# Patient Record
Sex: MALE
[De-identification: names, ages, dates, MRNs, and addresses within clinical notes are randomized per-mention and may not be internally consistent; named-entity substitution may affect disease eponyms.]

---

## 2023-06-05 PROBLEM — Z00.00 ENCOUNTER FOR PREVENTIVE HEALTH EXAMINATION: Status: ACTIVE | Noted: 2023-06-05

## 2023-07-14 ENCOUNTER — APPOINTMENT (OUTPATIENT)
Dept: OTOLARYNGOLOGY | Facility: CLINIC | Age: 58
End: 2023-07-14

## 2023-08-20 ENCOUNTER — INPATIENT (INPATIENT)
Facility: HOSPITAL | Age: 58
LOS: 7 days | Discharge: HOME | DRG: 872 | End: 2023-08-28
Attending: INTERNAL MEDICINE | Admitting: INTERNAL MEDICINE
Payer: MEDICAID

## 2023-08-20 VITALS
DIASTOLIC BLOOD PRESSURE: 55 MMHG | HEART RATE: 102 BPM | HEIGHT: 74 IN | SYSTOLIC BLOOD PRESSURE: 88 MMHG | WEIGHT: 259.93 LBS | RESPIRATION RATE: 18 BRPM | TEMPERATURE: 98 F | OXYGEN SATURATION: 94 %

## 2023-08-20 LAB
ALBUMIN SERPL ELPH-MCNC: 3.2 G/DL — LOW (ref 3.5–5)
ALBUMIN SERPL ELPH-MCNC: 3.2 G/DL — LOW (ref 3.5–5)
ALP SERPL-CCNC: 120 U/L — SIGNIFICANT CHANGE UP (ref 40–120)
ALP SERPL-CCNC: 120 U/L — SIGNIFICANT CHANGE UP (ref 40–120)
ALT FLD-CCNC: 32 U/L DA — SIGNIFICANT CHANGE UP (ref 10–60)
ALT FLD-CCNC: 32 U/L DA — SIGNIFICANT CHANGE UP (ref 10–60)
ANION GAP SERPL CALC-SCNC: 11 MMOL/L — SIGNIFICANT CHANGE UP (ref 5–17)
ANION GAP SERPL CALC-SCNC: 11 MMOL/L — SIGNIFICANT CHANGE UP (ref 5–17)
APTT BLD: 33.6 SEC — SIGNIFICANT CHANGE UP (ref 24.5–35.6)
APTT BLD: 33.6 SEC — SIGNIFICANT CHANGE UP (ref 24.5–35.6)
AST SERPL-CCNC: 16 U/L — SIGNIFICANT CHANGE UP (ref 10–40)
AST SERPL-CCNC: 16 U/L — SIGNIFICANT CHANGE UP (ref 10–40)
BASOPHILS # BLD AUTO: 0.05 K/UL — SIGNIFICANT CHANGE UP (ref 0–0.2)
BASOPHILS # BLD AUTO: 0.05 K/UL — SIGNIFICANT CHANGE UP (ref 0–0.2)
BASOPHILS NFR BLD AUTO: 0.3 % — SIGNIFICANT CHANGE UP (ref 0–2)
BASOPHILS NFR BLD AUTO: 0.3 % — SIGNIFICANT CHANGE UP (ref 0–2)
BILIRUB SERPL-MCNC: 0.5 MG/DL — SIGNIFICANT CHANGE UP (ref 0.2–1.2)
BILIRUB SERPL-MCNC: 0.5 MG/DL — SIGNIFICANT CHANGE UP (ref 0.2–1.2)
BUN SERPL-MCNC: 17 MG/DL — SIGNIFICANT CHANGE UP (ref 7–18)
BUN SERPL-MCNC: 17 MG/DL — SIGNIFICANT CHANGE UP (ref 7–18)
CALCIUM SERPL-MCNC: 8.6 MG/DL — SIGNIFICANT CHANGE UP (ref 8.4–10.5)
CALCIUM SERPL-MCNC: 8.6 MG/DL — SIGNIFICANT CHANGE UP (ref 8.4–10.5)
CHLORIDE SERPL-SCNC: 101 MMOL/L — SIGNIFICANT CHANGE UP (ref 96–108)
CHLORIDE SERPL-SCNC: 101 MMOL/L — SIGNIFICANT CHANGE UP (ref 96–108)
CO2 SERPL-SCNC: 22 MMOL/L — SIGNIFICANT CHANGE UP (ref 22–31)
CO2 SERPL-SCNC: 22 MMOL/L — SIGNIFICANT CHANGE UP (ref 22–31)
CREAT SERPL-MCNC: 1.76 MG/DL — HIGH (ref 0.5–1.3)
CREAT SERPL-MCNC: 1.76 MG/DL — HIGH (ref 0.5–1.3)
EGFR: 44 ML/MIN/1.73M2 — LOW
EGFR: 44 ML/MIN/1.73M2 — LOW
EOSINOPHIL # BLD AUTO: 0.08 K/UL — SIGNIFICANT CHANGE UP (ref 0–0.5)
EOSINOPHIL # BLD AUTO: 0.08 K/UL — SIGNIFICANT CHANGE UP (ref 0–0.5)
EOSINOPHIL NFR BLD AUTO: 0.5 % — SIGNIFICANT CHANGE UP (ref 0–6)
EOSINOPHIL NFR BLD AUTO: 0.5 % — SIGNIFICANT CHANGE UP (ref 0–6)
GLUCOSE SERPL-MCNC: 147 MG/DL — HIGH (ref 70–99)
GLUCOSE SERPL-MCNC: 147 MG/DL — HIGH (ref 70–99)
HCT VFR BLD CALC: 26.9 % — LOW (ref 39–50)
HCT VFR BLD CALC: 26.9 % — LOW (ref 39–50)
HGB BLD-MCNC: 7.9 G/DL — LOW (ref 13–17)
HGB BLD-MCNC: 7.9 G/DL — LOW (ref 13–17)
IMM GRANULOCYTES NFR BLD AUTO: 0.6 % — SIGNIFICANT CHANGE UP (ref 0–0.9)
IMM GRANULOCYTES NFR BLD AUTO: 0.6 % — SIGNIFICANT CHANGE UP (ref 0–0.9)
INR BLD: 1.2 RATIO — HIGH (ref 0.85–1.18)
INR BLD: 1.2 RATIO — HIGH (ref 0.85–1.18)
LACTATE SERPL-SCNC: 2 MMOL/L — SIGNIFICANT CHANGE UP (ref 0.7–2)
LACTATE SERPL-SCNC: 2 MMOL/L — SIGNIFICANT CHANGE UP (ref 0.7–2)
LYMPHOCYTES # BLD AUTO: 0.9 K/UL — LOW (ref 1–3.3)
LYMPHOCYTES # BLD AUTO: 0.9 K/UL — LOW (ref 1–3.3)
LYMPHOCYTES # BLD AUTO: 5.3 % — LOW (ref 13–44)
LYMPHOCYTES # BLD AUTO: 5.3 % — LOW (ref 13–44)
MCHC RBC-ENTMCNC: 22.7 PG — LOW (ref 27–34)
MCHC RBC-ENTMCNC: 22.7 PG — LOW (ref 27–34)
MCHC RBC-ENTMCNC: 29.4 GM/DL — LOW (ref 32–36)
MCHC RBC-ENTMCNC: 29.4 GM/DL — LOW (ref 32–36)
MCV RBC AUTO: 77.3 FL — LOW (ref 80–100)
MCV RBC AUTO: 77.3 FL — LOW (ref 80–100)
MONOCYTES # BLD AUTO: 1.47 K/UL — HIGH (ref 0–0.9)
MONOCYTES # BLD AUTO: 1.47 K/UL — HIGH (ref 0–0.9)
MONOCYTES NFR BLD AUTO: 8.6 % — SIGNIFICANT CHANGE UP (ref 2–14)
MONOCYTES NFR BLD AUTO: 8.6 % — SIGNIFICANT CHANGE UP (ref 2–14)
NEUTROPHILS # BLD AUTO: 14.41 K/UL — HIGH (ref 1.8–7.4)
NEUTROPHILS # BLD AUTO: 14.41 K/UL — HIGH (ref 1.8–7.4)
NEUTROPHILS NFR BLD AUTO: 84.7 % — HIGH (ref 43–77)
NEUTROPHILS NFR BLD AUTO: 84.7 % — HIGH (ref 43–77)
NRBC # BLD: 0 /100 WBCS — SIGNIFICANT CHANGE UP (ref 0–0)
NRBC # BLD: 0 /100 WBCS — SIGNIFICANT CHANGE UP (ref 0–0)
PLATELET # BLD AUTO: 397 K/UL — SIGNIFICANT CHANGE UP (ref 150–400)
PLATELET # BLD AUTO: 397 K/UL — SIGNIFICANT CHANGE UP (ref 150–400)
POTASSIUM SERPL-MCNC: 3.4 MMOL/L — LOW (ref 3.5–5.3)
POTASSIUM SERPL-MCNC: 3.4 MMOL/L — LOW (ref 3.5–5.3)
POTASSIUM SERPL-SCNC: 3.4 MMOL/L — LOW (ref 3.5–5.3)
POTASSIUM SERPL-SCNC: 3.4 MMOL/L — LOW (ref 3.5–5.3)
PROT SERPL-MCNC: 8.2 G/DL — SIGNIFICANT CHANGE UP (ref 6–8.3)
PROT SERPL-MCNC: 8.2 G/DL — SIGNIFICANT CHANGE UP (ref 6–8.3)
PROTHROM AB SERPL-ACNC: 13.6 SEC — HIGH (ref 9.5–13)
PROTHROM AB SERPL-ACNC: 13.6 SEC — HIGH (ref 9.5–13)
RBC # BLD: 3.48 M/UL — LOW (ref 4.2–5.8)
RBC # BLD: 3.48 M/UL — LOW (ref 4.2–5.8)
RBC # FLD: 16.5 % — HIGH (ref 10.3–14.5)
RBC # FLD: 16.5 % — HIGH (ref 10.3–14.5)
SARS-COV-2 RNA SPEC QL NAA+PROBE: SIGNIFICANT CHANGE UP
SARS-COV-2 RNA SPEC QL NAA+PROBE: SIGNIFICANT CHANGE UP
SODIUM SERPL-SCNC: 134 MMOL/L — LOW (ref 135–145)
SODIUM SERPL-SCNC: 134 MMOL/L — LOW (ref 135–145)
TROPONIN I, HIGH SENSITIVITY RESULT: 7.3 NG/L — SIGNIFICANT CHANGE UP
TROPONIN I, HIGH SENSITIVITY RESULT: 7.3 NG/L — SIGNIFICANT CHANGE UP
WBC # BLD: 17.02 K/UL — HIGH (ref 3.8–10.5)
WBC # BLD: 17.02 K/UL — HIGH (ref 3.8–10.5)
WBC # FLD AUTO: 17.02 K/UL — HIGH (ref 3.8–10.5)
WBC # FLD AUTO: 17.02 K/UL — HIGH (ref 3.8–10.5)

## 2023-08-20 PROCEDURE — 99285 EMERGENCY DEPT VISIT HI MDM: CPT

## 2023-08-20 PROCEDURE — 71045 X-RAY EXAM CHEST 1 VIEW: CPT | Mod: 26

## 2023-08-20 RX ORDER — PIPERACILLIN AND TAZOBACTAM 4; .5 G/20ML; G/20ML
3.38 INJECTION, POWDER, LYOPHILIZED, FOR SOLUTION INTRAVENOUS ONCE
Refills: 0 | Status: COMPLETED | OUTPATIENT
Start: 2023-08-20 | End: 2023-08-20

## 2023-08-20 RX ORDER — VANCOMYCIN HCL 1 G
1000 VIAL (EA) INTRAVENOUS ONCE
Refills: 0 | Status: COMPLETED | OUTPATIENT
Start: 2023-08-20 | End: 2023-08-21

## 2023-08-20 RX ORDER — SODIUM CHLORIDE 9 MG/ML
1000 INJECTION INTRAMUSCULAR; INTRAVENOUS; SUBCUTANEOUS ONCE
Refills: 0 | Status: COMPLETED | OUTPATIENT
Start: 2023-08-20 | End: 2023-08-20

## 2023-08-20 RX ADMIN — SODIUM CHLORIDE 1000 MILLILITER(S): 9 INJECTION INTRAMUSCULAR; INTRAVENOUS; SUBCUTANEOUS at 23:53

## 2023-08-20 RX ADMIN — PIPERACILLIN AND TAZOBACTAM 200 GRAM(S): 4; .5 INJECTION, POWDER, LYOPHILIZED, FOR SOLUTION INTRAVENOUS at 23:54

## 2023-08-20 NOTE — ED PROVIDER NOTE - CLINICAL SUMMARY MEDICAL DECISION MAKING FREE TEXT BOX
1:14am  /68. Pt in no idstress.  MAR and hospitalist endorsed. Pt agrees with admission for IVF and IV abx.  I had a detailed discussion with the patient and/or guardian regarding the historical points, exam findings, and any diagnostic results supporting the admit diagnosis. 1:14am  /68. Pt in no idstress.  MAR and hospitalist endorsed. Pt agrees with admission for IVF and IV abx.  I had a detailed discussion with the patient and/or guardian regarding the historical points, exam findings, and any diagnostic results supporting the admit diagnosis.    H/H 7.9/26.9, pt states received 1 unit transfusion at [facility]. Pt denies blood per rectum. Pt states has dark stool from ferrous supplements. 1:14am  /68. Pt in no idstress.  MAR and hospitalist endorsed. Pt agrees with admission for IVF and IV abx.  I had a detailed discussion with the patient and/or guardian regarding the historical points, exam findings, and any diagnostic results supporting the admit diagnosis.    H/H 7.9/26.9, pt states received 1 unit transfusion at [facility]. Pt denies blood per rectum. Pt states has dark stool from ferrous supplements. 1:14am  /68. Pt in no idstress.  MAR and hospitalist endorsed. Pt agrees with admission for IVF and IV abx.  I had a detailed discussion with the patient and/or guardian regarding the historical points, exam findings, and any diagnostic results supporting the admit diagnosis.    H/H 7.9/26.9, pt states received 1 unit transfusion at [facility]. Pt denies blood per rectum. Pt states has dark stool from ferrous supplements.

## 2023-08-20 NOTE — ED PROVIDER NOTE - OBJECTIVE STATEMENT
58-year-old male past medical history for diabetes, dyslipidemia, hypertension.  Patient with progressive weakness for the past 3 days.  Patient states he was recently admitted to [facility] in [place] for anemia and diabetic lower extremity infected ulcers.  Patient states he left hospital 2 days ago due to personal issues.  Patient denies chest pain, no shortness of breath, no vomiting, no abdominal pain.  Patient does not endorse recent fever.  Meds: Janumet, Jardiance, insulin, losartan/hydrochlorothiazide, pravastatin. 58-year-old male past medical history for diabetes, dyslipidemia, hypertension.  Patient with progressive weakness for the past 3 days.  Patient states he was recently admitted to [facility] in [place] for anemia and diabetic lower extremity infected ulcers.  Patient states he left hospital 2 days ago due to personal issues.  Patient denies chest pain, no shortness of breath, no vomiting, no abdominal pain.  Patient does not endorse recent fever.  Meds: Janumet, Jardiance, insulin, losartan/hydrochlorothiazide, pravastatin. 58-year-old male past medical history for diabetes, dyslipidemia, hypertension.  Patient with progressive weakness for the past 3 days.  Patient states he was recently admitted to [facility] in [place] for anemia and diabetic lower extremity infected ulcers.  Patient states he left hospital 2 days ago due to personal issues.  Patient denies chest pain, no shortness of breath, no vomiting, no abdominal pain.  Patient does not endorse recent fever.  Meds: Janumet, Jardiance, insulin, losartan/hydrochlorothiazide, pravastatin.

## 2023-08-20 NOTE — ED PROVIDER NOTE - NS ED MD DISPO DIVISION
[facility]

## 2023-08-20 NOTE — ED ADULT TRIAGE NOTE - NS ED NURSE AMBULANCES
[facility]

## 2023-08-21 DIAGNOSIS — E11.9 TYPE 2 DIABETES MELLITUS WITHOUT COMPLICATIONS: ICD-10-CM

## 2023-08-21 DIAGNOSIS — A41.9 SEPSIS, UNSPECIFIED ORGANISM: ICD-10-CM

## 2023-08-21 DIAGNOSIS — L97.909 NON-PRESSURE CHRONIC ULCER OF UNSPECIFIED PART OF UNSPECIFIED LOWER LEG WITH UNSPECIFIED SEVERITY: ICD-10-CM

## 2023-08-21 DIAGNOSIS — N17.9 ACUTE KIDNEY FAILURE, UNSPECIFIED: ICD-10-CM

## 2023-08-21 DIAGNOSIS — I10 ESSENTIAL (PRIMARY) HYPERTENSION: ICD-10-CM

## 2023-08-21 DIAGNOSIS — E78.5 HYPERLIPIDEMIA, UNSPECIFIED: ICD-10-CM

## 2023-08-21 DIAGNOSIS — Z29.9 ENCOUNTER FOR PROPHYLACTIC MEASURES, UNSPECIFIED: ICD-10-CM

## 2023-08-21 DIAGNOSIS — I95.9 HYPOTENSION, UNSPECIFIED: ICD-10-CM

## 2023-08-21 DIAGNOSIS — D64.9 ANEMIA, UNSPECIFIED: ICD-10-CM

## 2023-08-21 DIAGNOSIS — L03.115 CELLULITIS OF RIGHT LOWER LIMB: ICD-10-CM

## 2023-08-21 DIAGNOSIS — E11.622 TYPE 2 DIABETES MELLITUS WITH OTHER SKIN ULCER: ICD-10-CM

## 2023-08-21 LAB
A1C WITH ESTIMATED AVERAGE GLUCOSE RESULT: 6.6 % — HIGH (ref 4–5.6)
A1C WITH ESTIMATED AVERAGE GLUCOSE RESULT: 6.6 % — HIGH (ref 4–5.6)
ABO RH CONFIRMATION: SIGNIFICANT CHANGE UP
ABO RH CONFIRMATION: SIGNIFICANT CHANGE UP
ANION GAP SERPL CALC-SCNC: 10 MMOL/L — SIGNIFICANT CHANGE UP (ref 5–17)
ANION GAP SERPL CALC-SCNC: 10 MMOL/L — SIGNIFICANT CHANGE UP (ref 5–17)
ANISOCYTOSIS BLD QL: SLIGHT — SIGNIFICANT CHANGE UP
ANISOCYTOSIS BLD QL: SLIGHT — SIGNIFICANT CHANGE UP
APPEARANCE UR: CLEAR — SIGNIFICANT CHANGE UP
APPEARANCE UR: CLEAR — SIGNIFICANT CHANGE UP
BILIRUB UR-MCNC: NEGATIVE — SIGNIFICANT CHANGE UP
BILIRUB UR-MCNC: NEGATIVE — SIGNIFICANT CHANGE UP
BUN SERPL-MCNC: 15 MG/DL — SIGNIFICANT CHANGE UP (ref 7–18)
BUN SERPL-MCNC: 15 MG/DL — SIGNIFICANT CHANGE UP (ref 7–18)
CALCIUM SERPL-MCNC: 7.1 MG/DL — LOW (ref 8.4–10.5)
CALCIUM SERPL-MCNC: 7.1 MG/DL — LOW (ref 8.4–10.5)
CHLORIDE SERPL-SCNC: 106 MMOL/L — SIGNIFICANT CHANGE UP (ref 96–108)
CHLORIDE SERPL-SCNC: 106 MMOL/L — SIGNIFICANT CHANGE UP (ref 96–108)
CO2 SERPL-SCNC: 20 MMOL/L — LOW (ref 22–31)
CO2 SERPL-SCNC: 20 MMOL/L — LOW (ref 22–31)
COLOR SPEC: YELLOW — SIGNIFICANT CHANGE UP
COLOR SPEC: YELLOW — SIGNIFICANT CHANGE UP
CREAT SERPL-MCNC: 1.1 MG/DL — SIGNIFICANT CHANGE UP (ref 0.5–1.3)
CREAT SERPL-MCNC: 1.1 MG/DL — SIGNIFICANT CHANGE UP (ref 0.5–1.3)
DIFF PNL FLD: NEGATIVE — SIGNIFICANT CHANGE UP
DIFF PNL FLD: NEGATIVE — SIGNIFICANT CHANGE UP
EGFR: 78 ML/MIN/1.73M2 — SIGNIFICANT CHANGE UP
EGFR: 78 ML/MIN/1.73M2 — SIGNIFICANT CHANGE UP
ESTIMATED AVERAGE GLUCOSE: 143 MG/DL — HIGH (ref 68–114)
ESTIMATED AVERAGE GLUCOSE: 143 MG/DL — HIGH (ref 68–114)
FERRITIN SERPL-MCNC: 73 NG/ML — SIGNIFICANT CHANGE UP (ref 30–400)
FERRITIN SERPL-MCNC: 73 NG/ML — SIGNIFICANT CHANGE UP (ref 30–400)
GLUCOSE BLDC GLUCOMTR-MCNC: 133 MG/DL — HIGH (ref 70–99)
GLUCOSE BLDC GLUCOMTR-MCNC: 133 MG/DL — HIGH (ref 70–99)
GLUCOSE BLDC GLUCOMTR-MCNC: 147 MG/DL — HIGH (ref 70–99)
GLUCOSE BLDC GLUCOMTR-MCNC: 147 MG/DL — HIGH (ref 70–99)
GLUCOSE BLDC GLUCOMTR-MCNC: 164 MG/DL — HIGH (ref 70–99)
GLUCOSE BLDC GLUCOMTR-MCNC: 164 MG/DL — HIGH (ref 70–99)
GLUCOSE BLDC GLUCOMTR-MCNC: 193 MG/DL — HIGH (ref 70–99)
GLUCOSE BLDC GLUCOMTR-MCNC: 193 MG/DL — HIGH (ref 70–99)
GLUCOSE SERPL-MCNC: 224 MG/DL — HIGH (ref 70–99)
GLUCOSE SERPL-MCNC: 224 MG/DL — HIGH (ref 70–99)
GLUCOSE UR QL: >=1000 MG/DL
GLUCOSE UR QL: >=1000 MG/DL
GRAM STN FLD: SIGNIFICANT CHANGE UP
HAPTOGLOB SERPL-MCNC: 372 MG/DL — HIGH (ref 34–200)
HAPTOGLOB SERPL-MCNC: 372 MG/DL — HIGH (ref 34–200)
HCT VFR BLD CALC: 22.9 % — LOW (ref 39–50)
HCT VFR BLD CALC: 22.9 % — LOW (ref 39–50)
HCT VFR BLD CALC: 24.8 % — LOW (ref 39–50)
HCT VFR BLD CALC: 24.8 % — LOW (ref 39–50)
HGB BLD-MCNC: 6.9 G/DL — CRITICAL LOW (ref 13–17)
HGB BLD-MCNC: 6.9 G/DL — CRITICAL LOW (ref 13–17)
HGB BLD-MCNC: 7.6 G/DL — LOW (ref 13–17)
HGB BLD-MCNC: 7.6 G/DL — LOW (ref 13–17)
HYPOCHROMIA BLD QL: SLIGHT — SIGNIFICANT CHANGE UP
HYPOCHROMIA BLD QL: SLIGHT — SIGNIFICANT CHANGE UP
IRON SATN MFR SERPL: 2 % — LOW (ref 20–55)
IRON SATN MFR SERPL: 2 % — LOW (ref 20–55)
IRON SATN MFR SERPL: 8 UG/DL — LOW (ref 65–170)
IRON SATN MFR SERPL: 8 UG/DL — LOW (ref 65–170)
KETONES UR-MCNC: ABNORMAL MG/DL
KETONES UR-MCNC: ABNORMAL MG/DL
LDH SERPL L TO P-CCNC: 158 U/L — SIGNIFICANT CHANGE UP (ref 120–225)
LDH SERPL L TO P-CCNC: 158 U/L — SIGNIFICANT CHANGE UP (ref 120–225)
LEUKOCYTE ESTERASE UR-ACNC: NEGATIVE — SIGNIFICANT CHANGE UP
LEUKOCYTE ESTERASE UR-ACNC: NEGATIVE — SIGNIFICANT CHANGE UP
MAGNESIUM SERPL-MCNC: 2.3 MG/DL — SIGNIFICANT CHANGE UP (ref 1.6–2.6)
MAGNESIUM SERPL-MCNC: 2.3 MG/DL — SIGNIFICANT CHANGE UP (ref 1.6–2.6)
MANUAL SMEAR VERIFICATION: SIGNIFICANT CHANGE UP
MANUAL SMEAR VERIFICATION: SIGNIFICANT CHANGE UP
MCHC RBC-ENTMCNC: 23.4 PG — LOW (ref 27–34)
MCHC RBC-ENTMCNC: 23.4 PG — LOW (ref 27–34)
MCHC RBC-ENTMCNC: 23.8 PG — LOW (ref 27–34)
MCHC RBC-ENTMCNC: 23.8 PG — LOW (ref 27–34)
MCHC RBC-ENTMCNC: 30.1 GM/DL — LOW (ref 32–36)
MCHC RBC-ENTMCNC: 30.1 GM/DL — LOW (ref 32–36)
MCHC RBC-ENTMCNC: 30.6 GM/DL — LOW (ref 32–36)
MCHC RBC-ENTMCNC: 30.6 GM/DL — LOW (ref 32–36)
MCV RBC AUTO: 77.5 FL — LOW (ref 80–100)
MCV RBC AUTO: 77.5 FL — LOW (ref 80–100)
MCV RBC AUTO: 77.6 FL — LOW (ref 80–100)
MCV RBC AUTO: 77.6 FL — LOW (ref 80–100)
MICROCYTES BLD QL: SLIGHT — SIGNIFICANT CHANGE UP
MICROCYTES BLD QL: SLIGHT — SIGNIFICANT CHANGE UP
NITRITE UR-MCNC: NEGATIVE — SIGNIFICANT CHANGE UP
NITRITE UR-MCNC: NEGATIVE — SIGNIFICANT CHANGE UP
NRBC # BLD: 0 /100 WBCS — SIGNIFICANT CHANGE UP (ref 0–0)
OVALOCYTES BLD QL SMEAR: SLIGHT — SIGNIFICANT CHANGE UP
OVALOCYTES BLD QL SMEAR: SLIGHT — SIGNIFICANT CHANGE UP
PH UR: 6 — SIGNIFICANT CHANGE UP (ref 5–8)
PH UR: 6 — SIGNIFICANT CHANGE UP (ref 5–8)
PHOSPHATE SERPL-MCNC: 3.2 MG/DL — SIGNIFICANT CHANGE UP (ref 2.5–4.5)
PHOSPHATE SERPL-MCNC: 3.2 MG/DL — SIGNIFICANT CHANGE UP (ref 2.5–4.5)
PLAT MORPH BLD: NORMAL — SIGNIFICANT CHANGE UP
PLAT MORPH BLD: NORMAL — SIGNIFICANT CHANGE UP
PLATELET # BLD AUTO: 314 K/UL — SIGNIFICANT CHANGE UP (ref 150–400)
PLATELET # BLD AUTO: 314 K/UL — SIGNIFICANT CHANGE UP (ref 150–400)
PLATELET # BLD AUTO: 318 K/UL — SIGNIFICANT CHANGE UP (ref 150–400)
PLATELET # BLD AUTO: 318 K/UL — SIGNIFICANT CHANGE UP (ref 150–400)
PLATELET COUNT - ESTIMATE: NORMAL — SIGNIFICANT CHANGE UP
PLATELET COUNT - ESTIMATE: NORMAL — SIGNIFICANT CHANGE UP
POLYCHROMASIA BLD QL SMEAR: SLIGHT — SIGNIFICANT CHANGE UP
POLYCHROMASIA BLD QL SMEAR: SLIGHT — SIGNIFICANT CHANGE UP
POTASSIUM SERPL-MCNC: 3.5 MMOL/L — SIGNIFICANT CHANGE UP (ref 3.5–5.3)
POTASSIUM SERPL-MCNC: 3.5 MMOL/L — SIGNIFICANT CHANGE UP (ref 3.5–5.3)
POTASSIUM SERPL-SCNC: 3.5 MMOL/L — SIGNIFICANT CHANGE UP (ref 3.5–5.3)
POTASSIUM SERPL-SCNC: 3.5 MMOL/L — SIGNIFICANT CHANGE UP (ref 3.5–5.3)
PROT UR-MCNC: 30 MG/DL
PROT UR-MCNC: 30 MG/DL
RBC # BLD: 2.95 M/UL — LOW (ref 4.2–5.8)
RBC # BLD: 2.95 M/UL — LOW (ref 4.2–5.8)
RBC # BLD: 3.2 M/UL — LOW (ref 4.2–5.8)
RBC # BLD: 3.2 M/UL — LOW (ref 4.2–5.8)
RBC # FLD: 16.8 % — HIGH (ref 10.3–14.5)
RBC # FLD: 16.8 % — HIGH (ref 10.3–14.5)
RBC # FLD: 16.9 % — HIGH (ref 10.3–14.5)
RBC # FLD: 16.9 % — HIGH (ref 10.3–14.5)
RBC BLD AUTO: ABNORMAL
RBC BLD AUTO: ABNORMAL
RETICS #: 62.8 K/UL — SIGNIFICANT CHANGE UP (ref 25–125)
RETICS #: 62.8 K/UL — SIGNIFICANT CHANGE UP (ref 25–125)
RETICS/RBC NFR: 2.1 % — SIGNIFICANT CHANGE UP (ref 0.5–2.5)
RETICS/RBC NFR: 2.1 % — SIGNIFICANT CHANGE UP (ref 0.5–2.5)
SODIUM SERPL-SCNC: 136 MMOL/L — SIGNIFICANT CHANGE UP (ref 135–145)
SODIUM SERPL-SCNC: 136 MMOL/L — SIGNIFICANT CHANGE UP (ref 135–145)
SP GR SPEC: 1.03 — HIGH (ref 1–1.03)
SP GR SPEC: 1.03 — HIGH (ref 1–1.03)
SPECIMEN SOURCE: SIGNIFICANT CHANGE UP
TIBC SERPL-MCNC: 323 UG/DL — SIGNIFICANT CHANGE UP (ref 250–450)
TIBC SERPL-MCNC: 323 UG/DL — SIGNIFICANT CHANGE UP (ref 250–450)
TRANSFERRIN SERPL-MCNC: 274 MG/DL — SIGNIFICANT CHANGE UP (ref 200–360)
TRANSFERRIN SERPL-MCNC: 274 MG/DL — SIGNIFICANT CHANGE UP (ref 200–360)
UIBC SERPL-MCNC: 315 UG/DL — SIGNIFICANT CHANGE UP (ref 110–370)
UIBC SERPL-MCNC: 315 UG/DL — SIGNIFICANT CHANGE UP (ref 110–370)
UROBILINOGEN FLD QL: 0.2 MG/DL — SIGNIFICANT CHANGE UP (ref 0.2–1)
UROBILINOGEN FLD QL: 0.2 MG/DL — SIGNIFICANT CHANGE UP (ref 0.2–1)
WBC # BLD: 10.14 K/UL — SIGNIFICANT CHANGE UP (ref 3.8–10.5)
WBC # BLD: 10.14 K/UL — SIGNIFICANT CHANGE UP (ref 3.8–10.5)
WBC # BLD: 14.49 K/UL — HIGH (ref 3.8–10.5)
WBC # BLD: 14.49 K/UL — HIGH (ref 3.8–10.5)
WBC # FLD AUTO: 10.14 K/UL — SIGNIFICANT CHANGE UP (ref 3.8–10.5)
WBC # FLD AUTO: 10.14 K/UL — SIGNIFICANT CHANGE UP (ref 3.8–10.5)
WBC # FLD AUTO: 14.49 K/UL — HIGH (ref 3.8–10.5)
WBC # FLD AUTO: 14.49 K/UL — HIGH (ref 3.8–10.5)

## 2023-08-21 PROCEDURE — 99222 1ST HOSP IP/OBS MODERATE 55: CPT

## 2023-08-21 PROCEDURE — 99223 1ST HOSP IP/OBS HIGH 75: CPT

## 2023-08-21 PROCEDURE — 73590 X-RAY EXAM OF LOWER LEG: CPT | Mod: 26,50

## 2023-08-21 RX ORDER — PIPERACILLIN AND TAZOBACTAM 4; .5 G/20ML; G/20ML
3.38 INJECTION, POWDER, LYOPHILIZED, FOR SOLUTION INTRAVENOUS EVERY 8 HOURS
Refills: 0 | Status: DISCONTINUED | OUTPATIENT
Start: 2023-08-21 | End: 2023-08-22

## 2023-08-21 RX ORDER — LANOLIN ALCOHOL/MO/W.PET/CERES
3 CREAM (GRAM) TOPICAL AT BEDTIME
Refills: 0 | Status: DISCONTINUED | OUTPATIENT
Start: 2023-08-21 | End: 2023-08-28

## 2023-08-21 RX ORDER — HEPARIN SODIUM 5000 [USP'U]/ML
5000 INJECTION INTRAVENOUS; SUBCUTANEOUS EVERY 8 HOURS
Refills: 0 | Status: DISCONTINUED | OUTPATIENT
Start: 2023-08-21 | End: 2023-08-21

## 2023-08-21 RX ORDER — COLLAGENASE CLOSTRIDIUM HIST. 250 UNIT/G
1 OINTMENT (GRAM) TOPICAL ONCE
Refills: 0 | Status: COMPLETED | OUTPATIENT
Start: 2023-08-21 | End: 2023-08-21

## 2023-08-21 RX ORDER — SODIUM CHLORIDE 9 MG/ML
1000 INJECTION INTRAMUSCULAR; INTRAVENOUS; SUBCUTANEOUS ONCE
Refills: 0 | Status: COMPLETED | OUTPATIENT
Start: 2023-08-21 | End: 2023-08-21

## 2023-08-21 RX ORDER — ACETAMINOPHEN 500 MG
650 TABLET ORAL EVERY 6 HOURS
Refills: 0 | Status: DISCONTINUED | OUTPATIENT
Start: 2023-08-21 | End: 2023-08-28

## 2023-08-21 RX ORDER — VANCOMYCIN HCL 1 G
1750 VIAL (EA) INTRAVENOUS EVERY 24 HOURS
Refills: 0 | Status: DISCONTINUED | OUTPATIENT
Start: 2023-08-21 | End: 2023-08-21

## 2023-08-21 RX ORDER — INSULIN LISPRO 100/ML
VIAL (ML) SUBCUTANEOUS AT BEDTIME
Refills: 0 | Status: DISCONTINUED | OUTPATIENT
Start: 2023-08-21 | End: 2023-08-28

## 2023-08-21 RX ORDER — INSULIN LISPRO 100/ML
VIAL (ML) SUBCUTANEOUS
Refills: 0 | Status: DISCONTINUED | OUTPATIENT
Start: 2023-08-21 | End: 2023-08-28

## 2023-08-21 RX ORDER — SODIUM CHLORIDE 9 MG/ML
1000 INJECTION INTRAMUSCULAR; INTRAVENOUS; SUBCUTANEOUS
Refills: 0 | Status: DISCONTINUED | OUTPATIENT
Start: 2023-08-21 | End: 2023-08-28

## 2023-08-21 RX ADMIN — Medication 1: at 09:15

## 2023-08-21 RX ADMIN — SODIUM CHLORIDE 125 MILLILITER(S): 9 INJECTION INTRAMUSCULAR; INTRAVENOUS; SUBCUTANEOUS at 05:03

## 2023-08-21 RX ADMIN — SODIUM CHLORIDE 1000 MILLILITER(S): 9 INJECTION INTRAMUSCULAR; INTRAVENOUS; SUBCUTANEOUS at 01:55

## 2023-08-21 RX ADMIN — Medication 1 APPLICATION(S): at 04:08

## 2023-08-21 RX ADMIN — SODIUM CHLORIDE 1000 MILLILITER(S): 9 INJECTION INTRAMUSCULAR; INTRAVENOUS; SUBCUTANEOUS at 00:55

## 2023-08-21 RX ADMIN — PIPERACILLIN AND TAZOBACTAM 3.38 GRAM(S): 4; .5 INJECTION, POWDER, LYOPHILIZED, FOR SOLUTION INTRAVENOUS at 00:30

## 2023-08-21 RX ADMIN — PIPERACILLIN AND TAZOBACTAM 25 GRAM(S): 4; .5 INJECTION, POWDER, LYOPHILIZED, FOR SOLUTION INTRAVENOUS at 05:07

## 2023-08-21 RX ADMIN — Medication 250 MILLIGRAM(S): at 00:57

## 2023-08-21 RX ADMIN — PIPERACILLIN AND TAZOBACTAM 25 GRAM(S): 4; .5 INJECTION, POWDER, LYOPHILIZED, FOR SOLUTION INTRAVENOUS at 14:33

## 2023-08-21 RX ADMIN — Medication 1: at 17:24

## 2023-08-21 RX ADMIN — SODIUM CHLORIDE 125 MILLILITER(S): 9 INJECTION INTRAMUSCULAR; INTRAVENOUS; SUBCUTANEOUS at 16:24

## 2023-08-21 RX ADMIN — HEPARIN SODIUM 5000 UNIT(S): 5000 INJECTION INTRAVENOUS; SUBCUTANEOUS at 05:07

## 2023-08-21 RX ADMIN — SODIUM CHLORIDE 125 MILLILITER(S): 9 INJECTION INTRAMUSCULAR; INTRAVENOUS; SUBCUTANEOUS at 20:23

## 2023-08-21 RX ADMIN — PIPERACILLIN AND TAZOBACTAM 25 GRAM(S): 4; .5 INJECTION, POWDER, LYOPHILIZED, FOR SOLUTION INTRAVENOUS at 22:39

## 2023-08-21 NOTE — H&P ADULT - PROBLEM SELECTOR PLAN 8
Home med: Jardiance, Janumet, Insuline     - MED REC NEEDED  - Started on [facility] Home med: Jardiance, Janumet, Insulin    - MED REC NEEDED  - Started on [facility] Home med: Jardiance, Janumet, Insulin  - MED REC NEEDED  - Started on [facility]

## 2023-08-21 NOTE — H&P ADULT - NSICDXPASTMEDICALHX_GEN_ALL_CORE_FT
PAST MEDICAL HISTORY:  DM (diabetes mellitus)     Dyslipidemia     HTN (hypertension)     Leg ulcer

## 2023-08-21 NOTE — H&P ADULT - NSHPREVIEWOFSYSTEMS_GEN_ALL_CORE
REVIEW OF SYSTEMS:  CONSTITUTIONAL: (+)weakness, No  fevers or chills  EYES: No conjunctiva redness, No eye discharge  ENT: No nasal congestion, No sore throat, No ear pain, No vertigo   NECK: No pain or stiffness  RESPIRATORY: No cough, wheezing, hemoptysis; No shortness of breath  CARDIOVASCULAR: No chest pain or palpitations  GASTROINTESTINAL: No abdominal or epigastric pain. No nausea, vomiting, or hematemesis; No diarrhea or constipation. No melena or hematochezia.  GENITOURINARY: No dysuria, frequency or hematuria  NEUROLOGICAL: No numbness or weakness  SKIN: (+) B/L leg ulcer   All other review of systems is negative unless indicated above. REVIEW OF SYSTEMS:  CONSTITUTIONAL: (+)weakness, No  fevers or chills  EYES: No conjunctiva redness, No eye discharge  ENT: No nasal congestion, No sore throat, No ear pain, No vertigo   NECK: No pain or stiffness  RESPIRATORY: No cough, wheezing, hemoptysis; No shortness of breath  CARDIOVASCULAR: No chest pain or palpitations  GASTROINTESTINAL: No abdominal or epigastric pain. No nausea, vomiting, or hematemesis; No diarrhea or constipation. No melena or hematochezia.  GENITOURINARY: No dysuria, frequency or hematuria  NEUROLOGICAL: No numbness or weakness  SKIN: (+) B/L leg ulcer, erythema  All other review of systems is negative unless indicated above.

## 2023-08-21 NOTE — CONSULT NOTE ADULT - ASSESSMENT
A:  DM wounds    P:   Patient evaluated and Chart reviewed   Discussed diagnosis and treatment with patient  Xrays pending  BRIDGETTE/PVR pending  Wound flushed with normal saline  Obtained wound culture to be sent to Lab  Applied santyl, xeroform with dry sterile dressing  Continue with IV antibiotics As Per ID  Weight bearing as tolerated to b/l feet and legs  ID consult recommended  Offloading to bilateral Heels.   Discussed importance of daily foot examinations and proper shoe gear and to importance of lower Fasting Blood Glucose levels.   Podiatry to follow while in house.  Discussed with Attending Dr. Farias    A:  DM wounds    P:   Patient evaluated and Chart reviewed   Discussed diagnosis and treatment with patient  Xrays reviewed  BRIDGETTE/PVR pending  Recommend MRI with concern for OM   Wound flushed with normal saline  Obtained wound culture to be sent to Lab  Applied santyl, xeroform with dry sterile dressing  Continue with IV antibiotics As Per ID  Weight bearing as tolerated to b/l feet and legs  ID consult recommended  Offloading to bilateral Heels.   Discussed importance of daily foot examinations and proper shoe gear and to importance of lower Fasting Blood Glucose levels.   Podiatry to follow while in house.  Discussed with Attending Dr. Farias

## 2023-08-21 NOTE — CONSULT NOTE ADULT - ASSESSMENT
Patient is a 58M with a PMHx of DM, HLD, HTN, ANEUDY (on PO iron), GERD (on Omeprazole), and diabetic leg ulcers, who presented to the ED with chills, weakness, and lightheadedness. GI was consulted for anemia.     Patient reports recent admission at [facility] in [place] 3 days ago for similar symptoms, received 1u PRBC, abx, and admitted however left AMA after 1 day due to personal affairs.   He presents today with weakness, dizziness, unable to walk, and low blood pressure x 1 week. Patient reports these symptoms started 6 months ago when his leg ulcers were bleeding. He also endorses decreased appetite, known hemorrhoids with scant bright red blood only when he wipes, functional dyspepsia with PO intake, for which he takes omeprazole, and intermittent dark stools when he takes PO iron (which he takes daily). He has not required IV iron infusions in the past. He denies cp, abdominal pain/tenderness, n/v/d, changes in stool caliber, hematochezia, weight loss. No family hx of liver disease or colorectal cancers. Does not take herbal supplements. Reports taking Naproxen 2 tabs daily for the past week for his lower extremity pain 2/2 ulcers. Last EGD/colonoscopy (2022) notable for polyps that were removed. He is due for repeat endoscopies 9/27/23 with his primary GI outpatient.     In the ED, BP 88/55, , WBC 17.02, neut 84.7, Hgb 7.9, MCV 77.3, K 3.5, Cr 1.76. No lactate and trop flat. LFTs wnl. s/p 2L NS and Vanc/Zosyn, BP improved 101/68.   Repeat labs this morning showed a drop in Hgb 6.9, currently receiving 1u PRBC.     #Microcytic anemia  #ANEUDY (on PO iron)  #GERD (on omeprazole)  #HX of polyps  #Hx of hemorrhoids  Patient presented with weakness, dizziness, and infected RLE ulcer x 3 days, found to be anemic with Hgb 7.9 and hypotensive 88/55, received 2L NS, fluid responsive with improvement in /68. Repeat Hgb 6.9 this morning, ? hemodilutional vs blood loss vs other, currently being transfused 1u PRBC. Patient reports not requiring blood transfusions in the past however received 1u PRBC when he was at [facility] in [place] 3 days prior for similar symptoms. Iron panel notable for low iron 8 and %sat 2, otherwise normal UIBC 315, TIBC 323, and . LFTs wnl. Patient has an outpatient EGD/colonoscopy scheduled for 9/27/23 with his primary GI. Discussed differentials including occult blood loss vs anatomical etiologies that may be contributing to anemia e.g. hiatal hernia vs slow transit GI bleed vs malignancy vs other. Reassuringly, patient denies constitutional symptoms that raise suspicion for malignancy, RABIA with light brown stool in vault, no overt signs of bleeding, and otherwise HD stable. BUN:Cr <30, low suspicion for upper source. Abd exam benign to indicate ulcer disease. Tolerating diet. Patient agreeable to close outpatient follow up with his primary GI and keep his endoscopy appointment.    	- s/p 1u PRBC, please obtain post-transfusion H/H  	- Diet as tolerated  	- IV PPI daily, OK to transition to PO on discharge  	- Monitor for s/sx of bleeding  	- Maintain active T&S, 2 large bore peripheral IVs, transfuse for goal Hgb >7 or if symptomatic  	- Trend H/H daily  	- Remainder of care per primary team  	- Patient to follow up with his primary GI outpatient for repeat EGD/colonoscopy scheduled for 9/27/23    This note and its recommendations herein are preliminary until such time as cosigned by an attending.    GI will sign off at this time.  Thank you for this consult!  Please re-consult GI PRN. Patient is a 58M with a PMHx of DM, HLD, HTN, ANEUDY (on PO iron), GERD (on Omeprazole), and diabetic leg ulcers, who presented to the ED with chills, weakness, and lightheadedness. GI was consulted for anemia.     Patient reports recent admission at [facility] in [place] 3 days ago for similar symptoms, received 1u PRBC, abx, and admitted however left AMA after 1 day due to personal affairs.   He presents today with weakness, dizziness, unable to walk, and low blood pressure x 1 week. Patient reports these symptoms started 6 months ago when his leg ulcers were bleeding. He also endorses decreased appetite, known hemorrhoids with scant bright red blood only when he wipes, functional dyspepsia with PO intake, for which he takes omeprazole, and intermittent dark stools when he takes PO iron (which he takes daily). He has not required IV iron infusions in the past. He denies cp, abdominal pain/tenderness, n/v/d, changes in stool caliber, hematochezia, weight loss. No family hx of liver disease or colorectal cancers. Does not take herbal supplements. Reports taking Naproxen 2 tabs daily for the past week for his lower extremity pain 2/2 ulcers. Last EGD/colonoscopy (2022) notable for polyps that were removed. He is due for repeat endoscopies 9/27/23 with his primary GI outpatient.     In the ED, BP 88/55, , WBC 17.02, neut 84.7, Hgb 7.9, MCV 77.3, K 3.5, Cr 1.76. No lactate and trop flat. LFTs wnl. s/p 2L NS and Vanc/Zosyn, BP improved 101/68.   Repeat labs this morning showed a drop in Hgb 6.9, currently receiving 1u PRBC.     #Microcytic anemia  #ANEUDY (on PO iron)  #GERD (on omeprazole)  #HX of polyps  #Hx of hemorrhoids  Patient presented with weakness, dizziness, and infected RLE ulcer x 3 days, found to be anemic with Hgb 7.9 and hypotensive 88/55, received 2L NS, fluid responsive with improvement in /68. Repeat Hgb 6.9 this morning, ? hemodilutional vs blood loss vs other, currently being transfused 1u PRBC. Patient reports not requiring blood transfusions in the past however received 1u PRBC when he was at [facility] in [place] 3 days prior for similar symptoms. Iron panel notable for low iron 8 and %sat 2, otherwise normal UIBC 315, TIBC 323, and . LFTs wnl. Patient has an outpatient EGD/colonoscopy scheduled for 9/27/23 with his primary GI. Discussed differentials including occult blood loss vs anatomical etiologies that may be contributing to anemia e.g. hiatal hernia vs slow transit GI bleed vs malignancy vs other. Reassuringly, patient denies constitutional symptoms that raise suspicion for malignancy, RABIA with light brown stool in vault, no overt signs of bleeding, and otherwise HD stable. BUN:Cr <30, low suspicion for upper source. Abd exam benign to indicate ulcer disease. Tolerating diet. Patient agreeable to close outpatient follow up with his primary GI and keep his endoscopy appointment.    	- s/p 1u PRBC, please obtain post-transfusion H/H  	- Diet as tolerated  	- IV PPI daily, OK to transition to PO on discharge  	- Monitor for s/sx of bleeding  	- Maintain active T&S, 2 large bore peripheral IVs, transfuse for goal Hgb >7 or if symptomatic  	- Trend H/H daily  	- Remainder of care per primary team  	- Patient to follow up with his primary GI outpatient for repeat EGD/colonoscopy scheduled for 9/27/23    This note and its recommendations herein are preliminary until such time as cosigned by an attending.    GI will sign off at this time.  Thank you for this consult!  Please re-consult GI PRN. Patient is a 58M with a PMHx of DM, HLD, HTN, ANEUDY (on PO iron), GERD (on Omeprazole), and diabetic leg ulcers, who presented to the ED with chills, weakness, and lightheadedness. GI was consulted for anemia.     Patient reports recent admission at [facility] in [place] 3 days ago for similar symptoms, received 1u PRBC, abx, and admitted however left AMA after 1 day due to personal affairs.   He presents today with weakness, dizziness, unable to walk, and low blood pressure x 1 week. Patient reports these symptoms started 6 months ago when his leg ulcers were bleeding. He also endorses decreased appetite, known hemorrhoids with scant bright red blood only when he wipes, functional dyspepsia with PO intake, for which he takes omeprazole, and intermittent dark stools when he takes PO iron (which he takes daily). He has not required IV iron infusions in the past. He denies cp, abdominal pain/tenderness, n/v/d, changes in stool caliber, hematochezia, weight loss. No family hx of liver disease or colorectal cancers. Does not take herbal supplements. Reports taking Naproxen 2 tabs daily for the past week for his lower extremity pain 2/2 ulcers. Last EGD/colonoscopy (2022) notable for polyps that were removed. He is due for repeat endoscopies 9/27/23 with his primary GI outpatient.     In the ED, BP 88/55, , WBC 17.02, neut 84.7, Hgb 7.9, MCV 77.3, K 3.5, Cr 1.76. No lactate and trop flat. LFTs wnl. s/p 2L NS and Vanc/Zosyn, BP improved 101/68.   Repeat labs this morning showed a drop in Hgb 6.9, currently receiving 1u PRBC.     #Microcytic anemia  #ANEUDY (on PO iron)  #GERD (on omeprazole)  #HX of polyps  #Hx of hemorrhoids  Patient presented with weakness, dizziness, and infected RLE ulcer x 3 days, found to be anemic with Hgb 7.9 and hypotensive 88/55, received 2L NS, fluid responsive with improvement in /68. Repeat Hgb 6.9 this morning, ? hemodilutional vs blood loss vs other, currently being transfused 1u PRBC. Patient reports not requiring blood transfusions in the past however received 1u PRBC when he was at [facility] in [place] 3 days prior for similar symptoms. Iron panel notable for low iron 8 and %sat 2, otherwise normal UIBC 315, TIBC 323, and . LFTs wnl. Patient has an outpatient EGD/colonoscopy scheduled for 9/27/23 with his primary GI. Discussed differentials including occult blood loss vs anatomical etiologies that may be contributing to anemia e.g. hiatal hernia vs slow transit GI bleed vs malignancy vs other. Reassuringly, patient denies constitutional symptoms that raise suspicion for malignancy, RABIA with light brown stool in vault, no overt signs of bleeding, and otherwise HD stable. BUN:Cr <30, low suspicion for upper source. Abd exam benign to indicate ulcer disease. Tolerating diet. Patient agreeable to close outpatient follow up with his primary GI and keep his endoscopy appointment.    	- s/p 1u PRBC, please obtain post-transfusion H/H  	- Diet as tolerated  	- IV PPI daily, OK to transition to PO on discharge  	- Monitor for s/sx of bleeding  	- Maintain active T&S, 2 large bore peripheral IVs, transfuse for goal Hgb >7 or if symptomatic  	- Trend H/H daily  	- Remainder of care per primary team  	- Patient to follow up with his primary GI outpatient for repeat EGD/colonoscopy scheduled for 9/27/23    This note and its recommendations herein are preliminary until such time as cosigned by an attending.    GI will sign off at this time.  Thank you for this consult!  Please re-consult GI PRN. Patient is a 58M with a PMHx of DM, HLD, HTN, ANEUDY (on PO iron), GERD (on Omeprazole), and diabetic leg ulcers, who presented to the ED with chills, weakness, and lightheadedness. GI was consulted for anemia.     Patient reports recent admission at [facility] in [place] 3 days ago for similar symptoms, received 1u PRBC, abx, and admitted however left AMA after 1 day due to personal affairs.   He presents today with weakness, dizziness, unable to walk, and low blood pressure x 1 week. Patient reports these symptoms started 6 months ago when his leg ulcers were bleeding. He also endorses decreased appetite, known hemorrhoids with scant bright red blood only when he wipes, functional dyspepsia with PO intake, for which he takes omeprazole, and intermittent dark stools when he takes PO iron (which he takes daily). He has not required IV iron infusions in the past. He denies cp, abdominal pain/tenderness, n/v/d, changes in stool caliber, hematochezia, weight loss. No family hx of liver disease or colorectal cancers. Does not take herbal supplements. Reports taking Naproxen 2 tabs daily for the past week for his lower extremity pain 2/2 ulcers. Last EGD/colonoscopy (2022) notable for polyps that were removed. He is due for repeat endoscopies 9/27/23 with his primary GI outpatient.     In the ED, BP 88/55, , WBC 17.02, neut 84.7, Hgb 7.9, MCV 77.3, K 3.5, Cr 1.76. No lactate and trop flat. LFTs wnl. s/p 2L NS and Vanc/Zosyn, BP improved 101/68.   Repeat labs this morning showed a drop in Hgb 6.9, currently receiving 1u PRBC.    #[id number] Ruth Ann.    #Microcytic anemia  #ANEUDY (on PO iron)  #GERD (on omeprazole)  #HX of polyps  #Hx of hemorrhoids  Patient presented with weakness, dizziness, and infected RLE ulcer x 3 days, found to be anemic with Hgb 7.9 and hypotensive 88/55, received 2L NS, fluid responsive with improvement in /68. Repeat Hgb 6.9 this morning, ? hemodilutional vs blood loss vs other, currently being transfused 1u PRBC. Patient reports not requiring blood transfusions in the past however received 1u PRBC when he was at [facility] in [place] 3 days prior for similar symptoms. Iron panel notable for low iron 8 and %sat 2, otherwise normal UIBC 315, TIBC 323, and . LFTs wnl. Patient has an outpatient EGD/colonoscopy scheduled for 9/27/23 with his primary GI. Discussed differentials including occult blood loss vs anatomical etiologies that may be contributing to anemia e.g. hiatal hernia vs slow transit GI bleed vs malignancy vs other. Reassuringly, patient denies constitutional symptoms that raise suspicion for malignancy, RABIA with light brown stool in vault, no overt signs of bleeding, and otherwise HD stable. BUN:Cr <30, low suspicion for upper source. Abd exam benign to indicate ulcer disease. Tolerating diet. Patient agreeable to close outpatient follow up with his primary GI and keep his endoscopy appointment.    	- s/p 1u PRBC, please obtain post-transfusion H/H  	- Diet as tolerated  	- IV PPI daily, OK to transition to PO on discharge  	- Monitor for s/sx of bleeding  	- Maintain active T&S, 2 large bore peripheral IVs, transfuse for goal Hgb >7 or if symptomatic  	- Trend H/H daily  	- Remainder of care per primary team  	- Patient to follow up with his primary GI outpatient for repeat EGD/colonoscopy scheduled for 9/27/23    This note and its recommendations herein are preliminary until such time as cosigned by an attending.    GI will sign off at this time.  Thank you for this consult!  Please re-consult GI PRN. Patient is a 58M with a PMHx of DM, HLD, HTN, ANEUDY (on PO iron), GERD (on Omeprazole), and diabetic leg ulcers, who presented to the ED with chills, weakness, and lightheadedness. GI was consulted for anemia.     Patient reports recent admission at [facility] in [place] 3 days ago for similar symptoms, received 1u PRBC, abx, and admitted however left AMA after 1 day due to personal affairs.   He presents today with weakness, dizziness, unable to walk, and low blood pressure x 1 week. Patient reports these symptoms started 6 months ago when his leg ulcers were bleeding. He also endorses decreased appetite, known hemorrhoids with scant bright red blood only when he wipes, functional dyspepsia with PO intake, for which he takes omeprazole, and intermittent dark stools when he takes PO iron (which he takes daily). He has not required IV iron infusions in the past. He denies cp, abdominal pain/tenderness, n/v/d, changes in stool caliber, hematochezia, weight loss. No family hx of liver disease or colorectal cancers. Does not take herbal supplements. Reports taking Naproxen 2 tabs daily for the past week for his lower extremity pain 2/2 ulcers. Last EGD/colonoscopy (2022) notable for polyps that were removed. He is due for repeat endoscopies 9/27/23 with his primary GI outpatient.     In the ED, BP 88/55, , WBC 17.02, neut 84.7, Hgb 7.9, MCV 77.3, K 3.5, Cr 1.76. No lactate and trop flat. LFTs wnl. s/p 2L NS and Vanc/Zosyn, BP improved 101/68.   Repeat labs this morning showed a drop in Hgb 6.9, currently receiving 1u PRBC.    #[id number] Ruth Ann.    #Microcytic anemia  #ANEUDY (on PO iron)  #GERD (on omeprazole)  #HX of polyps  #Hx of hemorrhoids  Patient presented with weakness, dizziness, and infected RLE ulcer x 3 days, found to be anemic with Hgb 7.9 and hypotensive 88/55, received 2L NS, fluid responsive with improvement in /68. Repeat Hgb 6.9 this morning, ? hemodilutional vs blood loss vs other, currently being transfused 1u PRBC. Patient reports not requiring blood transfusions in the past however received 1u PRBC when he was at [facility] in [place] 3 days prior for similar symptoms. Iron panel notable for low iron 8 and %sat 2, otherwise normal UIBC 315, TIBC 323, and . LFTs wnl. Patient has an outpatient EGD/colonoscopy scheduled for 9/27/23 with his primary GI. Discussed differentials including occult blood loss vs anatomical etiologies that may be contributing to anemia e.g. hiatal hernia vs slow transit GI bleed vs malignancy vs other. Reassuringly, patient denies constitutional symptoms that raise suspicion for malignancy, RABIA with light brown stool in vault, no overt signs of bleeding, and otherwise HD stable. BUN:Cr <30, low suspicion for upper source. Abd exam benign to indicate ulcer disease. Tolerating diet. Patient agreeable to close outpatient follow up with his primary GI and keep his endoscopy appointment.    	- s/p 1u PRBC, please obtain post-transfusion H/H  	- Diet as tolerated  	- IV PPI daily, OK to transition to PO on discharge  	- Monitor for s/sx of bleeding  	- Maintain active T&S, 2 large bore peripheral IVs, transfuse for goal Hgb >7 or if symptomatic  	- Trend H/H daily  	- Remainder of care per primary team  	- Patient to follow up with his primary GI outpatient for repeat EGD/colonoscopy scheduled for 9/27/23    This note and its recommendations herein are preliminary until such time as cosigned by an attending.    GI will sign off at this time.  Thank you for this consult!  Please re-consult GI PRN. Patient is a 58M with a PMHx of DM, HLD, HTN, ANEUDY (on PO iron), GERD (on Omeprazole), and diabetic leg ulcers, who presented to the ED with chills, weakness, and lightheadedness. GI was consulted for anemia.     Patient reports recent admission at [facility] in [place] 3 days ago for similar symptoms, received 1u PRBC, abx, and admitted however left AMA after 1 day due to personal affairs.   He presents today with weakness, dizziness, unable to walk, and low blood pressure x 1 week. Patient reports these symptoms started 6 months ago when his leg ulcers were bleeding. He also endorses decreased appetite, known hemorrhoids with scant bright red blood only when he wipes, functional dyspepsia with PO intake, for which he takes omeprazole, and intermittent dark stools when he takes PO iron (which he takes daily). He has not required IV iron infusions in the past. He denies cp, abdominal pain/tenderness, n/v/d, changes in stool caliber, hematochezia, weight loss. No family hx of liver disease or colorectal cancers. Does not take herbal supplements. Reports taking Naproxen 2 tabs daily for the past week for his lower extremity pain 2/2 ulcers. Last EGD/colonoscopy (2022) notable for polyps that were removed. He is due for repeat endoscopies 9/27/23 with his primary GI outpatient.     In the ED, BP 88/55, , WBC 17.02, neut 84.7, Hgb 7.9, MCV 77.3, K 3.5, Cr 1.76. No lactate and trop flat. LFTs wnl. s/p 2L NS and Vanc/Zosyn, BP improved 101/68.   Repeat labs this morning showed a drop in Hgb 6.9, currently receiving 1u PRBC.    #[id number] Ruth Ann.    #Microcytic anemia  #ANEUDY (on PO iron)  #GERD (on omeprazole)  #HX of polyps  #Hx of hemorrhoids  Patient presented with weakness, dizziness, and infected RLE ulcer x 3 days, found to be anemic with Hgb 7.9 and hypotensive 88/55, received 2L NS, fluid responsive with improvement in /68. Repeat Hgb 6.9 this morning, ? hemodilutional vs blood loss vs other, currently being transfused 1u PRBC. Patient reports not requiring blood transfusions in the past however received 1u PRBC when he was at [facility] in [place] 3 days prior for similar symptoms. Iron panel notable for low iron 8 and %sat 2, otherwise normal UIBC 315, TIBC 323, and . LFTs wnl. Patient has an outpatient EGD/colonoscopy scheduled for 9/27/23 with his primary GI. Discussed differentials including occult blood loss vs anatomical etiologies that may be contributing to anemia e.g. hiatal hernia vs slow transit GI bleed vs malignancy vs other. Reassuringly, patient denies constitutional symptoms that raise suspicion for malignancy, RABIA with light brown stool in vault, no overt signs of bleeding, and otherwise HD stable. BUN:Cr <30, low suspicion for upper source. Abd exam benign to indicate ulcer disease. Tolerating diet. Patient agreeable to close outpatient follow up with his primary GI and keep his endoscopy appointment.    	- s/p 1u PRBC, please obtain post-transfusion H/H  	- Diet as tolerated  	- IV PPI daily, OK to transition to PO on discharge  	- Monitor for s/sx of bleeding  	- Maintain active T&S, 2 large bore peripheral IVs, transfuse for goal Hgb >7 or if symptomatic  	- Trend H/H daily  	- Remainder of care per primary team  	- Patient to follow up with his primary GI outpatient for repeat EGD/colonoscopy scheduled for 9/27/23    This note and its recommendations herein are preliminary until such time as cosigned by an attending.    GI will sign off at this time.  Thank you for this consult!  Please re-consult GI PRN.

## 2023-08-21 NOTE — ED ADULT NURSE NOTE - OBJECTIVE STATEMENT
pt reports of generalized body weakness and ulcers to both legs. attached to cardiac monitor with NSR. ulcers to both lower legs noted. pt denies any fevers or chills. [id number].

## 2023-08-21 NOTE — PATIENT PROFILE ADULT - FALL HARM RISK - HARM RISK INTERVENTIONS
Assistance with ambulation/Assistance OOB with selected safe patient handling equipment/Communicate Risk of Fall with Harm to all staff/Discuss with provider need for PT consult/Monitor gait and stability/Provide patient with walking aids - walker, cane, crutches/Reinforce activity limits and safety measures with patient and family/Tailored Fall Risk Interventions/Visual Cue: Yellow wristband and red socks/Bed in lowest position, wheels locked, appropriate side rails in place/Call bell, personal items and telephone in reach/Instruct patient to call for assistance before getting out of bed or chair/Non-slip footwear when patient is out of bed/[place] to call system/Physically safe environment - no spills, clutter or unnecessary equipment/Purposeful Proactive Rounding/Room/bathroom lighting operational, light cord in reach Assistance with ambulation/Assistance OOB with selected safe patient handling equipment/Communicate Risk of Fall with Harm to all staff/Discuss with provider need for PT consult/Monitor gait and stability/Provide patient with walking aids - walker, cane, crutches/Reinforce activity limits and safety measures with patient and family/Tailored Fall Risk Interventions/Visual Cue: Yellow wristband and red socks/Bed in lowest position, wheels locked, appropriate side rails in place/Call bell, personal items and telephone in reach/Instruct patient to call for assistance before getting out of bed or chair/Non-slip footwear when patient is out of bed/[place] to call system/Physically safe environment - no spills, clutter or unnecessary equipment/Purposeful Proactive Rounding/Room/bathroom lighting operational, light cord in reach Assistance with ambulation/Assistance OOB with selected safe patient handling equipment/Communicate Risk of Fall with Harm to all staff/Discuss with provider need for PT consult/Monitor gait and stability/Provide patient with walking aids - walker, cane, crutches/Reinforce activity limits and safety measures with patient and family/Tailored Fall Risk Interventions/Visual Cue: Yellow wristband and red socks/Bed in lowest position, wheels locked, appropriate side rails in place/Call bell, personal items and telephone in reach/Instruct patient to call for assistance before getting out of bed or chair/Non-slip footwear when patient is out of bed/[place] to call system/Physically safe environment - no spills, clutter or unnecessary equipment/Purposeful Proactive Rounding/Room/bathroom lighting operational, light cord in reach

## 2023-08-21 NOTE — H&P ADULT - PROBLEM SELECTOR PLAN 3
In the ED:  Vitals -> BP: 88/55, HR:102, T:97.7, O2 sat: 94% RA  s/p 2L NS    - IVF PE: (+) surrounding erythema over RT lower leg ulcer  In the  ED:  WBC 17k,  s/p Vancomycin, Zosyn IVPB    - C/w Vancomycin & Zosyn IVPB  - Podiatry Consult   - F/u CBC PE: (+) surrounding erythema over RT lower leg ulcer  In the  ED:  WBC 17k,  s/p Vancomycin, Zosyn IVPB  - C/w Vancomycin & Zosyn IVPB  - Podiatry Consult   - F/u Bld cx x2  - F/u Wound cx x2   - F/u CBC

## 2023-08-21 NOTE — H&P ADULT - ATTENDING COMMENTS
Hx via  [facility] - [id number]    58 year old man with PMH of DM, HLD, HTN, with chronic B/L lower extremity ulcers - Follows at [facility]  for the past 5 years.  He comes in on account of dizziness, chills and weakness .  Similar symptoms few days ago for which he was admitted to [facility].  He was diagnosed with deep tissue infection and placed on antibiotics, received blood transfusion but had to leave AMA one day ago.  He states he follows with a dermatologist and PCP in [place] but has been told his chronic leg ulcers are not vascular or diabetic in origin after prior work up.    Vital Signs Last 24 Hrs  T(C): 36.6 (21 Aug 2023 04:35), Max: 36.6 (21 Aug 2023 04:35)  T(F): 97.9 (21 Aug 2023 04:35), Max: 97.9 (21 Aug 2023 04:35)  HR: 18 (21 Aug 2023 04:35) (18 - 102)  BP: 95/55 (21 Aug 2023 04:35) (88/55 - 101/68)  RR: 18 (21 Aug 2023 04:35) (18 - 18)  SpO2: 100% (21 Aug 2023 04:35) (94% - 100%)  Parameters below as of 21 Aug 2023 04:35  Patient On (Oxygen Delivery Method): room air    - Exam   Middle aged man, NAD presently  AAO X 3    Extremities  Left leg( distal 1/2)  with 3 large deep ulcers- 2 on the medial aspect (- 1 just above the medial malleolus)  and 1 on the lateral aspect   uneven edges, pink to reddish erythematous base with a lot of yellowish fibrinolytic material    Right leg - has very deep ulcer with exposed tendons just above the medial malleolus. Surrounding erythema, swelling and tenderness.    Wound dressing changed and cultures taken    - Labs reviewed  wbc 17K  H/H - 8/27 with microcytosis  Glycosuria  HCO3 -20- no AG  ARGELIA resolved     Impression   58 year old man with hx of DM, HTN, HLD with chronic B/L LE ulcers managed in [place] presenting with sepsis likely related to his wound infections.  The chronic wounds do not appear venous or arterial ulcers and not wholly diabetic either and are concerning for vasculitis like pyoderma gangrenosum.    A/P   - Sepsis   -  B/L lower extremity chronic ulcer infection  with cellulitis and suspected osteomyelitis  -  ARGELIA   -  Hypocalcemia   - Acute on chronic microcytic anemia   suspicious for iron def anemia related to GI bleeding  - DM   -HTN     Plan   Admit to Medicine   Sepsis work up   Empiric broad spectrum antibiotics with zosyn and vancomycin   Anemia work up   Type, crossmatch and transfuse at least 1 unit of packed red cells.  check A1c   Hold BP meds for hypotension or  low BP  Insulin therapy  Podiatry consult Hx via  [facility] - [id number]    58 year old man with PMH of DM, HLD, HTN, with chronic B/L lower extremity ulcers - Follows at [facility]  for the past 5 years.  He comes in on account of dizziness, chills and weakness .  Similar symptoms few days ago for which he was admitted to [facility].  He was diagnosed with deep tissue infection and placed on antibiotics, received blood transfusion but had to leave AMA one day ago.  He states he follows with a dermatologist and PCP in [place] but has been told his chronic leg ulcers are not vascular or diabetic in origin after prior work up.    Vital Signs Last 24 Hrs  T(C): 36.6 (21 Aug 2023 04:35), Max: 36.6 (21 Aug 2023 04:35)  T(F): 97.9 (21 Aug 2023 04:35), Max: 97.9 (21 Aug 2023 04:35)  HR: 18 (21 Aug 2023 04:35) (18 - 102)  BP: 95/55 (21 Aug 2023 04:35) (88/55 - 101/68)  RR: 18 (21 Aug 2023 04:35) (18 - 18)  SpO2: 100% (21 Aug 2023 04:35) (94% - 100%)  Parameters below as of 21 Aug 2023 04:35  Patient On (Oxygen Delivery Method): room air    - Exam   Middle aged man, NAD presently  AAO X 3    Extremities  Left leg( distal 1/2)  with 3 large deep ulcers- 2 on the medial aspect (- 1 just above the medial malleolus)  and 1 on the lateral aspect   uneven edges, pink to reddish erythematous base with a lot of yellowish fibrinolytic material    Right leg - has very deep ulcer with exposed tendons just above the medial malleolus. Surrounding erythema, swelling and tenderness.    Wound dressing changed and cultures taken    - Labs reviewed  wbc 17K  H/H - 8/27 with microcytosis  Glycosuria  HCO3 -20- no AG  ARGELIA resolved     Impression   58 year old man with hx of DM, HTN, HLD with chronic B/L LE ulcers managed in [place] presenting with sepsis likely related to his wound infections.  The chronic wounds do not appear venous or arterial ulcers and not wholly diabetic either and are concerning for vasculitis like pyoderma gangrenosum.    A/P   - Sepsis   -  B/L lower extremity chronic ulcer infection  with cellulitis and suspected osteomyelitis  -  ARGELIA   -  Hypocalcemia   - Acute on chronic microcytic anemia   suspicious for iron def anemia related to GI bleeding  - DM   -HTN     Plan   Admit to Medicine   Sepsis work up   Empiric broad spectrum antibiotics with zosyn and vancomycin   Anemia work up   Type, crossmatch and transfuse at least 1 unit of packed red cells.  check A1c   Hold BP meds for hypotension or  low BP  Insulin therapy  Podiatry consult Hx via  [facility] - [id number]    58 year old man with PMH of DM, HLD, HTN, with chronic B/L lower extremity ulcers - Follows at [facility]  for the past 5 years.  He comes in on account of dizziness, chills and weakness .  Similar symptoms few days ago for which he was admitted to [facility].  He was diagnosed with deep tissue infection and placed on antibiotics, received blood transfusion but had to leave AMA one day ago.  He states he follows with a dermatologist and PCP in [place] but has been told his chronic leg ulcers are not vascular or diabetic in origin after prior work up.    Vital Signs Last 24 Hrs  T(C): 36.6 (21 Aug 2023 04:35), Max: 36.6 (21 Aug 2023 04:35)  T(F): 97.9 (21 Aug 2023 04:35), Max: 97.9 (21 Aug 2023 04:35)  HR: 18 (21 Aug 2023 04:35) (18 - 102)  BP: 95/55 (21 Aug 2023 04:35) (88/55 - 101/68)  RR: 18 (21 Aug 2023 04:35) (18 - 18)  SpO2: 100% (21 Aug 2023 04:35) (94% - 100%)  Parameters below as of 21 Aug 2023 04:35  Patient On (Oxygen Delivery Method): room air    - Exam   Middle aged man, NAD presently  AAO X 3    Extremities  Left leg( distal 1/2)  with 3 large deep ulcers- 2 on the medial aspect (- 1 just above the medial malleolus)  and 1 on the lateral aspect   uneven edges, pink to reddish erythematous base with a lot of yellowish fibrinolytic material    Right leg - has very deep ulcer with exposed tendons just above the medial malleolus. Surrounding erythema, swelling and tenderness.    Wound dressing changed and cultures taken    - Labs reviewed  wbc 17K  H/H - 8/27 with microcytosis  Glycosuria  HCO3 -20- no AG  ARGELIA resolved     Impression   58 year old man with hx of DM, HTN, HLD with chronic B/L LE ulcers managed in [place] presenting with sepsis likely related to his wound infections.  The chronic wounds do not appear venous or arterial ulcers and not wholly diabetic either and are concerning for vasculitis like pyoderma gangrenosum.    A/P   - Sepsis   -  B/L lower extremity chronic ulcer infection  with cellulitis and suspected osteomyelitis  -  ARGELIA   -  Hypocalcemia   - Acute on chronic microcytic anemia   suspicious for iron def anemia related to GI bleeding  - DM   -HTN     Plan   Admit to Medicine   Sepsis work up   Empiric broad spectrum antibiotics with zosyn and vancomycin   Anemia work up   Type, crossmatch and transfuse at least 1 unit of packed red cells.  check A1c   Hold BP meds for hypotension or  low BP  Insulin therapy  Podiatry consult

## 2023-08-21 NOTE — CHART NOTE - NSCHARTNOTEFT_GEN_A_CORE
Patient is a 58y old  Male who presents with a chief complaint of weakness (21 Aug 2023 13:04)    Patient was seen and examined at bedside   Complains of pain in BL LE     INTERVAL HPI/OVERNIGHT EVENTS:  T(C): 37.2 (23 @ 15:27), Max: 37.2 (23 @ 15:27)  HR: 80 (23 @ 15:27) (70 - 102)  BP: 104/65 (23 @ 15:27) (88/55 - 113/66)  RR: 18 (23 @ 15:27) (17 - 18)  SpO2: 97% (23 @ 15:27) (94% - 100%)  Wt(kg): --  I&O's Summary      REVIEW OF SYSTEMS: denies fever, chills, SOB, palpitations, chest pain, abdominal pain, nausea, vomiting, diarrhea, constipation, dizziness    MEDICATIONS  (STANDING):  insulin lispro (ADMELOG) corrective regimen sliding scale   SubCutaneous three times a day before meals  insulin lispro (ADMELOG) corrective regimen sliding scale   SubCutaneous at bedtime  piperacillin/tazobactam IVPB.. 3.375 Gram(s) IV Intermittent every 8 hours  sodium chloride 0.9%. 1000 milliLiter(s) (125 mL/Hr) IV Continuous <Continuous>    MEDICATIONS  (PRN):  acetaminophen     Tablet .. 650 milliGRAM(s) Oral every 6 hours PRN Temp greater or equal to 38C (100.4F), Mild Pain (1 - 3)  melatonin 3 milliGRAM(s) Oral at bedtime PRN Insomnia      PHYSICAL EXAM:  GENERAL: NAD, well-groomed, well-developed  HEAD:  Atraumatic, Normocephalic  NERVOUS SYSTEM:  Alert & Oriented X3, Good concentration; Motor Strength 5/5 B/L upper and lower extremities  CHEST/LUNG: Clear to percussion bilaterally; No rales, rhonchi, wheezing, or rubs  HEART: Regular rate and rhythm; No murmurs, rubs, or gallops  ABDOMEN: Soft, Nontender, Nondistended; Bowel sounds present  EXTREMITIES:  BL LE bandage clean, dry and intact   SKIN: No rashes or lesions    LABS:                        6.9    14.49 )-----------( 318      ( 21 Aug 2023 05:30 )             22.9     08-21    136  |  106  |  15  ----------------------------<  224<H>  3.5   |  20<L>  |  1.10    Ca    7.1<L>      21 Aug 2023 05:30  Phos  3.2     -  Mg     2.3     -    TPro  8.2  /  Alb  3.2<L>  /  TBili  0.5  /  DBili  x   /  AST  16  /  ALT  32  /  AlkPhos  120  08-20    PT/INR - ( 20 Aug 2023 23:10 )   PT: 13.6 sec;   INR: 1.20 ratio         PTT - ( 20 Aug 2023 23:10 )  PTT:33.6 sec  Urinalysis Basic - ( 21 Aug 2023 06:52 )    Color: Yellow / Appearance: Clear / S.034 / pH: x  Gluc: x / Ketone: Trace mg/dL  / Bili: Negative / Urobili: 0.2 mg/dL   Blood: x / Protein: 30 mg/dL / Nitrite: Negative   Leuk Esterase: Negative / RBC: 0 /HPF / WBC 0 /HPF   Sq Epi: x / Non Sq Epi: x / Bacteria: Few /HPF      CAPILLARY BLOOD GLUCOSE      POCT Blood Glucose.: 133 mg/dL (21 Aug 2023 12:36)  POCT Blood Glucose.: 193 mg/dL (21 Aug 2023 08:44)  POCT Blood Glucose.: 154 mg/dL (20 Aug 2023 22:39)        Urinalysis Basic - ( 21 Aug 2023 06:52 )    Color: Yellow / Appearance: Clear / S.034 / pH: x  Gluc: x / Ketone: Trace mg/dL  / Bili: Negative / Urobili: 0.2 mg/dL   Blood: x / Protein: 30 mg/dL / Nitrite: Negative   Leuk Esterase: Negative / RBC: 0 /HPF / WBC 0 /HPF   Sq Epi: x / Non Sq Epi: x / Bacteria: Few /HPF Patient is a 58y old  Male who presents with a chief complaint of weakness (21 Aug 2023 13:04)    Patient was seen and examined at bedside   Complains of pain in BL LE     INTERVAL HPI/OVERNIGHT EVENTS:  T(C): 37.2 (23 @ 15:27), Max: 37.2 (23 @ 15:27)  HR: 80 (23 @ 15:27) (70 - 102)  BP: 104/65 (23 @ 15:27) (88/55 - 113/66)  RR: 18 (23 @ 15:27) (17 - 18)  SpO2: 97% (23 @ 15:27) (94% - 100%)  Wt(kg): --  I&O's Summary      REVIEW OF SYSTEMS: denies fever, chills, SOB, palpitations, chest pain, abdominal pain, nausea, vomiting, diarrhea, constipation, dizziness    MEDICATIONS  (STANDING):  insulin lispro (ADMELOG) corrective regimen sliding scale   SubCutaneous three times a day before meals  insulin lispro (ADMELOG) corrective regimen sliding scale   SubCutaneous at bedtime  piperacillin/tazobactam IVPB.. 3.375 Gram(s) IV Intermittent every 8 hours  sodium chloride 0.9%. 1000 milliLiter(s) (125 mL/Hr) IV Continuous <Continuous>    MEDICATIONS  (PRN):  acetaminophen     Tablet .. 650 milliGRAM(s) Oral every 6 hours PRN Temp greater or equal to 38C (100.4F), Mild Pain (1 - 3)  melatonin 3 milliGRAM(s) Oral at bedtime PRN Insomnia      PHYSICAL EXAM:  GENERAL: NAD, well-groomed, well-developed  HEAD:  Atraumatic, Normocephalic  NERVOUS SYSTEM:  Alert & Oriented X3, Good concentration; Motor Strength 5/5 B/L upper and lower extremities  CHEST/LUNG: Clear to percussion bilaterally; No rales, rhonchi, wheezing, or rubs  HEART: Regular rate and rhythm; No murmurs, rubs, or gallops  ABDOMEN: Soft, Nontender, Nondistended; Bowel sounds present  EXTREMITIES:  BL LE bandage clean, dry and intact   SKIN: No rashes or lesions    LABS:                        6.9    14.49 )-----------( 318      ( 21 Aug 2023 05:30 )             22.9     08-21    136  |  106  |  15  ----------------------------<  224<H>  3.5   |  20<L>  |  1.10    Ca    7.1<L>      21 Aug 2023 05:30  Phos  3.2     08-  Mg     2.3     -    TPro  8.2  /  Alb  3.2<L>  /  TBili  0.5  /  DBili  x   /  AST  16  /  ALT  32  /  AlkPhos  120  08-20    PT/INR - ( 20 Aug 2023 23:10 )   PT: 13.6 sec;   INR: 1.20 ratio         PTT - ( 20 Aug 2023 23:10 )  PTT:33.6 sec  Urinalysis Basic - ( 21 Aug 2023 06:52 )    Color: Yellow / Appearance: Clear / S.034 / pH: x  Gluc: x / Ketone: Trace mg/dL  / Bili: Negative / Urobili: 0.2 mg/dL   Blood: x / Protein: 30 mg/dL / Nitrite: Negative   Leuk Esterase: Negative / RBC: 0 /HPF / WBC 0 /HPF   Sq Epi: x / Non Sq Epi: x / Bacteria: Few /HPF      CAPILLARY BLOOD GLUCOSE      POCT Blood Glucose.: 133 mg/dL (21 Aug 2023 12:36)  POCT Blood Glucose.: 193 mg/dL (21 Aug 2023 08:44)  POCT Blood Glucose.: 154 mg/dL (20 Aug 2023 22:39)      A/P:  Will cont Zosyn, received Vanc yesterday. Will consult ID. Will transfuse one unit of PRBC, follow up post transfusion PRBC. GI consult appreciated  Low dose opioid for pain  Recommend biopsy of ulcers by podiatry   Wound care by podiatry   Plan discussed with JACKSON Mello  Plan of care was discussed with patient; all questions and concerns were addressed and care was aligned with patient's wishes.

## 2023-08-21 NOTE — H&P ADULT - ASSESSMENT
59 yo M with PMH of  DM, HLD, HTN and diabetic  leg ulcers present with weakness, lightheadedness and infected RT lower extremity ulcer for the past 3 days which has progressively worsened today. Admitted to medicine for Sepsis 2/2 to lower extremity infection  57 yo M with PMH of  DM, HLD, HTN and diabetic  leg ulcers present with weakness, lightheadedness and infected RT lower extremity ulcer for the past 3 days which has progressively worsened today. Admitted to medicine for Sepsis 2/2 to lower extremity infection

## 2023-08-21 NOTE — ED ADULT NURSE NOTE - NSFALLRISKINTERV_ED_ALL_ED
Assistance OOB with selected safe patient handling equipment if applicable/Assistance with ambulation/Communicate fall risk and risk factors to all staff, patient, and family/Monitor gait and stability/Provide patient with walking aids/Provide visual cue: yellow wristband, yellow gown, etc/Reinforce activity limits and safety measures with patient and family/Call bell, personal items and telephone in reach/Instruct patient to call for assistance before getting out of bed/chair/stretcher/Non-slip footwear applied when patient is off stretcher/[place] to call system/Physically safe environment - no spills, clutter or unnecessary equipment/Purposeful Proactive Rounding/Room/bathroom lighting operational, light cord in reach Assistance OOB with selected safe patient handling equipment if applicable/Assistance with ambulation/Communicate fall risk and risk factors to all staff, patient, and family/Monitor gait and stability/Provide patient with walking aids/Provide visual cue: yellow wristband, yellow gown, etc/Reinforce activity limits and safety measures with patient and family/Call bell, personal items and telephone in reach/Instruct patient to call for assistance before getting out of bed/chair/stretcher/Non-slip footwear applied when patient is off stretcher/[place] to call system/Physically safe environment - no spills, clutter or unnecessary equipment/Purposeful Proactive Rounding/Room/bathroom lighting operational, light cord in reach Assistance OOB with selected safe patient handling equipment if applicable/Assistance with ambulation/Communicate fall risk and risk factors to all staff, patient, and family/Monitor gait and stability/Provide patient with walking aids/Provide visual cue: yellow wristband, yellow gown, etc/Reinforce activity limits and safety measures with patient and family/Call bell, personal items and telephone in reach/Instruct patient to call for assistance before getting out of bed/chair/stretcher/Non-slip footwear applied when patient is off stretcher/[place] to call system/Physically safe environment - no spills, clutter or unnecessary equipment/Purposeful Proactive Rounding/Room/bathroom lighting operational, light cord in reach

## 2023-08-21 NOTE — H&P ADULT - PROBLEM SELECTOR PLAN 6
PE: (+) B/L LE ulcer    - Wound care In the ED  HB:7.9 ( On 7/25/23  HB: 9.4) **[facility]  HIE**  Baseline 10    - F/u CBC  - Transfuse Hb < 7 In the ED  HB:7.9 ( On 7/25/23  HB: 9.4) **[facility]  HIE**  Baseline 10    - F/u CBC  - Transfuse Hb < 7 In the ED  HB:7.9 ( On 7/25/23  HB: 9.4) **[facility]  HIE**  Baseline 10    - F/u CBC  - Transfuse Hb < 7 Pt reports receiving 1pRBC 3 days ago  In the ED  HB:7.9 ( On 7/25/23  HB: 9.4) **[facility]  HIRAMSEY**  Baseline 10  - F/u CBC  - Transfuse Hb < 7 Pt reports receiving 1pRBC 3 days ago  In the ED  HB:7.9 ( On 7/25/23  HB: 9.4) **[facility]  HIRAMSEY**  Baseline 10  - F/u CBC  - Transfuse Hb < 7 Pt reports receiving 1pRBC 3 days ago  In the ED  HB:7.9 ( On 7/25/23  HB: 9.4) **[facility]  HIRAMSEY**  Baseline 10  - F/u CBC  - Transfuse Hb < 7

## 2023-08-21 NOTE — H&P ADULT - PROBLEM SELECTOR PLAN 4
likely 2/2 to poor oral intake   In the ED:  Cr:1.76, eGFR < 44 ( On 7/25/23 Cr: 0.78, eGFR 103) **[facility]**    - F/u BMP In the ED:  Vitals -> BP: 88/55, HR:102, T:97.7, O2 sat: 94% RA  s/p 2L NS -> /68    - IVF In the ED:  Vitals -> BP: 88/55, HR:102, T:97.7, O2 sat: 94% RA  s/p 2L NS -> /68  - IVF

## 2023-08-21 NOTE — CONSULT NOTE ADULT - SUBJECTIVE AND OBJECTIVE BOX
[facility]    Patient is a 58y old  Male who presents with a chief complaint of weakness (21 Aug 2023 02:06)    HPI:  57 yo M with PMH of DM, HLD, HTN and diabetic leg ulcers present with chills, weakness, lightheadedness and infected RT lower extremity ulcer for the past 3 days which has progressively worsened today. As per pt,  he went to [facility] in [place] 3 days  ago for his weakness and infected leg ulcer. He mentioned that he received one blood transfusion, antibiotics and was admitted to the hospital. He reports staying in the hospital for a day before leaving the hospital for some personal issue. He reports decreased oral intake today, felt extremely weak and lightheaded. He reports history of ulcer for the past 5 years that has worsened in the past 3 years.  He follows a "dermatologist" at [facility] and he is unable to provide information of his diagnosis. He denies; fever, bodyaches, Chest pain, SOB, URI sxs, N/V/D, abdominal pain, LOC, Head trauma    In the ED   Vitals -> BP: 88/55, HR:102, T:97.7, O2 sat: 94% RA  Labs :  WbC 17k, Hb:7.9, Cr:1.76  EKG : NSR  s/p Vancomycin, Zosyn IVPB  s/p 2L NS ->101/68 (21 Aug 2023 02:06)        PMH/PSH:  PAST MEDICAL & SURGICAL HISTORY:  HTN (hypertension)      Dyslipidemia      DM (diabetes mellitus)      Leg ulcer      No significant past surgical history            FH:  FAMILY HISTORY:  No pertinent family history in first degree relatives          MEDS:  MEDICATIONS  (STANDING):  insulin lispro (ADMELOG) corrective regimen sliding scale   SubCutaneous three times a day before meals  insulin lispro (ADMELOG) corrective regimen sliding scale   SubCutaneous at bedtime  piperacillin/tazobactam IVPB.. 3.375 Gram(s) IV Intermittent every 8 hours  sodium chloride 0.9%. 1000 milliLiter(s) (125 mL/Hr) IV Continuous <Continuous>    MEDICATIONS  (PRN):  acetaminophen     Tablet .. 650 milliGRAM(s) Oral every 6 hours PRN Temp greater or equal to 38C (100.4F), Mild Pain (1 - 3)  melatonin 3 milliGRAM(s) Oral at bedtime PRN Insomnia    Allergies    No Known Allergies    Intolerances          ROS: A detailed set of ROS were asked and negative except those outlined in GI HPI.  ______________________________________________________________________  PHYSICAL EXAM:  T(C): 37.1 (08-21-23 @ 11:39), Max: 37.1 (08-21-23 @ 08:13)  HR: 77 (08-21-23 @ 11:39)  BP: 113/66 (08-21-23 @ 11:39)  RR: 18 (08-21-23 @ 11:39)  SpO2: 99% (08-21-23 @ 11:39)  Wt(kg): --      GEN: NAD  HEENT: EOMI, conjunctivae anicteric, neck supple, moist mucous membranes  PULM: LSCTAB, no wheezing, rales, or rhonchi  CV: RRR, no m/r/b  GI: Soft, round, NT, ND; +BS in all four quadrants, no ascites, no Morris's sign  RABIA: no external hemorrhoids visible however internal hemorrhoids noted & palpated upon insertion, sphincter tone intact, light brown stool in vault with clear mucous  MSK: STEPHENIE  NEURO: A&O x 3, no gross deficits  ______________________________________________________________________  LABS:                        6.9    14.49 )-----------( 318      ( 21 Aug 2023 05:30 )             22.9     08-21    136  |  106  |  15  ----------------------------<  224<H>  3.5   |  20<L>  |  1.10    Ca    7.1<L>      21 Aug 2023 05:30  Phos  3.2     08-21  Mg     2.3     08-21    TPro  8.2  /  Alb  3.2<L>  /  TBili  0.5  /  DBili  x   /  AST  16  /  ALT  32  /  AlkPhos  120  08-20    LIVER FUNCTIONS - ( 20 Aug 2023 23:10 )  Alb: 3.2 g/dL / Pro: 8.2 g/dL / ALK PHOS: 120 U/L / ALT: 32 U/L DA / AST: 16 U/L / GGT: x           PT/INR - ( 20 Aug 2023 23:10 )   PT: 13.6 sec;   INR: 1.20 ratio         PTT - ( 20 Aug 2023 23:10 )  PTT:33.6 sec  ____________________________________________    IMAGING:      < from: Xray Chest 1 View-PORTABLE IMMEDIATE (08.20.23 @ 23:41) >  XR CHEST PORTABLE IMMED 1V   ORDERED BY: ALEKSEY WAITE     PROCEDURE DATE:  08/20/2023          INTERPRETATION:  CLINICAL INDICATION: 58 years  Male with Sepsis.    COMPARISON: None    Limitations: Rotation. Multiple overlying artifacts.    AP view of the chest demonstrates the lungs to be clear. There is no   pleural effusion. The pulmonary vasculature is normal. There is no   pneumothorax.    The heart is normal in size. The mediastinum and heydi cannot be assessed.    Mild thoracic degenerative changes are present.    IMPRESSION:    No acute infiltrate.    < end of copied text >   [facility]    Patient is a 58y old  Male who presents with a chief complaint of weakness (21 Aug 2023 02:06)    HPI:  57 yo M with PMH of DM, HLD, HTN and diabetic leg ulcers present with chills, weakness, lightheadedness and infected RT lower extremity ulcer for the past 3 days which has progressively worsened today. As per pt,  he went to [facility] in [place] 3 days  ago for his weakness and infected leg ulcer. He mentioned that he received one blood transfusion, antibiotics and was admitted to the hospital. He reports staying in the hospital for a day before leaving the hospital for some personal issue. He reports decreased oral intake today, felt extremely weak and lightheaded. He reports history of ulcer for the past 5 years that has worsened in the past 3 years.  He follows a "dermatologist" at [facility] and he is unable to provide information of his diagnosis. He denies; fever, bodyaches, Chest pain, SOB, URI sxs, N/V/D, abdominal pain, LOC, Head trauma    In the ED   Vitals -> BP: 88/55, HR:102, T:97.7, O2 sat: 94% RA  Labs :  WbC 17k, Hb:7.9, Cr:1.76  EKG : NSR  s/p Vancomycin, Zosyn IVPB  s/p 2L NS ->101/68 (21 Aug 2023 02:06)        PMH/PSH:  PAST MEDICAL & SURGICAL HISTORY:  HTN (hypertension)      Dyslipidemia      DM (diabetes mellitus)      Leg ulcer      No significant past surgical history            FH:  FAMILY HISTORY:  No pertinent family history in first degree relatives          MEDS:  MEDICATIONS  (STANDING):  insulin lispro (ADMELOG) corrective regimen sliding scale   SubCutaneous three times a day before meals  insulin lispro (ADMELOG) corrective regimen sliding scale   SubCutaneous at bedtime  piperacillin/tazobactam IVPB.. 3.375 Gram(s) IV Intermittent every 8 hours  sodium chloride 0.9%. 1000 milliLiter(s) (125 mL/Hr) IV Continuous <Continuous>    MEDICATIONS  (PRN):  acetaminophen     Tablet .. 650 milliGRAM(s) Oral every 6 hours PRN Temp greater or equal to 38C (100.4F), Mild Pain (1 - 3)  melatonin 3 milliGRAM(s) Oral at bedtime PRN Insomnia    Allergies    No Known Allergies    Intolerances          ROS: A detailed set of ROS were asked and negative except those outlined in GI HPI.  ______________________________________________________________________  PHYSICAL EXAM:  T(C): 37.1 (08-21-23 @ 11:39), Max: 37.1 (08-21-23 @ 08:13)  HR: 77 (08-21-23 @ 11:39)  BP: 113/66 (08-21-23 @ 11:39)  RR: 18 (08-21-23 @ 11:39)  SpO2: 99% (08-21-23 @ 11:39)  Wt(kg): --      GEN: NAD  HEENT: EOMI, conjunctivae anicteric, neck supple, moist mucous membranes  PULM: LSCTAB, no wheezing, rales, or rhonchi  CV: RRR, no m/r/b  GI: Soft, round, NT, ND; +BS in all four quadrants, no ascites, no Morris's sign  RABIA: no external hemorrhoids visible however internal hemorrhoids noted & palpated upon insertion, sphincter tone intact, light brown stool in vault with clear mucous  MSK: STEPHENIE  NEURO: A&O x 3, no gross deficits  ______________________________________________________________________  LABS:                        6.9    14.49 )-----------( 318      ( 21 Aug 2023 05:30 )             22.9     08-21    136  |  106  |  15  ----------------------------<  224<H>  3.5   |  20<L>  |  1.10    Ca    7.1<L>      21 Aug 2023 05:30  Phos  3.2     08-21  Mg     2.3     08-21    TPro  8.2  /  Alb  3.2<L>  /  TBili  0.5  /  DBili  x   /  AST  16  /  ALT  32  /  AlkPhos  120  08-20    LIVER FUNCTIONS - ( 20 Aug 2023 23:10 )  Alb: 3.2 g/dL / Pro: 8.2 g/dL / ALK PHOS: 120 U/L / ALT: 32 U/L DA / AST: 16 U/L / GGT: x           PT/INR - ( 20 Aug 2023 23:10 )   PT: 13.6 sec;   INR: 1.20 ratio         PTT - ( 20 Aug 2023 23:10 )  PTT:33.6 sec  ____________________________________________    IMAGING:      < from: Xray Chest 1 View-PORTABLE IMMEDIATE (08.20.23 @ 23:41) >  XR CHEST PORTABLE IMMED 1V   ORDERED BY: ALEKSEY WAITE     PROCEDURE DATE:  08/20/2023          INTERPRETATION:  CLINICAL INDICATION: 58 years  Male with Sepsis.    COMPARISON: None    Limitations: Rotation. Multiple overlying artifacts.    AP view of the chest demonstrates the lungs to be clear. There is no   pleural effusion. The pulmonary vasculature is normal. There is no   pneumothorax.    The heart is normal in size. The mediastinum and heydi cannot be assessed.    Mild thoracic degenerative changes are present.    IMPRESSION:    No acute infiltrate.    < end of copied text >   [facility]    Patient is a 58y old  Male who presents with a chief complaint of weakness (21 Aug 2023 02:06)    HPI:  59 yo M with PMH of DM, HLD, HTN and diabetic leg ulcers present with chills, weakness, lightheadedness and infected RT lower extremity ulcer for the past 3 days which has progressively worsened today. As per pt,  he went to [facility] in [place] 3 days  ago for his weakness and infected leg ulcer. He mentioned that he received one blood transfusion, antibiotics and was admitted to the hospital. He reports staying in the hospital for a day before leaving the hospital for some personal issue. He reports decreased oral intake today, felt extremely weak and lightheaded. He reports history of ulcer for the past 5 years that has worsened in the past 3 years.  He follows a "dermatologist" at [facility] and he is unable to provide information of his diagnosis. He denies; fever, bodyaches, Chest pain, SOB, URI sxs, N/V/D, abdominal pain, LOC, Head trauma    In the ED   Vitals -> BP: 88/55, HR:102, T:97.7, O2 sat: 94% RA  Labs :  WbC 17k, Hb:7.9, Cr:1.76  EKG : NSR  s/p Vancomycin, Zosyn IVPB  s/p 2L NS ->101/68 (21 Aug 2023 02:06)        PMH/PSH:  PAST MEDICAL & SURGICAL HISTORY:  HTN (hypertension)      Dyslipidemia      DM (diabetes mellitus)      Leg ulcer      No significant past surgical history            FH:  FAMILY HISTORY:  No pertinent family history in first degree relatives          MEDS:  MEDICATIONS  (STANDING):  insulin lispro (ADMELOG) corrective regimen sliding scale   SubCutaneous three times a day before meals  insulin lispro (ADMELOG) corrective regimen sliding scale   SubCutaneous at bedtime  piperacillin/tazobactam IVPB.. 3.375 Gram(s) IV Intermittent every 8 hours  sodium chloride 0.9%. 1000 milliLiter(s) (125 mL/Hr) IV Continuous <Continuous>    MEDICATIONS  (PRN):  acetaminophen     Tablet .. 650 milliGRAM(s) Oral every 6 hours PRN Temp greater or equal to 38C (100.4F), Mild Pain (1 - 3)  melatonin 3 milliGRAM(s) Oral at bedtime PRN Insomnia    Allergies    No Known Allergies    Intolerances          ROS: A detailed set of ROS were asked and negative except those outlined in GI HPI.  ______________________________________________________________________  PHYSICAL EXAM:  T(C): 37.1 (08-21-23 @ 11:39), Max: 37.1 (08-21-23 @ 08:13)  HR: 77 (08-21-23 @ 11:39)  BP: 113/66 (08-21-23 @ 11:39)  RR: 18 (08-21-23 @ 11:39)  SpO2: 99% (08-21-23 @ 11:39)  Wt(kg): --      GEN: NAD  HEENT: EOMI, conjunctivae anicteric, neck supple, moist mucous membranes  PULM: LSCTAB, no wheezing, rales, or rhonchi  CV: RRR, no m/r/b  GI: Soft, round, NT, ND; +BS in all four quadrants, no ascites, no Morris's sign  RABIA: no external hemorrhoids visible however internal hemorrhoids noted & palpated upon insertion, sphincter tone intact, light brown stool in vault with clear mucous  MSK: STEPHENIE  NEURO: A&O x 3, no gross deficits  ______________________________________________________________________  LABS:                        6.9    14.49 )-----------( 318      ( 21 Aug 2023 05:30 )             22.9     08-21    136  |  106  |  15  ----------------------------<  224<H>  3.5   |  20<L>  |  1.10    Ca    7.1<L>      21 Aug 2023 05:30  Phos  3.2     08-21  Mg     2.3     08-21    TPro  8.2  /  Alb  3.2<L>  /  TBili  0.5  /  DBili  x   /  AST  16  /  ALT  32  /  AlkPhos  120  08-20    LIVER FUNCTIONS - ( 20 Aug 2023 23:10 )  Alb: 3.2 g/dL / Pro: 8.2 g/dL / ALK PHOS: 120 U/L / ALT: 32 U/L DA / AST: 16 U/L / GGT: x           PT/INR - ( 20 Aug 2023 23:10 )   PT: 13.6 sec;   INR: 1.20 ratio         PTT - ( 20 Aug 2023 23:10 )  PTT:33.6 sec  ____________________________________________    IMAGING:      < from: Xray Chest 1 View-PORTABLE IMMEDIATE (08.20.23 @ 23:41) >  XR CHEST PORTABLE IMMED 1V   ORDERED BY: ALEKSEY WAITE     PROCEDURE DATE:  08/20/2023          INTERPRETATION:  CLINICAL INDICATION: 58 years  Male with Sepsis.    COMPARISON: None    Limitations: Rotation. Multiple overlying artifacts.    AP view of the chest demonstrates the lungs to be clear. There is no   pleural effusion. The pulmonary vasculature is normal. There is no   pneumothorax.    The heart is normal in size. The mediastinum and heydi cannot be assessed.    Mild thoracic degenerative changes are present.    IMPRESSION:    No acute infiltrate.    < end of copied text >

## 2023-08-21 NOTE — H&P ADULT - ATTENDING SUPERVISION STATEMENT
July 20, 2022      Prince Patel  [id number] [id number]        To Whom It May Concern:    Prince Patel was seen on 07/20/2022.  Please excuse her until 07/22/2022 due to injury.        Sincerely,        Jennifer Tineo MD     Resident

## 2023-08-21 NOTE — H&P ADULT - PROBLEM SELECTOR PLAN 2
PE: (+) surrounding erythema over RT lower leg ulcer  In the  ED:  WBC 17k,  s/p Vancomycin, Zosyn IVPB    - C/w Vancomycin & Zosyn IVPB  - Wound care  - F/u CBC PE: (+) B/L LE ulcer    - Podiatry Consult PE: (+) B/L LE ulcer  - Podiatry Consult  - F/u Wound cx

## 2023-08-21 NOTE — H&P ADULT - NSHPPHYSICALEXAM_GEN_ALL_CORE
GENERAL: NAD, lying in bed comfortably  HEAD:  Atraumatic, Normocephalic  EYES: EOMI, PERRLA, clear conjunctiva, Clear sclera   ENT: Moist mucous membranes  NECK: Supple, No JVD  CHEST/LUNG: Clear to auscultation bilaterally; No rales, rhonchi, wheezing, or rubs. Unlabored respirations  HEART: Regular rate and rhythm; No murmurs, rubs, or gallops  ABDOMEN: Bowel sounds present; Soft, Nontender, Nondistended. No hepatomegally, Negative Rovsing's sign   EXTREMITIES:  2+ Peripheral Pulses, brisk capillary refill. No clubbing, cyanosis,   NERVOUS SYSTEM:  Alert & Oriented X3, speech clear. No deficits   MSK: FROM all 4 extremities, full and equal strength  SKIN: (+) B/L lower leg ulcers, (+)  surrounding erythema over the RT lower leg GENERAL: NAD, lying in bed comfortably  HEAD:  Atraumatic, Normocephalic  EYES: EOMI, PERRLA, clear conjunctiva, Clear sclera   ENT: Moist mucous membranes  NECK: Supple, No JVD  CHEST/LUNG: Clear to auscultation bilaterally; No rales, rhonchi, wheezing, or rubs. Unlabored respirations  HEART: Regular rate and rhythm; No murmurs, rubs, or gallops  ABDOMEN: Bowel sounds present; Soft, Nontender, Nondistended. No hepatomegally, Negative Rovsing's sign   EXTREMITIES:  2+ Peripheral Pulses, brisk capillary refill. No clubbing, cyanosis,   NERVOUS SYSTEM:  Alert & Oriented X3, speech clear. No deficits   MSK: FROM all 4 extremities, full and equal strength  SKIN: (+) Multiple  B/L lower leg ulcers, (+) surrounding erythema over the RT lower leg

## 2023-08-21 NOTE — H&P ADULT - SOCIAL HISTORY
Body Location Override (Optional - Billing Will Still Be Based On Selected Body Map Location If Applicable): left temporal Detail Level: Detailed Was A Bandage Applied: Yes Punch Size In Mm: 3 Biopsy Type: H and E Anesthesia Type: 1% lidocaine with epinephrine Anesthesia Volume In Cc (Will Not Render If 0): 4.5 Additional Anesthesia Volume In Cc (Will Not Render If 0): 0 Hemostasis: Electrocautery Epidermal Sutures: none, closed by secondary intention Wound Care: Bacitracin Dressing: bandage Suture Removal: 14 days Patient Will Remove Sutures At Home?: No Lab: [id number] [facility], [place] Lab Facility: [address] [place] [address] Path Notes (To The Dermatopathologist): 3mm Consent: Written consent was obtained and risks were reviewed including but not limited to scarring, infection, bleeding, scabbing, incomplete removal, nerve damage and allergy to anesthesia. Post-Care Instructions: I reviewed with the patient in detail post-care instructions. Patient is to keep the biopsy site dry overnight, and then apply bacitracin twice daily until healed. Patient may apply hydrogen peroxide soaks to remove any crusting. Home Suture Removal Text: Patient was provided a home suture removal kit and will remove their sutures at home. If they have any questions or difficulties they will call the office. Notification Instructions: Patient will be notified of biopsy results. However, patient instructed to call the office if not contacted within 2 weeks. Billing Type: [employer] Information: Selecting Yes will display possible errors in your note based on the variables you have selected. This validation is only offered as a suggestion for you. PLEASE NOTE THAT THE VALIDATION TEXT WILL BE REMOVED WHEN YOU FINALIZE YOUR NOTE. IF YOU WANT TO FAX A PRELIMINARY NOTE YOU WILL NEED TO TOGGLE THIS TO 'NO' IF YOU DO NOT WANT IT IN YOUR FAXED NOTE. Body Location Override (Optional - Billing Will Still Be Based On Selected Body Map Location If Applicable): right temporal Lab: [id number] Lab Facility: 72 Home Suture Removal Text: Patient was provided a home suture removal kit and will remove their sutures at home.  If they have any questions or difficulties they will call the office. Billing Type: Third-Party Bill Body Location Override (Optional - Billing Will Still Be Based On Selected Body Map Location If Applicable): right thenar Yes

## 2023-08-21 NOTE — CONSULT NOTE ADULT - SUBJECTIVE AND OBJECTIVE BOX
Patient is a 58y old  Male who presents with a chief complaint of weakness (21 Aug 2023 02:06)      HPI:  59 yo M with PMH of DM, HLD, HTN and diabetic leg ulcers present with chills, weakness, lightheadedness and infected RT lower extremity ulcer for the past 3 days which has progressively worsened today. As per pt,  he went to [facility] in [place] 3 days  ago for his weakness and infected leg ulcer. He mentioned that he received one blood transfusion, antibiotics and was admitted to the hospital. He reports staying in the hospital for a day before leaving the hospital for some personal issue. He reports decreased oral intake today, felt extremely weak and lightheaded. He reports history of ulcer for the past 5 years that has worsened in the past 3 years.  He follows a "dermatologist" at [facility] and he is unable to provide information of his diagnosis. He denies; fever, bodyaches, Chest pain, SOB, URI sxs, N/V/D, abdominal pain, LOC, Head trauma    In the ED   Vitals -> BP: 88/55, HR:102, T:97.7, O2 sat: 94% RA  Labs :  WbC 17k, Hb:7.9, Cr:1.76  EKG : NSR  s/p Vancomycin, Zosyn IVPB  s/p 2L NS ->101/68 (21 Aug 2023 02:06)      Podiatry HPI: Patient stated that he has had leg wounds for 5 years. He seems a dermatologist at [facility] who gives him steroid shots for his wounds and they have worsened. Over the last month his pain has grown and his has become dizzy and weak. Patient states he needs to have santyl on his wounds for every dressing change so that the shooting pain he has subsides. Patient denies any other pedal complaints.    ID#: [id number]        PMH:HTN (hypertension)    Dyslipidemia    DM (diabetes mellitus)    Leg ulcer      Allergies: No Known Allergies    Medications: acetaminophen     Tablet .. 650 milliGRAM(s) Oral every 6 hours PRN  insulin lispro (ADMELOG) corrective regimen sliding scale   SubCutaneous at bedtime  insulin lispro (ADMELOG) corrective regimen sliding scale   SubCutaneous three times a day before meals  melatonin 3 milliGRAM(s) Oral at bedtime PRN  piperacillin/tazobactam IVPB.. 3.375 Gram(s) IV Intermittent every 8 hours  sodium chloride 0.9%. 1000 milliLiter(s) IV Continuous <Continuous>    FH:No pertinent family history in first degree relatives      PSX: No significant past surgical history      SH: acetaminophen     Tablet .. 650 milliGRAM(s) Oral every 6 hours PRN  insulin lispro (ADMELOG) corrective regimen sliding scale   SubCutaneous three times a day before meals  insulin lispro (ADMELOG) corrective regimen sliding scale   SubCutaneous at bedtime  melatonin 3 milliGRAM(s) Oral at bedtime PRN  piperacillin/tazobactam IVPB.. 3.375 Gram(s) IV Intermittent every 8 hours  sodium chloride 0.9%. 1000 milliLiter(s) IV Continuous <Continuous>      Vital Signs Last 24 Hrs  T(C): 37.1 (21 Aug 2023 11:39), Max: 37.1 (21 Aug 2023 08:13)  T(F): 98.7 (21 Aug 2023 11:39), Max: 98.8 (21 Aug 2023 08:13)  HR: 77 (21 Aug 2023 11:39) (70 - 102)  BP: 113/66 (21 Aug 2023 11:39) (88/55 - 113/66)  BP(mean): --  RR: 18 (21 Aug 2023 11:39) (18 - 18)  SpO2: 99% (21 Aug 2023 11:39) (94% - 100%)    Parameters below as of 21 Aug 2023 11:39  Patient On (Oxygen Delivery Method): room air        LABS                        6.9    14.49 )-----------( 318      ( 21 Aug 2023 05:30 )             22.9               08-21    136  |  106  |  15  ----------------------------<  224<H>  3.5   |  20<L>  |  1.10    Ca    7.1<L>      21 Aug 2023 05:30  Phos  3.2     08-21  Mg     2.3     08-21    TPro  8.2  /  Alb  3.2<L>  /  TBili  0.5  /  DBili  x   /  AST  16  /  ALT  32  /  AlkPhos  120  08-20      ROS  REVIEW OF SYSTEMS:    CONSTITUTIONAL: No fever, weight loss, or fatigue  EYES: No eye pain, visual disturbances, or discharge  ENMT:  No difficulty hearing, tinnitus, vertigo; No sinus or throat pain  NECK: No pain or stiffness  BREASTS: No pain, masses, or nipple discharge  RESPIRATORY: No cough, wheezing, chills or hemoptysis; No shortness of breath  CARDIOVASCULAR: No chest pain, palpitations, dizziness, or leg swelling  GASTROINTESTINAL: No abdominal or epigastric pain. No nausea, vomiting, or hematemesis; No diarrhea or constipation. No melena or hematochezia.  GENITOURINARY: No dysuria, frequency, hematuria, or incontinence  NEUROLOGICAL: No headaches, memory loss, loss of strength, numbness, or tremors  SKIN: No itching, burning, rashes, or lesions   LYMPH NODES: No enlarged glands  ENDOCRINE: No heat or cold intolerance; No hair loss  MUSCULOSKELETAL: No joint pain or swelling; No muscle, back, or extremity pain  PSYCHIATRIC: No depression, anxiety, mood swings, or difficulty sleeping  HEME/LYMPH: No easy bruising, or bleeding gums  ALLERY AND IMMUNOLOGIC: No hives or eczema      PHYSICAL EXAM  LE Focused:    Vasc:  DP and PT pulses palpable 2/4. CFT<3 seconds. No edema noted in legs or feet   Derm: Wounds noted b/l lower legs to level of subcutaneous tissue/muscle tendon layer of legs. No PTB in any of wounds but tendon exposure noted. Wounds are fibrogranular in nature with no drainage noted. No fluctuance crepitus or streaking noted.   Neuro: Gross sensation intact.   MSK: POP to all wounds.       IMAGING:   Xray- pending     CULTURES: pending          Patient is a 58y old  Male who presents with a chief complaint of weakness (21 Aug 2023 02:06)      HPI:  57 yo M with PMH of DM, HLD, HTN and diabetic leg ulcers present with chills, weakness, lightheadedness and infected RT lower extremity ulcer for the past 3 days which has progressively worsened today. As per pt,  he went to [facility] in [place] 3 days  ago for his weakness and infected leg ulcer. He mentioned that he received one blood transfusion, antibiotics and was admitted to the hospital. He reports staying in the hospital for a day before leaving the hospital for some personal issue. He reports decreased oral intake today, felt extremely weak and lightheaded. He reports history of ulcer for the past 5 years that has worsened in the past 3 years.  He follows a "dermatologist" at [facility] and he is unable to provide information of his diagnosis. He denies; fever, bodyaches, Chest pain, SOB, URI sxs, N/V/D, abdominal pain, LOC, Head trauma    In the ED   Vitals -> BP: 88/55, HR:102, T:97.7, O2 sat: 94% RA  Labs :  WbC 17k, Hb:7.9, Cr:1.76  EKG : NSR  s/p Vancomycin, Zosyn IVPB  s/p 2L NS ->101/68 (21 Aug 2023 02:06)      Podiatry HPI: Patient stated that he has had leg wounds for 5 years. He seems a dermatologist at [facility] who gives him steroid shots for his wounds and they have worsened. Over the last month his pain has grown and his has become dizzy and weak. Patient states he needs to have santyl on his wounds for every dressing change so that the shooting pain he has subsides. Patient denies any other pedal complaints.    ID#: [id number]        PMH:HTN (hypertension)    Dyslipidemia    DM (diabetes mellitus)    Leg ulcer      Allergies: No Known Allergies    Medications: acetaminophen     Tablet .. 650 milliGRAM(s) Oral every 6 hours PRN  insulin lispro (ADMELOG) corrective regimen sliding scale   SubCutaneous at bedtime  insulin lispro (ADMELOG) corrective regimen sliding scale   SubCutaneous three times a day before meals  melatonin 3 milliGRAM(s) Oral at bedtime PRN  piperacillin/tazobactam IVPB.. 3.375 Gram(s) IV Intermittent every 8 hours  sodium chloride 0.9%. 1000 milliLiter(s) IV Continuous <Continuous>    FH:No pertinent family history in first degree relatives      PSX: No significant past surgical history      SH: acetaminophen     Tablet .. 650 milliGRAM(s) Oral every 6 hours PRN  insulin lispro (ADMELOG) corrective regimen sliding scale   SubCutaneous three times a day before meals  insulin lispro (ADMELOG) corrective regimen sliding scale   SubCutaneous at bedtime  melatonin 3 milliGRAM(s) Oral at bedtime PRN  piperacillin/tazobactam IVPB.. 3.375 Gram(s) IV Intermittent every 8 hours  sodium chloride 0.9%. 1000 milliLiter(s) IV Continuous <Continuous>      Vital Signs Last 24 Hrs  T(C): 37.1 (21 Aug 2023 11:39), Max: 37.1 (21 Aug 2023 08:13)  T(F): 98.7 (21 Aug 2023 11:39), Max: 98.8 (21 Aug 2023 08:13)  HR: 77 (21 Aug 2023 11:39) (70 - 102)  BP: 113/66 (21 Aug 2023 11:39) (88/55 - 113/66)  BP(mean): --  RR: 18 (21 Aug 2023 11:39) (18 - 18)  SpO2: 99% (21 Aug 2023 11:39) (94% - 100%)    Parameters below as of 21 Aug 2023 11:39  Patient On (Oxygen Delivery Method): room air        LABS                        6.9    14.49 )-----------( 318      ( 21 Aug 2023 05:30 )             22.9               08-21    136  |  106  |  15  ----------------------------<  224<H>  3.5   |  20<L>  |  1.10    Ca    7.1<L>      21 Aug 2023 05:30  Phos  3.2     08-21  Mg     2.3     08-21    TPro  8.2  /  Alb  3.2<L>  /  TBili  0.5  /  DBili  x   /  AST  16  /  ALT  32  /  AlkPhos  120  08-20      ROS  REVIEW OF SYSTEMS:    CONSTITUTIONAL: No fever, weight loss, or fatigue  EYES: No eye pain, visual disturbances, or discharge  ENMT:  No difficulty hearing, tinnitus, vertigo; No sinus or throat pain  NECK: No pain or stiffness  BREASTS: No pain, masses, or nipple discharge  RESPIRATORY: No cough, wheezing, chills or hemoptysis; No shortness of breath  CARDIOVASCULAR: No chest pain, palpitations, dizziness, or leg swelling  GASTROINTESTINAL: No abdominal or epigastric pain. No nausea, vomiting, or hematemesis; No diarrhea or constipation. No melena or hematochezia.  GENITOURINARY: No dysuria, frequency, hematuria, or incontinence  NEUROLOGICAL: No headaches, memory loss, loss of strength, numbness, or tremors  SKIN: No itching, burning, rashes, or lesions   LYMPH NODES: No enlarged glands  ENDOCRINE: No heat or cold intolerance; No hair loss  MUSCULOSKELETAL: No joint pain or swelling; No muscle, back, or extremity pain  PSYCHIATRIC: No depression, anxiety, mood swings, or difficulty sleeping  HEME/LYMPH: No easy bruising, or bleeding gums  ALLERY AND IMMUNOLOGIC: No hives or eczema      PHYSICAL EXAM  LE Focused:    Vasc:  DP and PT pulses palpable 2/4. CFT<3 seconds. No edema noted in legs or feet   Derm: Wounds noted b/l lower legs to level of subcutaneous tissue/muscle tendon layer of legs. No PTB in any of wounds but tendon exposure noted. Wounds are fibrogranular in nature with no drainage noted. No fluctuance crepitus or streaking noted.   Neuro: Gross sensation intact.   MSK: POP to all wounds.       IMAGING:   Xray- pending     CULTURES: pending          Patient is a 58y old  Male who presents with a chief complaint of weakness (21 Aug 2023 02:06)      HPI:  59 yo M with PMH of DM, HLD, HTN and diabetic leg ulcers present with chills, weakness, lightheadedness and infected RT lower extremity ulcer for the past 3 days which has progressively worsened today. As per pt,  he went to [facility] in [place] 3 days  ago for his weakness and infected leg ulcer. He mentioned that he received one blood transfusion, antibiotics and was admitted to the hospital. He reports staying in the hospital for a day before leaving the hospital for some personal issue. He reports decreased oral intake today, felt extremely weak and lightheaded. He reports history of ulcer for the past 5 years that has worsened in the past 3 years.  He follows a "dermatologist" at [facility] and he is unable to provide information of his diagnosis. He denies; fever, bodyaches, Chest pain, SOB, URI sxs, N/V/D, abdominal pain, LOC, Head trauma    In the ED   Vitals -> BP: 88/55, HR:102, T:97.7, O2 sat: 94% RA  Labs :  WbC 17k, Hb:7.9, Cr:1.76  EKG : NSR  s/p Vancomycin, Zosyn IVPB  s/p 2L NS ->101/68 (21 Aug 2023 02:06)      Podiatry HPI: Patient stated that he has had leg wounds for 5 years. He seems a dermatologist at [facility] who gives him steroid shots for his wounds and they have worsened. Over the last month his pain has grown and his has become dizzy and weak. Patient states he needs to have santyl on his wounds for every dressing change so that the shooting pain he has subsides. Patient denies any other pedal complaints.    ID#: [id number]        PMH:HTN (hypertension)    Dyslipidemia    DM (diabetes mellitus)    Leg ulcer      Allergies: No Known Allergies    Medications: acetaminophen     Tablet .. 650 milliGRAM(s) Oral every 6 hours PRN  insulin lispro (ADMELOG) corrective regimen sliding scale   SubCutaneous at bedtime  insulin lispro (ADMELOG) corrective regimen sliding scale   SubCutaneous three times a day before meals  melatonin 3 milliGRAM(s) Oral at bedtime PRN  piperacillin/tazobactam IVPB.. 3.375 Gram(s) IV Intermittent every 8 hours  sodium chloride 0.9%. 1000 milliLiter(s) IV Continuous <Continuous>    FH:No pertinent family history in first degree relatives      PSX: No significant past surgical history      SH: acetaminophen     Tablet .. 650 milliGRAM(s) Oral every 6 hours PRN  insulin lispro (ADMELOG) corrective regimen sliding scale   SubCutaneous three times a day before meals  insulin lispro (ADMELOG) corrective regimen sliding scale   SubCutaneous at bedtime  melatonin 3 milliGRAM(s) Oral at bedtime PRN  piperacillin/tazobactam IVPB.. 3.375 Gram(s) IV Intermittent every 8 hours  sodium chloride 0.9%. 1000 milliLiter(s) IV Continuous <Continuous>      Vital Signs Last 24 Hrs  T(C): 37.1 (21 Aug 2023 11:39), Max: 37.1 (21 Aug 2023 08:13)  T(F): 98.7 (21 Aug 2023 11:39), Max: 98.8 (21 Aug 2023 08:13)  HR: 77 (21 Aug 2023 11:39) (70 - 102)  BP: 113/66 (21 Aug 2023 11:39) (88/55 - 113/66)  BP(mean): --  RR: 18 (21 Aug 2023 11:39) (18 - 18)  SpO2: 99% (21 Aug 2023 11:39) (94% - 100%)    Parameters below as of 21 Aug 2023 11:39  Patient On (Oxygen Delivery Method): room air        LABS                        6.9    14.49 )-----------( 318      ( 21 Aug 2023 05:30 )             22.9               08-21    136  |  106  |  15  ----------------------------<  224<H>  3.5   |  20<L>  |  1.10    Ca    7.1<L>      21 Aug 2023 05:30  Phos  3.2     08-21  Mg     2.3     08-21    TPro  8.2  /  Alb  3.2<L>  /  TBili  0.5  /  DBili  x   /  AST  16  /  ALT  32  /  AlkPhos  120  08-20      ROS  REVIEW OF SYSTEMS:    CONSTITUTIONAL: No fever, weight loss, or fatigue  EYES: No eye pain, visual disturbances, or discharge  ENMT:  No difficulty hearing, tinnitus, vertigo; No sinus or throat pain  NECK: No pain or stiffness  BREASTS: No pain, masses, or nipple discharge  RESPIRATORY: No cough, wheezing, chills or hemoptysis; No shortness of breath  CARDIOVASCULAR: No chest pain, palpitations, dizziness, or leg swelling  GASTROINTESTINAL: No abdominal or epigastric pain. No nausea, vomiting, or hematemesis; No diarrhea or constipation. No melena or hematochezia.  GENITOURINARY: No dysuria, frequency, hematuria, or incontinence  NEUROLOGICAL: No headaches, memory loss, loss of strength, numbness, or tremors  SKIN: No itching, burning, rashes, or lesions   LYMPH NODES: No enlarged glands  ENDOCRINE: No heat or cold intolerance; No hair loss  MUSCULOSKELETAL: No joint pain or swelling; No muscle, back, or extremity pain  PSYCHIATRIC: No depression, anxiety, mood swings, or difficulty sleeping  HEME/LYMPH: No easy bruising, or bleeding gums  ALLERY AND IMMUNOLOGIC: No hives or eczema      PHYSICAL EXAM  LE Focused:    Vasc:  DP and PT pulses palpable 2/4. CFT<3 seconds. No edema noted in legs or feet   Derm: Wounds noted b/l lower legs to level of subcutaneous tissue/muscle tendon layer of legs. No PTB in any of wounds but tendon exposure noted. Wounds are fibrogranular in nature with no drainage noted. No fluctuance crepitus or streaking noted.   Neuro: Gross sensation intact.   MSK: POP to all wounds.       IMAGING:   Xray- pending     CULTURES: pending

## 2023-08-21 NOTE — H&P ADULT - PROBLEM SELECTOR PLAN 1
p/w  DM. HTN, diabetic  leg ulcers present with weakness, lightheadedness and infected RT lower extremity ulcer for the past 3 days.  In the ED:   Vitals -> BP: 88/55, HR:102, T:97.7, O2 sat: 94% RA  Labs  -> WBC 17k, Hb:7.9, Cr:1.76  s/p Vancomycin, Zosyn IVPB  s/p 2L NS    - C/w Vancomycin & Zosyn IVPB p/w  DM. HTN, diabetic  leg ulcers present with weakness, lightheadedness and infected RT lower extremity ulcer for the past 3 days.  In the ED:   Vitals -> BP: 88/55, HR:102, T:97.7, O2 sat: 94% RA  Labs  -> WBC 17k, Hb:7.9, Cr:1.76  s/p Vancomycin, Zosyn IVPB  s/p 2L NS -> /68  Hold  BP meds in the setting of sepsis     - C/w Vancomycin & Zosyn IVPB p/w  DM. HTN, diabetic  leg ulcers present with weakness, lightheadedness and infected RT lower extremity ulcer for the past 3 days.  In the ED:   Vitals -> BP: 88/55, HR:102, T:97.7, O2 sat: 94% RA  Labs  -> WBC 17k, Hb:7.9, Cr:1.76  s/p Vancomycin, Zosyn IVPB  s/p 2L NS -> /68  EKG NSR  Hold  BP meds in the setting of sepsis   - C/w Vancomycin & Zosyn IVPB  - F/u Bld cx x 2,   - F/u wound cx x 2

## 2023-08-21 NOTE — CHART NOTE - NSCHARTNOTEFT_GEN_A_CORE
57 yo M with PMH of DM, HLD, HTN and diabetic leg ulcers present with chills, weakness, lightheadedness and infected right lower extremity ulcer   admitted 3 days ago at [facility], reports he received blood transfusion, started antibiotics but left AMA   Now presented with worsening dizziness and weakness       In the ED hypotensive 88/55, HR:102, T:97.7, O2 sat: 94% RA  WBC 17k, Hb:7.9, Cr:1.76  EKG : NSR  received Vancomycin, Zosyn   Admitted for  Sepsis, lower extremity ulcer infection  with cellulitis, symptomatic anemia     Pt was seen at bedside, initially angry, verbally abusive, requesting to be discharged AMA   Risks and consequesnces of leaving AMA without completion medical eval and treatment discussed in details via interpreters #[id number] Edgard, #[id number] Chloe  PT decided to stay for "only one day"    Xrays tib/fib  Mild degenerative change with no fracture, dislocation or gross   cortical destruction. Soft tissue defect at the medial leg measuring 11 x 1.5 cm.  Podiatry consulted   Dressing done by podiatry: santyl, xeroform, dry sterile dressing  ID consulted Yamini Dozier and zosyn continued    Received one unit of blood   GI was consulted for anemia.   Last EGD/colonoscopy 2022 found to have polyps, due for repeat endoscopies 9/27/23 with his primary GI outpatient.   pt to follow up with his primary GI and keep his endoscopy appointment.  NO signs of bleeding, VS stable, no fever                 OBJECTIVE:  Vital Signs Last 24 Hrs  T(C): 37.2 (21 Aug 2023 15:27), Max: 37.2 (21 Aug 2023 15:27)  T(F): 98.9 (21 Aug 2023 15:27), Max: 98.9 (21 Aug 2023 15:27)  HR: 80 (21 Aug 2023 15:27) (70 - 102)  BP: 104/65 (21 Aug 2023 15:27) (88/55 - 113/66)  BP(mean): --  RR: 18 (21 Aug 2023 15:27) (17 - 18)  SpO2: 97% (21 Aug 2023 15:27) (94% - 100%)    Parameters below as of 21 Aug 2023 15:27  Patient On (Oxygen Delivery Method): room air        FOCUSED PHYSICAL EXAM:  Neuro: awake, alert, oriented x 3. No neuro deficit  Cardiovascular: Pulses +2 B/L in lower and upper extremities, HR regular, BP stable, no edema.  Respiratory: Respirations regular, unlabored, breath sounds clear B/L.   GI: Abdomen soft, non-tender, positive bowel sounds.  : no bladder distention noted. No complaints at this time.  Skin: LE with open wounds, surrounding cellulitis, discoloration   no drainage noted, no streaking noted.               ASSESSMENT:  57 yo M with PMH of DM, HLD, HTN and diabetic leg ulcers present with chills, weakness, lightheadedness and infected right lower extremity ulcer   admitted 3 days ago at [facility], reports he received blood transfusion, started antibiotics but left AMA   Now presented with worsening dizziness and weakness   Now admitted for  Sepsis, lower extremity ulcer infection  with cellulitis, symptomatic anemia       Plan     c/w zosyn and vancomycin   ID consulted Dr Gooden   follow up blood culture, wound culture   f/u Anemia work up   follow up post transfusion CBC   GI on board, less likely GI bleed, outpt GI work up as scheduled 9/27/23   Podiatry on board, likely needs biopsy   continue home medications   proph measures 59 yo M with PMH of DM, HLD, HTN and diabetic leg ulcers present with chills, weakness, lightheadedness and infected right lower extremity ulcer   admitted 3 days ago at [facility], reports he received blood transfusion, started antibiotics but left AMA   Now presented with worsening dizziness and weakness       In the ED hypotensive 88/55, HR:102, T:97.7, O2 sat: 94% RA  WBC 17k, Hb:7.9, Cr:1.76  EKG : NSR  received Vancomycin, Zosyn   Admitted for  Sepsis, lower extremity ulcer infection  with cellulitis, symptomatic anemia     Pt was seen at bedside, initially angry, verbally abusive, requesting to be discharged AMA   Risks and consequesnces of leaving AMA without completion medical eval and treatment discussed in details via interpreters #[id number] Edgard, #[id number] Chloe  PT decided to stay for "only one day"    Xrays tib/fib  Mild degenerative change with no fracture, dislocation or gross   cortical destruction. Soft tissue defect at the medial leg measuring 11 x 1.5 cm.  Podiatry consulted   Dressing done by podiatry: santyl, xeroform, dry sterile dressing  ID consulted Yamini Dozier and zosyn continued    Received one unit of blood   GI was consulted for anemia.   Last EGD/colonoscopy 2022 found to have polyps, due for repeat endoscopies 9/27/23 with his primary GI outpatient.   pt to follow up with his primary GI and keep his endoscopy appointment.  NO signs of bleeding, VS stable, no fever                 OBJECTIVE:  Vital Signs Last 24 Hrs  T(C): 37.2 (21 Aug 2023 15:27), Max: 37.2 (21 Aug 2023 15:27)  T(F): 98.9 (21 Aug 2023 15:27), Max: 98.9 (21 Aug 2023 15:27)  HR: 80 (21 Aug 2023 15:27) (70 - 102)  BP: 104/65 (21 Aug 2023 15:27) (88/55 - 113/66)  BP(mean): --  RR: 18 (21 Aug 2023 15:27) (17 - 18)  SpO2: 97% (21 Aug 2023 15:27) (94% - 100%)    Parameters below as of 21 Aug 2023 15:27  Patient On (Oxygen Delivery Method): room air        FOCUSED PHYSICAL EXAM:  Neuro: awake, alert, oriented x 3. No neuro deficit  Cardiovascular: Pulses +2 B/L in lower and upper extremities, HR regular, BP stable, no edema.  Respiratory: Respirations regular, unlabored, breath sounds clear B/L.   GI: Abdomen soft, non-tender, positive bowel sounds.  : no bladder distention noted. No complaints at this time.  Skin: LE with open wounds, surrounding cellulitis, discoloration   no drainage noted, no streaking noted.               ASSESSMENT:  59 yo M with PMH of DM, HLD, HTN and diabetic leg ulcers present with chills, weakness, lightheadedness and infected right lower extremity ulcer   admitted 3 days ago at [place], reports he received blood transfusion, started antibiotics but left AMA   Now presented with worsening dizziness and weakness   Now admitted for  Sepsis, lower extremity ulcer infection  with cellulitis, symptomatic anemia       Plan     c/w zosyn and vancomycin   ID consulted Dr Gooden   follow up blood culture, wound culture   f/u Anemia work up   follow up post transfusion CBC   GI on board, less likely GI bleed, outpt GI work up as scheduled 9/27/23   Podiatry on board, likely needs biopsy   continue home medications   proph measures 57 yo M with PMH of DM, HLD, HTN and diabetic leg ulcers present with chills, weakness, lightheadedness and infected right lower extremity ulcer   admitted 3 days ago at [facility], reports he received blood transfusion, started antibiotics but left AMA   Now presented with worsening dizziness and weakness       In the ED hypotensive 88/55, HR:102, T:97.7, O2 sat: 94% RA  WBC 17k, Hb:7.9, Cr:1.76  EKG : NSR  received Vancomycin, Zosyn   Admitted for  Sepsis, lower extremity ulcer infection  with cellulitis, symptomatic anemia     Pt was seen at bedside, initially angry, verbally abusive, requesting to be discharged AMA   Risks and consequesnces of leaving AMA without completion medical eval and treatment discussed in details via interpreters #[id number] Edgard, #[id number] Chloe  PT decided to stay for "only one day"    Xrays tib/fib  Mild degenerative change with no fracture, dislocation or gross   cortical destruction. Soft tissue defect at the medial leg measuring 11 x 1.5 cm.  Podiatry consulted   Dressing done by podiatry: santyl, xeroform, dry sterile dressing  ID consulted Yamini Dozier and zosyn continued    Received one unit of blood   GI was consulted for anemia.   Last EGD/colonoscopy 2022 found to have polyps, due for repeat endoscopies 9/27/23 with his primary GI outpatient.   pt to follow up with his primary GI and keep his endoscopy appointment.  NO signs of bleeding, VS stable, no fever                 OBJECTIVE:  Vital Signs Last 24 Hrs  T(C): 37.2 (21 Aug 2023 15:27), Max: 37.2 (21 Aug 2023 15:27)  T(F): 98.9 (21 Aug 2023 15:27), Max: 98.9 (21 Aug 2023 15:27)  HR: 80 (21 Aug 2023 15:27) (70 - 102)  BP: 104/65 (21 Aug 2023 15:27) (88/55 - 113/66)  BP(mean): --  RR: 18 (21 Aug 2023 15:27) (17 - 18)  SpO2: 97% (21 Aug 2023 15:27) (94% - 100%)    Parameters below as of 21 Aug 2023 15:27  Patient On (Oxygen Delivery Method): room air        FOCUSED PHYSICAL EXAM:  Neuro: awake, alert, oriented x 3. No neuro deficit  Cardiovascular: Pulses +2 B/L in lower and upper extremities, HR regular, BP stable, no edema.  Respiratory: Respirations regular, unlabored, breath sounds clear B/L.   GI: Abdomen soft, non-tender, positive bowel sounds.  : no bladder distention noted. No complaints at this time.  Skin: LE with open wounds, surrounding cellulitis, discoloration   no drainage noted, no streaking noted.               ASSESSMENT:  57 yo M with PMH of DM, HLD, HTN and diabetic leg ulcers present with chills, weakness, lightheadedness and infected right lower extremity ulcer   admitted 3 days ago at [facility], reports he received blood transfusion, started antibiotics but left AMA   Now presented with worsening dizziness and weakness   Now admitted for  Sepsis, lower extremity ulcer infection  with cellulitis, symptomatic anemia       Plan     c/w zosyn and vancomycin   ID consulted Dr Gooden   follow up blood culture, wound culture   f/u Anemia work up   follow up post transfusion CBC   GI on board, less likely GI bleed, outpt GI work up as scheduled 9/27/23   Podiatry on board, likely needs biopsy   continue home medications   proph measures

## 2023-08-21 NOTE — H&P ADULT - PROBLEM SELECTOR PLAN 5
In the ED  HB:7.9 ( On 7/25/23  HB: 9.4) [facility]    - F/u CBC  - Transfuse Hb < 7 In the ED  HB:7.9 ( On 7/25/23  HB: 9.4) [facility]    - F/u CBC  - Transfuse Hb < 7 In the ED  HB:7.9 ( On 7/25/23  HB: 9.4) [facility]    - F/u CBC  - Transfuse Hb < 7 likely 2/2 to poor oral intake   In the ED:  Cr:1.76, eGFR < 44 ( On 7/25/23 Cr: 0.78, eGFR 103) **[facility] MARK**  Hold   Losartan, Chlorothiazide     - F/u BMP likely 2/2 to poor oral intake   In the ED:  Cr:1.76, eGFR < 44 ( On 7/25/23 Cr: 0.78, eGFR 103) **Constantine MARK**  Hold   Losartan, Chlorothiazide   - F/u BMP

## 2023-08-21 NOTE — H&P ADULT - HISTORY OF PRESENT ILLNESS
57 yo M with PMH of diabetes, dyslipidemia, HTN and leg ulcers present with weakness and lightheadedness for the past 3 days which has progressively worsened. As per the patient, he went to [place]   59 yo M with PMH of diabetes, dyslipidemia, HTN and leg ulcers present with weakness and lightheadedness for the past 3 days which has progressively worsened. As per the patient, he went to [place]   57 yo M with PMH of diabetes, dyslipidemia, HTN and leg ulcers present with weakness and lightheadedness for the past 3 days which has progressively worsened. As per the patient, he went to [place]   57 yo M with PMH of DM, HLD,  HTN and diabetic  leg ulcers present with weakness, lightheadedness and infected RT lower extremity ulcer for the past 3 days which has progressively worsened today. As per the patient, he went to [facility] in [place] 3 day ago for his weakness and  infected leg ulcer. He reports they started him on antibiotics and was admitted to the hospital. He reports staying in the hospital for a day before leaving the hospital for some personal issue. He reports today he felt extremely weak and lightheaded. He denies; fever, chills/sweats, bodyaches, Chest pain, SOB, URI sxs, N/V/D, abdominal pain, LOC, Head trauma    In the ED   Vitals -> BP: 88/55, HR:102, T:97.7, O2 sat: 94% RA  Labs :  WbC 17k, Hb:7.9, Cr:1.76  s/p Vancomycin, Zosyn IVPB  s/p 2L NS 59 yo M with PMH of DM, HLD,  HTN and diabetic  leg ulcers present with weakness, lightheadedness and infected RT lower extremity ulcer for the past 3 days which has progressively worsened today. As per the patient, he went to [facility] in [place] 3 day ago for his weakness and  infected leg ulcer. He reports they started him on antibiotics and was admitted to the hospital. He reports staying in the hospital for a day before leaving the hospital for some personal issue. He reports today he felt extremely weak and lightheaded. He denies; fever, chills/sweats, bodyaches, Chest pain, SOB, URI sxs, N/V/D, abdominal pain, LOC, Head trauma    In the ED   Vitals -> BP: 88/55, HR:102, T:97.7, O2 sat: 94% RA  Labs :  WbC 17k, Hb:7.9, Cr:1.76  s/p Vancomycin, Zosyn IVPB  s/p 2L NS 59 yo M with PMH of DM, HLD,  HTN and diabetic  leg ulcers present with weakness, lightheadedness and infected RT lower extremity ulcer for the past 3 days which has progressively worsened today. As per the patient, he went to [facility] in [place] 3 day ago for his weakness and  infected leg ulcer. He reports they started him on antibiotics and was admitted to the hospital. He reports staying in the hospital for a day before leaving the hospital for some personal issue. He reports today he felt extremely weak and lightheaded. He denies; fever, chills/sweats, bodyaches, Chest pain, SOB, URI sxs, N/V/D, abdominal pain, LOC, Head trauma    In the ED   Vitals -> BP: 88/55, HR:102, T:97.7, O2 sat: 94% RA  Labs :  WbC 17k, Hb:7.9, Cr:1.76  s/p Vancomycin, Zosyn IVPB  s/p 2L NS 57 yo M with PMH of DM, HLD,  HTN and diabetic  leg ulcers present with weakness, lightheadedness and infected RT lower extremity ulcer for the past 3 days which has progressively worsened today. As per the patient, he went to [facility] in [place] 3 day ago for his weakness and  infected leg ulcer. He reports they started him on antibiotics and was admitted to the hospital. He reports staying in the hospital for a day before leaving the hospital for some personal issue. He reports decreased oral intake today,  felt extremely weak and lightheaded. He denies; fever, chills/sweats, bodyaches, Chest pain, SOB, URI sxs, N/V/D, abdominal pain, LOC, Head trauma    In the ED   Vitals -> BP: 88/55, HR:102, T:97.7, O2 sat: 94% RA  Labs :  WbC 17k, Hb:7.9, Cr:1.76  s/p Vancomycin, Zosyn IVPB  s/p 2L NS 57 yo M with PMH of DM, HLD,  HTN and diabetic  leg ulcers present with weakness, lightheadedness and infected RT lower extremity ulcer for the past 3 days which has progressively worsened today. As per the patient, he went to [facility] in [place] 3 day ago for his weakness and  infected leg ulcer. He reports they started him on antibiotics and was admitted to the hospital. He reports staying in the hospital for a day before leaving the hospital for some personal issue. He reports decreased oral intake today,  felt extremely weak and lightheaded. He denies; fever, chills/sweats, bodyaches, Chest pain, SOB, URI sxs, N/V/D, abdominal pain, LOC, Head trauma    In the ED   Vitals -> BP: 88/55, HR:102, T:97.7, O2 sat: 94% RA  Labs :  WbC 17k, Hb:7.9, Cr:1.76  s/p Vancomycin, Zosyn IVPB  s/p 2L NS 59 yo M with PMH of DM, HLD,  HTN and diabetic  leg ulcers present with weakness, lightheadedness and infected RT lower extremity ulcer for the past 3 days which has progressively worsened today. As per the patient, he went to [facility] in [place] 3 day ago for his weakness and  infected leg ulcer. He reports they started him on antibiotics and was admitted to the hospital. He reports staying in the hospital for a day before leaving the hospital for some personal issue. He reports decreased oral intake today,  felt extremely weak and lightheaded. He denies; fever, chills/sweats, bodyaches, Chest pain, SOB, URI sxs, N/V/D, abdominal pain, LOC, Head trauma    In the ED   Vitals -> BP: 88/55, HR:102, T:97.7, O2 sat: 94% RA  Labs :  WbC 17k, Hb:7.9, Cr:1.76  s/p Vancomycin, Zosyn IVPB  s/p 2L NS 57 yo M with PMH of DM, HLD, HTN and diabetic leg ulcers present with chills, weakness, lightheadedness and infected RT lower extremity ulcer for the past 3 days which has progressively worsened today. As per the patient, he went to [facility] in [place] 3 vargas ago for his weakness and  infected leg ulcer. He mentioned that he received one blood transfusion,  antibiotics and was admitted to the hospital. He reports staying in the hospital for a day before leaving the hospital for some personal issue. He reports decreased oral intake today, felt extremely weak and lightheaded. He reports history ulcer for the past 5 years that has worsened in the past 3 years.  He follows a "dermatologist" at [facility] and he is unable to provide information of his diagnosis. He denies; fever, bodyaches, Chest pain, SOB, URI sxs, N/V/D, abdominal pain, LOC, Head trauma    In the ED   Vitals -> BP: 88/55, HR:102, T:97.7, O2 sat: 94% RA  Labs :  WbC 17k, Hb:7.9, Cr:1.76  EKG : NSR  s/p Vancomycin, Zosyn IVPB  s/p 2L NS ->101/68 57 yo M with PMH of DM, HLD, HTN and diabetic leg ulcers present with chills, weakness, lightheadedness and infected RT lower extremity ulcer for the past 3 days which has progressively worsened today. As per the patient, he went to [facility] in [place] 3 vargas ago for his weakness and  infected leg ulcer. He mentioned that he received one blood transfusion,  antibiotics and was admitted to the hospital. He reports staying in the hospital for a day before leaving the hospital for some personal issue. He reports decreased oral intake today, felt extremely weak and lightheaded. He reports history ulcer for the past 5 years that has worsened in the past 3 years.  He follows a "dermatologist" at [facility] and he is unable to provide information of his diagnosis. He denies; fever, bodyaches, Chest pain, SOB, URI sxs, N/V/D, abdominal pain, LOC, Head trauma    In the ED   Vitals -> BP: 88/55, HR:102, T:97.7, O2 sat: 94% RA  Labs :  WbC 17k, Hb:7.9, Cr:1.76  EKG : NSR  s/p Vancomycin, Zosyn IVPB  s/p 2L NS ->101/68 57 yo M with PMH of DM, HLD, HTN and diabetic leg ulcers present with chills, weakness, lightheadedness and infected RT lower extremity ulcer for the past 3 days which has progressively worsened today. As per the patient, he went to [facility] in [place] 3 vargas ago for his weakness and  infected leg ulcer. He mentioned that he received one blood transfusion,  antibiotics and was admitted to the hospital. He reports staying in the hospital for a day before leaving the hospital for some personal issue. He reports decreased oral intake today, felt extremely weak and lightheaded. He reports history ulcer for the past 5 years that has worsened in the past 3 years.  He follows a "dermatologist" at [facility] and he is unable to provide information of his diagnosis. He denies; fever, bodyaches, Chest pain, SOB, URI sxs, N/V/D, abdominal pain, LOC, Head trauma    In the ED   Vitals -> BP: 88/55, HR:102, T:97.7, O2 sat: 94% RA  Labs :  WbC 17k, Hb:7.9, Cr:1.76  EKG : NSR  s/p Vancomycin, Zosyn IVPB  s/p 2L NS ->101/68 59 yo M with PMH of DM, HLD, HTN and diabetic leg ulcers present with chills, weakness, lightheadedness and infected RT lower extremity ulcer for the past 3 days which has progressively worsened today. As per pt,  he went to [facility] in [place] 3 days  ago for his weakness and infected leg ulcer. He mentioned that he received one blood transfusion, antibiotics and was admitted to the hospital. He reports staying in the hospital for a day before leaving the hospital for some personal issue. He reports decreased oral intake today, felt extremely weak and lightheaded. He reports history of ulcer for the past 5 years that has worsened in the past 3 years.  He follows a "dermatologist" at [facility] and he is unable to provide information of his diagnosis. He denies; fever, bodyaches, Chest pain, SOB, URI sxs, N/V/D, abdominal pain, LOC, Head trauma    In the ED   Vitals -> BP: 88/55, HR:102, T:97.7, O2 sat: 94% RA  Labs :  WbC 17k, Hb:7.9, Cr:1.76  EKG : NSR  s/p Vancomycin, Zosyn IVPB  s/p 2L NS ->101/68 59 yo M with PMH of DM, HLD, HTN and diabetic leg ulcers present with chills, weakness, lightheadedness and infected RT lower extremity ulcer for the past 3 days which has progressively worsened today. As per pt,  he went to [facility] in [place] 3 days  ago for his weakness and infected leg ulcer. He mentioned that he received one blood transfusion, antibiotics and was admitted to the hospital. He reports staying in the hospital for a day before leaving the hospital for some personal issue. He reports decreased oral intake today, felt extremely weak and lightheaded. He reports history of ulcer for the past 5 years that has worsened in the past 3 years.  He follows a "dermatologist" at [facility] and he is unable to provide information of his diagnosis. He denies; fever, bodyaches, Chest pain, SOB, URI sxs, N/V/D, abdominal pain, LOC, Head trauma    In the ED   Vitals -> BP: 88/55, HR:102, T:97.7, O2 sat: 94% RA  Labs :  WbC 17k, Hb:7.9, Cr:1.76  EKG : NSR  s/p Vancomycin, Zosyn IVPB  s/p 2L NS ->101/68 57 yo M with PMH of DM, HLD, HTN and diabetic leg ulcers present with chills, weakness, lightheadedness and infected RT lower extremity ulcer for the past 3 days which has progressively worsened today. As per pt,  he went to [facility] in [place] 3 days  ago for his weakness and infected leg ulcer. He mentioned that he received one blood transfusion, antibiotics and was admitted to the hospital. He reports staying in the hospital for a day before leaving the hospital for some personal issue. He reports decreased oral intake today, felt extremely weak and lightheaded. He reports history of ulcer for the past 5 years that has worsened in the past 3 years.  He follows a "dermatologist" at [facility] and he is unable to provide information of his diagnosis. He denies; fever, bodyaches, Chest pain, SOB, URI sxs, N/V/D, abdominal pain, LOC, Head trauma    In the ED   Vitals -> BP: 88/55, HR:102, T:97.7, O2 sat: 94% RA  Labs :  WbC 17k, Hb:7.9, Cr:1.76  EKG : NSR  s/p Vancomycin, Zosyn IVPB  s/p 2L NS ->101/68

## 2023-08-22 ENCOUNTER — TRANSCRIPTION ENCOUNTER (OUTPATIENT)
Age: 58
End: 2023-08-22

## 2023-08-22 LAB
ANION GAP SERPL CALC-SCNC: 7 MMOL/L — SIGNIFICANT CHANGE UP (ref 5–17)
ANION GAP SERPL CALC-SCNC: 7 MMOL/L — SIGNIFICANT CHANGE UP (ref 5–17)
BUN SERPL-MCNC: 8 MG/DL — SIGNIFICANT CHANGE UP (ref 7–18)
BUN SERPL-MCNC: 8 MG/DL — SIGNIFICANT CHANGE UP (ref 7–18)
CALCIUM SERPL-MCNC: 7.9 MG/DL — LOW (ref 8.4–10.5)
CALCIUM SERPL-MCNC: 7.9 MG/DL — LOW (ref 8.4–10.5)
CHLORIDE SERPL-SCNC: 112 MMOL/L — HIGH (ref 96–108)
CHLORIDE SERPL-SCNC: 112 MMOL/L — HIGH (ref 96–108)
CO2 SERPL-SCNC: 22 MMOL/L — SIGNIFICANT CHANGE UP (ref 22–31)
CO2 SERPL-SCNC: 22 MMOL/L — SIGNIFICANT CHANGE UP (ref 22–31)
CREAT SERPL-MCNC: 0.8 MG/DL — SIGNIFICANT CHANGE UP (ref 0.5–1.3)
CREAT SERPL-MCNC: 0.8 MG/DL — SIGNIFICANT CHANGE UP (ref 0.5–1.3)
CULTURE RESULTS: NO GROWTH — SIGNIFICANT CHANGE UP
CULTURE RESULTS: NO GROWTH — SIGNIFICANT CHANGE UP
EGFR: 103 ML/MIN/1.73M2 — SIGNIFICANT CHANGE UP
EGFR: 103 ML/MIN/1.73M2 — SIGNIFICANT CHANGE UP
FERRITIN SERPL-MCNC: 60 NG/ML — SIGNIFICANT CHANGE UP (ref 30–400)
FERRITIN SERPL-MCNC: 60 NG/ML — SIGNIFICANT CHANGE UP (ref 30–400)
GLUCOSE BLDC GLUCOMTR-MCNC: 139 MG/DL — HIGH (ref 70–99)
GLUCOSE BLDC GLUCOMTR-MCNC: 139 MG/DL — HIGH (ref 70–99)
GLUCOSE BLDC GLUCOMTR-MCNC: 147 MG/DL — HIGH (ref 70–99)
GLUCOSE BLDC GLUCOMTR-MCNC: 147 MG/DL — HIGH (ref 70–99)
GLUCOSE BLDC GLUCOMTR-MCNC: 149 MG/DL — HIGH (ref 70–99)
GLUCOSE BLDC GLUCOMTR-MCNC: 149 MG/DL — HIGH (ref 70–99)
GLUCOSE BLDC GLUCOMTR-MCNC: 175 MG/DL — HIGH (ref 70–99)
GLUCOSE BLDC GLUCOMTR-MCNC: 175 MG/DL — HIGH (ref 70–99)
GLUCOSE SERPL-MCNC: 132 MG/DL — HIGH (ref 70–99)
GLUCOSE SERPL-MCNC: 132 MG/DL — HIGH (ref 70–99)
HCT VFR BLD CALC: 26.1 % — LOW (ref 39–50)
HCT VFR BLD CALC: 26.1 % — LOW (ref 39–50)
HCV AB S/CO SERPL IA: 0.09 S/CO — SIGNIFICANT CHANGE UP (ref 0–0.99)
HCV AB S/CO SERPL IA: 0.09 S/CO — SIGNIFICANT CHANGE UP (ref 0–0.99)
HCV AB SERPL-IMP: SIGNIFICANT CHANGE UP
HCV AB SERPL-IMP: SIGNIFICANT CHANGE UP
HGB BLD-MCNC: 7.8 G/DL — LOW (ref 13–17)
HGB BLD-MCNC: 7.8 G/DL — LOW (ref 13–17)
IRON SATN MFR SERPL: 17 UG/DL — LOW (ref 65–170)
IRON SATN MFR SERPL: 17 UG/DL — LOW (ref 65–170)
IRON SATN MFR SERPL: 5 % — LOW (ref 20–55)
IRON SATN MFR SERPL: 5 % — LOW (ref 20–55)
MCHC RBC-ENTMCNC: 23.5 PG — LOW (ref 27–34)
MCHC RBC-ENTMCNC: 23.5 PG — LOW (ref 27–34)
MCHC RBC-ENTMCNC: 29.9 GM/DL — LOW (ref 32–36)
MCHC RBC-ENTMCNC: 29.9 GM/DL — LOW (ref 32–36)
MCV RBC AUTO: 78.6 FL — LOW (ref 80–100)
MCV RBC AUTO: 78.6 FL — LOW (ref 80–100)
NRBC # BLD: 0 /100 WBCS — SIGNIFICANT CHANGE UP (ref 0–0)
NRBC # BLD: 0 /100 WBCS — SIGNIFICANT CHANGE UP (ref 0–0)
PLATELET # BLD AUTO: 335 K/UL — SIGNIFICANT CHANGE UP (ref 150–400)
PLATELET # BLD AUTO: 335 K/UL — SIGNIFICANT CHANGE UP (ref 150–400)
POTASSIUM SERPL-MCNC: 3.8 MMOL/L — SIGNIFICANT CHANGE UP (ref 3.5–5.3)
POTASSIUM SERPL-MCNC: 3.8 MMOL/L — SIGNIFICANT CHANGE UP (ref 3.5–5.3)
POTASSIUM SERPL-SCNC: 3.8 MMOL/L — SIGNIFICANT CHANGE UP (ref 3.5–5.3)
POTASSIUM SERPL-SCNC: 3.8 MMOL/L — SIGNIFICANT CHANGE UP (ref 3.5–5.3)
RBC # BLD: 3.32 M/UL — LOW (ref 4.2–5.8)
RBC # BLD: 3.32 M/UL — LOW (ref 4.2–5.8)
RBC # FLD: 16.8 % — HIGH (ref 10.3–14.5)
RBC # FLD: 16.8 % — HIGH (ref 10.3–14.5)
SODIUM SERPL-SCNC: 141 MMOL/L — SIGNIFICANT CHANGE UP (ref 135–145)
SODIUM SERPL-SCNC: 141 MMOL/L — SIGNIFICANT CHANGE UP (ref 135–145)
SPECIMEN SOURCE: SIGNIFICANT CHANGE UP
SPECIMEN SOURCE: SIGNIFICANT CHANGE UP
TIBC SERPL-MCNC: 331 UG/DL — SIGNIFICANT CHANGE UP (ref 250–450)
TIBC SERPL-MCNC: 331 UG/DL — SIGNIFICANT CHANGE UP (ref 250–450)
TRANSFERRIN SERPL-MCNC: 265 MG/DL — SIGNIFICANT CHANGE UP (ref 200–360)
TRANSFERRIN SERPL-MCNC: 265 MG/DL — SIGNIFICANT CHANGE UP (ref 200–360)
UIBC SERPL-MCNC: 314 UG/DL — SIGNIFICANT CHANGE UP (ref 110–370)
UIBC SERPL-MCNC: 314 UG/DL — SIGNIFICANT CHANGE UP (ref 110–370)
WBC # BLD: 9.43 K/UL — SIGNIFICANT CHANGE UP (ref 3.8–10.5)
WBC # BLD: 9.43 K/UL — SIGNIFICANT CHANGE UP (ref 3.8–10.5)
WBC # FLD AUTO: 9.43 K/UL — SIGNIFICANT CHANGE UP (ref 3.8–10.5)
WBC # FLD AUTO: 9.43 K/UL — SIGNIFICANT CHANGE UP (ref 3.8–10.5)

## 2023-08-22 PROCEDURE — 99253 IP/OBS CNSLTJ NEW/EST LOW 45: CPT

## 2023-08-22 PROCEDURE — 99232 SBSQ HOSP IP/OBS MODERATE 35: CPT | Mod: GC

## 2023-08-22 PROCEDURE — 93971 EXTREMITY STUDY: CPT | Mod: 26,RT

## 2023-08-22 PROCEDURE — 93923 UPR/LXTR ART STDY 3+ LVLS: CPT | Mod: 26

## 2023-08-22 PROCEDURE — 99223 1ST HOSP IP/OBS HIGH 75: CPT | Mod: GC

## 2023-08-22 RX ORDER — VANCOMYCIN HCL 1 G
1750 VIAL (EA) INTRAVENOUS EVERY 12 HOURS
Refills: 0 | Status: DISCONTINUED | OUTPATIENT
Start: 2023-08-22 | End: 2023-08-23

## 2023-08-22 RX ORDER — CEFEPIME 1 G/1
2000 INJECTION, POWDER, FOR SOLUTION INTRAMUSCULAR; INTRAVENOUS EVERY 8 HOURS
Refills: 0 | Status: DISCONTINUED | OUTPATIENT
Start: 2023-08-22 | End: 2023-08-25

## 2023-08-22 RX ORDER — CEFEPIME 1 G/1
INJECTION, POWDER, FOR SOLUTION INTRAMUSCULAR; INTRAVENOUS
Refills: 0 | Status: DISCONTINUED | OUTPATIENT
Start: 2023-08-22 | End: 2023-08-25

## 2023-08-22 RX ORDER — CEFEPIME 1 G/1
2000 INJECTION, POWDER, FOR SOLUTION INTRAMUSCULAR; INTRAVENOUS ONCE
Refills: 0 | Status: COMPLETED | OUTPATIENT
Start: 2023-08-22 | End: 2023-08-22

## 2023-08-22 RX ORDER — GABAPENTIN 400 MG/1
300 CAPSULE ORAL ONCE
Refills: 0 | Status: COMPLETED | OUTPATIENT
Start: 2023-08-22 | End: 2023-08-22

## 2023-08-22 RX ADMIN — PIPERACILLIN AND TAZOBACTAM 25 GRAM(S): 4; .5 INJECTION, POWDER, LYOPHILIZED, FOR SOLUTION INTRAVENOUS at 06:01

## 2023-08-22 RX ADMIN — GABAPENTIN 300 MILLIGRAM(S): 400 CAPSULE ORAL at 12:46

## 2023-08-22 RX ADMIN — Medication 650 MILLIGRAM(S): at 21:41

## 2023-08-22 RX ADMIN — CEFEPIME 100 MILLIGRAM(S): 1 INJECTION, POWDER, FOR SOLUTION INTRAMUSCULAR; INTRAVENOUS at 21:33

## 2023-08-22 RX ADMIN — CEFEPIME 100 MILLIGRAM(S): 1 INJECTION, POWDER, FOR SOLUTION INTRAMUSCULAR; INTRAVENOUS at 15:32

## 2023-08-22 RX ADMIN — Medication 250 MILLIGRAM(S): at 17:18

## 2023-08-22 RX ADMIN — Medication 650 MILLIGRAM(S): at 19:59

## 2023-08-22 NOTE — PROGRESS NOTE ADULT - PROBLEM SELECTOR PLAN 1
p/w  DM. HTN, diabetic  leg ulcers present with weakness, lightheadedness and infected RT lower extremity ulcer for the past 3 days.  -in the ED patient was tachycardic and hypotensive  Labs  -> WBC 17k, Hb:7.9, Cr:1.76  s/p Vancomycin, Zosyn IVPB  s/p 2L NS -> BP improving   -Patient changed to vanc and cefepime  - wound culture growing gram negative rods   - F/u Bld cx x 2,   -MRI leg ordered  -ID consulted Dr. Carter -p/w  DM. HTN, diabetic  leg ulcers present with weakness, lightheadedness and infected RT lower extremity ulcer for the past 3 days.  -in the ED patient was tachycardic and hypotensive  Labs  -> WBC 17k, Hb:7.9, Cr:1.76  s/p Vancomycin, Zosyn IVPB  s/p 2L NS -> BP improving   -Patient changed to vanc and cefepime  - wound culture growing gram negative rods   - F/u Bld cx x 2,   -MRI leg ordered  -ID consulted Dr. Carter

## 2023-08-22 NOTE — DISCHARGE NOTE PROVIDER - NSDCMRMEDTOKEN_GEN_ALL_CORE_FT
cholecalciferol 1250 mcg (50,000 intl units) oral capsule: 1 orally every 7 days  Cozaar 100 mg oral tablet: 1 orally once a day  FeroSul 325 mg (65 mg elemental iron) oral tablet: 1 tablet orally once a day  hydroCHLOROthiazide 12.5 mg oral tablet: 1 orally once a day  Janumet XR 50 mg-1000 mg oral tablet, extended release: 2 orally once a day  Jardiance 10 mg oral tablet: 1 orally once a day  Lantus 100 units/mL subcutaneous solution: 4 unit(s) subcutaneous once a day (at bedtime)  meclizine 25 mg oral tablet: 1 orally 3 times a day  Neurontin 300 mg oral capsule: 1 orally 3 times a day  Pravachol 20 mg oral tablet: 1 orally once a day  timolol hemihydrate 0.5% ophthalmic solution: 1 dose(s) in each affected eye 2 times a day   cholecalciferol 1250 mcg (50,000 intl units) oral capsule: 1 orally every 7 days  ciprofloxacin 750 mg oral tablet: 1 tab(s) orally 2 times a day  Cozaar 100 mg oral tablet: 1 tablet orally once a day Hold &lt;110  dicloxacillin 250 mg oral capsule: 2 cap(s) orally 4 times a day  Eliquis 5 mg oral tablet: 1 tab(s) orally 2 times a day  FeroSul 325 mg (65 mg elemental iron) oral tablet: 1 tablet orally once a day  gabapentin 300 mg oral capsule: 1 cap(s) orally once a day (at bedtime)  hydroCHLOROthiazide 12.5 mg oral tablet: 1 tablet orally once a day Hold for BP &lt; 110  Janumet XR 50 mg-1000 mg oral tablet, extended release: 2 orally once a day  Jardiance 10 mg oral tablet: 1 orally once a day  Lantus 100 units/mL subcutaneous solution: 4 unit(s) subcutaneous once a day (at bedtime)  meclizine 25 mg oral tablet: 1 orally 3 times a day  timolol hemihydrate 0.5% ophthalmic solution: 1 dose(s) in each affected eye 2 times a day

## 2023-08-22 NOTE — CONSULT NOTE ADULT - SUBJECTIVE AND OBJECTIVE BOX
Vascular surgery called to see and evaluate 58 year old male for B/L LE wounds.   States that he had the b/l LE wounds for the past 5 years.   He states that he has been seen by a dermatologist in the past for the wounds. He states that he dresses the wounds himself at home.   He denies any LE numbness, tingling or pain.    He states that he ambulates at home with a cane.  He denies any history of smoking.   He denies any history of seeing a vascular surgeon in the past or having any vascular intervention in the past.   He denies any fever, chills, sob, chest pain or any other constitutional complaints.      used.     As per chart note  HPI:  59 yo M with PMH of DM, HLD, HTN and diabetic leg ulcers present with chills, weakness, lightheadedness and infected RT lower extremity ulcer for the past 3 days which has progressively worsened today. As per pt,  he went to [facility] in [place] 3 days  ago for his weakness and infected leg ulcer. He mentioned that he received one blood transfusion, antibiotics and was admitted to the hospital. He reports staying in the hospital for a day before leaving the hospital for some personal issue. He reports decreased oral intake today, felt extremely weak and lightheaded. He reports history of ulcer for the past 5 years that has worsened in the past 3 years.  He follows a "dermatologist" at [facility] and he is unable to provide information of his diagnosis. He denies; fever, bodyaches, Chest pain, SOB, URI sxs, N/V/D, abdominal pain, LOC, Head trauma    (21 Aug 2023 02:06)           PAST MEDICAL & SURGICAL HISTORY:  HTN (hypertension)    Dyslipidemia    DM (diabetes mellitus)    Leg ulcer    No significant past surgical history          MEDICATIONS  (STANDING):  cefepime   IVPB      cefepime   IVPB 2000 milliGRAM(s) IV Intermittent every 8 hours  insulin lispro (ADMELOG) corrective regimen sliding scale   SubCutaneous three times a day before meals  insulin lispro (ADMELOG) corrective regimen sliding scale   SubCutaneous at bedtime  sodium chloride 0.9%. 1000 milliLiter(s) (125 mL/Hr) IV Continuous <Continuous>  vancomycin  IVPB 1750 milliGRAM(s) IV Intermittent every 12 hours    MEDICATIONS  (PRN):  acetaminophen     Tablet .. 650 milliGRAM(s) Oral every 6 hours PRN Temp greater or equal to 38C (100.4F), Mild Pain (1 - 3)  melatonin 3 milliGRAM(s) Oral at bedtime PRN Insomnia      Allergies    No Known Allergies    Intolerances        Vital Signs Last 24 Hrs  T(C): 37.2 (22 Aug 2023 16:02), Max: 37.2 (22 Aug 2023 08:23)  T(F): 99 (22 Aug 2023 16:02), Max: 99 (22 Aug 2023 08:23)  HR: 90 (22 Aug 2023 16:02) (43 - 90)  BP: 121/83 (22 Aug 2023 16:02) (110/71 - 158/69)  BP(mean): --  RR: 18 (22 Aug 2023 16:02) (18 - 19)  SpO2: 97% (22 Aug 2023 16:02) (97% - 100%)    Parameters below as of 22 Aug 2023 16:02  Patient On (Oxygen Delivery Method): room air        Physical:  Gen: A&Ox3. NAD  Respirations: nonlabored  Right LE:   warm, soft, nontender  erythema and warmth distal to knee, area marked at dates  palpable bounding pt and dp pulse  sensation intact at dorsal aspect of foot   medial malleolus wound noted; no drainage, no fluctuance, no crepitus     Left LE:   warm, soft, nontender   palpable bounding pt and dp pulse   sensation intact at dorsal aspect of foot    medial malleolus and later wound noted; no drainage, no fluctuance, no crepitus         I&O's Detail    21 Aug 2023 07:01  -  22 Aug 2023 07:00  --------------------------------------------------------  IN:    sodium chloride 0.9%: 1125 mL  Total IN: 1125 mL    OUT:  Total OUT: 0 mL    Total NET: 1125 mL          LABS:                        7.8    9.43  )-----------( 335      ( 22 Aug 2023 08:00 )             26.1              08-22    141  |  112<H>  |  8   ----------------------------<  132<H>  3.8   |  22  |  0.80    Ca    7.9<L>      22 Aug 2023 08:00  Phos  3.2     08-21  Mg     2.3     08-21    TPro  8.2  /  Alb  3.2<L>  /  TBili  0.5  /  DBili  x   /  AST  16  /  ALT  32  /  AlkPhos  120  08-20            PT/INR - ( 20 Aug 2023 23:10 )   PT: 13.6 sec;   INR: 1.20 ratio         PTT - ( 20 Aug 2023 23:10 )  PTT:33.6 sec  Urinalysis Basic - ( 22 Aug 2023 08:00 )    Color: x / Appearance: x / SG: x / pH: x  Gluc: 132 mg/dL / Ketone: x  / Bili: x / Urobili: x   Blood: x / Protein: x / Nitrite: x   Leuk Esterase: x / RBC: x / WBC x   Sq Epi: x / Non Sq Epi: x / Bacteria: x        RADIOLOGY & ADDITIONAL STUDIES:    < from: [facility] Physiol Extremity Lower 3+ Level, BI (08.22.23 @ 16:34) >  INTERPRETATION:  Clinical indication: Peripheral arterial disease    COMPARISON: None    FINDINGS: There are normal pulse volume recordings bilaterally. There is   no abnormal segmental pressure gradient.    Right BRIDGETTE = 1.01  Left BRIDGETTE = 1.00    IMPRESSION: Normal ABIs.    < end of copied text >    < from: US Duplex Venous Lower Ext Ltd, Right (08.22.23 @ 16:30) >  TECHNIQUE: Duplex sonography of the RIGHT LOWER extremity veins with   color and spectral Doppler, with and without compression.    FINDINGS:    There is normal compressibility of the right common femoral, femoral and   popliteal veins.  The contralateral externaliliac vein is patent.  Doppler examination shows normal spontaneous and phasic flow.    No calf vein thrombosis is detected.    IMPRESSION:  No evidence of right lower extremity deep venous thrombosis.    < end of copied text >         Vascular surgery called to see and evaluate 58 year old male for B/L LE wounds.   States that he had the b/l LE wounds for the past 5 years.   He states that he has been seen by a dermatologist in the past for the wounds. He states that he dresses the wounds himself at home.   He denies any LE numbness, tingling or pain.    He states that he ambulates at home with a cane.  He denies any history of smoking.   He denies any history of seeing a vascular surgeon in the past or having any vascular intervention in the past.   He denies any fever, chills, sob, chest pain or any other constitutional complaints.      used.     As per chart note  HPI:  57 yo M with PMH of DM, HLD, HTN and diabetic leg ulcers present with chills, weakness, lightheadedness and infected RT lower extremity ulcer for the past 3 days which has progressively worsened today. As per pt,  he went to [facility] in [place] 3 days  ago for his weakness and infected leg ulcer. He mentioned that he received one blood transfusion, antibiotics and was admitted to the hospital. He reports staying in the hospital for a day before leaving the hospital for some personal issue. He reports decreased oral intake today, felt extremely weak and lightheaded. He reports history of ulcer for the past 5 years that has worsened in the past 3 years.  He follows a "dermatologist" at [facility] and he is unable to provide information of his diagnosis. He denies; fever, bodyaches, Chest pain, SOB, URI sxs, N/V/D, abdominal pain, LOC, Head trauma    (21 Aug 2023 02:06)           PAST MEDICAL & SURGICAL HISTORY:  HTN (hypertension)    Dyslipidemia    DM (diabetes mellitus)    Leg ulcer    No significant past surgical history          MEDICATIONS  (STANDING):  cefepime   IVPB      cefepime   IVPB 2000 milliGRAM(s) IV Intermittent every 8 hours  insulin lispro (ADMELOG) corrective regimen sliding scale   SubCutaneous three times a day before meals  insulin lispro (ADMELOG) corrective regimen sliding scale   SubCutaneous at bedtime  sodium chloride 0.9%. 1000 milliLiter(s) (125 mL/Hr) IV Continuous <Continuous>  vancomycin  IVPB 1750 milliGRAM(s) IV Intermittent every 12 hours    MEDICATIONS  (PRN):  acetaminophen     Tablet .. 650 milliGRAM(s) Oral every 6 hours PRN Temp greater or equal to 38C (100.4F), Mild Pain (1 - 3)  melatonin 3 milliGRAM(s) Oral at bedtime PRN Insomnia      Allergies    No Known Allergies    Intolerances        Vital Signs Last 24 Hrs  T(C): 37.2 (22 Aug 2023 16:02), Max: 37.2 (22 Aug 2023 08:23)  T(F): 99 (22 Aug 2023 16:02), Max: 99 (22 Aug 2023 08:23)  HR: 90 (22 Aug 2023 16:02) (43 - 90)  BP: 121/83 (22 Aug 2023 16:02) (110/71 - 158/69)  BP(mean): --  RR: 18 (22 Aug 2023 16:02) (18 - 19)  SpO2: 97% (22 Aug 2023 16:02) (97% - 100%)    Parameters below as of 22 Aug 2023 16:02  Patient On (Oxygen Delivery Method): room air        Physical:  Gen: A&Ox3. NAD  Respirations: nonlabored  Right LE:   warm, soft, nontender  erythema and warmth distal to knee, area marked at dates  palpable bounding pt and dp pulse  sensation intact at dorsal aspect of foot   medial malleolus wound noted; no drainage, no fluctuance, no crepitus     Left LE:   warm, soft, nontender   palpable bounding pt and dp pulse   sensation intact at dorsal aspect of foot    medial malleolus and later wound noted; no drainage, no fluctuance, no crepitus         I&O's Detail    21 Aug 2023 07:01  -  22 Aug 2023 07:00  --------------------------------------------------------  IN:    sodium chloride 0.9%: 1125 mL  Total IN: 1125 mL    OUT:  Total OUT: 0 mL    Total NET: 1125 mL          LABS:                        7.8    9.43  )-----------( 335      ( 22 Aug 2023 08:00 )             26.1              08-22    141  |  112<H>  |  8   ----------------------------<  132<H>  3.8   |  22  |  0.80    Ca    7.9<L>      22 Aug 2023 08:00  Phos  3.2     08-21  Mg     2.3     08-21    TPro  8.2  /  Alb  3.2<L>  /  TBili  0.5  /  DBili  x   /  AST  16  /  ALT  32  /  AlkPhos  120  08-20            PT/INR - ( 20 Aug 2023 23:10 )   PT: 13.6 sec;   INR: 1.20 ratio         PTT - ( 20 Aug 2023 23:10 )  PTT:33.6 sec  Urinalysis Basic - ( 22 Aug 2023 08:00 )    Color: x / Appearance: x / SG: x / pH: x  Gluc: 132 mg/dL / Ketone: x  / Bili: x / Urobili: x   Blood: x / Protein: x / Nitrite: x   Leuk Esterase: x / RBC: x / WBC x   Sq Epi: x / Non Sq Epi: x / Bacteria: x        RADIOLOGY & ADDITIONAL STUDIES:    < from: [facility] Physiol Extremity Lower 3+ Level, BI (08.22.23 @ 16:34) >  INTERPRETATION:  Clinical indication: Peripheral arterial disease    COMPARISON: None    FINDINGS: There are normal pulse volume recordings bilaterally. There is   no abnormal segmental pressure gradient.    Right BRIDGETTE = 1.01  Left BRIDGETTE = 1.00    IMPRESSION: Normal ABIs.    < end of copied text >    < from: US Duplex Venous Lower Ext Ltd, Right (08.22.23 @ 16:30) >  TECHNIQUE: Duplex sonography of the RIGHT LOWER extremity veins with   color and spectral Doppler, with and without compression.    FINDINGS:    There is normal compressibility of the right common femoral, femoral and   popliteal veins.  The contralateral externaliliac vein is patent.  Doppler examination shows normal spontaneous and phasic flow.    No calf vein thrombosis is detected.    IMPRESSION:  No evidence of right lower extremity deep venous thrombosis.    < end of copied text >         Vascular surgery called to see and evaluate 58 year old male for B/L LE wounds.   States that he had the b/l LE wounds for the past 5 years.   He states that he has been seen by a dermatologist in the past for the wounds. He states that he dresses the wounds himself at home.   He denies any LE numbness, tingling or pain.    He states that he ambulates at home with a cane.  He denies any history of smoking.   He denies any history of seeing a vascular surgeon in the past or having any vascular intervention in the past.   He denies any fever, chills, sob, chest pain or any other constitutional complaints.      used.     As per chart note  HPI:  57 yo M with PMH of DM, HLD, HTN and diabetic leg ulcers present with chills, weakness, lightheadedness and infected RT lower extremity ulcer for the past 3 days which has progressively worsened today. As per pt,  he went to [facility] in [place] 3 days  ago for his weakness and infected leg ulcer. He mentioned that he received one blood transfusion, antibiotics and was admitted to the hospital. He reports staying in the hospital for a day before leaving the hospital for some personal issue. He reports decreased oral intake today, felt extremely weak and lightheaded. He reports history of ulcer for the past 5 years that has worsened in the past 3 years.  He follows a "dermatologist" at [facility] and he is unable to provide information of his diagnosis. He denies; fever, bodyaches, Chest pain, SOB, URI sxs, N/V/D, abdominal pain, LOC, Head trauma    (21 Aug 2023 02:06)           PAST MEDICAL & SURGICAL HISTORY:  HTN (hypertension)    Dyslipidemia    DM (diabetes mellitus)    Leg ulcer    No significant past surgical history          MEDICATIONS  (STANDING):  cefepime   IVPB      cefepime   IVPB 2000 milliGRAM(s) IV Intermittent every 8 hours  insulin lispro (ADMELOG) corrective regimen sliding scale   SubCutaneous three times a day before meals  insulin lispro (ADMELOG) corrective regimen sliding scale   SubCutaneous at bedtime  sodium chloride 0.9%. 1000 milliLiter(s) (125 mL/Hr) IV Continuous <Continuous>  vancomycin  IVPB 1750 milliGRAM(s) IV Intermittent every 12 hours    MEDICATIONS  (PRN):  acetaminophen     Tablet .. 650 milliGRAM(s) Oral every 6 hours PRN Temp greater or equal to 38C (100.4F), Mild Pain (1 - 3)  melatonin 3 milliGRAM(s) Oral at bedtime PRN Insomnia      Allergies    No Known Allergies    Intolerances        Vital Signs Last 24 Hrs  T(C): 37.2 (22 Aug 2023 16:02), Max: 37.2 (22 Aug 2023 08:23)  T(F): 99 (22 Aug 2023 16:02), Max: 99 (22 Aug 2023 08:23)  HR: 90 (22 Aug 2023 16:02) (43 - 90)  BP: 121/83 (22 Aug 2023 16:02) (110/71 - 158/69)  BP(mean): --  RR: 18 (22 Aug 2023 16:02) (18 - 19)  SpO2: 97% (22 Aug 2023 16:02) (97% - 100%)    Parameters below as of 22 Aug 2023 16:02  Patient On (Oxygen Delivery Method): room air        Physical:  Gen: A&Ox3. NAD  Respirations: nonlabored  Right LE:   warm, soft, nontender  erythema and warmth distal to knee, area marked at dates  palpable bounding pt and dp pulse  sensation intact at dorsal aspect of foot   medial malleolus wound noted; no drainage, no fluctuance, no crepitus     Left LE:   warm, soft, nontender   palpable bounding pt and dp pulse   sensation intact at dorsal aspect of foot    medial malleolus and later wound noted; no drainage, no fluctuance, no crepitus         I&O's Detail    21 Aug 2023 07:01  -  22 Aug 2023 07:00  --------------------------------------------------------  IN:    sodium chloride 0.9%: 1125 mL  Total IN: 1125 mL    OUT:  Total OUT: 0 mL    Total NET: 1125 mL          LABS:                        7.8    9.43  )-----------( 335      ( 22 Aug 2023 08:00 )             26.1              08-22    141  |  112<H>  |  8   ----------------------------<  132<H>  3.8   |  22  |  0.80    Ca    7.9<L>      22 Aug 2023 08:00  Phos  3.2     08-21  Mg     2.3     08-21    TPro  8.2  /  Alb  3.2<L>  /  TBili  0.5  /  DBili  x   /  AST  16  /  ALT  32  /  AlkPhos  120  08-20            PT/INR - ( 20 Aug 2023 23:10 )   PT: 13.6 sec;   INR: 1.20 ratio         PTT - ( 20 Aug 2023 23:10 )  PTT:33.6 sec  Urinalysis Basic - ( 22 Aug 2023 08:00 )    Color: x / Appearance: x / SG: x / pH: x  Gluc: 132 mg/dL / Ketone: x  / Bili: x / Urobili: x   Blood: x / Protein: x / Nitrite: x   Leuk Esterase: x / RBC: x / WBC x   Sq Epi: x / Non Sq Epi: x / Bacteria: x        RADIOLOGY & ADDITIONAL STUDIES:    < from: [facility] Physiol Extremity Lower 3+ Level, BI (08.22.23 @ 16:34) >  INTERPRETATION:  Clinical indication: Peripheral arterial disease    COMPARISON: None    FINDINGS: There are normal pulse volume recordings bilaterally. There is   no abnormal segmental pressure gradient.    Right BRIDGETTE = 1.01  Left BRIDGETTE = 1.00    IMPRESSION: Normal ABIs.    < end of copied text >    < from: US Duplex Venous Lower Ext Ltd, Right (08.22.23 @ 16:30) >  TECHNIQUE: Duplex sonography of the RIGHT LOWER extremity veins with   color and spectral Doppler, with and without compression.    FINDINGS:    There is normal compressibility of the right common femoral, femoral and   popliteal veins.  The contralateral externaliliac vein is patent.  Doppler examination shows normal spontaneous and phasic flow.    No calf vein thrombosis is detected.    IMPRESSION:  No evidence of right lower extremity deep venous thrombosis.    < end of copied text >

## 2023-08-22 NOTE — CONSULT NOTE ADULT - ATTENDING COMMENTS
Chart reviewed, including notes, labs, and imaging. Pt evaluated at the bedside in the presence of the intern on ID. Discussed with the intern and the medical team, including Dr. Canales, the Dx, w/u, and management of pt with septic shock and infected leg ulcers with concern for spread to the bone. I made changes to the documentation and agree with the above Hx, PE, assessment, and plan.

## 2023-08-22 NOTE — CONSULT NOTE ADULT - SUBJECTIVE AND OBJECTIVE BOX
HPI:  58 M w PMHx of DM, HTN, HLD. and diabetic leg ulcers for the past 5 years presented to the ED with c/o chills, weakness, lightheadedness and progressively worsening right sided diabetic foot ulcers. Patient was previously admitted and treated at [facility] for the infected ulcer and and was given 1 unit PRBC due to low hemoglobin. Pt left AMA after one day for personal reasons. In ED patient met SIRS criteria, was given vancomycin and zosyn, 2L NS, and was admitted for septic shock.     In the ED   Vitals -> BP: 88/55, HR:102, T:97.7, O2 sat: 94% RA  Labs :  WbC 17k, Hb:7.9, Cr:1.76  EKG : NSR  s/p Vancomycin, Zosyn IVPB  s/p 2L NS ->101/68 (21 Aug 2023 02:06)      PAST MEDICAL & SURGICAL HISTORY:  HTN (hypertension)  Dyslipidemia  DM (diabetes mellitus)  Leg ulcer    No significant past surgical history    MEDS:  acetaminophen     Tablet .. 650 milliGRAM(s) Oral every 6 hours PRN  cefepime   IVPB      cefepime   IVPB 2000 milliGRAM(s) IV Intermittent once (D2)  cefepime   IVPB 2000 milliGRAM(s) IV Intermittent every 8 hours  insulin lispro (ADMELOG) corrective regimen sliding scale   SubCutaneous three times a day before meals  insulin lispro (ADMELOG) corrective regimen sliding scale   SubCutaneous at bedtime  melatonin 3 milliGRAM(s) Oral at bedtime PRN  sodium chloride 0.9%. 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    FAMILY HISTORY:  No pertinent family history in first degree relatives    ROS:   General: feeling 	well, no fever, no chills  Skin: no itchiness no rash, redness of right lower extremity, bilateral leg ulcers  HEENT: dryness of throat  Respiratory and Thorax: no complaints  Cardiovascular:	no complaints  Abdomen: no complaints	  Genitourinary: no complaints  Musculoskeletal:	no complaints  Neurological: no complaints  Psychiatric: no complaints	  Hematology/Lymphatics:	no complaints  Endocrine: no complaints      Vital Signs Last 24 Hrs  T(C): 36.8 (22 Aug 2023 11:40), Max: 37.2 (21 Aug 2023 15:27)  T(F): 98.2 (22 Aug 2023 11:40), Max: 99 (22 Aug 2023 08:23)  HR: 86 (22 Aug 2023 11:40) (43 - 87)  BP: 117/72 (22 Aug 2023 11:40) (104/65 - 158/69)  RR: 19 (22 Aug 2023 11:40) (18 - 19)  SpO2: 100% (22 Aug 2023 11:40) (97% - 100%)    Parameters below as of 22 Aug 2023 11:40  Patient On (Oxygen Delivery Method): room air	    PHYSICAL EXAM:  Gen: NAD, patient lying comfortably in bed, non toxic  HEENT: moist mucous membranes; mouth--good oral hygiene  Neck: no stiffness  Back: no vert or CVA tenderness  Respiratory: b/l normal breath sounds, no crackles, wheezing, cough  Cardiovascular: s1 s2 regular, no murmurs  Gastrointestinal: soft, non tender non distended abdomen, bowel sounds normal  Genitourinary: no catheter  Extremities:  L sided ulcers w/ exudate, 1) 3x3 on medial malleolus (2)4x3 superior to malleolus w/ visible tendon (3)3x3 Middle of tibia  (4) 6x3 lateral to middle of tibia   R sided ulcers w/ exudate: 1) 7x4 medial lower limb w/ areas of necrosis above ankle   R sided cellulitis superior to ulcer, warmth and +1 edema with extension to below the knee  b/l onchomycosis  Vascular: cd not feel R dorsalis pedis pulse, normal L dorsalis pedis   Neurological: no weakness, no focal deficits; A+O x3  Skin: discoloration of left foot extending from 1st digit/MTP area into the 3rd digit/MTP area   Lymph Nodes: no enlarged nodes  Psychiatric: affect appropriate      LABS/DIAGNOSTIC TESTS:                   7.8    9.43  )-----------( 335      ( 22 Aug 2023 08:00 )             26.1     WBC Count: 9.43 K/uL (08-22 @ 08:00)  WBC Count: 10.14 K/uL (08-21 @ 18:40)  WBC Count: 14.49 K/uL (08-21 @ 05:30)  WBC Count: 17.02 K/uL (08-20 @ 23:10)      08-22    141  |  112<H>  |  8   ----------------------------<  132<H>  3.8   |  22  |  0.80    Ca    7.9<L>      22 Aug 2023 08:00  Phos  3.2     08-21  Mg     2.3     08-21    TPro  8.2  /  Alb  3.2<L>  /  TBili  0.5  /  DBili  x   /  AST  16  /  ALT  32  /  AlkPhos  120  08-20    LIVER FUNCTIONS - ( 20 Aug 2023 23:10 )  Alb: 3.2 g/dL / Pro: 8.2 g/dL / ALK PHOS: 120 U/L / ALT: 32 U/L DA / AST: 16 U/L / GGT: x             PT/INR - ( 20 Aug 2023 23:10 )   PT: 13.6 sec;   INR: 1.20 ratio         PTT - ( 20 Aug 2023 23:10 )  PTT:33.6 sec    CULTURES:     Culture - Surgical Swab (collected 08-21-23 @ 09:43)  Source: .Surgical Swab Leg wound  Preliminary Report (08-22-23 @ 11:34):    Moderate Pseudomonas aeruginosa    Few Klebsiella oxytoca/Raoultella ornithinolytica    Culture - Urine (collected 08-21-23 @ 06:52)  Source: Clean Catch Clean Catch (Midstream)  Final Report (08-22-23 @ 11:19):    No growth    Culture - Abscess with Gram Stain (collected 08-21-23 @ 04:26)  Source: .Abscess Leg - Right  Gram Stain (08-21-23 @ 15:35):    Few polymorphonuclear leukocytes seen per low power field    Few Gram Negative Rods seen per oil power field  Preliminary Report (08-22-23 @ 12:29):    Moderate Pseudomonas aeruginosa    Culture - Abscess with Gram Stain (collected 08-21-23 @ 04:26)  Source: .Abscess Leg - Left  Gram Stain (08-21-23 @ 12:30):    No polymorphonuclear cells seen per low power field    Few Gram Negative Rods per oil power field  Preliminary Report (08-22-23 @ 13:21):    Moderate Pseudomonas aeruginosa    Moderate Citrobacter koseri    Few Staphylococcus aureus    Culture - Blood (collected 08-20-23 @ 23:10)  Source: .Blood Blood-Peripheral  Preliminary Report (08-22-23 @ 03:02):    No growth at 24 hours    Culture - Blood (collected 08-20-23 @ 23:00)  Source: .Blood Blood-Peripheral  Preliminary Report (08-22-23 @ 03:02):    No growth at 24 hours        RADIOLOGY  Xray Tibia + Fibula 2 Views, Bilateral (08.21.23 @ 14:58)  IMPRESSION:  Large soft tissue defect at the medial leg distally     HPI:  58 M w PMHx of DM, HTN, HLD. and diabetic leg ulcers for the past 5 years presented to the ED with c/o chills, weakness, lightheadedness and progressively worsening right sided diabetic foot ulcers. Patient was previously admitted and treated at [facility] for the infected ulcer and and was given 1 unit PRBC due to low hemoglobin. Pt left AMA after one day for personal reasons. In ED patient met SIRS criteria, was given vancomycin and zosyn, 2L NS, and was admitted for septic shock.     In the ED   Vitals -> BP: 88/55, HR:102, T:97.7, O2 sat: 94% RA  Labs :  WbC 17k, Hb:7.9, Cr:1.76  EKG : NSR  s/p Vancomycin, Zosyn IVPB  s/p 2L NS ->101/68 (21 Aug 2023 02:06)      PAST MEDICAL & SURGICAL HISTORY:  HTN (hypertension)  Dyslipidemia  DM (diabetes mellitus)  Leg ulcer    No significant past surgical history    MEDS:  acetaminophen     Tablet .. 650 milliGRAM(s) Oral every 6 hours PRN  cefepime   IVPB      cefepime   IVPB 2000 milliGRAM(s) IV Intermittent once (D2)  cefepime   IVPB 2000 milliGRAM(s) IV Intermittent every 8 hours  insulin lispro (ADMELOG) corrective regimen sliding scale   SubCutaneous three times a day before meals  insulin lispro (ADMELOG) corrective regimen sliding scale   SubCutaneous at bedtime  melatonin 3 milliGRAM(s) Oral at bedtime PRN  sodium chloride 0.9%. 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    FAMILY HISTORY:  No pertinent family history in first degree relatives    ROS:   General: feeling 	well, no fever, no chills  Skin: no itchiness no rash, redness of right lower extremity, bilateral leg ulcers  HEENT: dryness of throat  Respiratory and Thorax: no complaints  Cardiovascular:	no complaints  Abdomen: no complaints	  Genitourinary: no complaints  Musculoskeletal:	no complaints  Neurological: no complaints  Psychiatric: no complaints	  Hematology/Lymphatics:	no complaints  Endocrine: no complaints      Vital Signs Last 24 Hrs  T(C): 36.8 (22 Aug 2023 11:40), Max: 37.2 (21 Aug 2023 15:27)  T(F): 98.2 (22 Aug 2023 11:40), Max: 99 (22 Aug 2023 08:23)  HR: 86 (22 Aug 2023 11:40) (43 - 87)  BP: 117/72 (22 Aug 2023 11:40) (104/65 - 158/69)  RR: 19 (22 Aug 2023 11:40) (18 - 19)  SpO2: 100% (22 Aug 2023 11:40) (97% - 100%)    Parameters below as of 22 Aug 2023 11:40  Patient On (Oxygen Delivery Method): room air	    PHYSICAL EXAM:  Gen: NAD, patient lying comfortably in bed, non toxic  HEENT: moist mucous membranes; mouth--good oral hygiene  Neck: no stiffness  Back: no vert or CVA tenderness  Respiratory: b/l normal breath sounds, no crackles, wheezing, cough  Cardiovascular: s1 s2 regular, no murmurs  Gastrointestinal: soft, non tender non distended abdomen, bowel sounds normal  Genitourinary: no catheter  Extremities:  L sided ulcers w/ exudate, 1) 3x3 on medial malleolus (2)4x3 superior to malleolus w/ visible tendon (3)3x3 Middle of tibia  (4) 6x3 lateral to middle of tibia   R sided ulcers w/ exudate: 1) 7x4 medial lower limb w/ areas of necrosis above ankle   R sided cellulitis superior to ulcer, warmth and +1 edema with extension to below the knee  b/l onchomycosis  Vascular: cd not feel R dorsalis pedis pulse, normal L dorsalis pedis   Neurological: no weakness, no focal deficits; A+O x3  Skin: discoloration of left foot extending from 1st digit/MTP area into the 3rd digit/MTP area   Lymph Nodes: no enlarged nodes  Psychiatric: affect appropriate      LABS/DIAGNOSTIC TESTS:                   7.8    9.43  )-----------( 335      ( 22 Aug 2023 08:00 )             26.1     WBC Count: 9.43 K/uL (08-22 @ 08:00)  WBC Count: 10.14 K/uL (08-21 @ 18:40)  WBC Count: 14.49 K/uL (08-21 @ 05:30)  WBC Count: 17.02 K/uL (08-20 @ 23:10)      08-22    141  |  112<H>  |  8   ----------------------------<  132<H>  3.8   |  22  |  0.80    Ca    7.9<L>      22 Aug 2023 08:00  Phos  3.2     08-21  Mg     2.3     08-21    TPro  8.2  /  Alb  3.2<L>  /  TBili  0.5  /  DBili  x   /  AST  16  /  ALT  32  /  AlkPhos  120  08-20    LIVER FUNCTIONS - ( 20 Aug 2023 23:10 )  Alb: 3.2 g/dL / Pro: 8.2 g/dL / ALK PHOS: 120 U/L / ALT: 32 U/L DA / AST: 16 U/L / GGT: x             PT/INR - ( 20 Aug 2023 23:10 )   PT: 13.6 sec;   INR: 1.20 ratio         PTT - ( 20 Aug 2023 23:10 )  PTT:33.6 sec    CULTURES:     Culture - Surgical Swab (collected 08-21-23 @ 09:43)  Source: .Surgical Swab Leg wound  Preliminary Report (08-22-23 @ 11:34):    Moderate Pseudomonas aeruginosa    Few Klebsiella oxytoca/Raoultella ornithinolytica    Culture - Urine (collected 08-21-23 @ 06:52)  Source: Clean Catch Clean Catch (Midstream)  Final Report (08-22-23 @ 11:19):    No growth    Culture - Abscess with Gram Stain (collected 08-21-23 @ 04:26)  Source: .Abscess Leg - Right  Gram Stain (08-21-23 @ 15:35):    Few polymorphonuclear leukocytes seen per low power field    Few Gram Negative Rods seen per oil power field  Preliminary Report (08-22-23 @ 12:29):    Moderate Pseudomonas aeruginosa    Culture - Abscess with Gram Stain (collected 08-21-23 @ 04:26)  Source: .Abscess Leg - Left  Gram Stain (08-21-23 @ 12:30):    No polymorphonuclear cells seen per low power field    Few Gram Negative Rods per oil power field  Preliminary Report (08-22-23 @ 13:21):    Moderate Pseudomonas aeruginosa    Moderate Citrobacter koseri    Few Staphylococcus aureus    Culture - Blood (collected 08-20-23 @ 23:10)  Source: .Blood Blood-Peripheral  Preliminary Report (08-22-23 @ 03:02):    No growth at 24 hours    Culture - Blood (collected 08-20-23 @ 23:00)  Source: .Blood Blood-Peripheral  Preliminary Report (08-22-23 @ 03:02):    No growth at 24 hours        RADIOLOGY  Xray Tibia + Fibula 2 Views, Bilateral (08.21.23 @ 14:58)  IMPRESSION:  Large soft tissue defect at the medial leg distally     HPI:  58 M w PMHx of DM, HTN, HLD. and diabetic leg ulcers for the past 5 years presented to the ED with c/o chills, weakness, lightheadedness and progressively worsening right sided diabetic foot ulcers. Patient was previously admitted and treated at [facility] for the infected ulcer and and was given 1 unit PRBC due to low hemoglobin. Pt left AMA after one day for personal reasons. In ED patient met SIRS criteria, was given vancomycin and zosyn, 2L NS, and was admitted for septic shock.     In the ED   Vitals -> BP: 88/55, HR:102, T:97.7, O2 sat: 94% RA  Labs :  WbC 17k, Hb:7.9, Cr:1.76  EKG : NSR  s/p Vancomycin, Zosyn IVPB  s/p 2L NS ->101/68 (21 Aug 2023 02:06)      PAST MEDICAL & SURGICAL HISTORY:  HTN (hypertension)  Dyslipidemia  DM (diabetes mellitus)  Leg ulcer    No significant past surgical history    MEDS:  acetaminophen     Tablet .. 650 milliGRAM(s) Oral every 6 hours PRN  cefepime   IVPB      cefepime   IVPB 2000 milliGRAM(s) IV Intermittent once (D2)  cefepime   IVPB 2000 milliGRAM(s) IV Intermittent every 8 hours  insulin lispro (ADMELOG) corrective regimen sliding scale   SubCutaneous three times a day before meals  insulin lispro (ADMELOG) corrective regimen sliding scale   SubCutaneous at bedtime  melatonin 3 milliGRAM(s) Oral at bedtime PRN  sodium chloride 0.9%. 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    FAMILY HISTORY:  No pertinent family history in first degree relatives    ROS:   General: feeling 	well, no fever, no chills  Skin: no itchiness no rash, redness of right lower extremity, bilateral leg ulcers  HEENT: dryness of throat  Respiratory and Thorax: no complaints  Cardiovascular:	no complaints  Abdomen: no complaints	  Genitourinary: no complaints  Musculoskeletal:	no complaints  Neurological: no complaints  Psychiatric: no complaints	  Hematology/Lymphatics:	no complaints  Endocrine: no complaints      Vital Signs Last 24 Hrs  T(C): 36.8 (22 Aug 2023 11:40), Max: 37.2 (21 Aug 2023 15:27)  T(F): 98.2 (22 Aug 2023 11:40), Max: 99 (22 Aug 2023 08:23)  HR: 86 (22 Aug 2023 11:40) (43 - 87)  BP: 117/72 (22 Aug 2023 11:40) (104/65 - 158/69)  RR: 19 (22 Aug 2023 11:40) (18 - 19)  SpO2: 100% (22 Aug 2023 11:40) (97% - 100%)    Parameters below as of 22 Aug 2023 11:40  Patient On (Oxygen Delivery Method): room air	    PHYSICAL EXAM:  Gen: NAD, patient lying comfortably in bed, non toxic  HEENT: moist mucous membranes; mouth--good oral hygiene  Neck: no stiffness  Back: no vert or CVA tenderness  Respiratory: b/l normal breath sounds, no crackles, wheezing, cough  Cardiovascular: s1 s2 regular, no murmurs  Gastrointestinal: soft, non tender non distended abdomen, bowel sounds normal  Genitourinary: no catheter  Extremities:  L sided ulcers w/ exudate, 1) 3x3 on medial malleolus (2)4x3 superior to malleolus w/ visible tendon (3)3x3 Middle of tibia  (4) 6x3 lateral to middle of tibia   R sided ulcers w/ exudate: 1) 7x4 medial lower limb w/ areas of necrosis above ankle   R sided cellulitis superior to ulcer, warmth and +1 edema with extension to below the knee  b/l onchomycosis  Vascular: cd not feel R dorsalis pedis pulse, normal L dorsalis pedis   Neurological: no weakness, no focal deficits; A+O x3  Skin: discoloration of left foot extending from 1st digit/MTP area into the 3rd digit/MTP area   Lymph Nodes: no enlarged nodes  Psychiatric: affect appropriate      LABS/DIAGNOSTIC TESTS:                   7.8    9.43  )-----------( 335      ( 22 Aug 2023 08:00 )             26.1     WBC Count: 9.43 K/uL (08-22 @ 08:00)  WBC Count: 10.14 K/uL (08-21 @ 18:40)  WBC Count: 14.49 K/uL (08-21 @ 05:30)  WBC Count: 17.02 K/uL (08-20 @ 23:10)      08-22    141  |  112<H>  |  8   ----------------------------<  132<H>  3.8   |  22  |  0.80    Ca    7.9<L>      22 Aug 2023 08:00  Phos  3.2     08-21  Mg     2.3     08-21    TPro  8.2  /  Alb  3.2<L>  /  TBili  0.5  /  DBili  x   /  AST  16  /  ALT  32  /  AlkPhos  120  08-20    LIVER FUNCTIONS - ( 20 Aug 2023 23:10 )  Alb: 3.2 g/dL / Pro: 8.2 g/dL / ALK PHOS: 120 U/L / ALT: 32 U/L DA / AST: 16 U/L / GGT: x             PT/INR - ( 20 Aug 2023 23:10 )   PT: 13.6 sec;   INR: 1.20 ratio         PTT - ( 20 Aug 2023 23:10 )  PTT:33.6 sec    CULTURES:     Culture - Surgical Swab (collected 08-21-23 @ 09:43)  Source: .Surgical Swab Leg wound  Preliminary Report (08-22-23 @ 11:34):    Moderate Pseudomonas aeruginosa    Few Klebsiella oxytoca/Raoultella ornithinolytica    Culture - Urine (collected 08-21-23 @ 06:52)  Source: Clean Catch Clean Catch (Midstream)  Final Report (08-22-23 @ 11:19):    No growth    Culture - Abscess with Gram Stain (collected 08-21-23 @ 04:26)  Source: .Abscess Leg - Right  Gram Stain (08-21-23 @ 15:35):    Few polymorphonuclear leukocytes seen per low power field    Few Gram Negative Rods seen per oil power field  Preliminary Report (08-22-23 @ 12:29):    Moderate Pseudomonas aeruginosa    Culture - Abscess with Gram Stain (collected 08-21-23 @ 04:26)  Source: .Abscess Leg - Left  Gram Stain (08-21-23 @ 12:30):    No polymorphonuclear cells seen per low power field    Few Gram Negative Rods per oil power field  Preliminary Report (08-22-23 @ 13:21):    Moderate Pseudomonas aeruginosa    Moderate Citrobacter koseri    Few Staphylococcus aureus    Culture - Blood (collected 08-20-23 @ 23:10)  Source: .Blood Blood-Peripheral  Preliminary Report (08-22-23 @ 03:02):    No growth at 24 hours    Culture - Blood (collected 08-20-23 @ 23:00)  Source: .Blood Blood-Peripheral  Preliminary Report (08-22-23 @ 03:02):    No growth at 24 hours        RADIOLOGY  Xray Tibia + Fibula 2 Views, Bilateral (08.21.23 @ 14:58)  IMPRESSION:  Large soft tissue defect at the medial leg distally

## 2023-08-22 NOTE — DISCHARGE NOTE PROVIDER - PROVIDER RX CONTACT NUMBER
[phone]/ [phone]/ [phone]/ [phone]

## 2023-08-22 NOTE — PROGRESS NOTE ADULT - ASSESSMENT
59 yo M with PMH of  DM, HLD, HTN and diabetic  leg ulcers present with weakness, lightheadedness and infected RT lower extremity ulcer for the past 3 days which has progressively worsened today. IN the ED patient was hypotensive, tachycardic, saturating at 94 on room air. On labs patient did have a white count, on xray of foot  there was soft tissue defect. Patient on cefepime and vancomycin, MRI of leg pending. ID consulted.   Admitted to medicine for Sepsis 2/2 to lower extremity infection  57 yo M with PMH of  DM, HLD, HTN and diabetic  leg ulcers present with weakness, lightheadedness and infected RT lower extremity ulcer for the past 3 days which has progressively worsened today. IN the ED patient was hypotensive, tachycardic, saturating at 94 on room air. On labs patient did have a white count, on xray of foot  there was soft tissue defect. Patient on cefepime and vancomycin, MRI of leg pending. ID consulted.   Admitted to medicine for Sepsis 2/2 to lower extremity infection  57 yo M with PMH of  DM, HLD, HTN and diabetic  leg ulcers present with weakness, lightheadedness and infected RT lower extremity ulcer for the past 3 days which has progressively worsened today. IN the ED patient was hypotensive, tachycardic, saturating at 94 on room air. On labs patient did have a white count, on xray of foot  there was soft tissue defect. Patient on cefepime and vancomycin, MRI of leg pending. ID consulted. Vascular consulted.  Admitted to medicine for Sepsis 2/2 to lower extremity infection

## 2023-08-22 NOTE — PROGRESS NOTE ADULT - PROBLEM SELECTOR PLAN 3
PE: (+) B/L LE ulcer  - Podiatry Consult  - wound culture growing gram negative rods  -on vanc and cefepime PE: (+) B/L LE ulcer  - Podiatry Consult  - wound culture growing gram negative rods  -on vanc and cefepime  -vascular consulted

## 2023-08-22 NOTE — PROGRESS NOTE ADULT - PROBLEM SELECTOR PLAN 8
Home med: Jardiance, Janumet, Insulin  - MED REC NEEDED  - Started on [facility] Home med: Jardiance, Janumet, Insulin  - Started on [facility]

## 2023-08-22 NOTE — DISCHARGE NOTE PROVIDER - PROVIDER TOKENS
PROVIDER:[TOKEN:[[id number]],FOLLOWUP:[1 week]] PROVIDER:[TOKEN:[[id number]],FOLLOWUP:[1 week]] PROVIDER:[TOKEN:[[id number]],FOLLOWUP:[1 week]]

## 2023-08-22 NOTE — PROGRESS NOTE ADULT - PROBLEM SELECTOR PLAN 5
likely 2/2 to poor oral intake   In the ED:  Cr:1.76, eGFR < 44 ( On 7/25/23 Cr: 0.78, eGFR 103) **Constantine MARK**  Hold   Losartan, Chlorothiazide   - F/u BMP likely 2/2 to poor oral intake   In the ED: Cr:1.76, eGFR < 44 ( On 7/25/23 Cr: 0.78, eGFR 103) **Constantine MARK**  - Cr trending down In the ED:  Vitals -> BP: 88/55, HR:102, T:97.7, O2 sat: 94% RA  -now within normal limits

## 2023-08-22 NOTE — CONSULT NOTE ADULT - ASSESSMENT
HPI:  58 M w PMHx of DM, HTN, HLD. and Diabetic leg ulcers for the past 5 years presented to the ED with c/o chills, weakness, lightheadedness and progressively worsening Right sided diabetic foot ulcers. Patient was previously admitted and treated at [facility] for the infected ulcer and and was given 1 unit PRBC due to low hemoglobin but left AMA after one day for personal reasons. In ED patient met SIRS criteria, was given vancomycin and zosyn, 2L NS, and was admitted for septic shock.     In the ED   Vitals -> BP: 88/55, HR:102, T:97.7, O2 sat: 94% RA  Labs :  WbC 17k, Hb:7.9, Cr:1.76  EKG : NSR  s/p Vancomycin, Zosyn IVPB  s/p 2L NS ->101/68 (21 Aug 2023 02:06)      PAST MEDICAL & SURGICAL HISTORY:  HTN (hypertension)  Dyslipidemia  DM (diabetes mellitus)  Leg ulcer    No significant past surgical history    MEDS:  acetaminophen     Tablet .. 650 milliGRAM(s) Oral every 6 hours PRN  cefepime   IVPB      cefepime   IVPB 2000 milliGRAM(s) IV Intermittent once  cefepime   IVPB 2000 milliGRAM(s) IV Intermittent every 8 hours  insulin lispro (ADMELOG) corrective regimen sliding scale   SubCutaneous three times a day before meals  insulin lispro (ADMELOG) corrective regimen sliding scale   SubCutaneous at bedtime  melatonin 3 milliGRAM(s) Oral at bedtime PRN  sodium chloride 0.9%. 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    FAMILY HISTORY:  No pertinent family history in first degree relatives    ROS:   General: feeling 	well, no fever, no chills  Skin: no itchiness no rash, redness of right lower extremity, bilateral leg ulcers  HEENT: dryness of throat  Respiratory and Thorax: no complaints  Cardiovascular:	no complaints  Abdomen: no complaints	  Genitourinary: no complaints  Musculoskeletal:	no complaints  Neurological: no complaints  Psychiatric: no complaints	  Hematology/Lymphatics:	no complaints  Endocrine: no complaints	    PHYSICAL EXAM:  PHYSICAL EXAM: NAD, patient lying comfortably in bed, non toxic  Gen: no complaints  HEENT: moist mucous membranes  Neck: no stiffness  Breasts:-  Back: no pain  Respiratory: b/l normal breath sounds, no crackles, wheezing, cough  Cardiovascular: s1 s2 normal, no murmurs  Gastrointestinal: soft non tender non distended abdomen, bowel sounds normal  Genitourinary: no cathc  Rectal:-  Extremities:  L sided ulcers w/ exudate, 1) 3x3 on medial malleolus (2)4x3 superior to malleolus w/ visible tendon (3)3x3 Middle of tibia  (4) 6x3 lateral to middle of tibia   R sided ulcers w/ exudate: 1) 7x4 medial lower limb w/ areas of necrosis above ankle   R sided cellulitis superior to ulcer, warmth and +1 edema with extension to below the knee  b/l onchomycosis  Vascular: Weak R dorsalis pedis, normal L dorsalis pedis   Neurological: no weakness, no focal deficits  Skin: discoloration of left foot  Lymph Nodes: no enlarged nodes  Musculoskeletal: no muscular pain  Psychiatric: no suicidal ideation    Vital Signs Last 24 Hrs  T(C): 36.8 (22 Aug 2023 11:40), Max: 37.2 (21 Aug 2023 15:27)  T(F): 98.2 (22 Aug 2023 11:40), Max: 99 (22 Aug 2023 08:23)  HR: 86 (22 Aug 2023 11:40) (43 - 87)  BP: 117/72 (22 Aug 2023 11:40) (104/65 - 158/69)  RR: 19 (22 Aug 2023 11:40) (18 - 19)  SpO2: 100% (22 Aug 2023 11:40) (97% - 100%)    Parameters below as of 22 Aug 2023 11:40  Patient On (Oxygen Delivery Method): room air    LABS/DIAGNOSTIC TESTS:                   7.8    9.43  )-----------( 335      ( 22 Aug 2023 08:00 )             26.1     WBC Count: 9.43 K/uL (08-22 @ 08:00)  WBC Count: 10.14 K/uL (08-21 @ 18:40)  WBC Count: 14.49 K/uL (08-21 @ 05:30)  WBC Count: 17.02 K/uL (08-20 @ 23:10)      08-22    141  |  112<H>  |  8   ----------------------------<  132<H>  3.8   |  22  |  0.80    Ca    7.9<L>      22 Aug 2023 08:00  Phos  3.2     08-21  Mg     2.3     08-21    TPro  8.2  /  Alb  3.2<L>  /  TBili  0.5  /  DBili  x   /  AST  16  /  ALT  32  /  AlkPhos  120  08-20    LIVER FUNCTIONS - ( 20 Aug 2023 23:10 )  Alb: 3.2 g/dL / Pro: 8.2 g/dL / ALK PHOS: 120 U/L / ALT: 32 U/L DA / AST: 16 U/L / GGT: x             PT/INR - ( 20 Aug 2023 23:10 )   PT: 13.6 sec;   INR: 1.20 ratio         PTT - ( 20 Aug 2023 23:10 )  PTT:33.6 sec    CULTURES:     Culture - Surgical Swab (collected 08-21-23 @ 09:43)  Source: .Surgical Swab Leg wound  Preliminary Report (08-22-23 @ 11:34):    Moderate Pseudomonas aeruginosa    Few Klebsiella oxytoca/Raoultella ornithinolytica    Culture - Urine (collected 08-21-23 @ 06:52)  Source: Clean Catch Clean Catch (Midstream)  Final Report (08-22-23 @ 11:19):    No growth    Culture - Abscess with Gram Stain (collected 08-21-23 @ 04:26)  Source: .Abscess Leg - Right  Gram Stain (08-21-23 @ 15:35):    Few polymorphonuclear leukocytes seen per low power field    Few Gram Negative Rods seen per oil power field  Preliminary Report (08-22-23 @ 12:29):    Moderate Pseudomonas aeruginosa    Culture - Abscess with Gram Stain (collected 08-21-23 @ 04:26)  Source: .Abscess Leg - Left  Gram Stain (08-21-23 @ 12:30):    No polymorphonuclear cells seen per low power field    Few Gram Negative Rods per oil power field  Preliminary Report (08-22-23 @ 13:21):    Moderate Pseudomonas aeruginosa    Moderate Citrobacter koseri    Few Staphylococcus aureus    Culture - Blood (collected 08-20-23 @ 23:10)  Source: .Blood Blood-Peripheral  Preliminary Report (08-22-23 @ 03:02):    No growth at 24 hours    Culture - Blood (collected 08-20-23 @ 23:00)  Source: .Blood Blood-Peripheral  Preliminary Report (08-22-23 @ 03:02):    No growth at 24 hours        RADIOLOGY  Xray Tibia + Fibula 2 Views, Bilateral (08.21.23 @ 14:58)  IMPRESSION:  Large soft tissue defect at the medial leg distally, if there is   continued clinical concern follow-up MRI can be ordered    Impression:   58 M w PMHx of DM, HTN, HLD. and Diabetic leg ulcers for the past 5 years presented to the ED with c/o chills, weakness, lightheadedness and progressively worsening Right sided diabetic foot ulcers.    -sepsis  -b/l diabetic foot ulcers  -cellulitis of R leg   -onchomycosis    Plan:  -Continue vanco and cefipime  -b/l mri of legs to r/o osteomyelitis  -assess blood flow of RLE             HPI:  58 M w PMHx of DM, HTN, HLD. and Diabetic leg ulcers for the past 5 years presented to the ED with c/o chills, weakness, lightheadedness and progressively worsening Right sided diabetic foot ulcers. Patient was previously admitted and treated at [facility] for the infected ulcer and and was given 1 unit PRBC due to low hemoglobin but left AMA after one day for personal reasons. In ED patient met SIRS criteria, was given vancomycin and zosyn, 2L NS, and was admitted for septic shock.     In the ED   Vitals -> BP: 88/55, HR:102, T:97.7, O2 sat: 94% RA  Labs :  WbC 17k, Hb:7.9, Cr:1.76  EKG : NSR  s/p Vancomycin, Zosyn IVPB  s/p 2L NS ->101/68 (21 Aug 2023 02:06)      PAST MEDICAL & SURGICAL HISTORY:  HTN (hypertension)  Dyslipidemia  DM (diabetes mellitus)  Leg ulcer    No significant past surgical history    MEDS:  acetaminophen     Tablet .. 650 milliGRAM(s) Oral every 6 hours PRN  cefepime   IVPB      cefepime   IVPB 2000 milliGRAM(s) IV Intermittent once  cefepime   IVPB 2000 milliGRAM(s) IV Intermittent every 8 hours  insulin lispro (ADMELOG) corrective regimen sliding scale   SubCutaneous three times a day before meals  insulin lispro (ADMELOG) corrective regimen sliding scale   SubCutaneous at bedtime  melatonin 3 milliGRAM(s) Oral at bedtime PRN  sodium chloride 0.9%. 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    FAMILY HISTORY:  No pertinent family history in first degree relatives    ROS:   General: feeling 	well, no fever, no chills  Skin: no itchiness no rash, redness of right lower extremity, bilateral leg ulcers  HEENT: dryness of throat  Respiratory and Thorax: no complaints  Cardiovascular:	no complaints  Abdomen: no complaints	  Genitourinary: no complaints  Musculoskeletal:	no complaints  Neurological: no complaints  Psychiatric: no complaints	  Hematology/Lymphatics:	no complaints  Endocrine: no complaints	    PHYSICAL EXAM:  PHYSICAL EXAM: NAD, patient lying comfortably in bed, non toxic  Gen: no complaints  HEENT: moist mucous membranes  Neck: no stiffness  Breasts:-  Back: no pain  Respiratory: b/l normal breath sounds, no crackles, wheezing, cough  Cardiovascular: s1 s2 normal, no murmurs  Gastrointestinal: soft non tender non distended abdomen, bowel sounds normal  Genitourinary: no cathc  Rectal:-  Extremities:  L sided ulcers w/ exudate, 1) 3x3 on medial malleolus (2)4x3 superior to malleolus w/ visible tendon (3)3x3 Middle of tibia  (4) 6x3 lateral to middle of tibia   R sided ulcers w/ exudate: 1) 7x4 medial lower limb w/ areas of necrosis above ankle   R sided cellulitis superior to ulcer, warmth and +1 edema with extension to below the knee  b/l onchomycosis  Vascular: Weak R dorsalis pedis, normal L dorsalis pedis   Neurological: no weakness, no focal deficits  Skin: discoloration of left foot  Lymph Nodes: no enlarged nodes  Musculoskeletal: no muscular pain  Psychiatric: no suicidal ideation    Vital Signs Last 24 Hrs  T(C): 36.8 (22 Aug 2023 11:40), Max: 37.2 (21 Aug 2023 15:27)  T(F): 98.2 (22 Aug 2023 11:40), Max: 99 (22 Aug 2023 08:23)  HR: 86 (22 Aug 2023 11:40) (43 - 87)  BP: 117/72 (22 Aug 2023 11:40) (104/65 - 158/69)  RR: 19 (22 Aug 2023 11:40) (18 - 19)  SpO2: 100% (22 Aug 2023 11:40) (97% - 100%)    Parameters below as of 22 Aug 2023 11:40  Patient On (Oxygen Delivery Method): room air    LABS/DIAGNOSTIC TESTS:                   7.8    9.43  )-----------( 335      ( 22 Aug 2023 08:00 )             26.1     WBC Count: 9.43 K/uL (08-22 @ 08:00)  WBC Count: 10.14 K/uL (08-21 @ 18:40)  WBC Count: 14.49 K/uL (08-21 @ 05:30)  WBC Count: 17.02 K/uL (08-20 @ 23:10)      08-22    141  |  112<H>  |  8   ----------------------------<  132<H>  3.8   |  22  |  0.80    Ca    7.9<L>      22 Aug 2023 08:00  Phos  3.2     08-21  Mg     2.3     08-21    TPro  8.2  /  Alb  3.2<L>  /  TBili  0.5  /  DBili  x   /  AST  16  /  ALT  32  /  AlkPhos  120  08-20    LIVER FUNCTIONS - ( 20 Aug 2023 23:10 )  Alb: 3.2 g/dL / Pro: 8.2 g/dL / ALK PHOS: 120 U/L / ALT: 32 U/L DA / AST: 16 U/L / GGT: x             PT/INR - ( 20 Aug 2023 23:10 )   PT: 13.6 sec;   INR: 1.20 ratio         PTT - ( 20 Aug 2023 23:10 )  PTT:33.6 sec    CULTURES:     Culture - Surgical Swab (collected 08-21-23 @ 09:43)  Source: .Surgical Swab Leg wound  Preliminary Report (08-22-23 @ 11:34):    Moderate Pseudomonas aeruginosa    Few Klebsiella oxytoca/Raoultella ornithinolytica    Culture - Urine (collected 08-21-23 @ 06:52)  Source: Clean Catch Clean Catch (Midstream)  Final Report (08-22-23 @ 11:19):    No growth    Culture - Abscess with Gram Stain (collected 08-21-23 @ 04:26)  Source: .Abscess Leg - Right  Gram Stain (08-21-23 @ 15:35):    Few polymorphonuclear leukocytes seen per low power field    Few Gram Negative Rods seen per oil power field  Preliminary Report (08-22-23 @ 12:29):    Moderate Pseudomonas aeruginosa    Culture - Abscess with Gram Stain (collected 08-21-23 @ 04:26)  Source: .Abscess Leg - Left  Gram Stain (08-21-23 @ 12:30):    No polymorphonuclear cells seen per low power field    Few Gram Negative Rods per oil power field  Preliminary Report (08-22-23 @ 13:21):    Moderate Pseudomonas aeruginosa    Moderate Citrobacter koseri    Few Staphylococcus aureus    Culture - Blood (collected 08-20-23 @ 23:10)  Source: .Blood Blood-Peripheral  Preliminary Report (08-22-23 @ 03:02):    No growth at 24 hours    Culture - Blood (collected 08-20-23 @ 23:00)  Source: .Blood Blood-Peripheral  Preliminary Report (08-22-23 @ 03:02):    No growth at 24 hours        RADIOLOGY  Xray Tibia + Fibula 2 Views, Bilateral (08.21.23 @ 14:58)  IMPRESSION:  Large soft tissue defect at the medial leg distally, if there is   continued clinical concern follow-up MRI can be ordered    Impression:   58 M w PMHx of DM, HTN, HLD. and Diabetic leg ulcers for the past 5 years presented to the ED with c/o chills, weakness, lightheadedness and progressively worsening Right sided diabetic foot ulcers.    -sepsis  -b/l diabetic foot ulcers  -cellulitis of R leg   -onchomycosis    Plan:  -Continue vanco and cefipime  -b/l mri of legs to r/o osteomyelitis  -assess blood flow of RLE             HPI:  58 M w PMHx of DM, HTN, HLD. and Diabetic leg ulcers for the past 5 years presented to the ED with c/o chills, weakness, lightheadedness and progressively worsening Right sided diabetic foot ulcers. Patient was previously admitted and treated at [facility] for the infected ulcer and and was given 1 unit PRBC due to low hemoglobin but left AMA after one day for personal reasons. In ED patient met SIRS criteria, was given vancomycin and zosyn, 2L NS, and was admitted for septic shock.     In the ED   Vitals -> BP: 88/55, HR:102, T:97.7, O2 sat: 94% RA  Labs :  WbC 17k, Hb:7.9, Cr:1.76  EKG : NSR  s/p Vancomycin, Zosyn IVPB  s/p 2L NS ->101/68 (21 Aug 2023 02:06)      PAST MEDICAL & SURGICAL HISTORY:  HTN (hypertension)  Dyslipidemia  DM (diabetes mellitus)  Leg ulcer    No significant past surgical history    MEDS:  acetaminophen     Tablet .. 650 milliGRAM(s) Oral every 6 hours PRN  cefepime   IVPB      cefepime   IVPB 2000 milliGRAM(s) IV Intermittent once  cefepime   IVPB 2000 milliGRAM(s) IV Intermittent every 8 hours  insulin lispro (ADMELOG) corrective regimen sliding scale   SubCutaneous three times a day before meals  insulin lispro (ADMELOG) corrective regimen sliding scale   SubCutaneous at bedtime  melatonin 3 milliGRAM(s) Oral at bedtime PRN  sodium chloride 0.9%. 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    FAMILY HISTORY:  No pertinent family history in first degree relatives    ROS:   General: feeling 	well, no fever, no chills  Skin: no itchiness no rash, redness of right lower extremity, bilateral leg ulcers  HEENT: dryness of throat  Respiratory and Thorax: no complaints  Cardiovascular:	no complaints  Abdomen: no complaints	  Genitourinary: no complaints  Musculoskeletal:	no complaints  Neurological: no complaints  Psychiatric: no complaints	  Hematology/Lymphatics:	no complaints  Endocrine: no complaints	    PHYSICAL EXAM:  PHYSICAL EXAM: NAD, patient lying comfortably in bed, non toxic  Gen: no complaints  HEENT: moist mucous membranes  Neck: no stiffness  Breasts:-  Back: no pain  Respiratory: b/l normal breath sounds, no crackles, wheezing, cough  Cardiovascular: s1 s2 normal, no murmurs  Gastrointestinal: soft non tender non distended abdomen, bowel sounds normal  Genitourinary: no cathc  Rectal:-  Extremities:  L sided ulcers w/ exudate, 1) 3x3 on medial malleolus (2)4x3 superior to malleolus w/ visible tendon (3)3x3 Middle of tibia  (4) 6x3 lateral to middle of tibia   R sided ulcers w/ exudate: 1) 7x4 medial lower limb w/ areas of necrosis above ankle   R sided cellulitis superior to ulcer, warmth and +1 edema with extension to below the knee  b/l onchomycosis  Vascular: Weak R dorsalis pedis, normal L dorsalis pedis   Neurological: no weakness, no focal deficits  Skin: discoloration of left foot  Lymph Nodes: no enlarged nodes  Musculoskeletal: no muscular pain  Psychiatric: no suicidal ideation    Vital Signs Last 24 Hrs  T(C): 36.8 (22 Aug 2023 11:40), Max: 37.2 (21 Aug 2023 15:27)  T(F): 98.2 (22 Aug 2023 11:40), Max: 99 (22 Aug 2023 08:23)  HR: 86 (22 Aug 2023 11:40) (43 - 87)  BP: 117/72 (22 Aug 2023 11:40) (104/65 - 158/69)  RR: 19 (22 Aug 2023 11:40) (18 - 19)  SpO2: 100% (22 Aug 2023 11:40) (97% - 100%)    Parameters below as of 22 Aug 2023 11:40  Patient On (Oxygen Delivery Method): room air    LABS/DIAGNOSTIC TESTS:                   7.8    9.43  )-----------( 335      ( 22 Aug 2023 08:00 )             26.1     WBC Count: 9.43 K/uL (08-22 @ 08:00)  WBC Count: 10.14 K/uL (08-21 @ 18:40)  WBC Count: 14.49 K/uL (08-21 @ 05:30)  WBC Count: 17.02 K/uL (08-20 @ 23:10)      08-22    141  |  112<H>  |  8   ----------------------------<  132<H>  3.8   |  22  |  0.80    Ca    7.9<L>      22 Aug 2023 08:00  Phos  3.2     08-21  Mg     2.3     08-21    TPro  8.2  /  Alb  3.2<L>  /  TBili  0.5  /  DBili  x   /  AST  16  /  ALT  32  /  AlkPhos  120  08-20    LIVER FUNCTIONS - ( 20 Aug 2023 23:10 )  Alb: 3.2 g/dL / Pro: 8.2 g/dL / ALK PHOS: 120 U/L / ALT: 32 U/L DA / AST: 16 U/L / GGT: x             PT/INR - ( 20 Aug 2023 23:10 )   PT: 13.6 sec;   INR: 1.20 ratio         PTT - ( 20 Aug 2023 23:10 )  PTT:33.6 sec    CULTURES:     Culture - Surgical Swab (collected 08-21-23 @ 09:43)  Source: .Surgical Swab Leg wound  Preliminary Report (08-22-23 @ 11:34):    Moderate Pseudomonas aeruginosa    Few Klebsiella oxytoca/Raoultella ornithinolytica    Culture - Urine (collected 08-21-23 @ 06:52)  Source: Clean Catch Clean Catch (Midstream)  Final Report (08-22-23 @ 11:19):    No growth    Culture - Abscess with Gram Stain (collected 08-21-23 @ 04:26)  Source: .Abscess Leg - Right  Gram Stain (08-21-23 @ 15:35):    Few polymorphonuclear leukocytes seen per low power field    Few Gram Negative Rods seen per oil power field  Preliminary Report (08-22-23 @ 12:29):    Moderate Pseudomonas aeruginosa    Culture - Abscess with Gram Stain (collected 08-21-23 @ 04:26)  Source: .Abscess Leg - Left  Gram Stain (08-21-23 @ 12:30):    No polymorphonuclear cells seen per low power field    Few Gram Negative Rods per oil power field  Preliminary Report (08-22-23 @ 13:21):    Moderate Pseudomonas aeruginosa    Moderate Citrobacter koseri    Few Staphylococcus aureus    Culture - Blood (collected 08-20-23 @ 23:10)  Source: .Blood Blood-Peripheral  Preliminary Report (08-22-23 @ 03:02):    No growth at 24 hours    Culture - Blood (collected 08-20-23 @ 23:00)  Source: .Blood Blood-Peripheral  Preliminary Report (08-22-23 @ 03:02):    No growth at 24 hours        RADIOLOGY  Xray Tibia + Fibula 2 Views, Bilateral (08.21.23 @ 14:58)  IMPRESSION:  Large soft tissue defect at the medial leg distally, if there is   continued clinical concern follow-up MRI can be ordered    Impression:   58 M w PMHx of DM, HTN, HLD. and Diabetic leg ulcers for the past 5 years presented to the ED with c/o chills, weakness, lightheadedness and progressively worsening Right sided diabetic foot ulcers.    -sepsis  -b/l diabetic foot ulcers  -cellulitis of R leg   -onchomycosis    Plan:  -Continue vanco and cefipime  -b/l mri of legs to r/o osteomyelitis  -assess blood flow of RLE             Impression:   58 M w PMHx of DM, HTN, HLD. and diabetic leg ulcers for the past 5 years presented to the ED with c/o chills, weakness, lightheadedness and progressively worsening right sided diabetic foot ulcers and admitted for septic shock. Surgical swab and abscess Cx of leg wounds growing multiple organisms, including S. aureus, Pseudomonas aeruginosa, Klebsiella oxytoca/Raoultella ornithinolytica.       # Severe sepsis with b/l diabetic foot ulcers with overlying exudate, and cellulitis superior to the ulcer RLE  --change vancomycin to 1500 mg q12  --vancomycin trough around the 3rd dose  --cont. cefepime 2g q8h  --f/u organism susceptibilities    #Bilat LE SSTI-- concerned about deep infection involving bone  --MRI of bilat LE  --vascular flow study of LE    #DM-- management as per medical team

## 2023-08-22 NOTE — PROGRESS NOTE ADULT - ASSESSMENT
A:  DM wounds    P:   Patient evaluated and Chart reviewed   Discussed diagnosis and treatment with patient  Xrays reviewed  BRIDGETTE/PVR pending  Recommend MRI with concern for OM   Wound flushed with normal saline  Obtained wound culture to be sent to Lab  Applied santyl, xeroform with dry sterile dressing  Continue with IV antibiotics As Per ID  Weight bearing as tolerated to b/l feet and legs  ID consult recommended  Offloading to bilateral Heels.   Discussed importance of daily foot examinations and proper shoe gear and to importance of lower Fasting Blood Glucose levels.   Podiatry to follow while in house.  Seen, reveiwed, and treated with Attending Dr. Farias

## 2023-08-22 NOTE — PROGRESS NOTE ADULT - SUBJECTIVE AND OBJECTIVE BOX
PGY-1 Progress Note discussed with attending    PAGER #: [[phone]] TILL 5:00 PM  PLEASE CONTACT ON CALL TEAM:  - On Call Team (Please refer to Deanne) FROM 5:00 PM - 8:30PM  - Nightfloat Team FROM 8:30 -7:30 AM    INTERVAL HPI/OVERNIGHT EVENTS: Patient was seen at bedside. Patient is Algerian speaker. Patient stating that has been having leg ulcers for the past 5 years. Patient states that went to [facility] 3 days ago for same reason but had to leave. Patient states that right leg hurts on palpation. Denies any chest pain, shortness of breath and fevers. On telemetry no acute events.       REVIEW OF SYSTEMS:  CONSTITUTIONAL: No fever, weight loss, or fatigue  RESPIRATORY: No cough, wheezing, chills or hemoptysis; No shortness of breath  CARDIOVASCULAR: No chest pain, palpitations, dizziness, or leg swelling  GASTROINTESTINAL: No abdominal pain. No nausea, vomiting, or hematemesis; No diarrhea or constipation. No melena or hematochezia.  GENITOURINARY: No dysuria or hematuria, urinary frequency  NEUROLOGICAL: No headaches, memory loss, loss of strength, numbness, or tremors  SKIN: No itching, burning, rashes, or lesions     Vital Signs Last 24 Hrs  T(C): 36.8 (22 Aug 2023 11:40), Max: 37.2 (21 Aug 2023 15:27)  T(F): 98.2 (22 Aug 2023 11:40), Max: 99 (22 Aug 2023 08:23)  HR: 86 (22 Aug 2023 11:40) (43 - 87)  BP: 117/72 (22 Aug 2023 11:40) (94/55 - 158/69)  BP(mean): --  RR: 19 (22 Aug 2023 11:40) (17 - 19)  SpO2: 100% (22 Aug 2023 11:40) (97% - 100%)    Parameters below as of 22 Aug 2023 11:40  Patient On (Oxygen Delivery Method): room air        PHYSICAL EXAMINATION:  GENERAL: NAD, well built  HEAD:  Atraumatic, Normocephalic  EYES:  conjunctiva and sclera clear  NECK: Supple, No JVD, Normal thyroid  CHEST/LUNG: Clear to auscultation. Clear to percussion bilaterally; No rales, rhonchi, wheezing, or rubs  HEART: Regular rate and rhythm; No murmurs, rubs, or gallops  ABDOMEN: Soft, Nontender, Nondistended; Bowel sounds present  NERVOUS SYSTEM:  Alert & Oriented X3,    EXTREMITIES:  2+ Peripheral Pulses, No clubbing, cyanosis, or edema  SKIN: warm dry                          7.8    9.43  )-----------( 335      ( 22 Aug 2023 08:00 )             26.1     08-22    141  |  112<H>  |  8   ----------------------------<  132<H>  3.8   |  22  |  0.80    Ca    7.9<L>      22 Aug 2023 08:00  Phos  3.2     08-21  Mg     2.3     08-21    TPro  8.2  /  Alb  3.2<L>  /  TBili  0.5  /  DBili  x   /  AST  16  /  ALT  32  /  AlkPhos  120  08-20    LIVER FUNCTIONS - ( 20 Aug 2023 23:10 )  Alb: 3.2 g/dL / Pro: 8.2 g/dL / ALK PHOS: 120 U/L / ALT: 32 U/L DA / AST: 16 U/L / GGT: x               PT/INR - ( 20 Aug 2023 23:10 )   PT: 13.6 sec;   INR: 1.20 ratio         PTT - ( 20 Aug 2023 23:10 )  PTT:33.6 sec    CAPILLARY BLOOD GLUCOSE      RADIOLOGY & ADDITIONAL TESTS:                   PGY-1 Progress Note discussed with attending    PAGER #: [[phone]] TILL 5:00 PM  PLEASE CONTACT ON CALL TEAM:  - On Call Team (Please refer to Deanne) FROM 5:00 PM - 8:30PM  - Nightfloat Team FROM 8:30 -7:30 AM    INTERVAL HPI/OVERNIGHT EVENTS: Patient was seen at bedside. Patient is Tristanian speaker. Patient stating that has been having leg ulcers for the past 5 years. Patient states that went to [facility] 3 days ago for same reason but had to leave. Patient states that right leg hurts on palpation. Denies any chest pain, shortness of breath and fevers. On telemetry no acute events.       REVIEW OF SYSTEMS:  CONSTITUTIONAL: No fever, weight loss, or fatigue  RESPIRATORY: No cough, wheezing, chills or hemoptysis; No shortness of breath  CARDIOVASCULAR: No chest pain, palpitations, dizziness, or leg swelling  GASTROINTESTINAL: No abdominal pain. No nausea, vomiting, or hematemesis; No diarrhea or constipation. No melena or hematochezia.  GENITOURINARY: No dysuria or hematuria, urinary frequency  NEUROLOGICAL: No headaches, memory loss, loss of strength, numbness, or tremors  SKIN: No itching, burning, rashes, or lesions     Vital Signs Last 24 Hrs  T(C): 36.8 (22 Aug 2023 11:40), Max: 37.2 (21 Aug 2023 15:27)  T(F): 98.2 (22 Aug 2023 11:40), Max: 99 (22 Aug 2023 08:23)  HR: 86 (22 Aug 2023 11:40) (43 - 87)  BP: 117/72 (22 Aug 2023 11:40) (94/55 - 158/69)  BP(mean): --  RR: 19 (22 Aug 2023 11:40) (17 - 19)  SpO2: 100% (22 Aug 2023 11:40) (97% - 100%)    Parameters below as of 22 Aug 2023 11:40  Patient On (Oxygen Delivery Method): room air        PHYSICAL EXAMINATION:  GENERAL: NAD, well built  HEAD:  Atraumatic, Normocephalic  EYES:  conjunctiva and sclera clear  NECK: Supple, No JVD, Normal thyroid  CHEST/LUNG: Clear to auscultation. Clear to percussion bilaterally; No rales, rhonchi, wheezing, or rubs  HEART: Regular rate and rhythm; No murmurs, rubs, or gallops  ABDOMEN: Soft, Nontender, Nondistended; Bowel sounds present  NERVOUS SYSTEM:  Alert & Oriented X3,    EXTREMITIES:  2+ Peripheral Pulses, No clubbing, cyanosis, or edema  SKIN: warm dry                          7.8    9.43  )-----------( 335      ( 22 Aug 2023 08:00 )             26.1     08-22    141  |  112<H>  |  8   ----------------------------<  132<H>  3.8   |  22  |  0.80    Ca    7.9<L>      22 Aug 2023 08:00  Phos  3.2     08-21  Mg     2.3     08-21    TPro  8.2  /  Alb  3.2<L>  /  TBili  0.5  /  DBili  x   /  AST  16  /  ALT  32  /  AlkPhos  120  08-20    LIVER FUNCTIONS - ( 20 Aug 2023 23:10 )  Alb: 3.2 g/dL / Pro: 8.2 g/dL / ALK PHOS: 120 U/L / ALT: 32 U/L DA / AST: 16 U/L / GGT: x               PT/INR - ( 20 Aug 2023 23:10 )   PT: 13.6 sec;   INR: 1.20 ratio         PTT - ( 20 Aug 2023 23:10 )  PTT:33.6 sec    CAPILLARY BLOOD GLUCOSE      RADIOLOGY & ADDITIONAL TESTS:                   PGY-1 Progress Note discussed with attending    PAGER #: [[phone]] TILL 5:00 PM  PLEASE CONTACT ON CALL TEAM:  - On Call Team (Please refer to Deanne) FROM 5:00 PM - 8:30PM  - Nightfloat Team FROM 8:30 -7:30 AM    INTERVAL HPI/OVERNIGHT EVENTS: Patient was seen at bedside. Patient is Honduran speaker. Patient stating that has been having leg ulcers for the past 5 years. Patient states that went to [facility] 3 days ago for same reason but had to leave. Patient states that right leg hurts on palpation. Denies any chest pain, shortness of breath and fevers. On telemetry no acute events.       REVIEW OF SYSTEMS:  CONSTITUTIONAL: No fever, weight loss, or fatigue  RESPIRATORY: No cough, wheezing, chills or hemoptysis; No shortness of breath  CARDIOVASCULAR: No chest pain, palpitations, dizziness, or leg swelling  GASTROINTESTINAL: No abdominal pain. No nausea, vomiting, or hematemesis; No diarrhea or constipation. No melena or hematochezia.  GENITOURINARY: No dysuria or hematuria, urinary frequency  NEUROLOGICAL: No headaches, memory loss, loss of strength, numbness, or tremors  SKIN: No itching, burning, rashes, or lesions     Vital Signs Last 24 Hrs  T(C): 36.8 (22 Aug 2023 11:40), Max: 37.2 (21 Aug 2023 15:27)  T(F): 98.2 (22 Aug 2023 11:40), Max: 99 (22 Aug 2023 08:23)  HR: 86 (22 Aug 2023 11:40) (43 - 87)  BP: 117/72 (22 Aug 2023 11:40) (94/55 - 158/69)  BP(mean): --  RR: 19 (22 Aug 2023 11:40) (17 - 19)  SpO2: 100% (22 Aug 2023 11:40) (97% - 100%)    Parameters below as of 22 Aug 2023 11:40  Patient On (Oxygen Delivery Method): room air        PHYSICAL EXAMINATION:  GENERAL: NAD, well built  HEAD:  Atraumatic, Normocephalic  EYES:  conjunctiva and sclera clear  NECK: Supple, No JVD, Normal thyroid  CHEST/LUNG: Clear to auscultation. Clear to percussion bilaterally; No rales, rhonchi, wheezing, or rubs  HEART: Regular rate and rhythm; No murmurs, rubs, or gallops  ABDOMEN: Soft, Nontender, Nondistended; Bowel sounds present  NERVOUS SYSTEM:  Alert & Oriented X3,    EXTREMITIES:  2+ Peripheral Pulses, No clubbing, cyanosis, or edema  SKIN: warm dry                          7.8    9.43  )-----------( 335      ( 22 Aug 2023 08:00 )             26.1     08-22    141  |  112<H>  |  8   ----------------------------<  132<H>  3.8   |  22  |  0.80    Ca    7.9<L>      22 Aug 2023 08:00  Phos  3.2     08-21  Mg     2.3     08-21    TPro  8.2  /  Alb  3.2<L>  /  TBili  0.5  /  DBili  x   /  AST  16  /  ALT  32  /  AlkPhos  120  08-20    LIVER FUNCTIONS - ( 20 Aug 2023 23:10 )  Alb: 3.2 g/dL / Pro: 8.2 g/dL / ALK PHOS: 120 U/L / ALT: 32 U/L DA / AST: 16 U/L / GGT: x               PT/INR - ( 20 Aug 2023 23:10 )   PT: 13.6 sec;   INR: 1.20 ratio         PTT - ( 20 Aug 2023 23:10 )  PTT:33.6 sec    CAPILLARY BLOOD GLUCOSE      RADIOLOGY & ADDITIONAL TESTS:                   PGY-1 Progress Note discussed with attending    PAGER #: [[phone]] TILL 5:00 PM  PLEASE CONTACT ON CALL TEAM:  - On Call Team (Please refer to Deanne) FROM 5:00 PM - 8:30PM  - Nightfloat Team FROM 8:30 -7:30 AM    INTERVAL HPI/OVERNIGHT EVENTS: Patient was seen at bedside. Patient is [place]er speaker. Patient stating that has been having leg ulcers for the past 5 years. Patient states that went to [facility] 3 days ago for same reason but had to leave. Patient states that right leg hurts on palpation. Denies any chest pain, shortness of breath and fevers. On telemetry no acute events.       REVIEW OF SYSTEMS:  CONSTITUTIONAL: No fever, weight loss, or fatigue  RESPIRATORY: No cough, wheezing, chills or hemoptysis; No shortness of breath  CARDIOVASCULAR: No chest pain, palpitations, dizziness, or leg swelling  GASTROINTESTINAL: No abdominal pain. No nausea, vomiting, or hematemesis; No diarrhea or constipation. No melena or hematochezia.  GENITOURINARY: No dysuria or hematuria, urinary frequency  NEUROLOGICAL: No headaches, memory loss, loss of strength, numbness, or tremors  SKIN: patient with complains of leg pain on movement    Vital Signs Last 24 Hrs  T(C): 36.8 (22 Aug 2023 11:40), Max: 37.2 (21 Aug 2023 15:27)  T(F): 98.2 (22 Aug 2023 11:40), Max: 99 (22 Aug 2023 08:23)  HR: 86 (22 Aug 2023 11:40) (43 - 87)  BP: 117/72 (22 Aug 2023 11:40) (94/55 - 158/69)  BP(mean): --  RR: 19 (22 Aug 2023 11:40) (17 - 19)  SpO2: 100% (22 Aug 2023 11:40) (97% - 100%)    Parameters below as of 22 Aug 2023 11:40  Patient On (Oxygen Delivery Method): room air        PHYSICAL EXAMINATION:  GENERAL: NAD, obese  HEAD:  Atraumatic, Normocephalic  EYES:  conjunctiva and sclera clear  NECK: Supple,  CHEST/LUNG: Clear to auscultation. Clear to percussion bilaterally; No rales, rhonchi, wheezing, or rubs  HEART: Regular rate and rhythm; No murmurs, rubs, or gallops  ABDOMEN: Soft, Nontender, Nondistended; Bowel sounds present  NERVOUS SYSTEM:  Alert & Oriented X3,    EXTREMITIES: warmth and tenderness noted on gross examination to the right leg. Patient noted to have dressing in bilateral legs.    SKIN: warm dry                          7.8    9.43  )-----------( 335      ( 22 Aug 2023 08:00 )             26.1     08-22    141  |  112<H>  |  8   ----------------------------<  132<H>  3.8   |  22  |  0.80    Ca    7.9<L>      22 Aug 2023 08:00  Phos  3.2     08-21  Mg     2.3     08-21    TPro  8.2  /  Alb  3.2<L>  /  TBili  0.5  /  DBili  x   /  AST  16  /  ALT  32  /  AlkPhos  120  08-20    LIVER FUNCTIONS - ( 20 Aug 2023 23:10 )  Alb: 3.2 g/dL / Pro: 8.2 g/dL / ALK PHOS: 120 U/L / ALT: 32 U/L DA / AST: 16 U/L / GGT: x               PT/INR - ( 20 Aug 2023 23:10 )   PT: 13.6 sec;   INR: 1.20 ratio         PTT - ( 20 Aug 2023 23:10 )  PTT:33.6 sec    CAPILLARY BLOOD GLUCOSE      RADIOLOGY & ADDITIONAL TESTS:                   PGY-1 Progress Note discussed with attending    PAGER #: [[phone]] TILL 5:00 PM  PLEASE CONTACT ON CALL TEAM:  - On Call Team (Please refer to Deanne) FROM 5:00 PM - 8:30PM  - Nightfloat Team FROM 8:30 -7:30 AM    INTERVAL HPI/OVERNIGHT EVENTS: Patient was seen at bedside. Patient is Lao speaker. Patient stating that has been having leg ulcers for the past 5 years. Patient states that went to [facility] 3 days ago for same reason but had to leave. Patient states that right leg hurts on palpation. Denies any chest pain, shortness of breath and fevers. On telemetry no acute events.       REVIEW OF SYSTEMS:  CONSTITUTIONAL: No fever, weight loss, or fatigue  RESPIRATORY: No cough, wheezing, chills or hemoptysis; No shortness of breath  CARDIOVASCULAR: No chest pain, palpitations, dizziness, or leg swelling  GASTROINTESTINAL: No abdominal pain. No nausea, vomiting, or hematemesis; No diarrhea or constipation. No melena or hematochezia.  GENITOURINARY: No dysuria or hematuria, urinary frequency  NEUROLOGICAL: No headaches, memory loss, loss of strength, numbness, or tremors  SKIN: patient with complains of leg pain on movement    Vital Signs Last 24 Hrs  T(C): 36.8 (22 Aug 2023 11:40), Max: 37.2 (21 Aug 2023 15:27)  T(F): 98.2 (22 Aug 2023 11:40), Max: 99 (22 Aug 2023 08:23)  HR: 86 (22 Aug 2023 11:40) (43 - 87)  BP: 117/72 (22 Aug 2023 11:40) (94/55 - 158/69)  BP(mean): --  RR: 19 (22 Aug 2023 11:40) (17 - 19)  SpO2: 100% (22 Aug 2023 11:40) (97% - 100%)    Parameters below as of 22 Aug 2023 11:40  Patient On (Oxygen Delivery Method): room air        PHYSICAL EXAMINATION:  GENERAL: NAD, obese  HEAD:  Atraumatic, Normocephalic  EYES:  conjunctiva and sclera clear  NECK: Supple,  CHEST/LUNG: Clear to auscultation. Clear to percussion bilaterally; No rales, rhonchi, wheezing, or rubs  HEART: Regular rate and rhythm; No murmurs, rubs, or gallops  ABDOMEN: Soft, Nontender, Nondistended; Bowel sounds present  NERVOUS SYSTEM:  Alert & Oriented X3,    EXTREMITIES: warmth and tenderness noted on gross examination to the right leg. Patient noted to have dressing in bilateral legs.    SKIN: warm dry                          7.8    9.43  )-----------( 335      ( 22 Aug 2023 08:00 )             26.1     08-22    141  |  112<H>  |  8   ----------------------------<  132<H>  3.8   |  22  |  0.80    Ca    7.9<L>      22 Aug 2023 08:00  Phos  3.2     08-21  Mg     2.3     08-21    TPro  8.2  /  Alb  3.2<L>  /  TBili  0.5  /  DBili  x   /  AST  16  /  ALT  32  /  AlkPhos  120  08-20    LIVER FUNCTIONS - ( 20 Aug 2023 23:10 )  Alb: 3.2 g/dL / Pro: 8.2 g/dL / ALK PHOS: 120 U/L / ALT: 32 U/L DA / AST: 16 U/L / GGT: x               PT/INR - ( 20 Aug 2023 23:10 )   PT: 13.6 sec;   INR: 1.20 ratio         PTT - ( 20 Aug 2023 23:10 )  PTT:33.6 sec    CAPILLARY BLOOD GLUCOSE      RADIOLOGY & ADDITIONAL TESTS:                   PGY-1 Progress Note discussed with attending    PAGER #: [[phone]] TILL 5:00 PM  PLEASE CONTACT ON CALL TEAM:  - On Call Team (Please refer to Deanne) FROM 5:00 PM - 8:30PM  - Nightfloat Team FROM 8:30 -7:30 AM    INTERVAL HPI/OVERNIGHT EVENTS: Patient was seen at bedside. Patient is Jordanian speaker. Patient stating that has been having leg ulcers for the past 5 years. Patient states that went to [facility] 3 days ago for same reason but had to leave. Patient states that right leg hurts on palpation. Denies any chest pain, shortness of breath and fevers. On telemetry no acute events.       REVIEW OF SYSTEMS:  CONSTITUTIONAL: No fever, weight loss, or fatigue  RESPIRATORY: No cough, wheezing, chills or hemoptysis; No shortness of breath  CARDIOVASCULAR: No chest pain, palpitations, dizziness, or leg swelling  GASTROINTESTINAL: No abdominal pain. No nausea, vomiting, or hematemesis; No diarrhea or constipation. No melena or hematochezia.  GENITOURINARY: No dysuria or hematuria, urinary frequency  NEUROLOGICAL: No headaches, memory loss, loss of strength, numbness, or tremors  SKIN: patient with complains of leg pain on movement    Vital Signs Last 24 Hrs  T(C): 36.8 (22 Aug 2023 11:40), Max: 37.2 (21 Aug 2023 15:27)  T(F): 98.2 (22 Aug 2023 11:40), Max: 99 (22 Aug 2023 08:23)  HR: 86 (22 Aug 2023 11:40) (43 - 87)  BP: 117/72 (22 Aug 2023 11:40) (94/55 - 158/69)  BP(mean): --  RR: 19 (22 Aug 2023 11:40) (17 - 19)  SpO2: 100% (22 Aug 2023 11:40) (97% - 100%)    Parameters below as of 22 Aug 2023 11:40  Patient On (Oxygen Delivery Method): room air        PHYSICAL EXAMINATION:  GENERAL: NAD, obese  HEAD:  Atraumatic, Normocephalic  EYES:  conjunctiva and sclera clear  NECK: Supple,  CHEST/LUNG: Clear to auscultation. Clear to percussion bilaterally; No rales, rhonchi, wheezing, or rubs  HEART: Regular rate and rhythm; No murmurs, rubs, or gallops  ABDOMEN: Soft, Nontender, Nondistended; Bowel sounds present  NERVOUS SYSTEM:  Alert & Oriented X3,    EXTREMITIES: warmth and tenderness noted on gross examination to the right leg. Patient noted to have dressing in bilateral legs.    SKIN: warm dry                          7.8    9.43  )-----------( 335      ( 22 Aug 2023 08:00 )             26.1     08-22    141  |  112<H>  |  8   ----------------------------<  132<H>  3.8   |  22  |  0.80    Ca    7.9<L>      22 Aug 2023 08:00  Phos  3.2     08-21  Mg     2.3     08-21    TPro  8.2  /  Alb  3.2<L>  /  TBili  0.5  /  DBili  x   /  AST  16  /  ALT  32  /  AlkPhos  120  08-20    LIVER FUNCTIONS - ( 20 Aug 2023 23:10 )  Alb: 3.2 g/dL / Pro: 8.2 g/dL / ALK PHOS: 120 U/L / ALT: 32 U/L DA / AST: 16 U/L / GGT: x               PT/INR - ( 20 Aug 2023 23:10 )   PT: 13.6 sec;   INR: 1.20 ratio         PTT - ( 20 Aug 2023 23:10 )  PTT:33.6 sec    CAPILLARY BLOOD GLUCOSE      RADIOLOGY & ADDITIONAL TESTS:

## 2023-08-22 NOTE — DISCHARGE NOTE PROVIDER - CARE PROVIDER_API CALL
Ravin Maloney  [facility]  [facility], [address]  [place] [address]  Phone: [phone]  Fax: [phone]  Follow Up Time: 1 week   Ravin Maloney  [facility]  [facility], [address]  [place] [address]  Phone: [phone]  Fax: [phone]  Follow Up Time: 1 week   Ravin Maloney  [facility]  [facility], [address]  [place] [address]  Phone: [phone]  Fax: [phone]  Follow Up Time: 1 week

## 2023-08-22 NOTE — PROGRESS NOTE ADULT - ATTENDING COMMENTS
Patient seen and examined with PGY1 Dr. Temple who translated at bedside    58 year old man with PMH of DM, HLD, HTN, with chronic B/L lower extremity ulcers  for the past 5 years Follows at [facility]  He comes in on account of dizziness, chills and weakness .Similar symptoms few days ago for which he was admitted to OSH.  He was diagnosed with deep tissue infection and placed on antibiotics, received blood transfusion but had to leave AMA one day ago.  He states he follows with a dermatologist and PCP in [place] but has been told his chronic leg ulcers are not vascular or diabetic in origin after prior work up.    Patient doing okay with right leg more swollen than left with discoloration. He also has pain on left calf. Has ulcers on both legs dorsal aspect.     Vital Signs Last 24 Hrs  T(C): 37 (22 Aug 2023 20:12), Max: 37.2 (22 Aug 2023 08:23)  T(F): 98.6 (22 Aug 2023 20:12), Max: 99 (22 Aug 2023 08:23)  HR: 96 (22 Aug 2023 20:12) (78 - 96)  BP: 135/88 (22 Aug 2023 20:12) (110/71 - 135/88)  RR: 18 (22 Aug 2023 20:12) (18 - 19)  SpO2: 96% (22 Aug 2023 20:12) (96% - 100%): room air    P/E: as above  Middle aged man, comfortable in bed NAD  Psych: AAO x3  Neuro: No gross focal deficits; Power and sensation intact  CVS: S1S2 present, regular, no edema  Resp: BLAE+, No wheeze or Rhonchi  GI: Soft, BS+, Non tender, non distended  Skin: Warm and moist without any rashes  Extr: Right calf tenderness+; Right LE warm, swollen and tender compared to left LE  Left leg( distal 1/2)  with large deep ulcers- uneven edges, pink to reddish erythematous base with a lot of yellowish fibrinolytic material  Right leg - has very deep ulcer with exposed tendons just above the medial malleolus. Surrounding erythema, swelling and tenderness.    Labs:                      7.8    9.43  )-----------( 335      ( 22 Aug 2023 08:00 )             26.1   08-22    141  |  112<H>  |  8   ----------------------------<  132<H>  3.8   |  22  |  0.80    Ca    7.9<L>      22 Aug 2023 08:00  Phos  3.2     08-21  Mg     2.3     08-21  TPro  8.2  /  Alb  3.2<L>  /  TBili  0.5  /  DBili  x   /  AST  16  /  ALT  32  /  AlkPhos  120  08-20    Iron with Total Binding Capacity (08.22.23 @ 08:00)   Iron - Total Binding Capacity.: 331 ug/dL  % Saturation, Iron: 5 %   Iron Total: 17 ug/dL    A/P:  58 year old man with hx of DM, HTN, HLD with chronic B/L LE ulcers with chronic wounds managed in [place] presenting with sepsis likely related to his wound infections.    D/D:   Sepsis with SSTI  B/L lower extremity chronic ulcer infection  with cellulitis and suspected osteomyelitis  ARGELIA resolving likely from underlying Sepsis  Acute on chronic microcytic anemia suspicious for iron def anemia related to GI bleeding  Suspected Pyoderma gangrenosum  Type 2 DM fair control as per pt  HTN     Plan:  Follow up Blood cx and Sepsis work up   ID consult appreciated; Continue IV Vanco increased to 1500 mg q 12 hrs; Switch Zosyn to Cefepime; Vanco trough tomorrow  Anemia work up noted; Iron deficient; Type, crossmatch and transfuse at least 1 unit of packed red cells.  GI consult Patient will likely need a Colonoscopy; last Colonoscopy >1 yr ago with multiple polyps removed; recommended repeat  check A1c please add ON  Hold BP meds for hypotension or  low BP  please get medication list from [facility] pharmacy: Patient report taking Lantus, Janumet 2 tabs HS (50/1000), Jardiance 10 gm daily, Statin  Podiatry consult noted; Follow up [facility]; Vascular Sx consult requested  As d/w follow up MRI B/L LE    Discussed with patient at length with Dr. Temple translating and advised needs at least needs 2 to 3 days more stay minimum for IV ABX as well as work up of his ulcer as well as significant Anemia both of which could be related  Discussed with PGy1 Dr. Temple and PGY3 Dr. Sal and RN Patient seen and examined with PGY1 Dr. Temple who translated at bedside    58 year old man with PMH of DM, HLD, HTN, with chronic B/L lower extremity ulcers  for the past 5 years Follows at [facility]  He comes in on account of dizziness, chills and weakness .Similar symptoms few days ago for which he was admitted to OSH.  He was diagnosed with deep tissue infection and placed on antibiotics, received blood transfusion but had to leave AMA one day ago.  He states he follows with a dermatologist and PCP in [place] but has been told his chronic leg ulcers are not vascular or diabetic in origin after prior work up.    Patient doing okay with right leg more swollen than left with discoloration. He also has pain on left calf. Has ulcers on both legs dorsal aspect.     Vital Signs Last 24 Hrs  T(C): 37 (22 Aug 2023 20:12), Max: 37.2 (22 Aug 2023 08:23)  T(F): 98.6 (22 Aug 2023 20:12), Max: 99 (22 Aug 2023 08:23)  HR: 96 (22 Aug 2023 20:12) (78 - 96)  BP: 135/88 (22 Aug 2023 20:12) (110/71 - 135/88)  RR: 18 (22 Aug 2023 20:12) (18 - 19)  SpO2: 96% (22 Aug 2023 20:12) (96% - 100%): room air    P/E: as above  Middle aged man, comfortable in bed NAD  Psych: AAO x3  Neuro: No gross focal deficits; Power and sensation intact  CVS: S1S2 present, regular, no edema  Resp: BLAE+, No wheeze or Rhonchi  GI: Soft, BS+, Non tender, non distended  Skin: Warm and moist without any rashes  Extr: Right calf tenderness+; Right LE warm, swollen and tender compared to left LE  Left leg( distal 1/2)  with large deep ulcers- uneven edges, pink to reddish erythematous base with a lot of yellowish fibrinolytic material  Right leg - has very deep ulcer with exposed tendons just above the medial malleolus. Surrounding erythema, swelling and tenderness.    Labs:                      7.8    9.43  )-----------( 335      ( 22 Aug 2023 08:00 )             26.1   08-22    141  |  112<H>  |  8   ----------------------------<  132<H>  3.8   |  22  |  0.80    Ca    7.9<L>      22 Aug 2023 08:00  Phos  3.2     08-21  Mg     2.3     08-21  TPro  8.2  /  Alb  3.2<L>  /  TBili  0.5  /  DBili  x   /  AST  16  /  ALT  32  /  AlkPhos  120  08-20    Iron with Total Binding Capacity (08.22.23 @ 08:00)   Iron - Total Binding Capacity.: 331 ug/dL  % Saturation, Iron: 5 %   Iron Total: 17 ug/dL    A/P:  58 year old man with hx of DM, HTN, HLD with chronic B/L LE ulcers with chronic wounds managed in [place] presenting with sepsis likely related to his wound infections.    D/D:   Sepsis with SSTI  B/L lower extremity chronic ulcer infection  with cellulitis and suspected osteomyelitis  ARGELIA resolving likely from underlying Sepsis  Acute on chronic microcytic anemia suspicious for iron def anemia related to GI bleeding  Suspected Pyoderma gangrenosum  Type 2 DM fair control as per pt  HTN     Plan:  Follow up Blood cx and Sepsis work up   ID consult appreciated; Continue IV Vanco increased to 1500 mg q 12 hrs; Switch Zosyn to Cefepime; Vanco trough tomorrow  Anemia work up noted; Iron deficient; Type, crossmatch and transfuse at least 1 unit of packed red cells.  GI consult Patient will likely need a Colonoscopy; last Colonoscopy >1 yr ago with multiple polyps removed; recommended repeat  check A1c please add ON  Hold BP meds for hypotension or  low BP  please get medication list from [facility] pharmacy: Patient report taking Lantus, Janumet 2 tabs HS (50/1000), Jardiance 10 gm daily, Statin  Podiatry consult noted; Follow up [facility]; Vascular Sx consult requested  As d/w follow up MRI B/L LE    Discussed with patient at length with Dr. Temple translating and advised needs at least needs 2 to 3 days more stay minimum for IV ABX as well as work up of his ulcer as well as significant Anemia both of which could be related  Discussed with PGy1 Dr. Temple and PGY3 Dr. Sal and RN Patient seen and examined with PGY1 Dr. Temple who translated at bedside    58 year old man with PMH of DM, HLD, HTN, with chronic B/L lower extremity ulcers  for the past 5 years Follows at [facility]  He comes in on account of dizziness, chills and weakness .Similar symptoms few days ago for which he was admitted to OSH.  He was diagnosed with deep tissue infection and placed on antibiotics, received blood transfusion but had to leave AMA one day ago.  He states he follows with a dermatologist and PCP in [place] but has been told his chronic leg ulcers are not vascular or diabetic in origin after prior work up.    Patient doing okay with right leg more swollen than left with discoloration. He also has pain on left calf. Has ulcers on both legs dorsal aspect.     Vital Signs Last 24 Hrs  T(C): 37 (22 Aug 2023 20:12), Max: 37.2 (22 Aug 2023 08:23)  T(F): 98.6 (22 Aug 2023 20:12), Max: 99 (22 Aug 2023 08:23)  HR: 96 (22 Aug 2023 20:12) (78 - 96)  BP: 135/88 (22 Aug 2023 20:12) (110/71 - 135/88)  RR: 18 (22 Aug 2023 20:12) (18 - 19)  SpO2: 96% (22 Aug 2023 20:12) (96% - 100%): room air    P/E: as above  Middle aged man, comfortable in bed NAD  Psych: AAO x3  Neuro: No gross focal deficits; Power and sensation intact  CVS: S1S2 present, regular, no edema  Resp: BLAE+, No wheeze or Rhonchi  GI: Soft, BS+, Non tender, non distended  Skin: Warm and moist without any rashes  Extr: Right calf tenderness+; Right LE warm, swollen and tender compared to left LE  Left leg( distal 1/2)  with large deep ulcers- uneven edges, pink to reddish erythematous base with a lot of yellowish fibrinolytic material  Right leg - has very deep ulcer with exposed tendons just above the medial malleolus. Surrounding erythema, swelling and tenderness.    Labs:                      7.8    9.43  )-----------( 335      ( 22 Aug 2023 08:00 )             26.1   08-22    141  |  112<H>  |  8   ----------------------------<  132<H>  3.8   |  22  |  0.80    Ca    7.9<L>      22 Aug 2023 08:00  Phos  3.2     08-21  Mg     2.3     08-21  TPro  8.2  /  Alb  3.2<L>  /  TBili  0.5  /  DBili  x   /  AST  16  /  ALT  32  /  AlkPhos  120  08-20    Iron with Total Binding Capacity (08.22.23 @ 08:00)   Iron - Total Binding Capacity.: 331 ug/dL  % Saturation, Iron: 5 %   Iron Total: 17 ug/dL    A/P:  58 year old man with hx of DM, HTN, HLD with chronic B/L LE ulcers with chronic wounds managed in [place] presenting with sepsis likely related to his wound infections.    D/D:   Sepsis with SSTI  B/L lower extremity chronic ulcer infection  with cellulitis and suspected osteomyelitis  ARGELIA resolving likely from underlying Sepsis  Acute on chronic microcytic anemia suspicious for iron def anemia related to GI bleeding  Suspected Pyoderma gangrenosum  Type 2 DM fair control as per pt  HTN     Plan:  Follow up Blood cx and Sepsis work up   ID consult appreciated; Continue IV Vanco increased to 1500 mg q 12 hrs; Switch Zosyn to Cefepime; Vanco trough tomorrow  Anemia work up noted; Iron deficient; Type, crossmatch and transfuse at least 1 unit of packed red cells.  GI consult Patient will likely need a Colonoscopy; last Colonoscopy >1 yr ago with multiple polyps removed; recommended repeat  check A1c please add ON  Hold BP meds for hypotension or  low BP  please get medication list from [facility] pharmacy: Patient report taking Lantus, Janumet 2 tabs HS (50/1000), Jardiance 10 gm daily, Statin  Podiatry consult noted; Follow up [facility]; Vascular Sx consult requested  As d/w follow up MRI B/L LE    Discussed with patient at length with Dr. Temple translating and advised needs at least needs 2 to 3 days more stay minimum for IV ABX as well as work up of his ulcer as well as significant Anemia both of which could be related  Discussed with PGy1 Dr. Temple and PGY3 Dr. Sal and RN Patient seen and examined with PGY1 Dr. Temple who translated at bedside    58 year old man with PMH of DM, HLD, HTN, with chronic B/L lower extremity ulcers  for the past 5 years Follows at [facility]  He comes in on account of dizziness, chills and weakness .Similar symptoms few days ago for which he was admitted to OSH.  He was diagnosed with deep tissue infection and placed on antibiotics, received blood transfusion but had to leave AMA one day ago.  He states he follows with a dermatologist and PCP in [place] but has been told his chronic leg ulcers are not vascular or diabetic in origin after prior work up.    Patient doing okay with right leg more swollen than left with discoloration. He also has pain on left calf. Has ulcers on both legs dorsal aspect.     Vital Signs Last 24 Hrs  T(C): 37 (22 Aug 2023 20:12), Max: 37.2 (22 Aug 2023 08:23)  T(F): 98.6 (22 Aug 2023 20:12), Max: 99 (22 Aug 2023 08:23)  HR: 96 (22 Aug 2023 20:12) (78 - 96)  BP: 135/88 (22 Aug 2023 20:12) (110/71 - 135/88)  RR: 18 (22 Aug 2023 20:12) (18 - 19)  SpO2: 96% (22 Aug 2023 20:12) (96% - 100%): room air    P/E: as above  Middle aged man, comfortable in bed NAD  Psych: AAO x3  Neuro: No gross focal deficits; Power and sensation intact  CVS: S1S2 present, regular, no edema  Resp: BLAE+, No wheeze or Rhonchi  GI: Soft, BS+, Non tender, non distended  Skin: Warm and moist without any rashes  Extr: Right calf tenderness+; Right LE warm, swollen and tender compared to left LE  Left leg( distal 1/2)  with large deep ulcers- uneven edges, pink to reddish erythematous base with a lot of yellowish fibrinolytic material  Right leg - has very deep ulcer with exposed tendons just above the medial malleolus. Surrounding erythema, swelling and tenderness.    Labs:                      7.8    9.43  )-----------( 335      ( 22 Aug 2023 08:00 )             26.1   08-22    141  |  112<H>  |  8   ----------------------------<  132<H>  3.8   |  22  |  0.80    Ca    7.9<L>      22 Aug 2023 08:00  Phos  3.2     08-21  Mg     2.3     08-21  TPro  8.2  /  Alb  3.2<L>  /  TBili  0.5  /  DBili  x   /  AST  16  /  ALT  32  /  AlkPhos  120  08-20    Iron with Total Binding Capacity (08.22.23 @ 08:00)   Iron - Total Binding Capacity.: 331 ug/dL  % Saturation, Iron: 5 %   Iron Total: 17 ug/dL    A/P:  58 year old man with hx of DM, HTN, HLD with chronic B/L LE ulcers with chronic wounds managed in [place] presenting with sepsis likely related to his wound infections.    D/D:   Sepsis with SSTI  B/L lower extremity chronic ulcer infection  with cellulitis and suspected osteomyelitis  ARGELIA resolving likely from underlying Sepsis  Acute on chronic microcytic anemia suspicious for iron def anemia related to GI bleeding  Suspected Pyoderma gangrenosum  Type 2 DM fair control as per pt  HTN     Plan:  Follow up Blood cx and Sepsis work up   ID consult appreciated; Continue IV Vanco increased to 1500 mg q 12 hrs; Switch Zosyn to Cefepime; Vanco trough tomorrow  Anemia work up noted; Iron deficient; Type, crossmatch and transfuse at least 1 unit of packed red cells.  GI consult Patient will likely need a Colonoscopy; last Colonoscopy >1 yr ago with multiple polyps removed; recommended repeat  check A1c please add ON  Hold BP meds for hypotension or  low BP  please get medication list from [facility] pharmacy: Patient report taking Lantus, Janumet 2 tabs HS (50/1000), Jardiance 10 gm daily, Statin  Podiatry consult noted; Follow up [facility]; Vascular Sx consult requested  As d/w follow up MRI B/L LE  Melo get ESR, CRP AM    Discussed with patient at length with Dr. Temple translating and advised needs at least needs 2 to 3 days more stay minimum for IV ABX as well as work up of his ulcer as well as significant Anemia both of which could be related  Discussed with PGy1 Dr. Temple and PGY3 Dr. Sal and RN Patient seen and examined with PGY1 Dr. Temple who translated at bedside    58 year old man with PMH of DM, HLD, HTN, with chronic B/L lower extremity ulcers  for the past 5 years Follows at [facility]  He comes in on account of dizziness, chills and weakness .Similar symptoms few days ago for which he was admitted to OSH.  He was diagnosed with deep tissue infection and placed on antibiotics, received blood transfusion but had to leave AMA one day ago.  He states he follows with a dermatologist and PCP in [place] but has been told his chronic leg ulcers are not vascular or diabetic in origin after prior work up.    Patient doing okay with right leg more swollen than left with discoloration. He also has pain on left calf. Has ulcers on both legs dorsal aspect.     Vital Signs Last 24 Hrs  T(C): 37 (22 Aug 2023 20:12), Max: 37.2 (22 Aug 2023 08:23)  T(F): 98.6 (22 Aug 2023 20:12), Max: 99 (22 Aug 2023 08:23)  HR: 96 (22 Aug 2023 20:12) (78 - 96)  BP: 135/88 (22 Aug 2023 20:12) (110/71 - 135/88)  RR: 18 (22 Aug 2023 20:12) (18 - 19)  SpO2: 96% (22 Aug 2023 20:12) (96% - 100%): room air    P/E: as above  Middle aged man, comfortable in bed NAD  Psych: AAO x3  Neuro: No gross focal deficits; Power and sensation intact  CVS: S1S2 present, regular, no edema  Resp: BLAE+, No wheeze or Rhonchi  GI: Soft, BS+, Non tender, non distended  Skin: Warm and moist without any rashes  Extr: Right calf tenderness+; Right LE warm, swollen and tender compared to left LE  Left leg( distal 1/2)  with large deep ulcers- uneven edges, pink to reddish erythematous base with a lot of yellowish fibrinolytic material  Right leg - has very deep ulcer with exposed tendons just above the medial malleolus. Surrounding erythema, swelling and tenderness.    Labs:                      7.8    9.43  )-----------( 335      ( 22 Aug 2023 08:00 )             26.1   08-22    141  |  112<H>  |  8   ----------------------------<  132<H>  3.8   |  22  |  0.80    Ca    7.9<L>      22 Aug 2023 08:00  Phos  3.2     08-21  Mg     2.3     08-21  TPro  8.2  /  Alb  3.2<L>  /  TBili  0.5  /  DBili  x   /  AST  16  /  ALT  32  /  AlkPhos  120  08-20    Iron with Total Binding Capacity (08.22.23 @ 08:00)   Iron - Total Binding Capacity.: 331 ug/dL  % Saturation, Iron: 5 %   Iron Total: 17 ug/dL    A/P:  58 year old man with hx of DM, HTN, HLD with chronic B/L LE ulcers with chronic wounds managed in [place] presenting with sepsis likely related to his wound infections.    D/D:   Sepsis with SSTI  B/L lower extremity chronic ulcer infection  with cellulitis and suspected osteomyelitis  ARGELIA resolving likely from underlying Sepsis  Acute on chronic microcytic anemia suspicious for iron def anemia related to GI bleeding  Suspected Pyoderma gangrenosum  Type 2 DM fair control as per pt  HTN     Plan:  Follow up Blood cx and Sepsis work up   ID consult appreciated; Continue IV Vanco increased to 1500 mg q 12 hrs; Switch Zosyn to Cefepime; Vanco trough tomorrow  Anemia work up noted; Iron deficient; Type, crossmatch and transfuse at least 1 unit of packed red cells.  GI consult Patient will likely need a Colonoscopy; last Colonoscopy >1 yr ago with multiple polyps removed; recommended repeat  check A1c please add ON  Hold BP meds for hypotension or  low BP  please get medication list from [facility] pharmacy: Patient report taking Lantus, Janumet 2 tabs HS (50/1000), Jardiance 10 gm daily, Statin  Podiatry consult noted; Follow up [facility]; Vascular Sx consult requested  As d/w follow up MRI B/L LE  Melo get ESR, CRP AM    Discussed with patient at length with Dr. Temple translating and advised needs at least needs 2 to 3 days more stay minimum for IV ABX as well as work up of his ulcer as well as significant Anemia both of which could be related  Discussed with PGy1 Dr. Temple and PGY3 Dr. Sal and RN Patient seen and examined with PGY1 Dr. Temple who translated at bedside    58 year old man with PMH of DM, HLD, HTN, with chronic B/L lower extremity ulcers  for the past 5 years Follows at [facility]  He comes in on account of dizziness, chills and weakness .Similar symptoms few days ago for which he was admitted to OSH.  He was diagnosed with deep tissue infection and placed on antibiotics, received blood transfusion but had to leave AMA one day ago.  He states he follows with a dermatologist and PCP in [place] but has been told his chronic leg ulcers are not vascular or diabetic in origin after prior work up.    Patient doing okay with right leg more swollen than left with discoloration. He also has pain on left calf. Has ulcers on both legs dorsal aspect.     Vital Signs Last 24 Hrs  T(C): 37 (22 Aug 2023 20:12), Max: 37.2 (22 Aug 2023 08:23)  T(F): 98.6 (22 Aug 2023 20:12), Max: 99 (22 Aug 2023 08:23)  HR: 96 (22 Aug 2023 20:12) (78 - 96)  BP: 135/88 (22 Aug 2023 20:12) (110/71 - 135/88)  RR: 18 (22 Aug 2023 20:12) (18 - 19)  SpO2: 96% (22 Aug 2023 20:12) (96% - 100%): room air    P/E: as above  Middle aged man, comfortable in bed NAD  Psych: AAO x3  Neuro: No gross focal deficits; Power and sensation intact  CVS: S1S2 present, regular, no edema  Resp: BLAE+, No wheeze or Rhonchi  GI: Soft, BS+, Non tender, non distended  Skin: Warm and moist without any rashes  Extr: Right calf tenderness+; Right LE warm, swollen and tender compared to left LE  Left leg( distal 1/2)  with large deep ulcers- uneven edges, pink to reddish erythematous base with a lot of yellowish fibrinolytic material  Right leg - has very deep ulcer with exposed tendons just above the medial malleolus. Surrounding erythema, swelling and tenderness.    Labs:                      7.8    9.43  )-----------( 335      ( 22 Aug 2023 08:00 )             26.1   08-22    141  |  112<H>  |  8   ----------------------------<  132<H>  3.8   |  22  |  0.80    Ca    7.9<L>      22 Aug 2023 08:00  Phos  3.2     08-21  Mg     2.3     08-21  TPro  8.2  /  Alb  3.2<L>  /  TBili  0.5  /  DBili  x   /  AST  16  /  ALT  32  /  AlkPhos  120  08-20    Iron with Total Binding Capacity (08.22.23 @ 08:00)   Iron - Total Binding Capacity.: 331 ug/dL  % Saturation, Iron: 5 %   Iron Total: 17 ug/dL    A/P:  58 year old man with hx of DM, HTN, HLD with chronic B/L LE ulcers with chronic wounds managed in [place] presenting with sepsis likely related to his wound infections.    D/D:   Sepsis with SSTI  B/L lower extremity chronic ulcer infection  with cellulitis and suspected osteomyelitis  ARGELIA resolving likely from underlying Sepsis  Acute on chronic microcytic anemia suspicious for iron def anemia related to GI bleeding  Suspected Pyoderma gangrenosum  Type 2 DM fair control as per pt  HTN     Plan:  Follow up Blood cx and Sepsis work up   ID consult appreciated; Continue IV Vanco increased to 1500 mg q 12 hrs; Switch Zosyn to Cefepime; Vanco trough tomorrow  Anemia work up noted; Iron deficient; Type, crossmatch and transfuse at least 1 unit of packed red cells.  GI consult Patient will likely need a Colonoscopy; last Colonoscopy >1 yr ago with multiple polyps removed; recommended repeat  check A1c please add ON  Hold BP meds for hypotension or  low BP  please get medication list from [facility] pharmacy: Patient report taking Lantus, Janumet 2 tabs HS (50/1000), Jardiance 10 gm daily, Statin  Podiatry consult noted; Follow up [facility]; Vascular Sx consult requested  As d/w follow up MRI B/L LE  Melo get ESR, CRP AM    Discussed with patient at length with Dr. Temple translating and advised needs at least needs 2 to 3 days more stay minimum for IV ABX as well as work up of his ulcer as well as significant Anemia both of which could be related  Discussed with PGy1 Dr. Temple and PGY3 Dr. Sal and RN

## 2023-08-22 NOTE — PROGRESS NOTE ADULT - SUBJECTIVE AND OBJECTIVE BOX
Interval: Patient was seen at bedside. Patient stated he wanted to go home. Patient was advised to stay in the hospital. He stated he would stay to talk to his medicine doctor. Denied any pedal complaints.     Patient is a 58y old  Male who presents with a chief complaint of weakness (21 Aug 2023 02:06)      HPI:  57 yo M with PMH of DM, HLD, HTN and diabetic leg ulcers present with chills, weakness, lightheadedness and infected RT lower extremity ulcer for the past 3 days which has progressively worsened today. As per pt,  he went to [facility] in [place] 3 days  ago for his weakness and infected leg ulcer. He mentioned that he received one blood transfusion, antibiotics and was admitted to the hospital. He reports staying in the hospital for a day before leaving the hospital for some personal issue. He reports decreased oral intake today, felt extremely weak and lightheaded. He reports history of ulcer for the past 5 years that has worsened in the past 3 years.  He follows a "dermatologist" at [facility] and he is unable to provide information of his diagnosis. He denies; fever, bodyaches, Chest pain, SOB, URI sxs, N/V/D, abdominal pain, LOC, Head trauma    In the ED   Vitals -> BP: 88/55, HR:102, T:97.7, O2 sat: 94% RA  Labs :  WbC 17k, Hb:7.9, Cr:1.76  EKG : NSR  s/p Vancomycin, Zosyn IVPB  s/p 2L NS ->101/68 (21 Aug 2023 02:06)      Podiatry HPI: Patient stated that he has had leg wounds for 5 years. He seems a dermatologist at [facility] who gives him steroid shots for his wounds and they have worsened. Over the last month his pain has grown and his has become dizzy and weak. Patient states he needs to have santyl on his wounds for every dressing change so that the shooting pain he has subsides. Patient denies any other pedal complaints.    ID#: [id number]    Medications acetaminophen     Tablet .. 650 milliGRAM(s) Oral every 6 hours PRN  insulin lispro (ADMELOG) corrective regimen sliding scale   SubCutaneous three times a day before meals  insulin lispro (ADMELOG) corrective regimen sliding scale   SubCutaneous at bedtime  melatonin 3 milliGRAM(s) Oral at bedtime PRN  piperacillin/tazobactam IVPB.. 3.375 Gram(s) IV Intermittent every 8 hours  sodium chloride 0.9%. 1000 milliLiter(s) IV Continuous <Continuous>    FHNo pertinent family history in first degree relatives    ,   PMHHTN (hypertension)    Dyslipidemia    DM (diabetes mellitus)    Leg ulcer       PSHNo significant past surgical history        Labs                          7.6    10.14 )-----------( 314      ( 21 Aug 2023 18:40 )             24.8      08-21    136  |  106  |  15  ----------------------------<  224<H>  3.5   |  20<L>  |  1.10    Ca    7.1<L>      21 Aug 2023 05:30  Phos  3.2     08-21  Mg     2.3     08-21    TPro  8.2  /  Alb  3.2<L>  /  TBili  0.5  /  DBili  x   /  AST  16  /  ALT  32  /  AlkPhos  120  08-20     Vital Signs Last 24 Hrs  T(C): 36.6 (22 Aug 2023 04:38), Max: 37.2 (21 Aug 2023 15:27)  T(F): 97.9 (22 Aug 2023 04:38), Max: 98.9 (21 Aug 2023 15:27)  HR: 78 (22 Aug 2023 04:38) (43 - 84)  BP: 110/74 (22 Aug 2023 04:38) (90/53 - 158/69)  BP(mean): --  RR: 18 (22 Aug 2023 04:38) (17 - 18)  SpO2: 99% (22 Aug 2023 04:38) (97% - 100%)    Parameters below as of 22 Aug 2023 04:38  Patient On (Oxygen Delivery Method): room air              WBC Count: 10.14 K/uL (08-21-23 @ 18:40)      PHYSICAL EXAM  LE Focused:    Vasc:  DP and PT pulses palpable 2/4. CFT<3 seconds. No edema noted in legs or feet   Derm: Wounds noted b/l lower legs to level of subcutaneous tissue/muscle tendon layer of legs. No PTB in any of wounds but tendon exposure noted. Wounds are fibrogranular in nature with no drainage noted. No fluctuance crepitus or streaking noted.   Neuro: Gross sensation intact.   MSK: POP to all wounds.       IMAGING:   Xray- pending     CULTURES: pending      Interval: Patient was seen at bedside. Patient stated he wanted to go home. Patient was advised to stay in the hospital. He stated he would stay to talk to his medicine doctor. Denied any pedal complaints.     Patient is a 58y old  Male who presents with a chief complaint of weakness (21 Aug 2023 02:06)      HPI:  59 yo M with PMH of DM, HLD, HTN and diabetic leg ulcers present with chills, weakness, lightheadedness and infected RT lower extremity ulcer for the past 3 days which has progressively worsened today. As per pt,  he went to [facility] in [place] 3 days  ago for his weakness and infected leg ulcer. He mentioned that he received one blood transfusion, antibiotics and was admitted to the hospital. He reports staying in the hospital for a day before leaving the hospital for some personal issue. He reports decreased oral intake today, felt extremely weak and lightheaded. He reports history of ulcer for the past 5 years that has worsened in the past 3 years.  He follows a "dermatologist" at [facility] and he is unable to provide information of his diagnosis. He denies; fever, bodyaches, Chest pain, SOB, URI sxs, N/V/D, abdominal pain, LOC, Head trauma    In the ED   Vitals -> BP: 88/55, HR:102, T:97.7, O2 sat: 94% RA  Labs :  WbC 17k, Hb:7.9, Cr:1.76  EKG : NSR  s/p Vancomycin, Zosyn IVPB  s/p 2L NS ->101/68 (21 Aug 2023 02:06)      Podiatry HPI: Patient stated that he has had leg wounds for 5 years. He seems a dermatologist at [facility] who gives him steroid shots for his wounds and they have worsened. Over the last month his pain has grown and his has become dizzy and weak. Patient states he needs to have santyl on his wounds for every dressing change so that the shooting pain he has subsides. Patient denies any other pedal complaints.    ID#: [id number]    Medications acetaminophen     Tablet .. 650 milliGRAM(s) Oral every 6 hours PRN  insulin lispro (ADMELOG) corrective regimen sliding scale   SubCutaneous three times a day before meals  insulin lispro (ADMELOG) corrective regimen sliding scale   SubCutaneous at bedtime  melatonin 3 milliGRAM(s) Oral at bedtime PRN  piperacillin/tazobactam IVPB.. 3.375 Gram(s) IV Intermittent every 8 hours  sodium chloride 0.9%. 1000 milliLiter(s) IV Continuous <Continuous>    FHNo pertinent family history in first degree relatives    ,   PMHHTN (hypertension)    Dyslipidemia    DM (diabetes mellitus)    Leg ulcer       PSHNo significant past surgical history        Labs                          7.6    10.14 )-----------( 314      ( 21 Aug 2023 18:40 )             24.8      08-21    136  |  106  |  15  ----------------------------<  224<H>  3.5   |  20<L>  |  1.10    Ca    7.1<L>      21 Aug 2023 05:30  Phos  3.2     08-21  Mg     2.3     08-21    TPro  8.2  /  Alb  3.2<L>  /  TBili  0.5  /  DBili  x   /  AST  16  /  ALT  32  /  AlkPhos  120  08-20     Vital Signs Last 24 Hrs  T(C): 36.6 (22 Aug 2023 04:38), Max: 37.2 (21 Aug 2023 15:27)  T(F): 97.9 (22 Aug 2023 04:38), Max: 98.9 (21 Aug 2023 15:27)  HR: 78 (22 Aug 2023 04:38) (43 - 84)  BP: 110/74 (22 Aug 2023 04:38) (90/53 - 158/69)  BP(mean): --  RR: 18 (22 Aug 2023 04:38) (17 - 18)  SpO2: 99% (22 Aug 2023 04:38) (97% - 100%)    Parameters below as of 22 Aug 2023 04:38  Patient On (Oxygen Delivery Method): room air              WBC Count: 10.14 K/uL (08-21-23 @ 18:40)      PHYSICAL EXAM  LE Focused:    Vasc:  DP and PT pulses palpable 2/4. CFT<3 seconds. No edema noted in legs or feet   Derm: Wounds noted b/l lower legs to level of subcutaneous tissue/muscle tendon layer of legs. No PTB in any of wounds but tendon exposure noted. Wounds are fibrogranular in nature with no drainage noted. No fluctuance crepitus or streaking noted.   Neuro: Gross sensation intact.   MSK: POP to all wounds.       IMAGING:   Xray- pending     CULTURES: pending      Interval: Patient was seen at bedside. Patient stated he wanted to go home. Patient was advised to stay in the hospital. He stated he would stay to talk to his medicine doctor. Denied any pedal complaints.     Patient is a 58y old  Male who presents with a chief complaint of weakness (21 Aug 2023 02:06)      HPI:  57 yo M with PMH of DM, HLD, HTN and diabetic leg ulcers present with chills, weakness, lightheadedness and infected RT lower extremity ulcer for the past 3 days which has progressively worsened today. As per pt,  he went to [facility] in [place] 3 days  ago for his weakness and infected leg ulcer. He mentioned that he received one blood transfusion, antibiotics and was admitted to the hospital. He reports staying in the hospital for a day before leaving the hospital for some personal issue. He reports decreased oral intake today, felt extremely weak and lightheaded. He reports history of ulcer for the past 5 years that has worsened in the past 3 years.  He follows a "dermatologist" at [facility] and he is unable to provide information of his diagnosis. He denies; fever, bodyaches, Chest pain, SOB, URI sxs, N/V/D, abdominal pain, LOC, Head trauma    In the ED   Vitals -> BP: 88/55, HR:102, T:97.7, O2 sat: 94% RA  Labs :  WbC 17k, Hb:7.9, Cr:1.76  EKG : NSR  s/p Vancomycin, Zosyn IVPB  s/p 2L NS ->101/68 (21 Aug 2023 02:06)      Podiatry HPI: Patient stated that he has had leg wounds for 5 years. He seems a dermatologist at [facility] who gives him steroid shots for his wounds and they have worsened. Over the last month his pain has grown and his has become dizzy and weak. Patient states he needs to have santyl on his wounds for every dressing change so that the shooting pain he has subsides. Patient denies any other pedal complaints.    ID#: [id number]    Medications acetaminophen     Tablet .. 650 milliGRAM(s) Oral every 6 hours PRN  insulin lispro (ADMELOG) corrective regimen sliding scale   SubCutaneous three times a day before meals  insulin lispro (ADMELOG) corrective regimen sliding scale   SubCutaneous at bedtime  melatonin 3 milliGRAM(s) Oral at bedtime PRN  piperacillin/tazobactam IVPB.. 3.375 Gram(s) IV Intermittent every 8 hours  sodium chloride 0.9%. 1000 milliLiter(s) IV Continuous <Continuous>    FHNo pertinent family history in first degree relatives    ,   PMHHTN (hypertension)    Dyslipidemia    DM (diabetes mellitus)    Leg ulcer       PSHNo significant past surgical history        Labs                          7.6    10.14 )-----------( 314      ( 21 Aug 2023 18:40 )             24.8      08-21    136  |  106  |  15  ----------------------------<  224<H>  3.5   |  20<L>  |  1.10    Ca    7.1<L>      21 Aug 2023 05:30  Phos  3.2     08-21  Mg     2.3     08-21    TPro  8.2  /  Alb  3.2<L>  /  TBili  0.5  /  DBili  x   /  AST  16  /  ALT  32  /  AlkPhos  120  08-20     Vital Signs Last 24 Hrs  T(C): 36.6 (22 Aug 2023 04:38), Max: 37.2 (21 Aug 2023 15:27)  T(F): 97.9 (22 Aug 2023 04:38), Max: 98.9 (21 Aug 2023 15:27)  HR: 78 (22 Aug 2023 04:38) (43 - 84)  BP: 110/74 (22 Aug 2023 04:38) (90/53 - 158/69)  BP(mean): --  RR: 18 (22 Aug 2023 04:38) (17 - 18)  SpO2: 99% (22 Aug 2023 04:38) (97% - 100%)    Parameters below as of 22 Aug 2023 04:38  Patient On (Oxygen Delivery Method): room air              WBC Count: 10.14 K/uL (08-21-23 @ 18:40)      PHYSICAL EXAM  LE Focused:    Vasc:  DP and PT pulses palpable 2/4. CFT<3 seconds. No edema noted in legs or feet   Derm: Wounds noted b/l lower legs to level of subcutaneous tissue/muscle tendon layer of legs. No PTB in any of wounds but tendon exposure noted. Wounds are fibrogranular in nature with no drainage noted. No fluctuance crepitus or streaking noted.   Neuro: Gross sensation intact.   MSK: POP to all wounds.       IMAGING:   Xray- pending     CULTURES: pending

## 2023-08-22 NOTE — DISCHARGE NOTE PROVIDER - HOSPITAL COURSE
57 yo M with PMH of DM, HLD, HTN and diabetic leg ulcers present with chills, weakness, lightheadedness and infected RT lower extremity ulcer for the past 3 days which has progressively worsened today. As per pt,  he went to [facility] in [place] 3 days  ago for his weakness and infected leg ulcer. He mentioned that he received one blood transfusion, antibiotics and was admitted to the hospital. He reports staying in the hospital for a day before leaving the hospital for some personal issue. He reports decreased oral intake today, felt extremely weak and lightheaded. He reports history of ulcer for the past 5 years that has worsened in the past 3 years.  He follows a "dermatologist" at [facility] and he is unable to provide information of his diagnosis. He denies; fever, bodyaches, Chest pain, SOB, URI sxs, N/V/D, abdominal pain, LOC, Head trauma    In the ED   Vitals -> BP: 88/55, HR:102, T:97.7, O2 sat: 94% RA  Labs :  WbC 17k, Hb:7.9, Cr:1.76  EKG : NSR  s/p Vancomycin, Zosyn IVPB  s/p 2L NS ->101/68 59 yo M with PMH of DM, HLD, HTN and diabetic leg ulcers present with chills, weakness, lightheadedness and infected RT lower extremity ulcer for the past 3 days which has progressively worsened today. As per pt,  he went to [facility] in [place] 3 days  ago for his weakness and infected leg ulcer. He mentioned that he received one blood transfusion, antibiotics and was admitted to the hospital. He reports staying in the hospital for a day before leaving the hospital for some personal issue. He reports decreased oral intake today, felt extremely weak and lightheaded. He reports history of ulcer for the past 5 years that has worsened in the past 3 years.  He follows a "dermatologist" at [facility] and he is unable to provide information of his diagnosis. He denies; fever, bodyaches, Chest pain, SOB, URI sxs, N/V/D, abdominal pain, LOC, Head trauma    In the ED   Vitals -> BP: 88/55, HR:102, T:97.7, O2 sat: 94% RA  Labs :  WbC 17k, Hb:7.9, Cr:1.76  EKG : NSR  s/p Vancomycin, Zosyn IVPB  s/p 2L NS ->101/68 59 yo M with PMH of DM, HLD, HTN and diabetic leg ulcers present with chills, weakness, lightheadedness and infected RT lower extremity ulcer for the past 3 days which has progressively worsened today. As per pt,  he went to [facility] in [place] 3 days  ago for his weakness and infected leg ulcer. He mentioned that he received one blood transfusion, antibiotics and was admitted to the hospital. He reports staying in the hospital for a day before leaving the hospital for some personal issue. He reports decreased oral intake today, felt extremely weak and lightheaded. He reports history of ulcer for the past 5 years that has worsened in the past 3 years.  He follows a "dermatologist" at [facility] and he is unable to provide information of his diagnosis. He denies; fever, bodyaches, Chest pain, SOB, URI sxs, N/V/D, abdominal pain, LOC, Head trauma    In the ED   Vitals -> BP: 88/55, HR:102, T:97.7, O2 sat: 94% RA  Labs :  WbC 17k, Hb:7.9, Cr:1.76  EKG : NSR  s/p Vancomycin, Zosyn IVPB  s/p 2L NS ->101/68 59 yo M with PMH of DM, HLD, HTN and diabetic leg ulcers present with chills, weakness, lightheadedness and infected RT lower extremity ulcer for the past 3 days which has progressively worsened today. As per pt,  he went to [facility] in [place] 3 days  ago for his weakness and infected leg ulcer. He mentioned that he received one blood transfusion, antibiotics and was admitted to the hospital. He reports staying in the hospital for a day before leaving the hospital for some personal issue. He reports decreased oral intake today, felt extremely weak and lightheaded. He reports history of ulcer for the past 5 years that has worsened in the past 3 years.  He follows a "dermatologist" at [facility] and he is unable to provide information of his diagnosis. He denies; fever, bodyaches, Chest pain, SOB, URI sxs, N/V/D, abdominal pain, LOC, Head trauma. Patient was admitted for sepsis due to infected diabetic ulcers.    In the ED Vitals patient was hypotensive, tachycardic , afebrile and saturating well on room air.    On labs Labs patient had a WbC 17k, Hb:7.9, Cr:1.76. Patient was given vancomycin and zosyn in the ED . In wound cultures patient was growing Pseudomonas. ID was consulted and was changed to vancomycin and cefepime. Podiatry was consulted and MRI of bilateral extremities was ordered which showed ________________.    Gastroenterology was consulted due to the presentation of microcytic anemia of 7.8. Given that the patient is going for a colonoscopy and EGD outpatient within a month there was no management of GI done.    57 yo M with PMH of DM, HLD, HTN and diabetic leg ulcers present with chills, weakness, lightheadedness and infected RT lower extremity ulcer for the past 3 days which has progressively worsened today. As per pt,  he went to [facility] in [place] 3 days  ago for his weakness and infected leg ulcer. He mentioned that he received one blood transfusion, antibiotics and was admitted to the hospital. He reports staying in the hospital for a day before leaving the hospital for some personal issue. He reports decreased oral intake today, felt extremely weak and lightheaded. He reports history of ulcer for the past 5 years that has worsened in the past 3 years.  He follows a "dermatologist" at [facility] and he is unable to provide information of his diagnosis. He denies; fever, bodyaches, Chest pain, SOB, URI sxs, N/V/D, abdominal pain, LOC, Head trauma. Patient was admitted for sepsis due to infected diabetic ulcers.    In the ED Vitals patient was hypotensive, tachycardic , afebrile and saturating well on room air.    On labs Labs patient had a WbC 17k, Hb:7.9, Cr:1.76. Patient was given vancomycin and zosyn in the ED . In wound cultures patient was growing Pseudomonas. ID was consulted and was changed to vancomycin and cefepime. Podiatry was consulted and MRI of bilateral extremities was ordered which showed ________________.    Gastroenterology was consulted due to the presentation of microcytic anemia of 7.8. Given that the patient is going for a colonoscopy and EGD outpatient within a month there was no management of GI done.    57 yo M with PMH of DM, HLD, HTN and diabetic leg ulcers present with chills, weakness, lightheadedness and infected RT lower extremity ulcer for the past 3 days which has progressively worsened today. As per pt,  he went to [facility] in [place] 3 days  ago for his weakness and infected leg ulcer. He mentioned that he received one blood transfusion, antibiotics and was admitted to the hospital. He reports staying in the hospital for a day before leaving the hospital for some personal issue. He reports decreased oral intake today, felt extremely weak and lightheaded. He reports history of ulcer for the past 5 years that has worsened in the past 3 years.  He follows a "dermatologist" at [facility] and he is unable to provide information of his diagnosis. He denies; fever, bodyaches, Chest pain, SOB, URI sxs, N/V/D, abdominal pain, LOC, Head trauma. Patient was admitted for sepsis due to infected diabetic ulcers.    In the ED Vitals patient was hypotensive, tachycardic , afebrile and saturating well on room air.    On labs Labs patient had a WbC 17k, Hb:7.9, Cr:1.76. Patient was given vancomycin and zosyn in the ED . In wound cultures patient was growing Pseudomonas. ID was consulted and was changed to vancomycin and cefepime. Podiatry was consulted and MRI of bilateral extremities was ordered which showed ________________.    Gastroenterology was consulted due to the presentation of microcytic anemia of 7.8. Given that the patient is going for a colonoscopy and EGD outpatient within a month there was no management of GI done.    57 yo M with PMH of DM, HLD, HTN and diabetic leg ulcers present with chills, weakness, lightheadedness and infected RT lower extremity ulcer for the past 3 days which has progressively worsened today. As per pt,  he went to [facility] in [place] 3 days  ago for his weakness and infected leg ulcer. He mentioned that he received one blood transfusion, antibiotics and was admitted to the hospital. He reports staying in the hospital for a day before leaving the hospital for some personal issue. He reports decreased oral intake today, felt extremely weak and lightheaded. He reports history of ulcer for the past 5 years that has worsened in the past 3 years.  He follows a "dermatologist" at [facility] and he is unable to provide information of his diagnosis. He denies; fever, bodyaches, Chest pain, SOB, URI sxs, N/V/D, abdominal pain, LOC, Head trauma. Patient was admitted for sepsis due to infected diabetic ulcers.    In the ED Vitals patient was hypotensive, tachycardic , afebrile and saturating well on room air.    On ED Labs patient had a WbC 17k, Hb:7.9, Cr:1.76. Patient was given vancomycin and zosyn in the ED . In wound cultures patient was growing Pseudomonas. ID was consulted and was changed to vancomycin and cefepime. Doppler of the Right leg was ordered which showed no DVT, vascular studies were ordered which shows wnl BRIDGETTE. Vascular was consulted which did not recommend any intervention. On hospital day 5 (8/25) patient developed cord like lesion in the right leg and doppler was reordered which showed ________. Vascular was consulted which recommended __________.     Podiatry was consulted due to patient diabetic ulcers which were doing wound care and recommending MRI. MRI of bilateral extremities was ordered which showed no signs of osteomyelitis.     Gastroenterology was consulted due to the presentation of microcytic anemia of 7.8. Given that the patient is going for a colonoscopy and EGD outpatient within a month there was no management of GI done.     Through out hospital stay patient infection was treated with antibiotics, which resolved.  Wound care was done with Podiatry and was recommended wound care outpatient as well. 57 yo M with PMH of DM, HLD, HTN and diabetic leg ulcers present with chills, weakness, lightheadedness and infected RT lower extremity ulcer for the past 3 days which has progressively worsened today. As per pt,  he went to [facility] in [place] 3 days  ago for his weakness and infected leg ulcer. He mentioned that he received one blood transfusion, antibiotics and was admitted to the hospital. He reports staying in the hospital for a day before leaving the hospital for some personal issue. He reports decreased oral intake today, felt extremely weak and lightheaded. He reports history of ulcer for the past 5 years that has worsened in the past 3 years.  He follows a "dermatologist" at [facility] and he is unable to provide information of his diagnosis. He denies; fever, bodyaches, Chest pain, SOB, URI sxs, N/V/D, abdominal pain, LOC, Head trauma. Patient was admitted for sepsis due to infected diabetic ulcers.    In the ED Vitals patient was hypotensive, tachycardic , afebrile and saturating well on room air.    On ED Labs patient had a WbC 17k, Hb:7.9, Cr:1.76. Patient was given vancomycin and zosyn in the ED . In wound cultures patient was growing Pseudomonas. ID was consulted and was changed to vancomycin and cefepime. Doppler of the Right leg was ordered which showed no DVT, vascular studies were ordered which shows wnl BRIDGETTE. Vascular was consulted which did not recommend any intervention. On hospital day 5 (8/25) patient developed cord like lesion in the right leg and doppler was reordered which showed ________. Vascular was consulted which recommended __________.     Podiatry was consulted due to patient diabetic ulcers which were doing wound care and recommending MRI. MRI of bilateral extremities was ordered which showed no signs of osteomyelitis.     Gastroenterology was consulted due to the presentation of microcytic anemia of 7.8. Given that the patient is going for a colonoscopy and EGD outpatient within a month there was no management of GI done.     Through out hospital stay patient infection was treated with antibiotics, which resolved.  Wound care was done with Podiatry and was recommended wound care outpatient as well. 59 yo M with PMH of DM, HLD, HTN and diabetic leg ulcers present with chills, weakness, lightheadedness and infected RT lower extremity ulcer for the past 3 days which has progressively worsened today. As per pt,  he went to [facility] in [place] 3 days  ago for his weakness and infected leg ulcer. He mentioned that he received one blood transfusion, antibiotics and was admitted to the hospital. He reports staying in the hospital for a day before leaving the hospital for some personal issue. He reports decreased oral intake today, felt extremely weak and lightheaded. He reports history of ulcer for the past 5 years that has worsened in the past 3 years.  He follows a "dermatologist" at [facility] and he is unable to provide information of his diagnosis. He denies; fever, bodyaches, Chest pain, SOB, URI sxs, N/V/D, abdominal pain, LOC, Head trauma. Patient was admitted for sepsis due to infected diabetic ulcers.    In the ED Vitals patient was hypotensive, tachycardic , afebrile and saturating well on room air.    On ED Labs patient had a WbC 17k, Hb:7.9, Cr:1.76. Patient was given vancomycin and zosyn in the ED . In wound cultures patient was growing Pseudomonas. ID was consulted and was changed to vancomycin and cefepime. Doppler of the Right leg was ordered which showed no DVT, vascular studies were ordered which shows wnl BRIDGETTE. Vascular was consulted which did not recommend any intervention. On hospital day 5 (8/25) patient developed cord like lesion in the right leg and doppler was reordered which showed ________. Vascular was consulted which recommended __________.     Podiatry was consulted due to patient diabetic ulcers which were doing wound care and recommending MRI. MRI of bilateral extremities was ordered which showed no signs of osteomyelitis.     Gastroenterology was consulted due to the presentation of microcytic anemia of 7.8. Given that the patient is going for a colonoscopy and EGD outpatient within a month there was no management of GI done.     Through out hospital stay patient infection was treated with antibiotics, which resolved.  Wound care was done with Podiatry and was recommended wound care outpatient as well. 57 yo M with PMH of DM, HLD, HTN and diabetic leg ulcers present with chills, weakness, lightheadedness and infected RT lower extremity ulcer for the past 3 days which has progressively worsened today. As per pt,  he went to [facility] in [place] 3 days  ago for his weakness and infected leg ulcer. He mentioned that he received one blood transfusion, antibiotics and was admitted to the hospital. He reports staying in the hospital for a day before leaving the hospital for some personal issue. He reports decreased oral intake today, felt extremely weak and lightheaded. He reports history of ulcer for the past 5 years that has worsened in the past 3 years.  He follows a "dermatologist" at [facility] and he is unable to provide information of his diagnosis. He denies; fever, bodyaches, Chest pain, SOB, URI sxs, N/V/D, abdominal pain, LOC, Head trauma. Patient was admitted for sepsis due to infected diabetic ulcers.    In the ED Vitals patient was hypotensive, tachycardic , afebrile and saturating well on room air.    On ED Labs patient had a WbC 17k, Hb:7.9, Cr:1.76. Patient was given vancomycin and zosyn in the ED . In wound cultures patient was growing Pseudomonas. ID was consulted and was changed to vancomycin and cefepime. Doppler of the Right leg was ordered which showed no DVT, vascular studies were ordered which shows wnl BRIDGETTE. Vascular was consulted which did not recommend any intervention. On hospital day 5 (8/25) patient developed cord like lesion in the right leg and doppler was reordered which showed Superficial thrombosis of branch of the greater saphenous vein in the   anterior right thigh. Vascular was consulted which recommended eliquis for 30 days.    Podiatry was consulted due to patient diabetic ulcers which were doing wound care and recommending MRI. MRI of bilateral extremities was ordered which showed no signs of osteomyelitis.     Gastroenterology was consulted due to the presentation of microcytic anemia of 7.8. Given that the patient is going for a colonoscopy and EGD outpatient within a month there was no management of GI done.     Through out hospital stay patient infection was treated with antibiotics, which resolved. Patient was advised to continue follow up with PCP and infectious disease specialist.  Wound care was done with Podiatry and was recommended wound care outpatient as well. 59 yo M with PMH of DM, HLD, HTN and diabetic leg ulcers present with chills, weakness, lightheadedness and infected RT lower extremity ulcer for the past 3 days which has progressively worsened today. As per pt,  he went to [facility] in [place] 3 days  ago for his weakness and infected leg ulcer. He mentioned that he received one blood transfusion, antibiotics and was admitted to the hospital. He reports staying in the hospital for a day before leaving the hospital for some personal issue. He reports decreased oral intake today, felt extremely weak and lightheaded. He reports history of ulcer for the past 5 years that has worsened in the past 3 years.  He follows a "dermatologist" at [facility] and he is unable to provide information of his diagnosis. He denies; fever, bodyaches, Chest pain, SOB, URI sxs, N/V/D, abdominal pain, LOC, Head trauma. Patient was admitted for sepsis due to infected diabetic ulcers.    In the ED Vitals patient was hypotensive, tachycardic , afebrile and saturating well on room air.    On ED Labs patient had a WbC 17k, Hb:7.9, Cr:1.76. Patient was given vancomycin and zosyn in the ED . In wound cultures patient was growing Pseudomonas. ID was consulted and was changed to vancomycin and cefepime. Doppler of the Right leg was ordered which showed no DVT, vascular studies were ordered which shows wnl BRIDGETTE. Vascular was consulted which did not recommend any intervention. On hospital day 5 (8/25) patient developed cord like lesion in the right leg and doppler was reordered which showed Superficial thrombosis of branch of the greater saphenous vein in the   anterior right thigh. Vascular was consulted which recommended eliquis for 30 days.    Podiatry was consulted due to patient diabetic ulcers which were doing wound care and recommending MRI. MRI of bilateral extremities was ordered which showed no signs of osteomyelitis.     Gastroenterology was consulted due to the presentation of microcytic anemia of 7.8. Given that the patient is going for a colonoscopy and EGD outpatient within a month there was no management of GI done.     Through out hospital stay patient infection was treated with antibiotics, which resolved. Patient was advised to continue follow up with PCP and infectious disease specialist.  Wound care was done with Podiatry and was recommended wound care outpatient as well. 59 yo M with PMH of DM, HLD, HTN and diabetic leg ulcers present with chills, weakness, lightheadedness and infected RT lower extremity ulcer for the past 3 days which has progressively worsened today. As per pt,  he went to [facility] in [place] 3 days  ago for his weakness and infected leg ulcer. He mentioned that he received one blood transfusion, antibiotics and was admitted to the hospital. He reports staying in the hospital for a day before leaving the hospital for some personal issue. He reports decreased oral intake today, felt extremely weak and lightheaded. He reports history of ulcer for the past 5 years that has worsened in the past 3 years.  He follows a "dermatologist" at [facility] and he is unable to provide information of his diagnosis. He denies; fever, bodyaches, Chest pain, SOB, URI sxs, N/V/D, abdominal pain, LOC, Head trauma. Patient was admitted for sepsis due to infected diabetic ulcers.    In the ED Vitals patient was hypotensive, tachycardic , afebrile and saturating well on room air.    On ED Labs patient had a WbC 17k, Hb:7.9, Cr:1.76. Patient was given vancomycin and zosyn in the ED . In wound cultures patient was growing Pseudomonas. ID was consulted and was changed to vancomycin and cefepime. Doppler of the Right leg was ordered which showed no DVT, vascular studies were ordered which shows wnl BRIDGETTE. Vascular was consulted which did not recommend any intervention. On hospital day 5 (8/25) patient developed cord like lesion in the right leg and doppler was reordered which showed Superficial thrombosis of branch of the greater saphenous vein in the   anterior right thigh. Vascular was consulted which recommended eliquis for 30 days.    Podiatry was consulted due to patient diabetic ulcers which were doing wound care and recommending MRI. MRI of bilateral extremities was ordered which showed no signs of osteomyelitis.     Gastroenterology was consulted due to the presentation of microcytic anemia of 7.8. Given that the patient is going for a colonoscopy and EGD outpatient within a month there was no management of GI done.     Through out hospital stay patient infection was treated with antibiotics, which resolved. Patient was advised to continue follow up with PCP and infectious disease specialist.  Wound care was done with Podiatry and was recommended wound care outpatient as well. 57 yo M with PMH of DM, HLD, HTN and diabetic leg ulcers present with chills, weakness, lightheadedness and infected RT lower extremity ulcer for the past 3 days which has progressively worsened today. As per pt,  he went to [facility] in [place] 3 days  ago for his weakness and infected leg ulcer. He mentioned that he received one blood transfusion, antibiotics and was admitted to the hospital. He reports staying in the hospital for a day before leaving the hospital for some personal issue. He reports decreased oral intake today, felt extremely weak and lightheaded. He reports history of ulcer for the past 5 years that has worsened in the past 3 years.  He follows a "dermatologist" at [facility] and he is unable to provide information of his diagnosis. He denies; fever, bodyaches, Chest pain, SOB, URI sxs, N/V/D, abdominal pain, LOC, Head trauma. Patient was admitted for sepsis due to infected diabetic ulcers.    In the ED Vitals patient was hypotensive, tachycardic , afebrile and saturating well on room air.    On ED Labs patient had a WbC 17k, Hb:7.9, Cr:1.76. Patient was given vancomycin and zosyn in the ED . In wound cultures patient was growing Pseudomonas. ID was consulted and was changed to vancomycin and cefepime. Doppler of the Right leg was ordered which showed no DVT, vascular studies were ordered which shows wnl BRIDGETTE. Vascular was consulted which did not recommend any intervention. On hospital day 5 (8/25) patient developed cord like lesion in the right leg and doppler was reordered which showed Superficial thrombosis of branch of the greater saphenous vein in the   anterior right thigh. Vascular was consulted which recommended eliquis for 30 days.      Podiatry was consulted due to patient diabetic ulcers which were doing wound care and recommending MRI. MRI of bilateral extremities was ordered which showed no signs of osteomyelitis. Infectious disease specialist saw the patinet for which initally treated with IV cefepime. Cultures were drawn and patient currently on dicloxacillin and ciprofloxacin.  Patient to go home on antibiotics for three more weeks.     Gastroenterology was consulted due to the presentation of microcytic anemia of 7.8. Given that the patient is going for a colonoscopy and EGD outpatient within a month there was no management of GI done.     Through out hospital stay patient infection was treated with antibiotics. Patient was advised to continue follow up with PCP and infectious disease specialist.  Wound care was done with Podiatry and was recommended wound care outpatient as well. Appointment ws made for the patient to follow up with PCP on September 26 10:00am on [address] 59 yo M with PMH of DM, HLD, HTN and diabetic leg ulcers present with chills, weakness, lightheadedness and infected RT lower extremity ulcer for the past 3 days which has progressively worsened today. As per pt,  he went to [facility] in [place] 3 days  ago for his weakness and infected leg ulcer. He mentioned that he received one blood transfusion, antibiotics and was admitted to the hospital. He reports staying in the hospital for a day before leaving the hospital for some personal issue. He reports decreased oral intake today, felt extremely weak and lightheaded. He reports history of ulcer for the past 5 years that has worsened in the past 3 years.  He follows a "dermatologist" at [facility] and he is unable to provide information of his diagnosis. He denies; fever, bodyaches, Chest pain, SOB, URI sxs, N/V/D, abdominal pain, LOC, Head trauma. Patient was admitted for sepsis due to infected diabetic ulcers.    In the ED Vitals patient was hypotensive, tachycardic , afebrile and saturating well on room air.    On ED Labs patient had a WbC 17k, Hb:7.9, Cr:1.76. Patient was given vancomycin and zosyn in the ED . In wound cultures patient was growing Pseudomonas. ID was consulted and was changed to vancomycin and cefepime. Doppler of the Right leg was ordered which showed no DVT, vascular studies were ordered which shows wnl BRIDGETTE. Vascular was consulted which did not recommend any intervention. On hospital day 5 (8/25) patient developed cord like lesion in the right leg and doppler was reordered which showed Superficial thrombosis of branch of the greater saphenous vein in the   anterior right thigh. Vascular was consulted which recommended eliquis for 30 days.      Podiatry was consulted due to patient diabetic ulcers which were doing wound care and recommending MRI. MRI of bilateral extremities was ordered which showed no signs of osteomyelitis. Infectious disease specialist saw the patinet for which initally treated with IV cefepime. Cultures were drawn and patient currently on dicloxacillin and ciprofloxacin.  Patient to go home on antibiotics for three more weeks.     Gastroenterology was consulted due to the presentation of microcytic anemia of 7.8. Given that the patient is going for a colonoscopy and EGD outpatient within a month there was no management of GI done.     Through out hospital stay patient infection was treated with antibiotics. Patient was advised to continue follow up with PCP and infectious disease specialist.  Wound care was done with Podiatry and was recommended wound care outpatient as well. Appointment ws made for the patient to follow up with PCP on September 26 10:00am on [address] 59 yo M with PMH of DM, HLD, HTN and diabetic leg ulcers present with chills, weakness, lightheadedness and infected RT lower extremity ulcer for the past 3 days which has progressively worsened today. As per pt,  he went to [facility] in [place] 3 days  ago for his weakness and infected leg ulcer. He mentioned that he received one blood transfusion, antibiotics and was admitted to the hospital. He reports staying in the hospital for a day before leaving the hospital for some personal issue. He reports decreased oral intake today, felt extremely weak and lightheaded. He reports history of ulcer for the past 5 years that has worsened in the past 3 years.  He follows a "dermatologist" at [facility] and he is unable to provide information of his diagnosis. He denies; fever, bodyaches, Chest pain, SOB, URI sxs, N/V/D, abdominal pain, LOC, Head trauma. Patient was admitted for sepsis due to infected diabetic ulcers.    In the ED Vitals patient was hypotensive, tachycardic , afebrile and saturating well on room air.    On ED Labs patient had a WbC 17k, Hb:7.9, Cr:1.76. Patient was given vancomycin and zosyn in the ED . In wound cultures patient was growing Pseudomonas. ID was consulted and was changed to vancomycin and cefepime. Doppler of the Right leg was ordered which showed no DVT, vascular studies were ordered which shows wnl BRIDGETTE. Vascular was consulted which did not recommend any intervention. On hospital day 5 (8/25) patient developed cord like lesion in the right leg and doppler was reordered which showed Superficial thrombosis of branch of the greater saphenous vein in the   anterior right thigh. Vascular was consulted which recommended eliquis for 30 days.      Podiatry was consulted due to patient diabetic ulcers which were doing wound care and recommending MRI. MRI of bilateral extremities was ordered which showed no signs of osteomyelitis. Infectious disease specialist saw the patinet for which initally treated with IV cefepime. Cultures were drawn and patient currently on dicloxacillin and ciprofloxacin.  Patient to go home on antibiotics for three more weeks.     Gastroenterology was consulted due to the presentation of microcytic anemia of 7.8. Given that the patient is going for a colonoscopy and EGD outpatient within a month there was no management of GI done.     Through out hospital stay patient infection was treated with antibiotics. Patient was advised to continue follow up with PCP and infectious disease specialist.  Wound care was done with Podiatry and was recommended wound care outpatient as well. Appointment ws made for the patient to follow up with PCP on September 26 10:00am on [address]

## 2023-08-22 NOTE — CONSULT NOTE ADULT - ASSESSMENT
58 y.o. Male w. b/l LE wounds, PMHX DM  afebrile, wbc wnl   on cefepime and vanco  8/22 normal ABIs   8/22 venous US with no evidence of right LE DVT    patient seen and examined at bedside with attending, Dr. Hwang     - no acute vascular surgery intervention indicated  - abx, f/u ID recs  - recommend wound care consult with outpatient follow up   - strict glucose control  - remainder of care as per primary team   - reconsult as needed  - discussed and agreed with Dr. Hwang

## 2023-08-22 NOTE — PROGRESS NOTE ADULT - PROBLEM SELECTOR PLAN 4
In the ED:  Vitals -> BP: 88/55, HR:102, T:97.7, O2 sat: 94% RA  -now within normal limits Pt reports receiving 1pRBC 3 days ago  In the ED: HB:7.9 ( On 7/25/23  HB: 9.4) **Constantine MARK**  Baseline 10  -hbg 7.8  - Transfuse Hb < 7  -patient following up for EGD and colonoscopy outpatient  - GI to be consulted   -iron studies sent

## 2023-08-22 NOTE — PROGRESS NOTE ADULT - PROBLEM SELECTOR PLAN 6
Pt reports receiving 1pRBC 3 days ago  In the ED  HB:7.9 ( On 7/25/23  HB: 9.4) **[facility]  HIRAMSEY**  Baseline 10  - F/u CBC  - Transfuse Hb < 7 Pt reports receiving 1pRBC 3 days ago  In the ED  HB:7.9 ( On 7/25/23  HB: 9.4) **[facility]  HIRAMSEY**  Baseline 10  - F/u CBC  - Transfuse Hb < 7 Pt reports receiving 1pRBC 3 days ago  In the ED  HB:7.9 ( On 7/25/23  HB: 9.4) **[facility]  HIRAMSEY**  Baseline 10  - F/u CBC  - Transfuse Hb < 7 Pt reports receiving 1pRBC 3 days ago  In the ED: HB:7.9 ( On 7/25/23  HB: 9.4) **Constantine MARK**  Baseline 10  -hbg 7.8  - Transfuse Hb < 7  -patient following up for EGD and colonoscopy outpatient  - GI to be consulted   -iron studies sent likely 2/2 to poor oral intake   In the ED: Cr:1.76, eGFR < 44 ( On 7/25/23 Cr: 0.78, eGFR 103) **Constantine MARK**  - Cr trending down

## 2023-08-22 NOTE — PROGRESS NOTE ADULT - PROBLEM SELECTOR PLAN 2
PE: (+) surrounding erythema over RT lower leg ulcer  In the  ED:  WBC 17k,  s/p Vancomycin, Zosyn IVPB  - C/w Vancomycin & Zosyn IVPB  - Podiatry Consult  -recc MRI  -MRI ordered for cellulitis PE: (+) surrounding erythema over RT lower leg ulcer  In the  ED:  WBC 17k,  s/p Vancomycin, Zosyn IVPB  - C/w Vancomycin & Zosyn IVPB  - Podiatry Consult  -recc MRI  -MRI ordered for cellulitis  -Vascular surgery to be consulted

## 2023-08-22 NOTE — DISCHARGE NOTE PROVIDER - NSDCCPCAREPLAN_GEN_ALL_CORE_FT
PRINCIPAL DISCHARGE DIAGNOSIS  Diagnosis: Sepsis  Assessment and Plan of Treatment: You came in with weakness, chills, and lightheadedness. You were found to have a systemic infection, involving the ulcers found on your right leg. You also have ulcers on your left leg, and you also have been under treated in your previous admission three days ago. You were also placed an IV antibiotics that cover a variety of microorganisms. He was seen by infection disease specialist to make sure that your antibiotics are adequate. Your infection is now controlled. Please continue to maintain glucose control to avoid delayed. Wound healing and break out of infection.      SECONDARY DISCHARGE DIAGNOSES  Diagnosis: Cellulitis of right leg  Assessment and Plan of Treatment: You came in for leg infection, found to be on the right - your right leg was red, consistent with a skin infection. Due to the presence of ulcers in addition to the redness, You were also placed an IV antibiotics that cover a variety of microorganisms. He was seen by infection disease specialist to make sure that your antibiotics are adequate. Your infection is now controlled. Please continue to maintain glucose control to avoid delayed. Wound healing and break out of infection.    Diagnosis: Ulcers of both lower legs  Assessment and Plan of Treatment: You came in with swelling and redness of the right leg, and were found with ulcers on both legs. They were treated with antibiotics.You were seen by a podiatrist who recommended ruling out infection f the bone. You underwent an MRI to rule out infection in the bone which showed………………You were a valuated by and vascular surgery specialist because of concerns for peripheral vascular disease. You were found to …………. Your advised to………….    Diagnosis: Anemia  Assessment and Plan of Treatment: You came in for a leg infection and were found to be anemic. You had just received a blood transfusion three days ago and were still anemic. Your baseline hemoglobin is low, concerning for a slow leading process or a result of your chronic illness such as your Diabeties. You required 1 blood transfusion to be  back to your baselline. You have an appointment to see gastroenterologist at the end of next month. You were evaluated by a gastroenterologist on this admission, who Did not see a need for an acute intervention that was urgent. Please keep your appointment on 9/27/2023 and keep following up with your gastroenterologist as needed for management of any bleeding. If no bleeding is identified your anemia of diabetes can be controlled by managing your diabetes. Please follow up with your primary care doctor to continue managing your sugars.     PRINCIPAL DISCHARGE DIAGNOSIS  Diagnosis: Sepsis  Assessment and Plan of Treatment: You came in with weakness, chills, and lightheadedness. You were found to have a systemic infection, involving the ulcers found on your right leg. You also have ulcers on your left leg, and you also have been under treated in your previous admission three days ago. You were also placed an IV antibiotics that cover a variety of microorganisms. He was seen by infection disease specialist to make sure that your antibiotics are adequate. Your infection is now controlled. Please continue to maintain glucose control to avoid delayed. Wound healing and break out of infection.      SECONDARY DISCHARGE DIAGNOSES  Diagnosis: Ulcers of both lower legs  Assessment and Plan of Treatment: You came in with swelling and redness of the right leg, and were found with ulcers on both legs. They were treated with antibiotics.You were seen by a podiatrist who recommended ruling out infection f the bone. You underwent an MRI to rule out infection in the bone which showed no signs of osteomyelitis. You were a valuated by and vascular surgery specialist because of concerns for peripheral vascular disease. You were found to …………. Your advised to………….    Diagnosis: Anemia  Assessment and Plan of Treatment: You came in for a leg infection and were found to be anemic. You had just received a blood transfusion three days ago and were still anemic. Your baseline hemoglobin is low, concerning for a slow leading process or a result of your chronic illness such as your Diabeties. You required 1 blood transfusion to be  back to your baselline. You have an appointment to see gastroenterologist at the end of next month. You were evaluated by a gastroenterologist on this admission, who Did not see a need for an acute intervention that was urgent. Please keep your appointment on 9/27/2023 and keep following up with your gastroenterologist as needed for management of any bleeding. If no bleeding is identified your anemia of diabetes can be controlled by managing your diabetes. Please follow up with your primary care doctor to continue managing your sugars.    Diagnosis: Cellulitis of right leg  Assessment and Plan of Treatment: You came in for leg infection, found to be on the right - your right leg was red, consistent with a skin infection. Due to the presence of ulcers in addition to the redness, You were also placed an IV antibiotics that cover a variety of microorganisms. He was seen by infection disease specialist to make sure that your antibiotics are adequate. Your infection is now controlled. Please continue to maintain glucose control to avoid delayed. Wound healing and break out of infection.     PRINCIPAL DISCHARGE DIAGNOSIS  Diagnosis: Sepsis  Assessment and Plan of Treatment: You came in with weakness, chills, and lightheadedness. You were found to have a systemic infection, involving the ulcers found on your right leg. You also have ulcers on your left leg, and you also have been under treated in your previous admission three days ago. You were also placed an IV antibiotics that cover a variety of microorganisms. He was seen by infection disease specialist to make sure that your antibiotics are adequate. Your infection is now controlled. Please continue to maintain glucose control to avoid delayed. Wound healing and break out of infection.      SECONDARY DISCHARGE DIAGNOSES  Diagnosis: Ulcers of both lower legs  Assessment and Plan of Treatment: You came in with swelling and redness of the right leg, and were found with ulcers on both legs. They were treated with antibiotics.You were seen by a podiatrist who recommended ruling out infection f the bone. You underwent an MRI to rule out infection in the bone which showed no signs of osteomyelitis. You were a valuated by and vascular surgery specialist because of concerns for peripheral vascular disease.While being in the hospital on day 5 you developed a cord like lesion in the right upper leg. A test was done to see if you had a thrombosis in the leg which came positive with a Superficial thrombosis of branch of the greater saphenous vein in the anterior right thigh. Vascular surgery was consulted and was not recommended an intervention. However they recommended 30 days of eliquis. . Please follow up with your PCP to evaluate the medication and need of further intervention.       Diagnosis: Anemia  Assessment and Plan of Treatment: You came in for a leg infection and were found to be anemic. You had just received a blood transfusion three days ago and were still anemic. Your baseline hemoglobin is low, concerning for a slow leading process or a result of your chronic illness such as your Diabeties. You required 1 blood transfusion to be  back to your baselline. You have an appointment to see gastroenterologist at the end of next month. You were evaluated by a gastroenterologist on this admission, who Did not see a need for an acute intervention that was urgent. Please keep your appointment on 9/27/2023 and keep following up with your gastroenterologist as needed for management of any bleeding. If no bleeding is identified your anemia of diabetes can be controlled by managing your diabetes. Please follow up with your primary care doctor to continue managing your sugars.    Diagnosis: Cellulitis of right leg  Assessment and Plan of Treatment: You came in for leg infection, found to be on the right - your right leg was red, consistent with a skin infection. Due to the presence of ulcers in addition to the redness, You were also placed an IV antibiotics that cover a variety of microorganisms. He was seen by infection disease specialist to make sure that your antibiotics are adequate. Your infection is now controlled. Please continue to maintain glucose control to avoid delayed. Wound healing and break out of infection.    Diagnosis: Superficial vein thrombosis  Assessment and Plan of Treatment: While being in the hospital on day 5 you developed a cord like lesion in the right upper leg. A test was done to see if you had a thrombosis in the leg which came positive with a Superficial thrombosis of branch of the greater saphenous vein in the anterior right thigh. Vascular surgery was consulted and was not recommended an intervention. However they recommended 30 days of eliquis. This is a medication to make your blood thinner and helps to prevent further thrombosis and extension of the one that you currently have. We think that this happened due to the infectiuous and inflammatory process you had in this leg. Please follow up with your PCP to evaluate the medication and need of further intervention.       Diagnosis: Hypertension  Assessment and Plan of Treatment: You have a history of Hypertension.  On this admission, your Blood Pressure was adequately controlled with a low salt and cholesterol diet. You can continue to take your Blood pressure medications at home. Please hold your medications if BP SBP <100. Please continue a low salt and cholesterol diet to since it helps in lowering your BP.   Your blood pressure target is 120-140/80-90, maintain healthy lifestyle, low salt diet, avoid fatty food, weight loss, exercise regularly or stay active as tolerated 30 mins X 3 times per week.  Notify your doctor if you have any of the following symptoms:   (Dizziness, Lightheadedness, Blurry vision, Headache, Chest pain, Shortness of breath.)  Please continue taking your home medications and follow-up with your PCP in 1 week from discharge to adjust medications as needed.       PRINCIPAL DISCHARGE DIAGNOSIS  Diagnosis: Sepsis  Assessment and Plan of Treatment: You came in with weakness, chills, and lightheadedness. You were found to have a systemic infection, involving the ulcers found on your right leg. You also have ulcers on your left leg, and you also have been under treated in your previous admission three days ago at outside hospital where you left hospital for personal reasons. You were also placed an IV antibiotics with Vancomycin and Cefepime that cover a variety of microorganisms. You was seen by infection disease specialist Dr. Shirley Carter who advised discontinue Vancomycin and continued on Cefepime as wound culture grew Methicillin sensitive Staphylococcus Aureus. Sepsis resolved. Your infection is now controlled. Please continue to maintain glucose control to avoid delayed. Wound healing and break out of infection.      SECONDARY DISCHARGE DIAGNOSES  Diagnosis: Ulcers of both lower legs  Assessment and Plan of Treatment: You came in with swelling and redness of the right leg, and were found with ulcers on both legs. They were treated with antibiotics.You were seen by a podiatrist who recommended ruling out infection f the bone. You underwent an MRI to rule out infection in the bone which showed no signs of osteomyelitis. You were evaluated by vascular surgery specialist because of concerns for peripheral vascular disease. Arterial Duplex showed good blood flow to the legs.   While being in the hospital on day 5 you developed a cord like lesion in the right upper leg. A test was done to see if you had a thrombosis in the leg which came positive with a Superficial venous thrombosis of branch of the greater saphenous vein in the anterior right thigh. Vascular surgery was consulted and was not recommended an intervention. You was also seen by Heamatologist (Blood specialist) recommended anticoagulation for about 4 weeks given your relative immobility due to underlying wounds.  . Please follow up with your PCP to evaluate the medication and need of further intervention.       Diagnosis: Anemia  Assessment and Plan of Treatment: You came in for a leg infection and were found to be anemic. You had just received a blood transfusion three days ago and were still anemic. Your baseline hemoglobin is low, concerning for a slow leading process or a result of your chronic illness such as your Diabeties. You required 1 blood transfusion to be  back to your baselline. You reported having a Colonoscopy about 1.5 yrs ago noted to have polyps which were removed and beingn.  You have an appointment to see your outpatient gastroenterologist at the end of next month. You were evaluated by a gastroenterologist on this admission, who Did not see a need for an acute intervention that was urgent. Please keep your appointment on 9/27/2023 and keep following up with your gastroenterologist as needed for management of any bleeding. If no bleeding is identified your anemia of diabetes can be controlled by managing your diabetes. Please follow up with your primary care doctor to continue managing your sugars.    Diagnosis: Cellulitis of right leg  Assessment and Plan of Treatment: You came in for leg infection, found to be on the right - your right leg was red, consistent with a skin infection. Due to the presence of ulcers in addition to the redness, You were also placed an IV antibiotics that cover a variety of microorganisms. He was seen by infection disease specialist to make sure that your antibiotics are adequate. Your infection is now controlled. Please continue to maintain glucose control to avoid delayed. Wound healing and break out of infection.  PLEASE FOLLOW UP WITH YOUR PCP AT [facility]  TO GET BLOOD WORK REPEATED WEEKLY WHILE ON ANTIBIOTICS. WE ALSO ADVISE YOUT TO SEE DR. MICHAEL CANO AT INFORMATION PROVIDED IF YOU ARE UNABLE TO SEE YOUR PCP.    Diagnosis: Superficial vein thrombosis  Assessment and Plan of Treatment: While being in the hospital on day 5 you developed a cord like lesion in the right upper leg. A test was done to see if you had a thrombosis in the leg which came positive with a Superficial thrombosis of branch of the greater saphenous vein in the anterior right thigh. Vascular surgery was consulted and was not recommended an intervention. However they recommended 30 days of eliquis. This is a medication to make your blood thinner and helps to prevent further thrombosis and extension of the one that you currently have. We think that this happened due to the infectiuous and inflammatory process you had in this leg. Please follow up with your PCP to evaluate the medication and need of further intervention.   WE HAVE ARRANGED FOR YOU A MONTH SUPPLY OF THE BLOOD THINNER      Diagnosis: Hypertension  Assessment and Plan of Treatment: You have a history of Hypertension.  On this admission, your Blood Pressure was adequately controlled with a low salt and cholesterol diet. You can continue to take your Blood pressure medications at home. Please hold your medications if BP SBP <100. Please continue a low salt and cholesterol diet to since it helps in lowering your BP.   Your blood pressure target is 120-140/80-90, maintain healthy lifestyle, low salt diet, avoid fatty food, weight loss, exercise regularly or stay active as tolerated 30 mins X 3 times per week.  Notify your doctor if you have any of the following symptoms:   (Dizziness, Lightheadedness, Blurry vision, Headache, Chest pain, Shortness of breath.)  Please continue taking your home medications and follow-up with your PCP in 1 week from discharge to adjust medications as needed.       PRINCIPAL DISCHARGE DIAGNOSIS  Diagnosis: Sepsis  Assessment and Plan of Treatment: You came in with weakness, chills, and lightheadedness. You were found to have a systemic infection, involving the ulcers found on your right leg. You also have ulcers on your left leg, and you also have been under treated in your previous admission three days ago at outside hospital where you left hospital for personal reasons. You were also placed an IV antibiotics with Vancomycin and Cefepime that cover a variety of microorganisms. You was seen by infection disease specialist Dr. Shirley Carter who advised discontinue Vancomycin and continued on Cefepime as wound culture grew Methicillin sensitive Staphylococcus Aureus. Sepsis resolved. Your infection is now controlled. Please continue to maintain glucose control to avoid delayed. Wound healing and break out of infection.      SECONDARY DISCHARGE DIAGNOSES  Diagnosis: Ulcers of both lower legs  Assessment and Plan of Treatment: You came in with swelling and redness of the right leg, and were found with ulcers on both legs. They were treated with antibiotics.You were seen by a podiatrist who recommended ruling out infection f the bone. You underwent an MRI to rule out infection in the bone which showed no signs of osteomyelitis. You were evaluated by vascular surgery specialist because of concerns for peripheral vascular disease. Arterial Duplex showed good blood flow to the legs.   While being in the hospital on day 5 you developed a cord like lesion in the right upper leg. A test was done to see if you had a thrombosis in the leg which came positive with a Superficial venous thrombosis of branch of the greater saphenous vein in the anterior right thigh. Vascular surgery was consulted and was not recommended an intervention. You was also seen by Heamatologist (Blood specialist) recommended anticoagulation for about 4 weeks given your relative immobility due to underlying wounds.  . Please follow up with your PCP to evaluate the medication and need of further intervention.       Diagnosis: Anemia  Assessment and Plan of Treatment: You came in for a leg infection and were found to be anemic. You had just received a blood transfusion three days ago and were still anemic. Your baseline hemoglobin is low, concerning for a slow leading process or a result of your chronic illness such as your Diabeties. You required 1 blood transfusion to be  back to your baselline. You reported having a Colonoscopy about 1.5 yrs ago noted to have polyps which were removed and beingn.  You have an appointment to see your outpatient gastroenterologist at the end of next month. You were evaluated by a gastroenterologist on this admission, who Did not see a need for an acute intervention that was urgent. Please keep your appointment on 9/27/2023 and keep following up with your gastroenterologist as needed for management of any bleeding. If no bleeding is identified your anemia of diabetes can be controlled by managing your diabetes. Please follow up with your primary care doctor to continue managing your sugars.    Diagnosis: Cellulitis of right leg  Assessment and Plan of Treatment: You came in for leg infection, found to be on the right - your right leg was red, consistent with a skin infection. Due to the presence of ulcers in addition to the redness, You were also placed an IV antibiotics that cover a variety of microorganisms. He was seen by infection disease specialist to make sure that your antibiotics are adequate. Your infection is now controlled. Please continue to maintain glucose control to avoid delayed. Wound healing and break out of infection.  PLEASE FOLLOW UP WITH YOUR PCP AT [facility]  TO GET BLOOD WORK REPEATED WEEKLY WHILE ON ANTIBIOTICS. WE ALSO ADVISE YOUT TO SEE DR. MICHAEL CANO AT INFORMATION PROVIDED IF YOU ARE UNABLE TO SEE YOUR PCP.    Diagnosis: Superficial vein thrombosis  Assessment and Plan of Treatment: While being in the hospital on day 5 you developed a cord like lesion in the right upper leg. A test was done to see if you had a thrombosis in the leg which came positive with a Superficial thrombosis of branch of the greater saphenous vein in the anterior right thigh. Vascular surgery was consulted and was not recommended an intervention. However they recommended 30 days of eliquis. This is a medication to make your blood thinner and helps to prevent further thrombosis and extension of the one that you currently have. We think that this happened due to the infectiuous and inflammatory process you had in this leg. Please follow up with your PCP to evaluate the medication and need of further intervention.   WE HAVE ARRANGED FOR YOU A MONTH SUPPLY OF THE BLOOD THINNER      Diagnosis: Hypertension  Assessment and Plan of Treatment: You have a history of Hypertension.  On this admission, your Blood Pressure was adequately controlled with a low salt and cholesterol diet. You can continue to take your Blood pressure medications at home. Please hold your medications if BP SBP <100. Please continue a low salt and cholesterol diet to since it helps in lowering your BP.   Your blood pressure target is 120-140/80-90, maintain healthy lifestyle, low salt diet, avoid fatty food, weight loss, exercise regularly or stay active as tolerated 30 mins X 3 times per week.  Notify your doctor if you have any of the following symptoms:   (Dizziness, Lightheadedness, Blurry vision, Headache, Chest pain, Shortness of breath.)  Please continue taking your home medications and follow-up with your PCP in 1 week from discharge to adjust medications as needed.       PRINCIPAL DISCHARGE DIAGNOSIS  Diagnosis: Sepsis  Assessment and Plan of Treatment: You came in with weakness, chills, and lightheadedness. You were found to have a systemic infection, involving the ulcers found on your right leg. You also have ulcers on your left leg, and you also have been under treated in your previous admission three days ago at outside hospital where you left hospital for personal reasons. You were also placed an IV antibiotics with Vancomycin and Cefepime that cover a variety of microorganisms. You was seen by infection disease specialist Dr. Shirley Carter who advised discontinue Vancomycin and continued on Cefepime as wound culture grew Methicillin sensitive Staphylococcus Aureus. Sepsis resolved. Your infection is now controlled. Please continue to maintain glucose control to avoid delayed. Wound healing and break out of infection.      SECONDARY DISCHARGE DIAGNOSES  Diagnosis: Ulcers of both lower legs  Assessment and Plan of Treatment: You came in with swelling and redness of the right leg, and were found with ulcers on both legs. They were treated with antibiotics.You were seen by a podiatrist who recommended ruling out infection f the bone. You underwent an MRI to rule out infection in the bone which showed no signs of osteomyelitis. You were evaluated by vascular surgery specialist because of concerns for peripheral vascular disease. Arterial Duplex showed good blood flow to the legs.   While being in the hospital on day 5 you developed a cord like lesion in the right upper leg. A test was done to see if you had a thrombosis in the leg which came positive with a Superficial venous thrombosis of branch of the greater saphenous vein in the anterior right thigh. Vascular surgery was consulted and was not recommended an intervention. You was also seen by Heamatologist (Blood specialist) recommended anticoagulation for about 4 weeks given your relative immobility due to underlying wounds.  . Please follow up with your PCP to evaluate the medication and need of further intervention.       Diagnosis: Anemia  Assessment and Plan of Treatment: You came in for a leg infection and were found to be anemic. You had just received a blood transfusion three days ago and were still anemic. Your baseline hemoglobin is low, concerning for a slow leading process or a result of your chronic illness such as your Diabeties. You required 1 blood transfusion to be  back to your baselline. You reported having a Colonoscopy about 1.5 yrs ago noted to have polyps which were removed and beingn.  You have an appointment to see your outpatient gastroenterologist at the end of next month. You were evaluated by a gastroenterologist on this admission, who Did not see a need for an acute intervention that was urgent. Please keep your appointment on 9/27/2023 and keep following up with your gastroenterologist as needed for management of any bleeding. If no bleeding is identified your anemia of diabetes can be controlled by managing your diabetes. Please follow up with your primary care doctor to continue managing your sugars.    Diagnosis: Cellulitis of right leg  Assessment and Plan of Treatment: You came in for leg infection, found to be on the right - your right leg was red, consistent with a skin infection. Due to the presence of ulcers in addition to the redness, You were also placed an IV antibiotics that cover a variety of microorganisms. He was seen by infection disease specialist to make sure that your antibiotics are adequate. Your infection is now controlled. Please continue to maintain glucose control to avoid delayed. Wound healing and break out of infection.  PLEASE FOLLOW UP WITH YOUR PCP AT [facility]  TO GET BLOOD WORK REPEATED WEEKLY WHILE ON ANTIBIOTICS. WE ALSO ADVISE YOUT TO SEE DR. MICHAEL CANO AT INFORMATION PROVIDED IF YOU ARE UNABLE TO SEE YOUR PCP.    Diagnosis: Superficial vein thrombosis  Assessment and Plan of Treatment: While being in the hospital on day 5 you developed a cord like lesion in the right upper leg. A test was done to see if you had a thrombosis in the leg which came positive with a Superficial thrombosis of branch of the greater saphenous vein in the anterior right thigh. Vascular surgery was consulted and was not recommended an intervention. However they recommended 30 days of eliquis. This is a medication to make your blood thinner and helps to prevent further thrombosis and extension of the one that you currently have. We think that this happened due to the infectiuous and inflammatory process you had in this leg. Please follow up with your PCP to evaluate the medication and need of further intervention.   WE HAVE ARRANGED FOR YOU A MONTH SUPPLY OF THE BLOOD THINNER      Diagnosis: Hypertension  Assessment and Plan of Treatment: You have a history of Hypertension.  On this admission, your Blood Pressure was adequately controlled with a low salt and cholesterol diet. You can continue to take your Blood pressure medications at home. Please hold your medications if BP SBP <100. Please continue a low salt and cholesterol diet to since it helps in lowering your BP.   Your blood pressure target is 120-140/80-90, maintain healthy lifestyle, low salt diet, avoid fatty food, weight loss, exercise regularly or stay active as tolerated 30 mins X 3 times per week.  Notify your doctor if you have any of the following symptoms:   (Dizziness, Lightheadedness, Blurry vision, Headache, Chest pain, Shortness of breath.)  Please continue taking your home medications and follow-up with your PCP in 1 week from discharge to adjust medications as needed.       PRINCIPAL DISCHARGE DIAGNOSIS  Diagnosis: Sepsis  Assessment and Plan of Treatment: You came in with weakness, chills, and lightheadedness. You were found to have a systemic infection, involving the ulcers found on your right leg. You also have ulcers on your left leg, and you also have been under treated in your previous admission three days ago at outside hospital where you left hospital for personal reasons. You were also placed an IV antibiotics with Vancomycin and Cefepime that cover a variety of microorganisms. You was seen by infection disease specialist Dr. Shirley Carter who advised discontinue Vancomycin and continued on Cefepime as wound culture grew Methicillin sensitive Staphylococcus Aureus. Sepsis resolved. Your infection is now controlled. You are going home with two antibiotics, please take them as scheduled and follow up with PCP for follow up.  Please continue to maintain glucose control to avoid delayed. Wound healing and break out of infection.      SECONDARY DISCHARGE DIAGNOSES  Diagnosis: Ulcers of both lower legs  Assessment and Plan of Treatment: You came in with swelling and redness of the right leg, and were found with ulcers on both legs. They were treated with antibiotics.You were seen by a podiatrist who recommended ruling out infection f the bone. You underwent an MRI to rule out infection in the bone which showed no signs of osteomyelitis. You were evaluated by vascular surgery specialist because of concerns for peripheral vascular disease. Arterial Duplex showed good blood flow to the legs.   While being in the hospital on day 5 you developed a cord like lesion in the right upper leg. A test was done to see if you had a thrombosis in the leg which came positive with a Superficial venous thrombosis of branch of the greater saphenous vein in the anterior right thigh. Vascular surgery was consulted and was not recommended an intervention. You was also seen by Heamatologist (Blood specialist) recommended anticoagulation for about 4 weeks given your relative immobility due to underlying wounds.  . Please follow up with your PCP to evaluate the medication and need of further intervention.       Diagnosis: Anemia  Assessment and Plan of Treatment: You came in for a leg infection and were found to be anemic. You had just received a blood transfusion three days ago and were still anemic. Your baseline hemoglobin is low, concerning for a slow leading process or a result of your chronic illness such as your Diabeties. You required 1 blood transfusion to be  back to your baselline. You reported having a Colonoscopy about 1.5 yrs ago noted to have polyps which were removed and beingn.  You have an appointment to see your outpatient gastroenterologist at the end of next month. You were evaluated by a gastroenterologist on this admission, who Did not see a need for an acute intervention that was urgent. Please keep your appointment on 9/27/2023 and keep following up with your gastroenterologist as needed for management of any bleeding. If no bleeding is identified your anemia of diabetes can be controlled by managing your diabetes. Please follow up with your primary care doctor to continue managing your sugars.    Diagnosis: Cellulitis of right leg  Assessment and Plan of Treatment: You came in for leg infection, found to be on the right - your right leg was red, consistent with a skin infection. Due to the presence of ulcers in addition to the redness, You were also placed an IV antibiotics that cover a variety of microorganisms. He was seen by infection disease specialist to make sure that your antibiotics are adequate. Your infection is now controlled. Please continue to maintain glucose control to avoid delayed. Wound healing and break out of infection.  PLEASE FOLLOW UP WITH YOUR PCP AT [facility]  TO GET BLOOD WORK REPEATED WEEKLY WHILE ON ANTIBIOTICS. WE ALSO ADVISE YOUT TO SEE DR. MICHAEL CANO AT INFORMATION PROVIDED IF YOU ARE UNABLE TO SEE YOUR PCP.    Diagnosis: Superficial vein thrombosis  Assessment and Plan of Treatment: While being in the hospital on day 5 you developed a cord like lesion in the right upper leg. A test was done to see if you had a thrombosis in the leg which came positive with a Superficial thrombosis of branch of the greater saphenous vein in the anterior right thigh. Vascular surgery was consulted and was not recommended an intervention. However they recommended 30 days of eliquis. This is a medication to make your blood thinner and helps to prevent further thrombosis and extension of the one that you currently have. We think that this happened due to the infectiuous and inflammatory process you had in this leg. Please follow up with your PCP to evaluate the medication and need of further intervention.   WE HAVE ARRANGED FOR YOU A MONTH SUPPLY OF THE BLOOD THINNER      Diagnosis: Hypertension  Assessment and Plan of Treatment: You have a history of Hypertension.  On this admission, your Blood Pressure was adequately controlled with a low salt and cholesterol diet. You can continue to take your Blood pressure medications at home. Please hold your medications if BP SBP <100. Please continue a low salt and cholesterol diet to since it helps in lowering your BP.   Your blood pressure target is 120-140/80-90, maintain healthy lifestyle, low salt diet, avoid fatty food, weight loss, exercise regularly or stay active as tolerated 30 mins X 3 times per week.  Notify your doctor if you have any of the following symptoms:   (Dizziness, Lightheadedness, Blurry vision, Headache, Chest pain, Shortness of breath.)  Please continue taking your home medications and follow-up with your PCP in 1 week from discharge to adjust medications as needed.       PRINCIPAL DISCHARGE DIAGNOSIS  Diagnosis: Sepsis  Assessment and Plan of Treatment: You came in with weakness, chills, and lightheadedness. You were found to have a systemic infection, involving the ulcers found on your right leg. You also have ulcers on your left leg, and you also have been under treated in your previous admission three days ago at outside hospital where you left hospital for personal reasons. You were also placed an IV antibiotics with Vancomycin and Cefepime that cover a variety of microorganisms. You was seen by infection disease specialist Dr. Shirley Carter who advised discontinue Vancomycin and continued on Cefepime as wound culture grew Methicillin sensitive Staphylococcus Aureus. Sepsis resolved. Your infection is now controlled. You are going home with two antibiotics, please take them as scheduled and follow up with PCP for follow up.  Please continue to maintain glucose control to avoid delayed. Wound healing and break out of infection.      SECONDARY DISCHARGE DIAGNOSES  Diagnosis: Ulcers of both lower legs  Assessment and Plan of Treatment: You came in with swelling and redness of the right leg, and were found with ulcers on both legs. They were treated with antibiotics.You were seen by a podiatrist who recommended ruling out infection f the bone. You underwent an MRI to rule out infection in the bone which showed no signs of osteomyelitis. You were evaluated by vascular surgery specialist because of concerns for peripheral vascular disease. Arterial Duplex showed good blood flow to the legs.   While being in the hospital on day 5 you developed a cord like lesion in the right upper leg. A test was done to see if you had a thrombosis in the leg which came positive with a Superficial venous thrombosis of branch of the greater saphenous vein in the anterior right thigh. Vascular surgery was consulted and was not recommended an intervention. You was also seen by Heamatologist (Blood specialist) recommended anticoagulation for about 4 weeks given your relative immobility due to underlying wounds.  . Please follow up with your PCP to evaluate the medication and need of further intervention.       Diagnosis: Anemia  Assessment and Plan of Treatment: You came in for a leg infection and were found to be anemic. You had just received a blood transfusion three days ago and were still anemic. Your baseline hemoglobin is low, concerning for a slow leading process or a result of your chronic illness such as your Diabeties. You required 1 blood transfusion to be  back to your baselline. You reported having a Colonoscopy about 1.5 yrs ago noted to have polyps which were removed and beingn.  You have an appointment to see your outpatient gastroenterologist at the end of next month. You were evaluated by a gastroenterologist on this admission, who Did not see a need for an acute intervention that was urgent. Please keep your appointment on 9/27/2023 and keep following up with your gastroenterologist as needed for management of any bleeding. If no bleeding is identified your anemia of diabetes can be controlled by managing your diabetes. Please follow up with your primary care doctor to continue managing your sugars.    Diagnosis: Cellulitis of right leg  Assessment and Plan of Treatment: You came in for leg infection, found to be on the right - your right leg was red, consistent with a skin infection. Due to the presence of ulcers in addition to the redness, You were also placed an IV antibiotics that cover a variety of microorganisms. He was seen by infection disease specialist to make sure that your antibiotics are adequate. Your infection is now controlled. Please continue to maintain glucose control to avoid delayed. Wound healing and break out of infection.  PLEASE FOLLOW UP WITH YOUR PCP AT [facility]  TO GET BLOOD WORK REPEATED WEEKLY WHILE ON ANTIBIOTICS. WE ALSO ADVISE YOUT TO SEE DR. MICHAEL CANO AT INFORMATION PROVIDED IF YOU ARE UNABLE TO SEE YOUR PCP.    Diagnosis: Superficial vein thrombosis  Assessment and Plan of Treatment: While being in the hospital on day 5 you developed a cord like lesion in the right upper leg. A test was done to see if you had a thrombosis in the leg which came positive with a Superficial thrombosis of branch of the greater saphenous vein in the anterior right thigh. Vascular surgery was consulted and was not recommended an intervention. However they recommended 30 days of eliquis. This is a medication to make your blood thinner and helps to prevent further thrombosis and extension of the one that you currently have. We think that this happened due to the infectiuous and inflammatory process you had in this leg. Please follow up with your PCP to evaluate the medication and need of further intervention.   WE HAVE ARRANGED FOR YOU A MONTH SUPPLY OF THE BLOOD THINNER      Diagnosis: Hypertension  Assessment and Plan of Treatment: You have a history of Hypertension.  On this admission, your Blood Pressure was adequately controlled with a low salt and cholesterol diet. You can continue to take your Blood pressure medications at home. Please hold your medications if BP SBP <100. Please continue a low salt and cholesterol diet to since it helps in lowering your BP.   Your blood pressure target is 120-140/80-90, maintain healthy lifestyle, low salt diet, avoid fatty food, weight loss, exercise regularly or stay active as tolerated 30 mins X 3 times per week.  Notify your doctor if you have any of the following symptoms:   (Dizziness, Lightheadedness, Blurry vision, Headache, Chest pain, Shortness of breath.)  Please continue taking your home medications and follow-up with your PCP in 1 week from discharge to adjust medications as needed.       PRINCIPAL DISCHARGE DIAGNOSIS  Diagnosis: Sepsis  Assessment and Plan of Treatment: You came in with weakness, chills, and lightheadedness. You were found to have a systemic infection, involving the ulcers found on your right leg. You also have ulcers on your left leg, and you also have been under treated in your previous admission three days ago at outside hospital where you left hospital for personal reasons. You were also placed an IV antibiotics with Vancomycin and Cefepime that cover a variety of microorganisms. You was seen by infection disease specialist Dr. Shirley Carter who advised discontinue Vancomycin and continued on Cefepime as wound culture grew Methicillin sensitive Staphylococcus Aureus. Sepsis resolved. Your infection is now controlled. You are going home with two antibiotics, please take them as scheduled and follow up with PCP for follow up.  Please continue to maintain glucose control to avoid delayed. Wound healing and break out of infection.      SECONDARY DISCHARGE DIAGNOSES  Diagnosis: Ulcers of both lower legs  Assessment and Plan of Treatment: You came in with swelling and redness of the right leg, and were found with ulcers on both legs. They were treated with antibiotics.You were seen by a podiatrist who recommended ruling out infection f the bone. You underwent an MRI to rule out infection in the bone which showed no signs of osteomyelitis. You were evaluated by vascular surgery specialist because of concerns for peripheral vascular disease. Arterial Duplex showed good blood flow to the legs.   While being in the hospital on day 5 you developed a cord like lesion in the right upper leg. A test was done to see if you had a thrombosis in the leg which came positive with a Superficial venous thrombosis of branch of the greater saphenous vein in the anterior right thigh. Vascular surgery was consulted and was not recommended an intervention. You was also seen by Heamatologist (Blood specialist) recommended anticoagulation for about 4 weeks given your relative immobility due to underlying wounds.  . Please follow up with your PCP to evaluate the medication and need of further intervention.       Diagnosis: Anemia  Assessment and Plan of Treatment: You came in for a leg infection and were found to be anemic. You had just received a blood transfusion three days ago and were still anemic. Your baseline hemoglobin is low, concerning for a slow leading process or a result of your chronic illness such as your Diabeties. You required 1 blood transfusion to be  back to your baselline. You reported having a Colonoscopy about 1.5 yrs ago noted to have polyps which were removed and beingn.  You have an appointment to see your outpatient gastroenterologist at the end of next month. You were evaluated by a gastroenterologist on this admission, who Did not see a need for an acute intervention that was urgent. Please keep your appointment on 9/27/2023 and keep following up with your gastroenterologist as needed for management of any bleeding. If no bleeding is identified your anemia of diabetes can be controlled by managing your diabetes. Please follow up with your primary care doctor to continue managing your sugars.    Diagnosis: Cellulitis of right leg  Assessment and Plan of Treatment: You came in for leg infection, found to be on the right - your right leg was red, consistent with a skin infection. Due to the presence of ulcers in addition to the redness, You were also placed an IV antibiotics that cover a variety of microorganisms. He was seen by infection disease specialist to make sure that your antibiotics are adequate. Your infection is now controlled. Please continue to maintain glucose control to avoid delayed. Wound healing and break out of infection.  PLEASE FOLLOW UP WITH YOUR PCP AT [facility]  TO GET BLOOD WORK REPEATED WEEKLY WHILE ON ANTIBIOTICS. WE ALSO ADVISE YOUT TO SEE DR. MICHAEL CANO AT INFORMATION PROVIDED IF YOU ARE UNABLE TO SEE YOUR PCP.    Diagnosis: Superficial vein thrombosis  Assessment and Plan of Treatment: While being in the hospital on day 5 you developed a cord like lesion in the right upper leg. A test was done to see if you had a thrombosis in the leg which came positive with a Superficial thrombosis of branch of the greater saphenous vein in the anterior right thigh. Vascular surgery was consulted and was not recommended an intervention. However they recommended 30 days of eliquis. This is a medication to make your blood thinner and helps to prevent further thrombosis and extension of the one that you currently have. We think that this happened due to the infectiuous and inflammatory process you had in this leg. Please follow up with your PCP to evaluate the medication and need of further intervention.   WE HAVE ARRANGED FOR YOU A MONTH SUPPLY OF THE BLOOD THINNER      Diagnosis: Hypertension  Assessment and Plan of Treatment: You have a history of Hypertension.  On this admission, your Blood Pressure was adequately controlled with a low salt and cholesterol diet. You can continue to take your Blood pressure medications at home. Please hold your medications if BP SBP <100. Please continue a low salt and cholesterol diet to since it helps in lowering your BP.   Your blood pressure target is 120-140/80-90, maintain healthy lifestyle, low salt diet, avoid fatty food, weight loss, exercise regularly or stay active as tolerated 30 mins X 3 times per week.  Notify your doctor if you have any of the following symptoms:   (Dizziness, Lightheadedness, Blurry vision, Headache, Chest pain, Shortness of breath.)  Please continue taking your home medications and follow-up with your PCP in 1 week from discharge to adjust medications as needed.       PRINCIPAL DISCHARGE DIAGNOSIS  Diagnosis: Sepsis  Assessment and Plan of Treatment: You came in with weakness, chills, and lightheadedness. You were found to have a systemic infection, involving the ulcers found on your right leg. You also have ulcers on your left leg, and you also have been under treated in your previous admission three days ago at outside hospital where you left hospital for personal reasons. You were also placed an IV antibiotics with Vancomycin and Cefepime that cover a variety of microorganisms. You was seen by infection disease specialist Dr. Shirley Carter who advised discontinue Vancomycin and continued on Cefepime as wound culture grew Methicillin sensitive Staphylococcus Aureus. Sepsis resolved. Your infection is now controlled. You are going home with two antibiotics, Dicloxacillin and Ciprofloxacin to cover bacteria like MSSA (Methicillin Sensitive S. Aureus and Pseudomonas) which grew in your wound cultures. please take them as scheduled and follow up with PCP for follow up. YOU NEED BLOOD WORK FOR CBC, COMPREHENSIVE METABOLIC PANEL ONCE A WEEK FOR 3 WEEKS WHILE ON ANTIBIOTICS FOR SUSPECTED SEPTIC THROMBOPHLEBITIS.  Please continue to maintain glucose control to avoid delayed. Wound healing and break out of infection.      SECONDARY DISCHARGE DIAGNOSES  Diagnosis: Ulcers of both lower legs  Assessment and Plan of Treatment: You came in with swelling and redness of the right leg, and were found with ulcers on both legs. They were treated with antibiotics.You were seen by a podiatrist who recommended ruling out infection f the bone. You underwent an MRI to rule out infection in the bone which showed no signs of osteomyelitis. You were evaluated by vascular surgery specialist because of concerns for peripheral vascular disease. Arterial Duplex showed good blood flow to the legs.   While being in the hospital on day 5 you developed a cord like lesion in the right upper leg. A test was done to see if you had a thrombosis in the leg which came positive with a Superficial venous thrombosis of branch of the greater saphenous vein in the anterior right thigh. Vascular surgery was consulted and was not recommended an intervention. You was also seen by Heamatologist (Blood specialist) recommended anticoagulation for about 4 weeks given your relative immobility due to underlying wounds.  . Please follow up with your PCP to evaluate the medication and need of further intervention.   You reported having appointement with Dermatologist for Infusion tomorrow at [facility] medical clinic.  Follow up with Podiatry or Vascular surgeon at your outpatient clinic at [facility] to continue with wound care      Diagnosis: Anemia  Assessment and Plan of Treatment: You came in for a leg infection and were found to be anemic. You had just received a blood transfusion three days ago and were still anemic. Your baseline hemoglobin is low, concerning for a slow leading process or a result of your chronic illness such as your Diabeties. You required 1 blood transfusion to be  back to your baselline. You reported having a Colonoscopy about 1.5 yrs ago noted to have polyps which were removed and beingn.  You have an appointment to see your outpatient gastroenterologist at the end of next month. You were evaluated by a gastroenterologist on this admission, who Did not see a need for an acute intervention that was urgent. Please keep your appointment on 9/27/2023 and keep following up with your gastroenterologist as needed for management of any bleeding. If no bleeding is identified your anemia of diabetes can be controlled by managing your diabetes. Please follow up with your primary care doctor to continue managing your sugars.    Diagnosis: Cellulitis of right leg  Assessment and Plan of Treatment: You came in for leg infection, found to be on the right - your right leg was red, consistent with a skin infection. Due to the presence of ulcers in addition to the redness, You were also placed an IV antibiotics that cover a variety of microorganisms. He was seen by infection disease specialist to make sure that your antibiotics are adequate. Your infection is now controlled. Please continue to maintain glucose control to avoid delayed. Wound healing and break out of infection.  PLEASE FOLLOW UP WITH YOUR PCP AT [facility]  TO GET BLOOD WORK REPEATED WEEKLY WHILE ON ANTIBIOTICS. WE ALSO ADVISE YOUT TO SEE DR. MICHAEL CANO AT INFORMATION PROVIDED IF YOU ARE UNABLE TO SEE YOUR PCP.    Diagnosis: Superficial vein thrombosis  Assessment and Plan of Treatment: While being in the hospital on day 5 you developed a cord like lesion in the right upper leg. A test was done to see if you had a thrombosis in the leg which came positive with a Superficial thrombosis of branch of the greater saphenous vein in the anterior right thigh. Vascular surgery was consulted and was not recommended an intervention. However they recommended 30 days of eliquis. This is a medication to make your blood thinner and helps to prevent further thrombosis and extension of the one that you currently have. We think that this happened due to the infectiuous and inflammatory process you had in this leg. Please follow up with your PCP to evaluate the medication and need of further intervention.   WE HAVE ARRANGED FOR YOU A MONTH SUPPLY OF THE BLOOD THINNER; YOU ONLY NEED ONE MONTH OF TREAMTNET FOR SUPERFICIAL THROMBOSIS      Diagnosis: Hypertension  Assessment and Plan of Treatment: You have a history of Hypertension.  On this admission, your Blood Pressure was adequately controlled with a low salt and cholesterol diet. You can continue to take your Blood pressure medications at home. Please hold your medications if BP SBP <100. Please continue a low salt and cholesterol diet to since it helps in lowering your BP.   Your blood pressure target is 120-140/80-90, maintain healthy lifestyle, low salt diet, avoid fatty food, weight loss, exercise regularly or stay active as tolerated 30 mins X 3 times per week.  Notify your doctor if you have any of the following symptoms:   (Dizziness, Lightheadedness, Blurry vision, Headache, Chest pain, Shortness of breath.)  Please continue taking your home medications and follow-up with your PCP in 1 week from discharge to adjust medications as needed.       PRINCIPAL DISCHARGE DIAGNOSIS  Diagnosis: Sepsis  Assessment and Plan of Treatment: You came in with weakness, chills, and lightheadedness. You were found to have a systemic infection, involving the ulcers found on your right leg. You also have ulcers on your left leg, and you also have been under treated in your previous admission three days ago at outside hospital where you left hospital for personal reasons. You were also placed an IV antibiotics with Vancomycin and Cefepime that cover a variety of microorganisms. You was seen by infection disease specialist Dr. Shirley Carter who advised discontinue Vancomycin and continued on Cefepime as wound culture grew Methicillin sensitive Staphylococcus Aureus. Sepsis resolved. Your infection is now controlled. You are going home with two antibiotics, Dicloxacillin and Ciprofloxacin to cover bacteria like MSSA (Methicillin Sensitive S. Aureus and Pseudomonas) which grew in your wound cultures. please take them as scheduled and follow up with PCP for follow up. YOU NEED BLOOD WORK FOR CBC, COMPREHENSIVE METABOLIC PANEL ONCE A WEEK FOR 3 WEEKS WHILE ON ANTIBIOTICS FOR SUSPECTED SEPTIC THROMBOPHLEBITIS.  Please continue to maintain glucose control to avoid delayed. Wound healing and break out of infection.      SECONDARY DISCHARGE DIAGNOSES  Diagnosis: Ulcers of both lower legs  Assessment and Plan of Treatment: You came in with swelling and redness of the right leg, and were found with ulcers on both legs. They were treated with antibiotics.You were seen by a podiatrist who recommended ruling out infection f the bone. You underwent an MRI to rule out infection in the bone which showed no signs of osteomyelitis. You were evaluated by vascular surgery specialist because of concerns for peripheral vascular disease. Arterial Duplex showed good blood flow to the legs.   While being in the hospital on day 5 you developed a cord like lesion in the right upper leg. A test was done to see if you had a thrombosis in the leg which came positive with a Superficial venous thrombosis of branch of the greater saphenous vein in the anterior right thigh. Vascular surgery was consulted and was not recommended an intervention. You was also seen by Heamatologist (Blood specialist) recommended anticoagulation for about 4 weeks given your relative immobility due to underlying wounds.  . Please follow up with your PCP to evaluate the medication and need of further intervention.   You reported having appointement with Dermatologist for Infusion tomorrow at [facility] medical clinic.  Follow up with Podiatry or Vascular surgeon at your outpatient clinic at [facility] to continue with wound care      Diagnosis: Anemia  Assessment and Plan of Treatment: You came in for a leg infection and were found to be anemic. You had just received a blood transfusion three days ago and were still anemic. Your baseline hemoglobin is low, concerning for a slow leading process or a result of your chronic illness such as your Diabeties. You required 1 blood transfusion to be  back to your baselline. You reported having a Colonoscopy about 1.5 yrs ago noted to have polyps which were removed and beingn.  You have an appointment to see your outpatient gastroenterologist at the end of next month. You were evaluated by a gastroenterologist on this admission, who Did not see a need for an acute intervention that was urgent. Please keep your appointment on 9/27/2023 and keep following up with your gastroenterologist as needed for management of any bleeding. If no bleeding is identified your anemia of diabetes can be controlled by managing your diabetes. Please follow up with your primary care doctor to continue managing your sugars.    Diagnosis: Cellulitis of right leg  Assessment and Plan of Treatment: You came in for leg infection, found to be on the right - your right leg was red, consistent with a skin infection. Due to the presence of ulcers in addition to the redness, You were also placed an IV antibiotics that cover a variety of microorganisms. He was seen by infection disease specialist to make sure that your antibiotics are adequate. Your infection is now controlled. Please continue to maintain glucose control to avoid delayed. Wound healing and break out of infection.  PLEASE FOLLOW UP WITH YOUR PCP AT [facility]  TO GET BLOOD WORK REPEATED WEEKLY WHILE ON ANTIBIOTICS. WE ALSO ADVISE YOUT TO SEE DR. MICHAEL CANO AT INFORMATION PROVIDED IF YOU ARE UNABLE TO SEE YOUR PCP.    Diagnosis: Superficial vein thrombosis  Assessment and Plan of Treatment: While being in the hospital on day 5 you developed a cord like lesion in the right upper leg. A test was done to see if you had a thrombosis in the leg which came positive with a Superficial thrombosis of branch of the greater saphenous vein in the anterior right thigh. Vascular surgery was consulted and was not recommended an intervention. However they recommended 30 days of eliquis. This is a medication to make your blood thinner and helps to prevent further thrombosis and extension of the one that you currently have. We think that this happened due to the infectiuous and inflammatory process you had in this leg. Please follow up with your PCP to evaluate the medication and need of further intervention.   WE HAVE ARRANGED FOR YOU A MONTH SUPPLY OF THE BLOOD THINNER; YOU ONLY NEED ONE MONTH OF TREAMTNET FOR SUPERFICIAL THROMBOSIS      Diagnosis: Hypertension  Assessment and Plan of Treatment: You have a history of Hypertension.  On this admission, your Blood Pressure was adequately controlled with a low salt and cholesterol diet. You can continue to take your Blood pressure medications at home. Please hold your medications if BP SBP <100. Please continue a low salt and cholesterol diet to since it helps in lowering your BP.   Your blood pressure target is 120-140/80-90, maintain healthy lifestyle, low salt diet, avoid fatty food, weight loss, exercise regularly or stay active as tolerated 30 mins X 3 times per week.  Notify your doctor if you have any of the following symptoms:   (Dizziness, Lightheadedness, Blurry vision, Headache, Chest pain, Shortness of breath.)  Please continue taking your home medications and follow-up with your PCP in 1 week from discharge to adjust medications as needed.       PRINCIPAL DISCHARGE DIAGNOSIS  Diagnosis: Sepsis  Assessment and Plan of Treatment: You came in with weakness, chills, and lightheadedness. You were found to have a systemic infection, involving the ulcers found on your right leg. You also have ulcers on your left leg, and you also have been under treated in your previous admission three days ago at outside hospital where you left hospital for personal reasons. You were also placed an IV antibiotics with Vancomycin and Cefepime that cover a variety of microorganisms. You was seen by infection disease specialist Dr. Shirley Carter who advised discontinue Vancomycin and continued on Cefepime as wound culture grew Methicillin sensitive Staphylococcus Aureus. Sepsis resolved. Your infection is now controlled. You are going home with two antibiotics, Dicloxacillin and Ciprofloxacin to cover bacteria like MSSA (Methicillin Sensitive S. Aureus and Pseudomonas) which grew in your wound cultures. please take them as scheduled and follow up with PCP for follow up. YOU NEED BLOOD WORK FOR CBC, COMPREHENSIVE METABOLIC PANEL ONCE A WEEK FOR 3 WEEKS WHILE ON ANTIBIOTICS FOR SUSPECTED SEPTIC THROMBOPHLEBITIS.  Please continue to maintain glucose control to avoid delayed. Wound healing and break out of infection.      SECONDARY DISCHARGE DIAGNOSES  Diagnosis: Ulcers of both lower legs  Assessment and Plan of Treatment: You came in with swelling and redness of the right leg, and were found with ulcers on both legs. They were treated with antibiotics.You were seen by a podiatrist who recommended ruling out infection f the bone. You underwent an MRI to rule out infection in the bone which showed no signs of osteomyelitis. You were evaluated by vascular surgery specialist because of concerns for peripheral vascular disease. Arterial Duplex showed good blood flow to the legs.   While being in the hospital on day 5 you developed a cord like lesion in the right upper leg. A test was done to see if you had a thrombosis in the leg which came positive with a Superficial venous thrombosis of branch of the greater saphenous vein in the anterior right thigh. Vascular surgery was consulted and was not recommended an intervention. You was also seen by Heamatologist (Blood specialist) recommended anticoagulation for about 4 weeks given your relative immobility due to underlying wounds.  . Please follow up with your PCP to evaluate the medication and need of further intervention.   You reported having appointement with Dermatologist for Infusion tomorrow at [facility] medical clinic.  Follow up with Podiatry or Vascular surgeon at your outpatient clinic at [facility] to continue with wound care      Diagnosis: Anemia  Assessment and Plan of Treatment: You came in for a leg infection and were found to be anemic. You had just received a blood transfusion three days ago and were still anemic. Your baseline hemoglobin is low, concerning for a slow leading process or a result of your chronic illness such as your Diabeties. You required 1 blood transfusion to be  back to your baselline. You reported having a Colonoscopy about 1.5 yrs ago noted to have polyps which were removed and beingn.  You have an appointment to see your outpatient gastroenterologist at the end of next month. You were evaluated by a gastroenterologist on this admission, who Did not see a need for an acute intervention that was urgent. Please keep your appointment on 9/27/2023 and keep following up with your gastroenterologist as needed for management of any bleeding. If no bleeding is identified your anemia of diabetes can be controlled by managing your diabetes. Please follow up with your primary care doctor to continue managing your sugars.    Diagnosis: Cellulitis of right leg  Assessment and Plan of Treatment: You came in for leg infection, found to be on the right - your right leg was red, consistent with a skin infection. Due to the presence of ulcers in addition to the redness, You were also placed an IV antibiotics that cover a variety of microorganisms. He was seen by infection disease specialist to make sure that your antibiotics are adequate. Your infection is now controlled. Please continue to maintain glucose control to avoid delayed. Wound healing and break out of infection.  PLEASE FOLLOW UP WITH YOUR PCP AT [facility]  TO GET BLOOD WORK REPEATED WEEKLY WHILE ON ANTIBIOTICS. WE ALSO ADVISE YOUT TO SEE DR. MICHAEL CANO AT INFORMATION PROVIDED IF YOU ARE UNABLE TO SEE YOUR PCP.    Diagnosis: Superficial vein thrombosis  Assessment and Plan of Treatment: While being in the hospital on day 5 you developed a cord like lesion in the right upper leg. A test was done to see if you had a thrombosis in the leg which came positive with a Superficial thrombosis of branch of the greater saphenous vein in the anterior right thigh. Vascular surgery was consulted and was not recommended an intervention. However they recommended 30 days of eliquis. This is a medication to make your blood thinner and helps to prevent further thrombosis and extension of the one that you currently have. We think that this happened due to the infectiuous and inflammatory process you had in this leg. Please follow up with your PCP to evaluate the medication and need of further intervention.   WE HAVE ARRANGED FOR YOU A MONTH SUPPLY OF THE BLOOD THINNER; YOU ONLY NEED ONE MONTH OF TREAMTNET FOR SUPERFICIAL THROMBOSIS      Diagnosis: Hypertension  Assessment and Plan of Treatment: You have a history of Hypertension.  On this admission, your Blood Pressure was adequately controlled with a low salt and cholesterol diet. You can continue to take your Blood pressure medications at home. Please hold your medications if BP SBP <100. Please continue a low salt and cholesterol diet to since it helps in lowering your BP.   Your blood pressure target is 120-140/80-90, maintain healthy lifestyle, low salt diet, avoid fatty food, weight loss, exercise regularly or stay active as tolerated 30 mins X 3 times per week.  Notify your doctor if you have any of the following symptoms:   (Dizziness, Lightheadedness, Blurry vision, Headache, Chest pain, Shortness of breath.)  Please continue taking your home medications and follow-up with your PCP in 1 week from discharge to adjust medications as needed.       PRINCIPAL DISCHARGE DIAGNOSIS  Diagnosis: Sepsis  Assessment and Plan of Treatment: You came in with weakness, chills, and lightheadedness. You were found to have a systemic infection, involving the ulcers found on your right leg. You also have ulcers on your left leg, and you also have been under treated in your previous admission three days ago at outside hospital where you left hospital for personal reasons. You were also placed an IV antibiotics with Vancomycin and Cefepime that cover a variety of microorganisms. You was seen by infection disease specialist Dr. Shirley Carter who advised discontinue Vancomycin and continued on Cefepime as wound culture grew Methicillin sensitive Staphylococcus Aureus. Sepsis resolved. Your infection is now controlled. You are going home with two antibiotics, Dicloxacillin and Ciprofloxacin to cover bacteria like MSSA (Methicillin Sensitive S. Aureus and Pseudomonas) which grew in your wound cultures. please take them as scheduled and follow up with PCP for follow up. YOU NEED BLOOD WORK FOR CBC, COMPREHENSIVE METABOLIC PANEL ONCE A WEEK FOR 3 WEEKS WHILE ON ANTIBIOTICS FOR SUSPECTED SEPTIC THROMBOPHLEBITIS.  Please continue to maintain glucose control to avoid delayed. Wound healing and break out of infection.      SECONDARY DISCHARGE DIAGNOSES  Diagnosis: Ulcers of both lower legs  Assessment and Plan of Treatment: You came in with swelling and redness of the right leg, and were found with ulcers on both legs. They were treated with antibiotics.You were seen by a podiatrist who recommended ruling out infection f the bone. You underwent an MRI to rule out infection in the bone which showed no signs of osteomyelitis. You were evaluated by vascular surgery specialist because of concerns for peripheral vascular disease. Arterial Duplex showed good blood flow to the legs.   While being in the hospital on day 5 you developed a cord like lesion in the right upper leg. A test was done to see if you had a thrombosis in the leg which came positive with a Superficial venous thrombosis of branch of the greater saphenous vein in the anterior right thigh. Vascular surgery was consulted and was not recommended an intervention. You was also seen by Heamatologist (Blood specialist) recommended anticoagulation for about 4 weeks given your relative immobility due to underlying wounds.  . Please follow up with your PCP to evaluate the medication and need of further intervention.   You reported having appointement with Dermatologist for Infusion tomorrow at [facility] medical clinic.  Follow up with Podiatry or Vascular surgeon at your outpatient clinic at [facility] to continue with wound care  Wound care:   Applied santyl, xeroform with dry sterile dressing to both legs       Diagnosis: Anemia  Assessment and Plan of Treatment: You came in for a leg infection and were found to be anemic. You had just received a blood transfusion three days ago and were still anemic. Your baseline hemoglobin is low, concerning for a slow leading process or a result of your chronic illness such as your Diabeties. You required 1 blood transfusion to be  back to your baselline. You reported having a Colonoscopy about 1.5 yrs ago noted to have polyps which were removed and beingn.  You have an appointment to see your outpatient gastroenterologist at the end of next month. You were evaluated by a gastroenterologist on this admission, who Did not see a need for an acute intervention that was urgent. Please keep your appointment on 9/27/2023 and keep following up with your gastroenterologist as needed for management of any bleeding. If no bleeding is identified your anemia of diabetes can be controlled by managing your diabetes. Please follow up with your primary care doctor to continue managing your sugars.    Diagnosis: Cellulitis of right leg  Assessment and Plan of Treatment: You came in for leg infection, found to be on the right - your right leg was red, consistent with a skin infection. Due to the presence of ulcers in addition to the redness, You were also placed an IV antibiotics that cover a variety of microorganisms. He was seen by infection disease specialist to make sure that your antibiotics are adequate. Your infection is now controlled. Please continue to maintain glucose control to avoid delayed. Wound healing and break out of infection.  PLEASE FOLLOW UP WITH YOUR PCP AT [facility]  TO GET BLOOD WORK REPEATED WEEKLY WHILE ON ANTIBIOTICS. WE ALSO ADVISE YOUT TO SEE DR. MICHAEL CANO AT INFORMATION PROVIDED IF YOU ARE UNABLE TO SEE YOUR PCP.    Diagnosis: Superficial vein thrombosis  Assessment and Plan of Treatment: While being in the hospital on day 5 you developed a cord like lesion in the right upper leg. A test was done to see if you had a thrombosis in the leg which came positive with a Superficial thrombosis of branch of the greater saphenous vein in the anterior right thigh. Vascular surgery was consulted and was not recommended an intervention. However they recommended 30 days of eliquis. This is a medication to make your blood thinner and helps to prevent further thrombosis and extension of the one that you currently have. We think that this happened due to the infectiuous and inflammatory process you had in this leg. Please follow up with your PCP to evaluate the medication and need of further intervention.   WE HAVE ARRANGED FOR YOU A MONTH SUPPLY OF THE BLOOD THINNER; YOU ONLY NEED ONE MONTH OF TREAMTNET FOR SUPERFICIAL THROMBOSIS      Diagnosis: Hypertension  Assessment and Plan of Treatment: You have a history of Hypertension.  On this admission, your Blood Pressure was adequately controlled with a low salt and cholesterol diet. You can continue to take your Blood pressure medications at home. Please hold your medications if BP SBP <100. Please continue a low salt and cholesterol diet to since it helps in lowering your BP.   Your blood pressure target is 120-140/80-90, maintain healthy lifestyle, low salt diet, avoid fatty food, weight loss, exercise regularly or stay active as tolerated 30 mins X 3 times per week.  Notify your doctor if you have any of the following symptoms:   (Dizziness, Lightheadedness, Blurry vision, Headache, Chest pain, Shortness of breath.)  Please continue taking your home medications and follow-up with your PCP in 1 week from discharge to adjust medications as needed.       PRINCIPAL DISCHARGE DIAGNOSIS  Diagnosis: Sepsis  Assessment and Plan of Treatment: You came in with weakness, chills, and lightheadedness. You were found to have a systemic infection, involving the ulcers found on your right leg. You also have ulcers on your left leg, and you also have been under treated in your previous admission three days ago at outside hospital where you left hospital for personal reasons. You were also placed an IV antibiotics with Vancomycin and Cefepime that cover a variety of microorganisms. You was seen by infection disease specialist Dr. Shirley Carter who advised discontinue Vancomycin and continued on Cefepime as wound culture grew Methicillin sensitive Staphylococcus Aureus. Sepsis resolved. Your infection is now controlled. You are going home with two antibiotics, Dicloxacillin and Ciprofloxacin to cover bacteria like MSSA (Methicillin Sensitive S. Aureus and Pseudomonas) which grew in your wound cultures. please take them as scheduled and follow up with PCP for follow up. YOU NEED BLOOD WORK FOR CBC, COMPREHENSIVE METABOLIC PANEL ONCE A WEEK FOR 3 WEEKS WHILE ON ANTIBIOTICS FOR SUSPECTED SEPTIC THROMBOPHLEBITIS.  Please continue to maintain glucose control to avoid delayed. Wound healing and break out of infection.      SECONDARY DISCHARGE DIAGNOSES  Diagnosis: Ulcers of both lower legs  Assessment and Plan of Treatment: You came in with swelling and redness of the right leg, and were found with ulcers on both legs. They were treated with antibiotics.You were seen by a podiatrist who recommended ruling out infection f the bone. You underwent an MRI to rule out infection in the bone which showed no signs of osteomyelitis. You were evaluated by vascular surgery specialist because of concerns for peripheral vascular disease. Arterial Duplex showed good blood flow to the legs.   While being in the hospital on day 5 you developed a cord like lesion in the right upper leg. A test was done to see if you had a thrombosis in the leg which came positive with a Superficial venous thrombosis of branch of the greater saphenous vein in the anterior right thigh. Vascular surgery was consulted and was not recommended an intervention. You was also seen by Heamatologist (Blood specialist) recommended anticoagulation for about 4 weeks given your relative immobility due to underlying wounds.  . Please follow up with your PCP to evaluate the medication and need of further intervention.   You reported having appointement with Dermatologist for Infusion tomorrow at [facility] medical clinic.  Follow up with Podiatry or Vascular surgeon at your outpatient clinic at [facility] to continue with wound care  Wound care:   Applied santyl, xeroform with dry sterile dressing to both legs       Diagnosis: Anemia  Assessment and Plan of Treatment: You came in for a leg infection and were found to be anemic. You had just received a blood transfusion three days ago and were still anemic. Your baseline hemoglobin is low, concerning for a slow leading process or a result of your chronic illness such as your Diabeties. You required 1 blood transfusion to be  back to your baselline. You reported having a Colonoscopy about 1.5 yrs ago noted to have polyps which were removed and beingn.  You have an appointment to see your outpatient gastroenterologist at the end of next month. You were evaluated by a gastroenterologist on this admission, who Did not see a need for an acute intervention that was urgent. Please keep your appointment on 9/27/2023 and keep following up with your gastroenterologist as needed for management of any bleeding. If no bleeding is identified your anemia of diabetes can be controlled by managing your diabetes. Please follow up with your primary care doctor to continue managing your sugars.    Diagnosis: Cellulitis of right leg  Assessment and Plan of Treatment: You came in for leg infection, found to be on the right - your right leg was red, consistent with a skin infection. Due to the presence of ulcers in addition to the redness, You were also placed an IV antibiotics that cover a variety of microorganisms. He was seen by infection disease specialist to make sure that your antibiotics are adequate. Your infection is now controlled. Please continue to maintain glucose control to avoid delayed. Wound healing and break out of infection.  PLEASE FOLLOW UP WITH YOUR PCP AT [facility]  TO GET BLOOD WORK REPEATED WEEKLY WHILE ON ANTIBIOTICS. WE ALSO ADVISE YOUT TO SEE DR. MICHAEL CANO AT INFORMATION PROVIDED IF YOU ARE UNABLE TO SEE YOUR PCP.    Diagnosis: Superficial vein thrombosis  Assessment and Plan of Treatment: While being in the hospital on day 5 you developed a cord like lesion in the right upper leg. A test was done to see if you had a thrombosis in the leg which came positive with a Superficial thrombosis of branch of the greater saphenous vein in the anterior right thigh. Vascular surgery was consulted and was not recommended an intervention. However they recommended 30 days of eliquis. This is a medication to make your blood thinner and helps to prevent further thrombosis and extension of the one that you currently have. We think that this happened due to the infectiuous and inflammatory process you had in this leg. Please follow up with your PCP to evaluate the medication and need of further intervention.   WE HAVE ARRANGED FOR YOU A MONTH SUPPLY OF THE BLOOD THINNER; YOU ONLY NEED ONE MONTH OF TREAMTNET FOR SUPERFICIAL THROMBOSIS      Diagnosis: Hypertension  Assessment and Plan of Treatment: You have a history of Hypertension.  On this admission, your Blood Pressure was adequately controlled with a low salt and cholesterol diet. You can continue to take your Blood pressure medications at home. Please hold your medications if BP SBP <100. Please continue a low salt and cholesterol diet to since it helps in lowering your BP.   Your blood pressure target is 120-140/80-90, maintain healthy lifestyle, low salt diet, avoid fatty food, weight loss, exercise regularly or stay active as tolerated 30 mins X 3 times per week.  Notify your doctor if you have any of the following symptoms:   (Dizziness, Lightheadedness, Blurry vision, Headache, Chest pain, Shortness of breath.)  Please continue taking your home medications and follow-up with your PCP in 1 week from discharge to adjust medications as needed.       PRINCIPAL DISCHARGE DIAGNOSIS  Diagnosis: Sepsis  Assessment and Plan of Treatment: You came in with weakness, chills, and lightheadedness. You were found to have a systemic infection, involving the ulcers found on your right leg. You also have ulcers on your left leg, and you also have been under treated in your previous admission three days ago at outside hospital where you left hospital for personal reasons. You were also placed an IV antibiotics with Vancomycin and Cefepime that cover a variety of microorganisms. You was seen by infection disease specialist Dr. Shirley Carter who advised discontinue Vancomycin and continued on Cefepime as wound culture grew Methicillin sensitive Staphylococcus Aureus. Sepsis resolved. Your infection is now controlled. You are going home with two antibiotics, Dicloxacillin and Ciprofloxacin to cover bacteria like MSSA (Methicillin Sensitive S. Aureus and Pseudomonas) which grew in your wound cultures. please take them as scheduled and follow up with PCP for follow up. YOU NEED BLOOD WORK FOR CBC, COMPREHENSIVE METABOLIC PANEL ONCE A WEEK FOR 3 WEEKS WHILE ON ANTIBIOTICS FOR SUSPECTED SEPTIC THROMBOPHLEBITIS.  Please continue to maintain glucose control to avoid delayed. Wound healing and break out of infection.      SECONDARY DISCHARGE DIAGNOSES  Diagnosis: Ulcers of both lower legs  Assessment and Plan of Treatment: You came in with swelling and redness of the right leg, and were found with ulcers on both legs. They were treated with antibiotics.You were seen by a podiatrist who recommended ruling out infection f the bone. You underwent an MRI to rule out infection in the bone which showed no signs of osteomyelitis. You were evaluated by vascular surgery specialist because of concerns for peripheral vascular disease. Arterial Duplex showed good blood flow to the legs.   While being in the hospital on day 5 you developed a cord like lesion in the right upper leg. A test was done to see if you had a thrombosis in the leg which came positive with a Superficial venous thrombosis of branch of the greater saphenous vein in the anterior right thigh. Vascular surgery was consulted and was not recommended an intervention. You was also seen by Heamatologist (Blood specialist) recommended anticoagulation for about 4 weeks given your relative immobility due to underlying wounds.  . Please follow up with your PCP to evaluate the medication and need of further intervention.   You reported having appointement with Dermatologist for Infusion tomorrow at [facility] medical clinic.  Follow up with Podiatry or Vascular surgeon at your outpatient clinic at [facility] to continue with wound care  Wound care:   Applied santyl, xeroform with dry sterile dressing to both legs       Diagnosis: Anemia  Assessment and Plan of Treatment: You came in for a leg infection and were found to be anemic. You had just received a blood transfusion three days ago and were still anemic. Your baseline hemoglobin is low, concerning for a slow leading process or a result of your chronic illness such as your Diabeties. You required 1 blood transfusion to be  back to your baselline. You reported having a Colonoscopy about 1.5 yrs ago noted to have polyps which were removed and beingn.  You have an appointment to see your outpatient gastroenterologist at the end of next month. You were evaluated by a gastroenterologist on this admission, who Did not see a need for an acute intervention that was urgent. Please keep your appointment on 9/27/2023 and keep following up with your gastroenterologist as needed for management of any bleeding. If no bleeding is identified your anemia of diabetes can be controlled by managing your diabetes. Please follow up with your primary care doctor to continue managing your sugars.    Diagnosis: Cellulitis of right leg  Assessment and Plan of Treatment: You came in for leg infection, found to be on the right - your right leg was red, consistent with a skin infection. Due to the presence of ulcers in addition to the redness, You were also placed an IV antibiotics that cover a variety of microorganisms. He was seen by infection disease specialist to make sure that your antibiotics are adequate. Your infection is now controlled. Please continue to maintain glucose control to avoid delayed. Wound healing and break out of infection.  PLEASE FOLLOW UP WITH YOUR PCP AT [facility]  TO GET BLOOD WORK REPEATED WEEKLY WHILE ON ANTIBIOTICS. WE ALSO ADVISE YOUT TO SEE DR. MICHAEL CANO AT INFORMATION PROVIDED IF YOU ARE UNABLE TO SEE YOUR PCP.    Diagnosis: Superficial vein thrombosis  Assessment and Plan of Treatment: While being in the hospital on day 5 you developed a cord like lesion in the right upper leg. A test was done to see if you had a thrombosis in the leg which came positive with a Superficial thrombosis of branch of the greater saphenous vein in the anterior right thigh. Vascular surgery was consulted and was not recommended an intervention. However they recommended 30 days of eliquis. This is a medication to make your blood thinner and helps to prevent further thrombosis and extension of the one that you currently have. We think that this happened due to the infectiuous and inflammatory process you had in this leg. Please follow up with your PCP to evaluate the medication and need of further intervention.   WE HAVE ARRANGED FOR YOU A MONTH SUPPLY OF THE BLOOD THINNER; YOU ONLY NEED ONE MONTH OF TREAMTNET FOR SUPERFICIAL THROMBOSIS      Diagnosis: Hypertension  Assessment and Plan of Treatment: You have a history of Hypertension.  On this admission, your Blood Pressure was adequately controlled with a low salt and cholesterol diet. You can continue to take your Blood pressure medications at home. Please hold your medications if BP SBP <100. Please continue a low salt and cholesterol diet to since it helps in lowering your BP.   Your blood pressure target is 120-140/80-90, maintain healthy lifestyle, low salt diet, avoid fatty food, weight loss, exercise regularly or stay active as tolerated 30 mins X 3 times per week.  Notify your doctor if you have any of the following symptoms:   (Dizziness, Lightheadedness, Blurry vision, Headache, Chest pain, Shortness of breath.)  Please continue taking your home medications and follow-up with your PCP in 1 week from discharge to adjust medications as needed.

## 2023-08-22 NOTE — CONSULT NOTE ADULT - NS ATTEND AMEND GEN_ALL_CORE FT
Patient seen/examined with PA. P/w BLE ulcers which are chronic. On exam, there are bilateral leg ulcers. Bilateral DP and PT pulses are palpable. BRIDGETTE WNL. RLE venous duplex negative for DVT. Please also obtain LLE venous duplex. Recommend local wound care.
Patient seen and examined. GI consulted for anemia. No signs of overt GI bleeding. RABIA with brown stool. Pt reports scheduled already for EGD/colon to evaluate chrnic anemia in a few weeks. Transfuse PRN. No plans for anemia w/u inpatient unless overt bleeding occurs or continued drop in Hb. F/u outpatient as planned. Notify us if further Hb drop or melena/hematochezia occurs.    Total time spent to complete patient's bedside assessment, physical examination, review medical chart including labs & imaging, discuss medical plan of care with housestaff was more than 50 minutes.

## 2023-08-23 LAB
-  AMIKACIN: SIGNIFICANT CHANGE UP
-  AMOXICILLIN/CLAVULANIC ACID: SIGNIFICANT CHANGE UP
-  AMPICILLIN/SULBACTAM: SIGNIFICANT CHANGE UP
-  AMPICILLIN: SIGNIFICANT CHANGE UP
-  AZTREONAM: SIGNIFICANT CHANGE UP
-  CEFAZOLIN: SIGNIFICANT CHANGE UP
-  CEFEPIME: SIGNIFICANT CHANGE UP
-  CEFOXITIN: SIGNIFICANT CHANGE UP
-  CEFTAZIDIME: SIGNIFICANT CHANGE UP
-  CEFTRIAXONE: SIGNIFICANT CHANGE UP
-  CIPROFLOXACIN: SIGNIFICANT CHANGE UP
-  CLINDAMYCIN: SIGNIFICANT CHANGE UP
-  CLINDAMYCIN: SIGNIFICANT CHANGE UP
-  ERTAPENEM: SIGNIFICANT CHANGE UP
-  ERYTHROMYCIN: SIGNIFICANT CHANGE UP
-  ERYTHROMYCIN: SIGNIFICANT CHANGE UP
-  GENTAMICIN: SIGNIFICANT CHANGE UP
-  IMIPENEM: SIGNIFICANT CHANGE UP
-  LEVOFLOXACIN: SIGNIFICANT CHANGE UP
-  MEROPENEM: SIGNIFICANT CHANGE UP
-  OXACILLIN: SIGNIFICANT CHANGE UP
-  OXACILLIN: SIGNIFICANT CHANGE UP
-  PENICILLIN: SIGNIFICANT CHANGE UP
-  PENICILLIN: SIGNIFICANT CHANGE UP
-  PIPERACILLIN/TAZOBACTAM: SIGNIFICANT CHANGE UP
-  RIFAMPIN: SIGNIFICANT CHANGE UP
-  RIFAMPIN: SIGNIFICANT CHANGE UP
-  TETRACYCLINE: SIGNIFICANT CHANGE UP
-  TETRACYCLINE: SIGNIFICANT CHANGE UP
-  TOBRAMYCIN: SIGNIFICANT CHANGE UP
-  TRIMETHOPRIM/SULFAMETHOXAZOLE: SIGNIFICANT CHANGE UP
-  VANCOMYCIN: SIGNIFICANT CHANGE UP
-  VANCOMYCIN: SIGNIFICANT CHANGE UP
ALBUMIN SERPL ELPH-MCNC: 2.4 G/DL — LOW (ref 3.5–5)
ALBUMIN SERPL ELPH-MCNC: 2.4 G/DL — LOW (ref 3.5–5)
ALP SERPL-CCNC: 93 U/L — SIGNIFICANT CHANGE UP (ref 40–120)
ALP SERPL-CCNC: 93 U/L — SIGNIFICANT CHANGE UP (ref 40–120)
ALT FLD-CCNC: 19 U/L DA — SIGNIFICANT CHANGE UP (ref 10–60)
ALT FLD-CCNC: 19 U/L DA — SIGNIFICANT CHANGE UP (ref 10–60)
ANION GAP SERPL CALC-SCNC: 8 MMOL/L — SIGNIFICANT CHANGE UP (ref 5–17)
ANION GAP SERPL CALC-SCNC: 8 MMOL/L — SIGNIFICANT CHANGE UP (ref 5–17)
AST SERPL-CCNC: 11 U/L — SIGNIFICANT CHANGE UP (ref 10–40)
AST SERPL-CCNC: 11 U/L — SIGNIFICANT CHANGE UP (ref 10–40)
BILIRUB SERPL-MCNC: 0.2 MG/DL — SIGNIFICANT CHANGE UP (ref 0.2–1.2)
BILIRUB SERPL-MCNC: 0.2 MG/DL — SIGNIFICANT CHANGE UP (ref 0.2–1.2)
BUN SERPL-MCNC: 9 MG/DL — SIGNIFICANT CHANGE UP (ref 7–18)
BUN SERPL-MCNC: 9 MG/DL — SIGNIFICANT CHANGE UP (ref 7–18)
CALCIUM SERPL-MCNC: 8.1 MG/DL — LOW (ref 8.4–10.5)
CALCIUM SERPL-MCNC: 8.1 MG/DL — LOW (ref 8.4–10.5)
CHLORIDE SERPL-SCNC: 107 MMOL/L — SIGNIFICANT CHANGE UP (ref 96–108)
CHLORIDE SERPL-SCNC: 107 MMOL/L — SIGNIFICANT CHANGE UP (ref 96–108)
CO2 SERPL-SCNC: 23 MMOL/L — SIGNIFICANT CHANGE UP (ref 22–31)
CO2 SERPL-SCNC: 23 MMOL/L — SIGNIFICANT CHANGE UP (ref 22–31)
CREAT SERPL-MCNC: 0.69 MG/DL — SIGNIFICANT CHANGE UP (ref 0.5–1.3)
CREAT SERPL-MCNC: 0.69 MG/DL — SIGNIFICANT CHANGE UP (ref 0.5–1.3)
CRP SERPL-MCNC: 102 MG/L — HIGH
CRP SERPL-MCNC: 102 MG/L — HIGH
EGFR: 107 ML/MIN/1.73M2 — SIGNIFICANT CHANGE UP
EGFR: 107 ML/MIN/1.73M2 — SIGNIFICANT CHANGE UP
ERYTHROCYTE [SEDIMENTATION RATE] IN BLOOD: 107 MM/HR — HIGH (ref 0–20)
ERYTHROCYTE [SEDIMENTATION RATE] IN BLOOD: 107 MM/HR — HIGH (ref 0–20)
GLUCOSE BLDC GLUCOMTR-MCNC: 101 MG/DL — HIGH (ref 70–99)
GLUCOSE BLDC GLUCOMTR-MCNC: 101 MG/DL — HIGH (ref 70–99)
GLUCOSE BLDC GLUCOMTR-MCNC: 147 MG/DL — HIGH (ref 70–99)
GLUCOSE BLDC GLUCOMTR-MCNC: 147 MG/DL — HIGH (ref 70–99)
GLUCOSE BLDC GLUCOMTR-MCNC: 158 MG/DL — HIGH (ref 70–99)
GLUCOSE BLDC GLUCOMTR-MCNC: 158 MG/DL — HIGH (ref 70–99)
GLUCOSE BLDC GLUCOMTR-MCNC: 173 MG/DL — HIGH (ref 70–99)
GLUCOSE BLDC GLUCOMTR-MCNC: 173 MG/DL — HIGH (ref 70–99)
GLUCOSE SERPL-MCNC: 151 MG/DL — HIGH (ref 70–99)
GLUCOSE SERPL-MCNC: 151 MG/DL — HIGH (ref 70–99)
HCT VFR BLD CALC: 26.4 % — LOW (ref 39–50)
HCT VFR BLD CALC: 26.4 % — LOW (ref 39–50)
HGB BLD-MCNC: 7.9 G/DL — LOW (ref 13–17)
HGB BLD-MCNC: 7.9 G/DL — LOW (ref 13–17)
MAGNESIUM SERPL-MCNC: 2 MG/DL — SIGNIFICANT CHANGE UP (ref 1.6–2.6)
MAGNESIUM SERPL-MCNC: 2 MG/DL — SIGNIFICANT CHANGE UP (ref 1.6–2.6)
MCHC RBC-ENTMCNC: 23.4 PG — LOW (ref 27–34)
MCHC RBC-ENTMCNC: 23.4 PG — LOW (ref 27–34)
MCHC RBC-ENTMCNC: 29.9 GM/DL — LOW (ref 32–36)
MCHC RBC-ENTMCNC: 29.9 GM/DL — LOW (ref 32–36)
MCV RBC AUTO: 78.1 FL — LOW (ref 80–100)
MCV RBC AUTO: 78.1 FL — LOW (ref 80–100)
METHOD TYPE: SIGNIFICANT CHANGE UP
NRBC # BLD: 0 /100 WBCS — SIGNIFICANT CHANGE UP (ref 0–0)
NRBC # BLD: 0 /100 WBCS — SIGNIFICANT CHANGE UP (ref 0–0)
PHOSPHATE SERPL-MCNC: 2.9 MG/DL — SIGNIFICANT CHANGE UP (ref 2.5–4.5)
PHOSPHATE SERPL-MCNC: 2.9 MG/DL — SIGNIFICANT CHANGE UP (ref 2.5–4.5)
PLATELET # BLD AUTO: 360 K/UL — SIGNIFICANT CHANGE UP (ref 150–400)
PLATELET # BLD AUTO: 360 K/UL — SIGNIFICANT CHANGE UP (ref 150–400)
POTASSIUM SERPL-MCNC: 3.6 MMOL/L — SIGNIFICANT CHANGE UP (ref 3.5–5.3)
POTASSIUM SERPL-MCNC: 3.6 MMOL/L — SIGNIFICANT CHANGE UP (ref 3.5–5.3)
POTASSIUM SERPL-SCNC: 3.6 MMOL/L — SIGNIFICANT CHANGE UP (ref 3.5–5.3)
POTASSIUM SERPL-SCNC: 3.6 MMOL/L — SIGNIFICANT CHANGE UP (ref 3.5–5.3)
PROT SERPL-MCNC: 6.9 G/DL — SIGNIFICANT CHANGE UP (ref 6–8.3)
PROT SERPL-MCNC: 6.9 G/DL — SIGNIFICANT CHANGE UP (ref 6–8.3)
RBC # BLD: 3.38 M/UL — LOW (ref 4.2–5.8)
RBC # BLD: 3.38 M/UL — LOW (ref 4.2–5.8)
RBC # FLD: 17.1 % — HIGH (ref 10.3–14.5)
RBC # FLD: 17.1 % — HIGH (ref 10.3–14.5)
SODIUM SERPL-SCNC: 138 MMOL/L — SIGNIFICANT CHANGE UP (ref 135–145)
SODIUM SERPL-SCNC: 138 MMOL/L — SIGNIFICANT CHANGE UP (ref 135–145)
WBC # BLD: 9.91 K/UL — SIGNIFICANT CHANGE UP (ref 3.8–10.5)
WBC # BLD: 9.91 K/UL — SIGNIFICANT CHANGE UP (ref 3.8–10.5)
WBC # FLD AUTO: 9.91 K/UL — SIGNIFICANT CHANGE UP (ref 3.8–10.5)
WBC # FLD AUTO: 9.91 K/UL — SIGNIFICANT CHANGE UP (ref 3.8–10.5)

## 2023-08-23 PROCEDURE — 99232 SBSQ HOSP IP/OBS MODERATE 35: CPT | Mod: GC

## 2023-08-23 RX ORDER — GABAPENTIN 400 MG/1
300 CAPSULE ORAL AT BEDTIME
Refills: 0 | Status: DISCONTINUED | OUTPATIENT
Start: 2023-08-23 | End: 2023-08-28

## 2023-08-23 RX ORDER — VANCOMYCIN HCL 1 G
1500 VIAL (EA) INTRAVENOUS EVERY 12 HOURS
Refills: 0 | Status: DISCONTINUED | OUTPATIENT
Start: 2023-08-23 | End: 2023-08-23

## 2023-08-23 RX ADMIN — CEFEPIME 100 MILLIGRAM(S): 1 INJECTION, POWDER, FOR SOLUTION INTRAMUSCULAR; INTRAVENOUS at 14:56

## 2023-08-23 RX ADMIN — CEFEPIME 100 MILLIGRAM(S): 1 INJECTION, POWDER, FOR SOLUTION INTRAMUSCULAR; INTRAVENOUS at 05:01

## 2023-08-23 RX ADMIN — CEFEPIME 100 MILLIGRAM(S): 1 INJECTION, POWDER, FOR SOLUTION INTRAMUSCULAR; INTRAVENOUS at 21:01

## 2023-08-23 RX ADMIN — GABAPENTIN 300 MILLIGRAM(S): 400 CAPSULE ORAL at 21:01

## 2023-08-23 RX ADMIN — Medication 1: at 08:20

## 2023-08-23 RX ADMIN — Medication 650 MILLIGRAM(S): at 21:05

## 2023-08-23 RX ADMIN — Medication 250 MILLIGRAM(S): at 06:40

## 2023-08-23 RX ADMIN — Medication 650 MILLIGRAM(S): at 18:46

## 2023-08-23 NOTE — PROGRESS NOTE ADULT - ATTENDING COMMENTS
Chart reviewed and pt evaluated at the bedside. Discussed with the intern and the medical team, including Dr. Canales, the Dx, w/u, and management of pt with septic shock and infected leg ulcers with concern for spread to the bone. Recommendations for change in ABs based on organism susceptibilties. II made changes to the documentation and agree with the above Hx, PE, assessment, and plan.

## 2023-08-23 NOTE — PROGRESS NOTE ADULT - PROBLEM SELECTOR PLAN 6
likely 2/2 to poor oral intake   In the ED: Cr:1.76, eGFR < 44 ( On 7/25/23 Cr: 0.78, eGFR 103) **Constantine MARK**  - Cr trending down

## 2023-08-23 NOTE — PROGRESS NOTE ADULT - SUBJECTIVE AND OBJECTIVE BOX
Interval: Patient was seen resting in bed. No acute overnight events. Denied any pedal complaints.     Patient is a 58y old  Male who presents with a chief complaint of weakness (21 Aug 2023 02:06)      HPI:  59 yo M with PMH of DM, HLD, HTN and diabetic leg ulcers present with chills, weakness, lightheadedness and infected RT lower extremity ulcer for the past 3 days which has progressively worsened today. As per pt,  he went to [facility] in [place] 3 days  ago for his weakness and infected leg ulcer. He mentioned that he received one blood transfusion, antibiotics and was admitted to the hospital. He reports staying in the hospital for a day before leaving the hospital for some personal issue. He reports decreased oral intake today, felt extremely weak and lightheaded. He reports history of ulcer for the past 5 years that has worsened in the past 3 years.  He follows a "dermatologist" at [facility] and he is unable to provide information of his diagnosis. He denies; fever, bodyaches, Chest pain, SOB, URI sxs, N/V/D, abdominal pain, LOC, Head trauma    In the ED   Vitals -> BP: 88/55, HR:102, T:97.7, O2 sat: 94% RA  Labs :  WbC 17k, Hb:7.9, Cr:1.76  EKG : NSR  s/p Vancomycin, Zosyn IVPB  s/p 2L NS ->101/68 (21 Aug 2023 02:06)      Podiatry HPI: Patient stated that he has had leg wounds for 5 years. He seems a dermatologist at [facility] who gives him steroid shots for his wounds and they have worsened. Over the last month his pain has grown and his has become dizzy and weak. Patient states he needs to have santyl on his wounds for every dressing change so that the shooting pain he has subsides. Patient denies any other pedal complaints.    ID#: [id number]      Medications acetaminophen     Tablet .. 650 milliGRAM(s) Oral every 6 hours PRN  cefepime   IVPB 2000 milliGRAM(s) IV Intermittent every 8 hours  cefepime   IVPB      gabapentin 300 milliGRAM(s) Oral at bedtime  insulin lispro (ADMELOG) corrective regimen sliding scale   SubCutaneous three times a day before meals  insulin lispro (ADMELOG) corrective regimen sliding scale   SubCutaneous at bedtime  melatonin 3 milliGRAM(s) Oral at bedtime PRN  sodium chloride 0.9%. 1000 milliLiter(s) IV Continuous <Continuous>    FHNo pertinent family history in first degree relatives    ,   PMHHTN (hypertension)    Dyslipidemia    DM (diabetes mellitus)    Leg ulcer       PSHNo significant past surgical history        Labs                          7.9    9.91  )-----------( 360      ( 23 Aug 2023 07:05 )             26.4      08-23    138  |  107  |  9   ----------------------------<  151<H>  3.6   |  23  |  0.69    Ca    8.1<L>      23 Aug 2023 07:05  Phos  2.9     08-23  Mg     2.0     08-23    TPro  6.9  /  Alb  2.4<L>  /  TBili  0.2  /  DBili  x   /  AST  11  /  ALT  19  /  AlkPhos  93  08-23     Vital Signs Last 24 Hrs  T(C): 37.2 (23 Aug 2023 11:10), Max: 37.3 (23 Aug 2023 04:38)  T(F): 99 (23 Aug 2023 11:10), Max: 99.1 (23 Aug 2023 04:38)  HR: 86 (23 Aug 2023 11:10) (76 - 96)  BP: 112/69 (23 Aug 2023 11:10) (105/70 - 135/88)  BP(mean): --  RR: 17 (23 Aug 2023 11:10) (17 - 18)  SpO2: 98% (23 Aug 2023 11:10) (96% - 98%)    Parameters below as of 23 Aug 2023 11:10  Patient On (Oxygen Delivery Method): room air      Sedimentation Rate, Erythrocyte: 107 mm/Hr (08-23-23 @ 07:05)          WBC Count: 9.91 K/uL (08-23-23 @ 07:05)    PHYSICAL EXAM  LE Focused:    Vasc:  DP and PT pulses palpable 2/4. CFT<3 seconds. No edema noted in legs or feet   Derm: Wounds noted b/l lower legs to level of subcutaneous tissue/muscle tendon layer of legs. No PTB in any of wounds but tendon exposure noted. Wounds are fibrogranular in nature with no drainage noted. No fluctuance crepitus or streaking noted.   Neuro: Gross sensation intact.   MSK: POP to all wounds.       IMAGING:   Xray- pending     CULTURES: pending     < from: [facility] Physiol Extremity Lower 3+ Level, BI (08.22.23 @ 16:34) >  Right BRIDGETTE = 1.01  Left BRIDGETTE = 1.00    < end of copied text >   Interval: Patient was seen resting in bed. No acute overnight events. Denied any pedal complaints.     Patient is a 58y old  Male who presents with a chief complaint of weakness (21 Aug 2023 02:06)      HPI:  57 yo M with PMH of DM, HLD, HTN and diabetic leg ulcers present with chills, weakness, lightheadedness and infected RT lower extremity ulcer for the past 3 days which has progressively worsened today. As per pt,  he went to [facility] in [place] 3 days  ago for his weakness and infected leg ulcer. He mentioned that he received one blood transfusion, antibiotics and was admitted to the hospital. He reports staying in the hospital for a day before leaving the hospital for some personal issue. He reports decreased oral intake today, felt extremely weak and lightheaded. He reports history of ulcer for the past 5 years that has worsened in the past 3 years.  He follows a "dermatologist" at [facility] and he is unable to provide information of his diagnosis. He denies; fever, bodyaches, Chest pain, SOB, URI sxs, N/V/D, abdominal pain, LOC, Head trauma    In the ED   Vitals -> BP: 88/55, HR:102, T:97.7, O2 sat: 94% RA  Labs :  WbC 17k, Hb:7.9, Cr:1.76  EKG : NSR  s/p Vancomycin, Zosyn IVPB  s/p 2L NS ->101/68 (21 Aug 2023 02:06)      Podiatry HPI: Patient stated that he has had leg wounds for 5 years. He seems a dermatologist at [facility] who gives him steroid shots for his wounds and they have worsened. Over the last month his pain has grown and his has become dizzy and weak. Patient states he needs to have santyl on his wounds for every dressing change so that the shooting pain he has subsides. Patient denies any other pedal complaints.    ID#: [id number]      Medications acetaminophen     Tablet .. 650 milliGRAM(s) Oral every 6 hours PRN  cefepime   IVPB 2000 milliGRAM(s) IV Intermittent every 8 hours  cefepime   IVPB      gabapentin 300 milliGRAM(s) Oral at bedtime  insulin lispro (ADMELOG) corrective regimen sliding scale   SubCutaneous three times a day before meals  insulin lispro (ADMELOG) corrective regimen sliding scale   SubCutaneous at bedtime  melatonin 3 milliGRAM(s) Oral at bedtime PRN  sodium chloride 0.9%. 1000 milliLiter(s) IV Continuous <Continuous>    FHNo pertinent family history in first degree relatives    ,   PMHHTN (hypertension)    Dyslipidemia    DM (diabetes mellitus)    Leg ulcer       PSHNo significant past surgical history        Labs                          7.9    9.91  )-----------( 360      ( 23 Aug 2023 07:05 )             26.4      08-23    138  |  107  |  9   ----------------------------<  151<H>  3.6   |  23  |  0.69    Ca    8.1<L>      23 Aug 2023 07:05  Phos  2.9     08-23  Mg     2.0     08-23    TPro  6.9  /  Alb  2.4<L>  /  TBili  0.2  /  DBili  x   /  AST  11  /  ALT  19  /  AlkPhos  93  08-23     Vital Signs Last 24 Hrs  T(C): 37.2 (23 Aug 2023 11:10), Max: 37.3 (23 Aug 2023 04:38)  T(F): 99 (23 Aug 2023 11:10), Max: 99.1 (23 Aug 2023 04:38)  HR: 86 (23 Aug 2023 11:10) (76 - 96)  BP: 112/69 (23 Aug 2023 11:10) (105/70 - 135/88)  BP(mean): --  RR: 17 (23 Aug 2023 11:10) (17 - 18)  SpO2: 98% (23 Aug 2023 11:10) (96% - 98%)    Parameters below as of 23 Aug 2023 11:10  Patient On (Oxygen Delivery Method): room air      Sedimentation Rate, Erythrocyte: 107 mm/Hr (08-23-23 @ 07:05)          WBC Count: 9.91 K/uL (08-23-23 @ 07:05)    PHYSICAL EXAM  LE Focused:    Vasc:  DP and PT pulses palpable 2/4. CFT<3 seconds. No edema noted in legs or feet   Derm: Wounds noted b/l lower legs to level of subcutaneous tissue/muscle tendon layer of legs. No PTB in any of wounds but tendon exposure noted. Wounds are fibrogranular in nature with no drainage noted. No fluctuance crepitus or streaking noted.   Neuro: Gross sensation intact.   MSK: POP to all wounds.       IMAGING:   Xray- pending     CULTURES: pending     < from: [facility] Physiol Extremity Lower 3+ Level, BI (08.22.23 @ 16:34) >  Right BRIDGETTE = 1.01  Left BRIDGETTE = 1.00    < end of copied text >   Interval: Patient was seen resting in bed. No acute overnight events. Denied any pedal complaints.     Patient is a 58y old  Male who presents with a chief complaint of weakness (21 Aug 2023 02:06)      HPI:  59 yo M with PMH of DM, HLD, HTN and diabetic leg ulcers present with chills, weakness, lightheadedness and infected RT lower extremity ulcer for the past 3 days which has progressively worsened today. As per pt,  he went to [facility] in [place] 3 days  ago for his weakness and infected leg ulcer. He mentioned that he received one blood transfusion, antibiotics and was admitted to the hospital. He reports staying in the hospital for a day before leaving the hospital for some personal issue. He reports decreased oral intake today, felt extremely weak and lightheaded. He reports history of ulcer for the past 5 years that has worsened in the past 3 years.  He follows a "dermatologist" at [facility] and he is unable to provide information of his diagnosis. He denies; fever, bodyaches, Chest pain, SOB, URI sxs, N/V/D, abdominal pain, LOC, Head trauma    In the ED   Vitals -> BP: 88/55, HR:102, T:97.7, O2 sat: 94% RA  Labs :  WbC 17k, Hb:7.9, Cr:1.76  EKG : NSR  s/p Vancomycin, Zosyn IVPB  s/p 2L NS ->101/68 (21 Aug 2023 02:06)      Podiatry HPI: Patient stated that he has had leg wounds for 5 years. He seems a dermatologist at [facility] who gives him steroid shots for his wounds and they have worsened. Over the last month his pain has grown and his has become dizzy and weak. Patient states he needs to have santyl on his wounds for every dressing change so that the shooting pain he has subsides. Patient denies any other pedal complaints.    ID#: [id number]      Medications acetaminophen     Tablet .. 650 milliGRAM(s) Oral every 6 hours PRN  cefepime   IVPB 2000 milliGRAM(s) IV Intermittent every 8 hours  cefepime   IVPB      gabapentin 300 milliGRAM(s) Oral at bedtime  insulin lispro (ADMELOG) corrective regimen sliding scale   SubCutaneous three times a day before meals  insulin lispro (ADMELOG) corrective regimen sliding scale   SubCutaneous at bedtime  melatonin 3 milliGRAM(s) Oral at bedtime PRN  sodium chloride 0.9%. 1000 milliLiter(s) IV Continuous <Continuous>    FHNo pertinent family history in first degree relatives    ,   PMHHTN (hypertension)    Dyslipidemia    DM (diabetes mellitus)    Leg ulcer       PSHNo significant past surgical history        Labs                          7.9    9.91  )-----------( 360      ( 23 Aug 2023 07:05 )             26.4      08-23    138  |  107  |  9   ----------------------------<  151<H>  3.6   |  23  |  0.69    Ca    8.1<L>      23 Aug 2023 07:05  Phos  2.9     08-23  Mg     2.0     08-23    TPro  6.9  /  Alb  2.4<L>  /  TBili  0.2  /  DBili  x   /  AST  11  /  ALT  19  /  AlkPhos  93  08-23     Vital Signs Last 24 Hrs  T(C): 37.2 (23 Aug 2023 11:10), Max: 37.3 (23 Aug 2023 04:38)  T(F): 99 (23 Aug 2023 11:10), Max: 99.1 (23 Aug 2023 04:38)  HR: 86 (23 Aug 2023 11:10) (76 - 96)  BP: 112/69 (23 Aug 2023 11:10) (105/70 - 135/88)  BP(mean): --  RR: 17 (23 Aug 2023 11:10) (17 - 18)  SpO2: 98% (23 Aug 2023 11:10) (96% - 98%)    Parameters below as of 23 Aug 2023 11:10  Patient On (Oxygen Delivery Method): room air      Sedimentation Rate, Erythrocyte: 107 mm/Hr (08-23-23 @ 07:05)          WBC Count: 9.91 K/uL (08-23-23 @ 07:05)    PHYSICAL EXAM  LE Focused:    Vasc:  DP and PT pulses palpable 2/4. CFT<3 seconds. No edema noted in legs or feet   Derm: Wounds noted b/l lower legs to level of subcutaneous tissue/muscle tendon layer of legs. No PTB in any of wounds but tendon exposure noted. Wounds are fibrogranular in nature with no drainage noted. No fluctuance crepitus or streaking noted.   Neuro: Gross sensation intact.   MSK: POP to all wounds.       IMAGING:   Xray- pending     CULTURES: pending     < from: [facility] Physiol Extremity Lower 3+ Level, BI (08.22.23 @ 16:34) >  Right BRIDGETTE = 1.01  Left BRIDGETTE = 1.00    < end of copied text >

## 2023-08-23 NOTE — PROGRESS NOTE ADULT - SUBJECTIVE AND OBJECTIVE BOX
Subjective/Objective: No acute overnight events. On bedside exam patient lying comfortably at rest. Non toxic appearance.    ROS  General: feeling 	well, no fever, no chills  Skin: no itchiness no rash, pain and redness of right lower extremity, pain increased after vascular examination yesterday, bilateral leg ulcers  HEENT: no complaints  Respiratory and Thorax: no complaints  Cardiovascular:	no complaints  Abdomen: no complaints	  Genitourinary: no complaints  Musculoskeletal:	no complaints  Neurological: no complaints  Psychiatric: no complaints	  Hematology/Lymphatics:	no complaints  Endocrine: no complaints    MEDS  acetaminophen     Tablet .. 650 milliGRAM(s) Oral every 6 hours PRN  cefepime   IVPB      cefepime   IVPB 2000 milliGRAM(s) IV Intermittent every 8 hours  insulin lispro (ADMELOG) corrective regimen sliding scale   SubCutaneous three times a day before meals  insulin lispro (ADMELOG) corrective regimen sliding scale   SubCutaneous at bedtime  melatonin 3 milliGRAM(s) Oral at bedtime PRN  sodium chloride 0.9%. 1000 milliLiter(s) IV Continuous <Continuous>  vancomycin  IVPB 1750 milliGRAM(s) IV Intermittent every 12 hours          Vital Signs Last 24 Hrs  T(C): 37.2 (23 Aug 2023 08:03), Max: 37.3 (23 Aug 2023 04:38)  T(F): 99 (23 Aug 2023 08:03), Max: 99.1 (23 Aug 2023 04:38)  HR: 81 (23 Aug 2023 08:03) (76 - 96)  BP: 105/70 (23 Aug 2023 08:03) (105/70 - 135/88)  BP(mean): --  RR: 18 (23 Aug 2023 08:03) (18 - 19)  SpO2: 97% (23 Aug 2023 08:03) (96% - 100%)    Parameters below as of 23 Aug 2023 08:03  Patient On (Oxygen Delivery Method): room air      PHYSICAL EXAM:  GEN: patient lying comfortably at rest. Non toxic appearance    HEENT: moist mucous membranes, good oral hygiene    CHEST/Respiratory: b/l normal breath sounds, no wheezing, cough, crackles    Cardiovascular: s1 s2 regular, no murmurs or extra hearts sounds    Abdomen: soft, non tender, non distended, bowel sounds present    Genitourinary: no catheter    Extremities:   L sided ulcers w/ exudate, 1) 3x3 on medial malleolus (2)4x3 superior to malleolus w/ visible tendon (3)3x3 Middle of tibia  (4) 6x3 lateral to middle of tibia   R sided ulcers w/ exudate: 1) 7x4 medial lower limb w/ areas of necrosis above ankle, necrotic tissue loose  R sided cellulitis superior to ulcer, warmth and +1 edema with extension to below the knee, size and edema less than yesterday but pain has increased since, extremely painful on palpation vascular examination as per patient  b/l onychomycosis    Neurological : no weakness, no focal deficits; A+O x3    Skin: discoloration of left foot extending from 1st digit/MTP area into the 3rd digit/MTP area     Vascular: cd not feel R dorsalis pedis pulse, normal L dorsalis pedis    LABS/DIAGNOSTIC TESTS                      7.9    9.91  )-----------( 360      ( 23 Aug 2023 07:05 )             26.4       WBC Count: 9.91 K/uL (08-23 @ 07:05)  WBC Count: 9.43 K/uL (08-22 @ 08:00)  WBC Count: 10.14 K/uL (08-21 @ 18:40)  WBC Count: 14.49 K/uL (08-21 @ 05:30)  WBC Count: 17.02 K/uL (08-20 @ 23:10)      08-23    138  |  107  |  9   ----------------------------<  151<H>  3.6   |  23  |  0.69    Ca    8.1<L>      23 Aug 2023 07:05  Phos  2.9     08-23  Mg     2.0     08-23    TPro  6.9  /  Alb  2.4<L>  /  TBili  0.2  /  DBili  x   /  AST  11  /  ALT  19  /  AlkPhos  93  08-23      Urinalysis Basic - ( 23 Aug 2023 07:05 )    Color: x / Appearance: x / SG: x / pH: x  Gluc: 151 mg/dL / Ketone: x  / Bili: x / Urobili: x   Blood: x / Protein: x / Nitrite: x   Leuk Esterase: x / RBC: x / WBC x   Sq Epi: x / Non Sq Epi: x / Bacteria: x      CULTURES      Culture - Surgical Swab (collected 08-21-23 @ 09:43)  Source: .Surgical Swab Leg wound  Preliminary Report (08-22-23 @ 11:34):    Moderate Pseudomonas aeruginosa    Few Klebsiella oxytoca/Raoultella ornithinolytica    Culture - Urine (collected 08-21-23 @ 06:52)  Source: Clean Catch Clean Catch (Midstream)  Final Report (08-22-23 @ 11:19):    No growth    Culture - Abscess with Gram Stain (collected 08-21-23 @ 04:26)  Source: .Abscess Leg - Right  Gram Stain (08-21-23 @ 15:35):    Few polymorphonuclear leukocytes seen per low power field    Few Gram Negative Rods seen per oil power field  Preliminary Report (08-22-23 @ 12:29):    Moderate Pseudomonas aeruginosa    Culture - Abscess with Gram Stain (collected 08-21-23 @ 04:26)  Source: .Abscess Leg - Left  Gram Stain (08-21-23 @ 12:30):    No polymorphonuclear cells seen per low power field    Few Gram Negative Rods per oil power field  Preliminary Report (08-22-23 @ 13:21):    Moderate Pseudomonas aeruginosa    Moderate Citrobacter koseri    Few Staphylococcus aureus    Culture - Blood (collected 08-20-23 @ 23:10)  Source: .Blood Blood-Peripheral  Preliminary Report (08-23-23 @ 03:01):    No growth at 48 Hours    Culture - Blood (collected 08-20-23 @ 23:00)  Source: .Blood Blood-Peripheral  Preliminary Report (08-23-23 @ 03:01):    No growth at 48 Hours    RADIOLOGY  VA Physiol Extremity Lower 3+ Level, BI (08.22.23 @ 16:34)      PROCEDURE DATE:  08/22/2023      INTERPRETATION:  Clinical indication: Peripheral arterial disease    COMPARISON: None    FINDINGS: There are normal pulse volume recordings bilaterally. There is   no abnormal segmental pressure gradient.    Right BRIDGETTE = 1.01  Left BRIDGETTE = 1.00    IMPRESSION: Normal ABIs.      US Duplex Venous Lower Ext Ltd, Right (08.22.23 @ 16:30)  FINDINGS:    There is normal compressibility of the right common femoral, femoral and   popliteal veins.  The contralateral externaliliac vein is patent.  Doppler examination shows normal spontaneous and phasic flow.    No calf vein thrombosis is detected.    IMPRESSION:  No evidence of right lower extremity deep venous thrombosis.                   Subjective/Objective: No acute overnight events. On bedside exam patient lying comfortably at rest. Non toxic appearance.    ROS  General: feeling 	well, no fever, no chills  Musculoskeletal:	 less pain in RLE today    MEDS  acetaminophen     Tablet .. 650 milliGRAM(s) Oral every 6 hours PRN  cefepime   IVPB      cefepime   IVPB 2000 milliGRAM(s) IV Intermittent every 8 hours (D3)  insulin lispro (ADMELOG) corrective regimen sliding scale   SubCutaneous three times a day before meals  insulin lispro (ADMELOG) corrective regimen sliding scale   SubCutaneous at bedtime  melatonin 3 milliGRAM(s) Oral at bedtime PRN  sodium chloride 0.9%. 1000 milliLiter(s) IV Continuous <Continuous>  vancomycin  IVPB 1750 milliGRAM(s) IV Intermittent every 12 hours (D3)          Vital Signs Last 24 Hrs  T(C): 37.2 (23 Aug 2023 08:03), Max: 37.3 (23 Aug 2023 04:38)  T(F): 99 (23 Aug 2023 08:03), Max: 99.1 (23 Aug 2023 04:38)  HR: 81 (23 Aug 2023 08:03) (76 - 96)  BP: 105/70 (23 Aug 2023 08:03) (105/70 - 135/88)  BP(mean): --  RR: 18 (23 Aug 2023 08:03) (18 - 19)  SpO2: 97% (23 Aug 2023 08:03) (96% - 100%)    Parameters below as of 23 Aug 2023 08:03  Patient On (Oxygen Delivery Method): room air      PHYSICAL EXAM:  GEN: patient lying comfortably at rest. Non toxic appearance    HEENT: moist mucous membranes, good oral hygiene    CHEST/Respiratory: b/l normal breath sounds, no wheezing, cough, crackles    Cardiovascular: s1 s2 regular, no murmurs or extra hearts sounds    Abdomen: soft, non tender, non distended, bowel sounds present    Genitourinary: no catheter    Extremities:   L sided ulcers w/ exudate, 1) 3x3 on medial malleolus (2)4x3 superior to malleolus w/ visible tendon (3)3x3 Middle of tibia  (4) 6x3 lateral to middle of tibia   R sided ulcers w/ exudate: 1) 7x4 medial lower limb w/ areas of necrosis above ankle, necrotic tissue loose  R sided cellulitis superior to ulcer, warmth and +1 edema with extension to below the knee, size and edema less than yesterday but pain has increased since, extremely painful on palpation vascular examination as per patient  b/l onychomycosis    Neurological : A+O x3    Skin: discoloration of left foot extending from 1st digit/MTP area into the 3rd digit/MTP area         LABS/DIAGNOSTIC TESTS                  7.9    9.91  )-----------( 360      ( 23 Aug 2023 07:05 )             26.4       WBC Count: 9.91 K/uL (08-23 @ 07:05)  WBC Count: 9.43 K/uL (08-22 @ 08:00)  WBC Count: 10.14 K/uL (08-21 @ 18:40)  WBC Count: 14.49 K/uL (08-21 @ 05:30)  WBC Count: 17.02 K/uL (08-20 @ 23:10)      08-23    138  |  107  |  9   ----------------------------<  151<H>  3.6   |  23  |  0.69    Ca    8.1<L>      23 Aug 2023 07:05  Phos  2.9     08-23  Mg     2.0     08-23    TPro  6.9  /  Alb  2.4<L>  /  TBili  0.2  /  DBili  x   /  AST  11  /  ALT  19  /  AlkPhos  93  08-23    Sedimentation Rate, Erythrocyte (08.23.23 @ 07:05)   Sedimentation Rate, Erythrocyte: 107 mm/Hr    CULTURES      Culture - Surgical Swab (collected 08-21-23 @ 09:43)  Source: .Surgical Swab Leg wound  Preliminary Report (08-22-23 @ 11:34):    Moderate Pseudomonas aeruginosa    Few Klebsiella oxytoca/Raoultella ornithinolytica    Culture - Urine (collected 08-21-23 @ 06:52)  Source: Clean Catch Clean Catch (Midstream)  Final Report (08-22-23 @ 11:19):    No growth    Culture - Abscess with Gram Stain (collected 08-21-23 @ 04:26)  Source: .Abscess Leg - Right  Gram Stain (08-21-23 @ 15:35):    Few polymorphonuclear leukocytes seen per low power field    Few Gram Negative Rods seen per oil power field  Preliminary Report (08-22-23 @ 12:29):    Moderate Pseudomonas aeruginosa    Culture - Abscess with Gram Stain (collected 08-21-23 @ 04:26)  Source: .Abscess Leg - Left  Gram Stain (08-21-23 @ 12:30):    No polymorphonuclear cells seen per low power field    Few Gram Negative Rods per oil power field  Preliminary Report (08-22-23 @ 13:21):    Moderate Pseudomonas aeruginosa    Moderate Citrobacter koseri    Few Staphylococcus aureus    Culture - Blood (collected 08-20-23 @ 23:10)  Source: .Blood Blood-Peripheral  Preliminary Report (08-23-23 @ 03:01):    No growth at 48 Hours    Culture - Blood (collected 08-20-23 @ 23:00)  Source: .Blood Blood-Peripheral  Preliminary Report (08-23-23 @ 03:01):    No growth at 48 Hours    RADIOLOGY  VA Physiol Extremity Lower 3+ Level, BI (08.22.23 @ 16:34)      PROCEDURE DATE:  08/22/2023      INTERPRETATION:  Clinical indication: Peripheral arterial disease    COMPARISON: None    FINDINGS: There are normal pulse volume recordings bilaterally. There is   no abnormal segmental pressure gradient.    Right BRIDGETTE = 1.01  Left BRIDGETTE = 1.00    IMPRESSION: Normal ABIs.      US Duplex Venous Lower Ext Ltd, Right (08.22.23 @ 16:30)  FINDINGS:    There is normal compressibility of the right common femoral, femoral and   popliteal veins.  The contralateral externaliliac vein is patent.  Doppler examination shows normal spontaneous and phasic flow.    No calf vein thrombosis is detected.    IMPRESSION:  No evidence of right lower extremity deep venous thrombosis.

## 2023-08-23 NOTE — PROGRESS NOTE ADULT - PROBLEM SELECTOR PLAN 2
PE: (+) surrounding erythema over RT lower leg ulcer  In the  ED: WBC 17k   -today 9k  -on cefepime  -BRIDGETTE wnl  -neg for DVT   - Podiatry Consult  -recc MRI  -MRI ordered for cellulitis  -Vascular surgery to be consulted

## 2023-08-23 NOTE — PROGRESS NOTE ADULT - TIME BILLING
Chart reviewed, including notes, labs, and imaging. Pt re-evaluated at the bedside. Discussed with the intern and the medical team, including Dr. Canales, the Dx, w/u, and management of pt with septic shock and infected leg ulcers with concern for spread to the bone.

## 2023-08-23 NOTE — PROGRESS NOTE ADULT - PROBLEM SELECTOR PLAN 1
-p/w  DM. HTN, diabetic  leg ulcers present with weakness, lightheadedness and infected RT lower extremity ulcer for the past 3 days.  -in the ED patient was tachycardic and hypotensive  Labs  -> WBC 17k, Hb:7.9, Cr:1.76   -on cefepime   - wound culture growing gram negative rods   - F/u Bld cx x 2,   -MRI leg ordered  -ID consulted Dr. Carter

## 2023-08-23 NOTE — PROGRESS NOTE ADULT - ASSESSMENT
A:  DM wounds    P:   Patient evaluated and Chart reviewed   Discussed diagnosis and treatment with patient  Xrays reviewed  BRIDGETTE/PVR- Right Foot: 1.01, Left Foot-1.00  Recommend MRI with concern for OM   Wound flushed with normal saline  Obtained wound culture to be sent to Lab  Applied santyl, xeroform with dry sterile dressing  Continue with IV antibiotics As Per ID  Weight bearing as tolerated to b/l feet and legs  ID consult recommended  Offloading to bilateral Heels.   Discussed importance of daily foot examinations and proper shoe gear and to importance of lower Fasting Blood Glucose levels.   Podiatry to follow while in house.  Discussed with Dr. Kim

## 2023-08-23 NOTE — PROGRESS NOTE ADULT - PROBLEM SELECTOR PLAN 4
Pt reports receiving 1pRBC 3 days ago  In the ED: HB:7.9 ( On 7/25/23  HB: 9.4) **Constantine MARK**  Baseline 10  -hbg 7.8  - Transfuse Hb < 7  -patient following up for EGD and colonoscopy outpatient  - GI to be consulted   -iron studies sent

## 2023-08-23 NOTE — PROGRESS NOTE ADULT - SUBJECTIVE AND OBJECTIVE BOX
PGY-1 Progress Note discussed with attending    PAGER #: [phone]] TILL 5:00 PM  PLEASE CONTACT ON CALL TEAM:  - On Call Team (Please refer to Deanne) FROM 5:00 PM - 8:30PM  - Nightfloat Team FROM 8:30 -7:30 AM    INTERVAL HPI/OVERNIGHT EVENTS: Patient was seen at bedside. No need for translation. Patient more aware of his medical situation and willing to stay. Patient admits having less pain in leg. Denies any fever, chest pain or sob.      REVIEW OF SYSTEMS:  CONSTITUTIONAL: No fever, weight loss, or fatigue  RESPIRATORY: No cough, wheezing, chills or hemoptysis; No shortness of breath  CARDIOVASCULAR: No chest pain, palpitations, dizziness, or leg swelling  GASTROINTESTINAL: No abdominal pain. No nausea, vomiting, or hematemesis; No diarrhea or constipation. No melena or hematochezia.  GENITOURINARY: No dysuria or hematuria, urinary frequency  NEUROLOGICAL: No headaches, memory loss, loss of strength, numbness, or tremors  SKIN: concerned about lesions in skin, moderately painful    Vital Signs Last 24 Hrs  T(C): 37.4 (23 Aug 2023 16:03), Max: 37.4 (23 Aug 2023 16:03)  T(F): 99.3 (23 Aug 2023 16:03), Max: 99.3 (23 Aug 2023 16:03)  HR: 87 (23 Aug 2023 16:03) (76 - 96)  BP: 103/56 (23 Aug 2023 16:03) (103/56 - 135/88)  BP(mean): --  RR: 18 (23 Aug 2023 16:03) (17 - 18)  SpO2: 87% (23 Aug 2023 16:03) (87% - 98%)    Parameters below as of 23 Aug 2023 16:03  Patient On (Oxygen Delivery Method): room air        PHYSICAL EXAMINATION:  GENERAL: NAD, obese  HEAD:  Atraumatic, Normocephalic  EYES:  conjunctiva and sclera clear  NECK: Supple,   CHEST/LUNG: Clear to auscultation. Clear to percussion bilaterally; No rales, rhonchi, wheezing, or rubs  HEART: Regular rate and rhythm; No murmurs, rubs, or gallops  ABDOMEN: Soft, Nontender, Nondistended; Bowel sounds present  NERVOUS SYSTEM:  Alert & Oriented X3,    EXTREMITIES:  bilateral leg ulcers, oozing and blood noted in the packings, less tenderness and warmth compared to previous exams   SKIN: warm dry                          7.9    9.91  )-----------( 360      ( 23 Aug 2023 07:05 )             26.4     08-23    138  |  107  |  9   ----------------------------<  151<H>  3.6   |  23  |  0.69    Ca    8.1<L>      23 Aug 2023 07:05  Phos  2.9     08-23  Mg     2.0     08-23    TPro  6.9  /  Alb  2.4<L>  /  TBili  0.2  /  DBili  x   /  AST  11  /  ALT  19  /  AlkPhos  93  08-23    LIVER FUNCTIONS - ( 23 Aug 2023 07:05 )  Alb: 2.4 g/dL / Pro: 6.9 g/dL / ALK PHOS: 93 U/L / ALT: 19 U/L DA / AST: 11 U/L / GGT: x                   CAPILLARY BLOOD GLUCOSE      RADIOLOGY & ADDITIONAL TESTS:                   PGY-1 Progress Note discussed with attending    PAGER #: [phone]] TILL 5:00 PM  PLEASE CONTACT ON CALL TEAM:  - On Call Team (Please refer to Deanne) FROM 5:00 PM - 8:30PM  - Nightfloat Team FROM 8:30 -7:30 AM    INTERVAL HPI/OVERNIGHT EVENTS: Patient was seen at bedside. No need for translation. Patient more aware of his medical situation and willing to stay. Patient admits having less pain in leg. Denies any fever, chest pain or sob.      REVIEW OF SYSTEMS:  CONSTITUTIONAL: No fever, weight loss, or fatigue  RESPIRATORY: No cough, wheezing, chills or hemoptysis; No shortness of breath  CARDIOVASCULAR: No chest pain, palpitations, dizziness, or leg swelling  GASTROINTESTINAL: No abdominal pain. No nausea, vomiting, or hematemesis; No diarrhea or constipation. No melena or hematochezia.  GENITOURINARY: No dysuria or hematuria, urinary frequency  NEUROLOGICAL: No headaches, memory loss, loss of strength, numbness, or tremors  SKIN: concerned about lesions in skin, moderately painful    Vital Signs Last 24 Hrs  T(C): 37.4 (23 Aug 2023 16:03), Max: 37.4 (23 Aug 2023 16:03)  T(F): 99.3 (23 Aug 2023 16:03), Max: 99.3 (23 Aug 2023 16:03)  HR: 87 (23 Aug 2023 16:03) (76 - 96)  BP: 103/56 (23 Aug 2023 16:03) (103/56 - 135/88)  BP(mean): --  RR: 18 (23 Aug 2023 16:03) (17 - 18)  SpO2: 87% (23 Aug 2023 16:03) (87% - 98%)    Parameters below as of 23 Aug 2023 16:03  Patient On (Oxygen Delivery Method): room air        PHYSICAL EXAMINATION:  GENERAL: NAD, obese  HEAD:  Atraumatic, Normocephalic  EYES:  conjunctiva and sclera clear  NECK: Supple,   CHEST/LUNG: Clear to auscultation. Clear to percussion bilaterally; No rales, rhonchi, wheezing, or rubs  HEART: Regular rate and rhythm; No murmurs, rubs, or gallops  ABDOMEN: Soft, Nontender, Nondistended; Bowel sounds present  NERVOUS SYSTEM:  Alert & Oriented X3,    EXTREMITIES:  bilateral leg ulcers, oozing and blood noted in the packings, less tenderness and warmth compared to previous exams   SKIN: warm dry                          7.9    9.91  )-----------( 360      ( 23 Aug 2023 07:05 )             26.4     08-23    138  |  107  |  9   ----------------------------<  151<H>  3.6   |  23  |  0.69    Ca    8.1<L>      23 Aug 2023 07:05  Phos  2.9     08-23  Mg     2.0     08-23    TPro  6.9  /  Alb  2.4<L>  /  TBili  0.2  /  DBili  x   /  AST  11  /  ALT  19  /  AlkPhos  93  08-23    LIVER FUNCTIONS - ( 23 Aug 2023 07:05 )  Alb: 2.4 g/dL / Pro: 6.9 g/dL / ALK PHOS: 93 U/L / ALT: 19 U/L DA / AST: 11 U/L / GGT: x                   CAPILLARY BLOOD GLUCOSE      RADIOLOGY & ADDITIONAL TESTS:                   PGY-1 Progress Note discussed with attending    PAGER #: [[phone]] TILL 5:00 PM  PLEASE CONTACT ON CALL TEAM:  - On Call Team (Please refer to Deanne) FROM 5:00 PM - 8:30PM  - Nightfloat Team FROM 8:30 -7:30 AM    INTERVAL HPI/OVERNIGHT EVENTS: Patient was seen at bedside. No need for translation. Patient more aware of his medical situation and willing to stay. Patient admits having less pain in leg. Denies any fever, chest pain or sob.      REVIEW OF SYSTEMS:  CONSTITUTIONAL: No fever, weight loss, or fatigue  RESPIRATORY: No cough, wheezing, chills or hemoptysis; No shortness of breath  CARDIOVASCULAR: No chest pain, palpitations, dizziness, or leg swelling  GASTROINTESTINAL: No abdominal pain. No nausea, vomiting, or hematemesis; No diarrhea or constipation. No melena or hematochezia.  GENITOURINARY: No dysuria or hematuria, urinary frequency  NEUROLOGICAL: No headaches, memory loss, loss of strength, numbness, or tremors  SKIN: concerned about lesions in skin, moderately painful    Vital Signs Last 24 Hrs  T(C): 37.4 (23 Aug 2023 16:03), Max: 37.4 (23 Aug 2023 16:03)  T(F): 99.3 (23 Aug 2023 16:03), Max: 99.3 (23 Aug 2023 16:03)  HR: 87 (23 Aug 2023 16:03) (76 - 96)  BP: 103/56 (23 Aug 2023 16:03) (103/56 - 135/88)  BP(mean): --  RR: 18 (23 Aug 2023 16:03) (17 - 18)  SpO2: 87% (23 Aug 2023 16:03) (87% - 98%)    Parameters below as of 23 Aug 2023 16:03  Patient On (Oxygen Delivery Method): room air        PHYSICAL EXAMINATION:  GENERAL: NAD, obese  HEAD:  Atraumatic, Normocephalic  EYES:  conjunctiva and sclera clear  NECK: Supple,   CHEST/LUNG: Clear to auscultation. Clear to percussion bilaterally; No rales, rhonchi, wheezing, or rubs  HEART: Regular rate and rhythm; No murmurs, rubs, or gallops  ABDOMEN: Soft, Nontender, Nondistended; Bowel sounds present  NERVOUS SYSTEM:  Alert & Oriented X3,    EXTREMITIES:  bilateral leg ulcers, oozing and blood noted in the packings, less tenderness and warmth compared to previous exams   SKIN: warm dry                          7.9    9.91  )-----------( 360      ( 23 Aug 2023 07:05 )             26.4     08-23    138  |  107  |  9   ----------------------------<  151<H>  3.6   |  23  |  0.69    Ca    8.1<L>      23 Aug 2023 07:05  Phos  2.9     08-23  Mg     2.0     08-23    TPro  6.9  /  Alb  2.4<L>  /  TBili  0.2  /  DBili  x   /  AST  11  /  ALT  19  /  AlkPhos  93  08-23    LIVER FUNCTIONS - ( 23 Aug 2023 07:05 )  Alb: 2.4 g/dL / Pro: 6.9 g/dL / ALK PHOS: 93 U/L / ALT: 19 U/L DA / AST: 11 U/L / GGT: x                   CAPILLARY BLOOD GLUCOSE      RADIOLOGY & ADDITIONAL TESTS:

## 2023-08-23 NOTE — PROGRESS NOTE ADULT - ASSESSMENT
Impression:   58 M w PMHx of DM, HTN, HLD. and diabetic leg ulcers for the past 5 years presented to the ED with c/o chills, weakness, lightheadedness and progressively worsening right sided diabetic foot ulcers and admitted for septic shock. Surgical swab and abscess Cx of leg wounds growing multiple organisms, including S. aureus, Pseudomonas aeruginosa, Klebsiella oxytoca/Raoultella ornithinolytica.    # Severe sepsis with b/l diabetic foot ulcers with overlying exudate, and cellulitis superior to the ulcer RLE  --change vancomycin to 1500 mg q12  --vancomycin trough around the 3rd dose  --cont. cefepime 2g q8h  --f/u organism susceptibilities    #Bilat LE SSTI-- concerned about deep infection involving bone  -- f/u MRI of bilat LE  Normal ABIs.  No evidence of right lower extremity deep venous thrombosis.        #DM-- management as per medical team   58 M w PMHx of DM, HTN, HLD. and diabetic leg ulcers for the past 5 years presented to the ED with c/o chills, weakness, lightheadedness and progressively worsening right sided diabetic foot ulcers and admitted for septic shock. Surgical swab and abscess Cx of leg wounds growing multiple organisms, including S. aureus, Pseudomonas aeruginosa, Klebsiella oxytoca/Raoultella ornithinolytica. Normal ABIs. No evidence of right lower extremity deep venous thrombosis.     # Severe sepsis with b/l diabetic foot ulcers with overlying exudate, and cellulitis superior to the ulcer RLE  --d/c vanco (has MSSA)  --cont. cefepime 2g q8h to cover the gm negative aerobes    #Bilat LE SSTI-- concerned about deep infection involving bone  -- f/u MRI of bilat LE    #DM-- management as per medical team

## 2023-08-23 NOTE — PROGRESS NOTE ADULT - ASSESSMENT
57 yo M with PMH of  DM, HLD, HTN and diabetic  leg ulcers present with weakness, lightheadedness and infected RT lower extremity ulcer for the past 3 days which has progressively worsened today. IN the ED patient was hypotensive, tachycardic, saturating at 94 on room air. On labs patient did have a white count, on xray of foot  there was soft tissue defect. Patient on cefepime and vancomycin, MRI of leg pending. ID consulted. Vascular consulted.  Admitted to medicine for Sepsis 2/2 to lower extremity infection. 59 yo M with PMH of  DM, HLD, HTN and diabetic  leg ulcers present with weakness, lightheadedness and infected RT lower extremity ulcer for the past 3 days which has progressively worsened today. IN the ED patient was hypotensive, tachycardic, saturating at 94 on room air. On labs patient did have a white count, on xray of foot  there was soft tissue defect. Patient on cefepime and vancomycin, MRI of leg pending. ID consulted. Vascular consulted.  Admitted to medicine for Sepsis 2/2 to lower extremity infection.

## 2023-08-23 NOTE — PROGRESS NOTE ADULT - ATTENDING COMMENTS
Patient seen and examined this morning with PGY1 Dr. Temple who translated at bedside    58 year old man with PMH of DM, HLD, HTN, with chronic B/L lower extremity ulcers  for the past 5 years Follows at [facility]  He comes in on account of dizziness, chills and weakness .Similar symptoms few days ago for which he was admitted to OSH.  He was diagnosed with deep tissue infection and placed on antibiotics, received blood transfusion but had to leave AMA one day ago.  He states he follows with a dermatologist and PCP in [place] but has been told his chronic leg ulcers are not vascular or diabetic in origin after prior work up.    Patient doing better otday; understanding of clinical situation;  right leg swelling and discoloration much improved; . stil with pain onc soren but doppler negative for DVT;  Has longstanding ulcers on both legs appears to be deep seated and concern for OM    Vital Signs Last 24 Hrs  T(C): 37.4 (23 Aug 2023 16:03), Max: 37.4 (23 Aug 2023 16:03)  T(F): 99.3 (23 Aug 2023 16:03), Max: 99.3 (23 Aug 2023 16:03)  HR: 87 (23 Aug 2023 16:03) (76 - 96)  BP: 103/56 (23 Aug 2023 16:03) (103/56 - 135/88)  RR: 18 (23 Aug 2023 16:03) (17 - 18)  SpO2: 87% (23 Aug 2023 16:03) (87% - 98%): room air    P/E: as above  Middle aged man, comfortable in bed NAD  Psych: AAO x3  Neuro: No gross focal deficits; Power and sensation intact  CVS: S1S2 present, regular, no edema  Resp: BLAE+, No wheeze or Rhonchi  GI: Soft, BS+, Non tender, non distended  Skin: Warm and moist without any rashes  Extr: Right calf tenderness+; Right LE warm, swollen and tender compared to left LE but much imp[roved swelling and discolaration today  Left leg( distal 1/2)  with large deep ulcers- uneven edges, pink to reddish erythematous base with a lot of yellowish fibrinolytic material  Right leg - has very deep ulcer with exposed tendons just above the medial malleolus. Surrounding erythema, swelling and tenderness.    Labs:                              7.9    9.91  )-----------( 360      ( 23 Aug 2023 07:05 )             26.4   08-23    138  |  107  |  9   ----------------------------<  151<H>  3.6   |  23  |  0.69  Ca    8.1<L>      23 Aug 2023 07:05  Phos  2.9     08-23  Mg     2.0     08-23  TPro  6.9  /  Alb  2.4<L>  /  TBili  0.2  /  DBili  x   /  AST  11  /  ALT  19  /  AlkPhos  93  08-23    Sedimentation Rate, Erythrocyte (08.23.23 @ 07:05) Sedimentation Rate, Erythrocyte: 107 mm/Hr  C-Reactive Protein, Serum (08.23.23 @ 07:05) C-Reactive Protein, Serum: 102 mg/  A1C with Estimated Average Glucose (08.21.23 @ 05:30) A1C with Estimated Average Glucose Result: 6.6:    Iron with Total Binding Capacity (08.22.23 @ 08:00)   Iron - Total Binding Capacity.: 331 ug/dL  % Saturation, Iron: 5 %   Iron Total: 17 ug/dL    Culture - Blood (08.20.23 @ 23:10)   Culture Results: No growth at 48 Hours    A/P:  58 year old man with hx of DM, HTN, HLD with chronic B/L LE ulcers with chronic wounds managed in [place] presenting with sepsis likely related to his wound infections and concern for OM    D/D:   Sepsis with SSTI of RLE  B/L lower extremity chronic ulcer infection  with cellulitis and suspected osteomyelitis  ARGELIA likely from underlying Sepsis resolved  Acute on chronic microcytic anemia suspicious for iron def anemia related to GI bleeding  Suspected Pyoderma gangrenosum  Type 2 DM fair control   HTN     Plan:  Blood cx negative;   ID f/u appreciated; d/w Dr. Carter; D/C Vanco as MSSA; Continue Cefepime tocover MSSA and other Gram negative organism from wound Cx  Anemia panel with Iron deficiency;   GI consult Patient will likely need a Colonoscopy; last Colonoscopy >1 yr ago with multiple polyps removed; recommended repeat  A1c acceptable; Add Gabapentin 600 mg HS for Neuropathic pain  Hold BP meds for hypotension or  low BP  Obtain medication list from [facility] pharmacy: Patient reported taking Lantus, Janumet 2 tabs HS (50/1000), Jardiance 10 gm daily, Statin  Podiatry consult noted; Follow up BR[facility]GETTE; Vascular Sx consult appreciated; No intervention indicated  Follow up MRI; elevated ESR and CRP    Discussed with patient at length with Dr. Temple translating; Patient understand need for  IV ABX as well as work up of his ulcer as well as significant Anemia both of which could be related  Discussed with PGy1 Dr. Temple and PGY3 Dr. Sal and RN  I will be away 8/24/23; Hospitalist Colleague to assume care AM Patient seen and examined this morning with PGY1 Dr. Temple who translated at bedside    58 year old man with PMH of DM, HLD, HTN, with chronic B/L lower extremity ulcers  for the past 5 years Follows at [facility]  He comes in on account of dizziness, chills and weakness .Similar symptoms few days ago for which he was admitted to OSH.  He was diagnosed with deep tissue infection and placed on antibiotics, received blood transfusion but had to leave AMA one day ago.  He states he follows with a dermatologist and PCP in [place] but has been told his chronic leg ulcers are not vascular or diabetic in origin after prior work up.    Patient doing better otday; understanding of clinical situation;  right leg swelling and discoloration much improved; . stil with pain onc soren but doppler negative for DVT;  Has longstanding ulcers on both legs appears to be deep seated and concern for OM    Vital Signs Last 24 Hrs  T(C): 37.4 (23 Aug 2023 16:03), Max: 37.4 (23 Aug 2023 16:03)  T(F): 99.3 (23 Aug 2023 16:03), Max: 99.3 (23 Aug 2023 16:03)  HR: 87 (23 Aug 2023 16:03) (76 - 96)  BP: 103/56 (23 Aug 2023 16:03) (103/56 - 135/88)  RR: 18 (23 Aug 2023 16:03) (17 - 18)  SpO2: 87% (23 Aug 2023 16:03) (87% - 98%): room air    P/E: as above  Middle aged man, comfortable in bed NAD  Psych: AAO x3  Neuro: No gross focal deficits; Power and sensation intact  CVS: S1S2 present, regular, no edema  Resp: BLAE+, No wheeze or Rhonchi  GI: Soft, BS+, Non tender, non distended  Skin: Warm and moist without any rashes  Extr: Right calf tenderness+; Right LE warm, swollen and tender compared to left LE but much imp[roved swelling and discolaration today  Left leg( distal 1/2)  with large deep ulcers- uneven edges, pink to reddish erythematous base with a lot of yellowish fibrinolytic material  Right leg - has very deep ulcer with exposed tendons just above the medial malleolus. Surrounding erythema, swelling and tenderness.    Labs:                              7.9    9.91  )-----------( 360      ( 23 Aug 2023 07:05 )             26.4   08-23    138  |  107  |  9   ----------------------------<  151<H>  3.6   |  23  |  0.69  Ca    8.1<L>      23 Aug 2023 07:05  Phos  2.9     08-23  Mg     2.0     08-23  TPro  6.9  /  Alb  2.4<L>  /  TBili  0.2  /  DBili  x   /  AST  11  /  ALT  19  /  AlkPhos  93  08-23    Sedimentation Rate, Erythrocyte (08.23.23 @ 07:05) Sedimentation Rate, Erythrocyte: 107 mm/Hr  C-Reactive Protein, Serum (08.23.23 @ 07:05) C-Reactive Protein, Serum: 102 mg/  A1C with Estimated Average Glucose (08.21.23 @ 05:30) A1C with Estimated Average Glucose Result: 6.6:    Iron with Total Binding Capacity (08.22.23 @ 08:00)   Iron - Total Binding Capacity.: 331 ug/dL  % Saturation, Iron: 5 %   Iron Total: 17 ug/dL    Culture - Blood (08.20.23 @ 23:10)   Culture Results: No growth at 48 Hours    A/P:  58 year old man with hx of DM, HTN, HLD with chronic B/L LE ulcers with chronic wounds managed in [place] presenting with sepsis likely related to his wound infections and concern for OM    D/D:   Sepsis with SSTI of RLE  B/L lower extremity chronic ulcer infection  with cellulitis and suspected osteomyelitis  ARGELIA likely from underlying Sepsis resolved  Acute on chronic microcytic anemia suspicious for iron def anemia related to GI bleeding  Suspected Pyoderma gangrenosum  Type 2 DM fair control   HTN     Plan:  Blood cx negative;   ID f/u appreciated; d/w Dr. Carter; D/C Vanco as MSSA; Continue Cefepime tocover MSSA and other Gram negative organism from wound Cx  Anemia panel with Iron deficiency;   GI consult Patient will likely need a Colonoscopy; last Colonoscopy >1 yr ago with multiple polyps removed; recommended repeat  A1c acceptable; Add Gabapentin 600 mg HS for Neuropathic pain  Hold BP meds for hypotension or  low BP  Obtain medication list from [facility] pharmacy: Patient reported taking Lantus, Janumet 2 tabs HS (50/1000), Jardiance 10 gm daily, Statin  Podiatry consult noted; Follow up [facility]; Vascular Sx consult appreciated; No intervention indicated  Follow up MRI; elevated ESR and CRP    Discussed with patient at length with Dr. Temple translating; Patient understand need for  IV ABX as well as work up of his ulcer as well as significant Anemia both of which could be related  Discussed with PGy1 Dr. Temple and PGY3 Dr. Sal and RN  I will be away 8/24/23; Hospitalist Colleague to assume care AM Patient seen and examined this morning with PGY1 Dr. Temple who translated at bedside    58 year old man with PMH of DM, HLD, HTN, with chronic B/L lower extremity ulcers  for the past 5 years Follows at [facility]  He comes in on account of dizziness, chills and weakness .Similar symptoms few days ago for which he was admitted to OSH.  He was diagnosed with deep tissue infection and placed on antibiotics, received blood transfusion but had to leave AMA one day ago.  He states he follows with a dermatologist and PCP in [place] but has been told his chronic leg ulcers are not vascular or diabetic in origin after prior work up.    Patient doing better otday; understanding of clinical situation;  right leg swelling and discoloration much improved; . stil with pain onc soren but doppler negative for DVT;  Has longstanding ulcers on both legs appears to be deep seated and concern for OM    Vital Signs Last 24 Hrs  T(C): 37.4 (23 Aug 2023 16:03), Max: 37.4 (23 Aug 2023 16:03)  T(F): 99.3 (23 Aug 2023 16:03), Max: 99.3 (23 Aug 2023 16:03)  HR: 87 (23 Aug 2023 16:03) (76 - 96)  BP: 103/56 (23 Aug 2023 16:03) (103/56 - 135/88)  RR: 18 (23 Aug 2023 16:03) (17 - 18)  SpO2: 87% (23 Aug 2023 16:03) (87% - 98%): room air    P/E: as above  Middle aged man, comfortable in bed NAD  Psych: AAO x3  Neuro: No gross focal deficits; Power and sensation intact  CVS: S1S2 present, regular, no edema  Resp: BLAE+, No wheeze or Rhonchi  GI: Soft, BS+, Non tender, non distended  Skin: Warm and moist without any rashes  Extr: Right calf tenderness+; Right LE warm, swollen and tender compared to left LE but much imp[roved swelling and discolaration today  Left leg( distal 1/2)  with large deep ulcers- uneven edges, pink to reddish erythematous base with a lot of yellowish fibrinolytic material  Right leg - has very deep ulcer with exposed tendons just above the medial malleolus. Surrounding erythema, swelling and tenderness.    Labs:                              7.9    9.91  )-----------( 360      ( 23 Aug 2023 07:05 )             26.4   08-23    138  |  107  |  9   ----------------------------<  151<H>  3.6   |  23  |  0.69  Ca    8.1<L>      23 Aug 2023 07:05  Phos  2.9     08-23  Mg     2.0     08-23  TPro  6.9  /  Alb  2.4<L>  /  TBili  0.2  /  DBili  x   /  AST  11  /  ALT  19  /  AlkPhos  93  08-23    Sedimentation Rate, Erythrocyte (08.23.23 @ 07:05) Sedimentation Rate, Erythrocyte: 107 mm/Hr  C-Reactive Protein, Serum (08.23.23 @ 07:05) C-Reactive Protein, Serum: 102 mg/  A1C with Estimated Average Glucose (08.21.23 @ 05:30) A1C with Estimated Average Glucose Result: 6.6:    Iron with Total Binding Capacity (08.22.23 @ 08:00)   Iron - Total Binding Capacity.: 331 ug/dL  % Saturation, Iron: 5 %   Iron Total: 17 ug/dL    Culture - Blood (08.20.23 @ 23:10)   Culture Results: No growth at 48 Hours    A/P:  58 year old man with hx of DM, HTN, HLD with chronic B/L LE ulcers with chronic wounds managed in [place] presenting with sepsis likely related to his wound infections and concern for OM    D/D:   Sepsis with SSTI of RLE  B/L lower extremity chronic ulcer infection  with cellulitis and suspected osteomyelitis  ARGELIA likely from underlying Sepsis resolved  Acute on chronic microcytic anemia suspicious for iron def anemia related to GI bleeding  Suspected Pyoderma gangrenosum  Type 2 DM fair control   HTN     Plan:  Blood cx negative;   ID f/u appreciated; d/w Dr. Carter; D/C Vanco as MSSA; Continue Cefepime tocover MSSA and other Gram negative organism from wound Cx  Anemia panel with Iron deficiency;   GI consult Patient will likely need a Colonoscopy; last Colonoscopy >1 yr ago with multiple polyps removed; recommended repeat  A1c acceptable; Add Gabapentin 600 mg HS for Neuropathic pain  Hold BP meds for hypotension or  low BP  Obtain medication list from [facility] pharmacy: Patient reported taking Lantus, Janumet 2 tabs HS (50/1000), Jardiance 10 gm daily, Statin  Podiatry consult noted; Follow up [facility]GET[facility]; Vascular Sx consult appreciated; No intervention indicated  Follow up MRI; elevated ESR and CRP    Discussed with patient at length with Dr. Temple translating; Patient understand need for  IV ABX as well as work up of his ulcer as well as significant Anemia both of which could be related  Discussed with PGy1 Dr. Temple and PGY3 Dr. Sal and RN  I will be away 8/24/23; Hospitalist Colleague to assume care AM

## 2023-08-23 NOTE — PROGRESS NOTE ADULT - PROBLEM SELECTOR PLAN 3
PE: (+) B/L LE ulcer  - Podiatry Consult  - wound culture growing gram negative rods  -on cefepime   -vascular consulted

## 2023-08-24 LAB
-  AMIKACIN: SIGNIFICANT CHANGE UP
-  AMOXICILLIN/CLAVULANIC ACID: SIGNIFICANT CHANGE UP
-  AMPICILLIN/SULBACTAM: SIGNIFICANT CHANGE UP
-  AMPICILLIN: SIGNIFICANT CHANGE UP
-  AZTREONAM: SIGNIFICANT CHANGE UP
-  CEFAZOLIN: SIGNIFICANT CHANGE UP
-  CEFEPIME: SIGNIFICANT CHANGE UP
-  CEFOXITIN: SIGNIFICANT CHANGE UP
-  CEFTRIAXONE: SIGNIFICANT CHANGE UP
-  CIPROFLOXACIN: SIGNIFICANT CHANGE UP
-  ERTAPENEM: SIGNIFICANT CHANGE UP
-  GENTAMICIN: SIGNIFICANT CHANGE UP
-  IMIPENEM: SIGNIFICANT CHANGE UP
-  LEVOFLOXACIN: SIGNIFICANT CHANGE UP
-  MEROPENEM: SIGNIFICANT CHANGE UP
-  PIPERACILLIN/TAZOBACTAM: SIGNIFICANT CHANGE UP
-  TOBRAMYCIN: SIGNIFICANT CHANGE UP
-  TRIMETHOPRIM/SULFAMETHOXAZOLE: SIGNIFICANT CHANGE UP
ALBUMIN SERPL ELPH-MCNC: 2.4 G/DL — LOW (ref 3.5–5)
ALBUMIN SERPL ELPH-MCNC: 2.4 G/DL — LOW (ref 3.5–5)
ALP SERPL-CCNC: 93 U/L — SIGNIFICANT CHANGE UP (ref 40–120)
ALP SERPL-CCNC: 93 U/L — SIGNIFICANT CHANGE UP (ref 40–120)
ALT FLD-CCNC: 18 U/L DA — SIGNIFICANT CHANGE UP (ref 10–60)
ALT FLD-CCNC: 18 U/L DA — SIGNIFICANT CHANGE UP (ref 10–60)
ANION GAP SERPL CALC-SCNC: 7 MMOL/L — SIGNIFICANT CHANGE UP (ref 5–17)
ANION GAP SERPL CALC-SCNC: 7 MMOL/L — SIGNIFICANT CHANGE UP (ref 5–17)
AST SERPL-CCNC: 8 U/L — LOW (ref 10–40)
AST SERPL-CCNC: 8 U/L — LOW (ref 10–40)
BILIRUB SERPL-MCNC: 0.2 MG/DL — SIGNIFICANT CHANGE UP (ref 0.2–1.2)
BILIRUB SERPL-MCNC: 0.2 MG/DL — SIGNIFICANT CHANGE UP (ref 0.2–1.2)
BUN SERPL-MCNC: 10 MG/DL — SIGNIFICANT CHANGE UP (ref 7–18)
BUN SERPL-MCNC: 10 MG/DL — SIGNIFICANT CHANGE UP (ref 7–18)
CALCIUM SERPL-MCNC: 8.4 MG/DL — SIGNIFICANT CHANGE UP (ref 8.4–10.5)
CALCIUM SERPL-MCNC: 8.4 MG/DL — SIGNIFICANT CHANGE UP (ref 8.4–10.5)
CHLORIDE SERPL-SCNC: 107 MMOL/L — SIGNIFICANT CHANGE UP (ref 96–108)
CHLORIDE SERPL-SCNC: 107 MMOL/L — SIGNIFICANT CHANGE UP (ref 96–108)
CO2 SERPL-SCNC: 24 MMOL/L — SIGNIFICANT CHANGE UP (ref 22–31)
CO2 SERPL-SCNC: 24 MMOL/L — SIGNIFICANT CHANGE UP (ref 22–31)
CREAT SERPL-MCNC: 0.79 MG/DL — SIGNIFICANT CHANGE UP (ref 0.5–1.3)
CREAT SERPL-MCNC: 0.79 MG/DL — SIGNIFICANT CHANGE UP (ref 0.5–1.3)
EGFR: 103 ML/MIN/1.73M2 — SIGNIFICANT CHANGE UP
EGFR: 103 ML/MIN/1.73M2 — SIGNIFICANT CHANGE UP
GLUCOSE BLDC GLUCOMTR-MCNC: 124 MG/DL — HIGH (ref 70–99)
GLUCOSE BLDC GLUCOMTR-MCNC: 124 MG/DL — HIGH (ref 70–99)
GLUCOSE BLDC GLUCOMTR-MCNC: 128 MG/DL — HIGH (ref 70–99)
GLUCOSE BLDC GLUCOMTR-MCNC: 128 MG/DL — HIGH (ref 70–99)
GLUCOSE BLDC GLUCOMTR-MCNC: 147 MG/DL — HIGH (ref 70–99)
GLUCOSE BLDC GLUCOMTR-MCNC: 147 MG/DL — HIGH (ref 70–99)
GLUCOSE BLDC GLUCOMTR-MCNC: 164 MG/DL — HIGH (ref 70–99)
GLUCOSE BLDC GLUCOMTR-MCNC: 164 MG/DL — HIGH (ref 70–99)
GLUCOSE SERPL-MCNC: 144 MG/DL — HIGH (ref 70–99)
GLUCOSE SERPL-MCNC: 144 MG/DL — HIGH (ref 70–99)
HCT VFR BLD CALC: 27.9 % — LOW (ref 39–50)
HCT VFR BLD CALC: 27.9 % — LOW (ref 39–50)
HGB BLD-MCNC: 8.3 G/DL — LOW (ref 13–17)
HGB BLD-MCNC: 8.3 G/DL — LOW (ref 13–17)
MAGNESIUM SERPL-MCNC: 2.1 MG/DL — SIGNIFICANT CHANGE UP (ref 1.6–2.6)
MAGNESIUM SERPL-MCNC: 2.1 MG/DL — SIGNIFICANT CHANGE UP (ref 1.6–2.6)
MCHC RBC-ENTMCNC: 23.3 PG — LOW (ref 27–34)
MCHC RBC-ENTMCNC: 23.3 PG — LOW (ref 27–34)
MCHC RBC-ENTMCNC: 29.7 GM/DL — LOW (ref 32–36)
MCHC RBC-ENTMCNC: 29.7 GM/DL — LOW (ref 32–36)
MCV RBC AUTO: 78.4 FL — LOW (ref 80–100)
MCV RBC AUTO: 78.4 FL — LOW (ref 80–100)
METHOD TYPE: SIGNIFICANT CHANGE UP
NRBC # BLD: 0 /100 WBCS — SIGNIFICANT CHANGE UP (ref 0–0)
NRBC # BLD: 0 /100 WBCS — SIGNIFICANT CHANGE UP (ref 0–0)
PHOSPHATE SERPL-MCNC: 3.2 MG/DL — SIGNIFICANT CHANGE UP (ref 2.5–4.5)
PHOSPHATE SERPL-MCNC: 3.2 MG/DL — SIGNIFICANT CHANGE UP (ref 2.5–4.5)
PLATELET # BLD AUTO: 364 K/UL — SIGNIFICANT CHANGE UP (ref 150–400)
PLATELET # BLD AUTO: 364 K/UL — SIGNIFICANT CHANGE UP (ref 150–400)
POTASSIUM SERPL-MCNC: 3.9 MMOL/L — SIGNIFICANT CHANGE UP (ref 3.5–5.3)
POTASSIUM SERPL-MCNC: 3.9 MMOL/L — SIGNIFICANT CHANGE UP (ref 3.5–5.3)
POTASSIUM SERPL-SCNC: 3.9 MMOL/L — SIGNIFICANT CHANGE UP (ref 3.5–5.3)
POTASSIUM SERPL-SCNC: 3.9 MMOL/L — SIGNIFICANT CHANGE UP (ref 3.5–5.3)
PROT SERPL-MCNC: 7.3 G/DL — SIGNIFICANT CHANGE UP (ref 6–8.3)
PROT SERPL-MCNC: 7.3 G/DL — SIGNIFICANT CHANGE UP (ref 6–8.3)
RBC # BLD: 3.56 M/UL — LOW (ref 4.2–5.8)
RBC # BLD: 3.56 M/UL — LOW (ref 4.2–5.8)
RBC # FLD: 17 % — HIGH (ref 10.3–14.5)
RBC # FLD: 17 % — HIGH (ref 10.3–14.5)
SODIUM SERPL-SCNC: 138 MMOL/L — SIGNIFICANT CHANGE UP (ref 135–145)
SODIUM SERPL-SCNC: 138 MMOL/L — SIGNIFICANT CHANGE UP (ref 135–145)
VANCOMYCIN TROUGH SERPL-MCNC: 4.7 UG/ML — LOW (ref 10–20)
VANCOMYCIN TROUGH SERPL-MCNC: 4.7 UG/ML — LOW (ref 10–20)
WBC # BLD: 11.16 K/UL — HIGH (ref 3.8–10.5)
WBC # BLD: 11.16 K/UL — HIGH (ref 3.8–10.5)
WBC # FLD AUTO: 11.16 K/UL — HIGH (ref 3.8–10.5)
WBC # FLD AUTO: 11.16 K/UL — HIGH (ref 3.8–10.5)

## 2023-08-24 PROCEDURE — 73723 MRI JOINT LWR EXTR W/O&W/DYE: CPT | Mod: 26,RT

## 2023-08-24 PROCEDURE — 99232 SBSQ HOSP IP/OBS MODERATE 35: CPT | Mod: GC

## 2023-08-24 PROCEDURE — 73722 MRI JOINT OF LWR EXTR W/DYE: CPT | Mod: 26,LT

## 2023-08-24 RX ADMIN — CEFEPIME 100 MILLIGRAM(S): 1 INJECTION, POWDER, FOR SOLUTION INTRAMUSCULAR; INTRAVENOUS at 14:05

## 2023-08-24 RX ADMIN — Medication 650 MILLIGRAM(S): at 10:33

## 2023-08-24 RX ADMIN — Medication 650 MILLIGRAM(S): at 21:12

## 2023-08-24 RX ADMIN — GABAPENTIN 300 MILLIGRAM(S): 400 CAPSULE ORAL at 21:12

## 2023-08-24 RX ADMIN — CEFEPIME 100 MILLIGRAM(S): 1 INJECTION, POWDER, FOR SOLUTION INTRAMUSCULAR; INTRAVENOUS at 05:06

## 2023-08-24 RX ADMIN — Medication 650 MILLIGRAM(S): at 22:16

## 2023-08-24 RX ADMIN — Medication 650 MILLIGRAM(S): at 09:35

## 2023-08-24 RX ADMIN — CEFEPIME 100 MILLIGRAM(S): 1 INJECTION, POWDER, FOR SOLUTION INTRAMUSCULAR; INTRAVENOUS at 21:12

## 2023-08-24 NOTE — PROGRESS NOTE ADULT - PROBLEM SELECTOR PLAN 4
Pt reports receiving 1pRBC 3 days ago  In the ED: HB:7.9 ( On 7/25/23  HB: 9.4) **[place] MARK**  Baseline 10  -hbg 7.8  - Transfuse Hb < 7  -patient following up for EGD and colonoscopy outpatient  - GI to be consulted   -iron studies sent Pt reports receiving 1pRBC 3 days ago  In the ED: HB:7.9 ( On 7/25/23  HB: 9.4) **[place] MARK**  Baseline 10  -hbg 7.8  - Transfuse Hb < 7  -patient following up for EGD and colonoscopy outpatient  - GI to be consulted   -iron studies sent

## 2023-08-24 NOTE — PROGRESS NOTE ADULT - SUBJECTIVE AND OBJECTIVE BOX
PGY-1 Progress Note discussed with attending    PAGER #: [] TILL 5:00 PM  PLEASE CONTACT ON CALL TEAM:  - On Call Team (Please refer to Deanne) FROM 5:00 PM - 8:30PM  - Nightfloat Team FROM 8:30 -7:30 AM    CHIEF COMPLAINT & BRIEF HOSPITAL COURSE:      INTERVAL HPI/OVERNIGHT EVENTS:       REVIEW OF SYSTEMS:  CONSTITUTIONAL: No fever, weight loss, or fatigue  RESPIRATORY: No cough, wheezing, chills or hemoptysis; No shortness of breath  CARDIOVASCULAR: No chest pain, palpitations, dizziness, or leg swelling  GASTROINTESTINAL: No abdominal pain. No nausea, vomiting, or hematemesis; No diarrhea or constipation. No melena or hematochezia.  GENITOURINARY: No dysuria or hematuria, urinary frequency  NEUROLOGICAL: No headaches, memory loss, loss of strength, numbness, or tremors  SKIN: No itching, burning, rashes, or lesions     MEDICATIONS  (STANDING):  cefepime   IVPB      cefepime   IVPB 2000 milliGRAM(s) IV Intermittent every 8 hours  gabapentin 300 milliGRAM(s) Oral at bedtime  insulin lispro (ADMELOG) corrective regimen sliding scale   SubCutaneous three times a day before meals  insulin lispro (ADMELOG) corrective regimen sliding scale   SubCutaneous at bedtime  sodium chloride 0.9%. 1000 milliLiter(s) (125 mL/Hr) IV Continuous <Continuous>    MEDICATIONS  (PRN):  acetaminophen     Tablet .. 650 milliGRAM(s) Oral every 6 hours PRN Temp greater or equal to 38C (100.4F), Mild Pain (1 - 3)  melatonin 3 milliGRAM(s) Oral at bedtime PRN Insomnia      Vital Signs Last 24 Hrs  T(C): 37 (24 Aug 2023 11:05), Max: 37.4 (23 Aug 2023 16:03)  T(F): 98.6 (24 Aug 2023 11:05), Max: 99.3 (23 Aug 2023 16:03)  HR: 88 (24 Aug 2023 11:05) (64 - 88)  BP: 104/65 (24 Aug 2023 11:05) (103/56 - 117/72)  BP(mean): --  RR: 18 (24 Aug 2023 11:05) (18 - 19)  SpO2: 94% (24 Aug 2023 11:05) (87% - 98%)    Parameters below as of 24 Aug 2023 11:05  Patient On (Oxygen Delivery Method): room air        PHYSICAL EXAMINATION:  GENERAL: NAD, well built  HEAD:  Atraumatic, Normocephalic  EYES:  conjunctiva and sclera clear  NECK: Supple, No JVD, Normal thyroid  CHEST/LUNG: Clear to auscultation. Clear to percussion bilaterally; No rales, rhonchi, wheezing, or rubs  HEART: Regular rate and rhythm; No murmurs, rubs, or gallops  ABDOMEN: Soft, Nontender, Nondistended; Bowel sounds present, no pain or masses on palpation  NERVOUS SYSTEM:  Alert & Oriented X3  : voiding well  EXTREMITIES:  2+ Peripheral Pulses, No clubbing, cyanosis, or edema  SKIN: warm dry                          8.3    11.16 )-----------( 364      ( 24 Aug 2023 05:59 )             27.9     08-24    138  |  107  |  10  ----------------------------<  144<H>  3.9   |  24  |  0.79    Ca    8.4      24 Aug 2023 05:59  Phos  3.2     08-24  Mg     2.1     08-24    TPro  7.3  /  Alb  2.4<L>  /  TBili  0.2  /  DBili  x   /  AST  8<L>  /  ALT  18  /  AlkPhos  93  08-24    LIVER FUNCTIONS - ( 24 Aug 2023 05:59 )  Alb: 2.4 g/dL / Pro: 7.3 g/dL / ALK PHOS: 93 U/L / ALT: 18 U/L DA / AST: 8 U/L / GGT: x                   I&O's Summary    23 Aug 2023 07:01  -  24 Aug 2023 07:00  --------------------------------------------------------  IN: 780 mL / OUT: 1350 mL / NET: -570 mL    24 Aug 2023 07:01  -  24 Aug 2023 13:27  --------------------------------------------------------  IN: 236 mL / OUT: 0 mL / NET: 236 mL            CAPILLARY BLOOD GLUCOSE      RADIOLOGY & ADDITIONAL TESTS:                   PGY-1 Progress Note discussed with attending    PAGER #: [] TILL 5:00 PM  PLEASE CONTACT ON CALL TEAM:  - On Call Team (Please refer to Deanne) FROM 5:00 PM - 8:30PM  - Nightfloat Team FROM 8:30 -7:30 AM    CHIEF COMPLAINT & BRIEF HOSPITAL COURSE:      INTERVAL HPI/OVERNIGHT EVENTS: Patient was laying comfortable in bed this morning.  [id number] Brenna (Polish) was at the bedside. Patient was able to tolerate breakfast this morning and reports pain in legs but states pain is less than yesterday. Reports have two urinary voids overnight denies hematuria.       REVIEW OF SYSTEMS:  CONSTITUTIONAL: Denies fever and chills  RESPIRATORY: Denies cough, wheezing; Denies shortness of breath  CARDIOVASCULAR: Denies chest pain, palpitations, dizziness, + leg swelling  GASTROINTESTINAL: Denies abdominal pain. Denies nausea, vomiting  GENITOURINARY: Denies dysuria or hematuria, urinary frequency  NEUROLOGICAL: Denies headaches, memory loss, loss of strength, numbness, or tremors  SKIN: + lesions on medial tibia b/l    MEDICATIONS  (STANDING):  cefepime   IVPB      cefepime   IVPB 2000 milliGRAM(s) IV Intermittent every 8 hours  gabapentin 300 milliGRAM(s) Oral at bedtime  insulin lispro (ADMELOG) corrective regimen sliding scale   SubCutaneous three times a day before meals  insulin lispro (ADMELOG) corrective regimen sliding scale   SubCutaneous at bedtime  sodium chloride 0.9%. 1000 milliLiter(s) (125 mL/Hr) IV Continuous <Continuous>    MEDICATIONS  (PRN):  acetaminophen     Tablet .. 650 milliGRAM(s) Oral every 6 hours PRN Temp greater or equal to 38C (100.4F), Mild Pain (1 - 3)  melatonin 3 milliGRAM(s) Oral at bedtime PRN Insomnia      Vital Signs Last 24 Hrs  T(C): 37 (24 Aug 2023 11:05), Max: 37.4 (23 Aug 2023 16:03)  T(F): 98.6 (24 Aug 2023 11:05), Max: 99.3 (23 Aug 2023 16:03)  HR: 88 (24 Aug 2023 11:05) (64 - 88)  BP: 104/65 (24 Aug 2023 11:05) (103/56 - 117/72)  BP(mean): --  RR: 18 (24 Aug 2023 11:05) (18 - 19)  SpO2: 94% (24 Aug 2023 11:05) (87% - 98%)    Parameters below as of 24 Aug 2023 11:05  Patient On (Oxygen Delivery Method): room air        PHYSICAL EXAMINATION:  GENERAL: NAD, well built  HEAD:  Atraumatic, Normocephalic  EYES:  conjunctiva and sclera clear  NECK: - JVD  CHEST/LUNG: Clear to auscultation; No rales, rhonchi, wheezing, or rubs  HEART: Regular rate and rhythm; No murmurs, rubs, or gallops +s1 and +s2  ABDOMEN: Soft, Nontender, Nondistended; Bowel sounds present in all four quadrants, no pain or masses on palpation, no rashes, bruising, or scars  NERVOUS SYSTEM:  Alert & Oriented X3  : voided 2 times overnight  EXTREMITIES:  2+ peripheral radial pulses, diminished dorsalis pedis pulses b/l. Erythema on left distal 1st phalanx. 1+ edema on dorsum of feet b/l.  SKIN: Erythema surrounding medial tibia ulcers with drainage b/l                          8.3    11.16 )-----------( 364      ( 24 Aug 2023 05:59 )             27.9     08-24    138  |  107  |  10  ----------------------------<  144<H>  3.9   |  24  |  0.79    Ca    8.4      24 Aug 2023 05:59  Phos  3.2     08-24  Mg     2.1     08-24    TPro  7.3  /  Alb  2.4<L>  /  TBili  0.2  /  DBili  x   /  AST  8<L>  /  ALT  18  /  AlkPhos  93  08-24    LIVER FUNCTIONS - ( 24 Aug 2023 05:59 )  Alb: 2.4 g/dL / Pro: 7.3 g/dL / ALK PHOS: 93 U/L / ALT: 18 U/L DA / AST: 8 U/L / GGT: x                   I&O's Summary    23 Aug 2023 07:01  -  24 Aug 2023 07:00  --------------------------------------------------------  IN: 780 mL / OUT: 1350 mL / NET: -570 mL    24 Aug 2023 07:01  -  24 Aug 2023 13:27  --------------------------------------------------------  IN: 236 mL / OUT: 0 mL / NET: 236 mL            CAPILLARY BLOOD GLUCOSE      RADIOLOGY & ADDITIONAL TESTS:                   PGY-1 Progress Note discussed with attending    PAGER #: [] TILL 5:00 PM  PLEASE CONTACT ON CALL TEAM:  - On Call Team (Please refer to Deanne) FROM 5:00 PM - 8:30PM  - Nightfloat Team FROM 8:30 -7:30 AM    CHIEF COMPLAINT & BRIEF HOSPITAL COURSE:      INTERVAL HPI/OVERNIGHT EVENTS: Patient was laying comfortable in bed this morning.  [id number] Brenna (Malay) was at the bedside. Patient was able to tolerate breakfast this morning and reports pain in legs but states pain is less than yesterday. Reports have two urinary voids overnight denies hematuria.       REVIEW OF SYSTEMS:  CONSTITUTIONAL: Denies fever and chills  RESPIRATORY: Denies cough, wheezing; Denies shortness of breath  CARDIOVASCULAR: Denies chest pain, palpitations, dizziness, + leg swelling  GASTROINTESTINAL: Denies abdominal pain. Denies nausea, vomiting  GENITOURINARY: Denies dysuria or hematuria, urinary frequency  NEUROLOGICAL: Denies headaches, memory loss, loss of strength, numbness, or tremors  SKIN: + lesions on medial tibia b/l    MEDICATIONS  (STANDING):  cefepime   IVPB      cefepime   IVPB 2000 milliGRAM(s) IV Intermittent every 8 hours  gabapentin 300 milliGRAM(s) Oral at bedtime  insulin lispro (ADMELOG) corrective regimen sliding scale   SubCutaneous three times a day before meals  insulin lispro (ADMELOG) corrective regimen sliding scale   SubCutaneous at bedtime  sodium chloride 0.9%. 1000 milliLiter(s) (125 mL/Hr) IV Continuous <Continuous>    MEDICATIONS  (PRN):  acetaminophen     Tablet .. 650 milliGRAM(s) Oral every 6 hours PRN Temp greater or equal to 38C (100.4F), Mild Pain (1 - 3)  melatonin 3 milliGRAM(s) Oral at bedtime PRN Insomnia      Vital Signs Last 24 Hrs  T(C): 37 (24 Aug 2023 11:05), Max: 37.4 (23 Aug 2023 16:03)  T(F): 98.6 (24 Aug 2023 11:05), Max: 99.3 (23 Aug 2023 16:03)  HR: 88 (24 Aug 2023 11:05) (64 - 88)  BP: 104/65 (24 Aug 2023 11:05) (103/56 - 117/72)  BP(mean): --  RR: 18 (24 Aug 2023 11:05) (18 - 19)  SpO2: 94% (24 Aug 2023 11:05) (87% - 98%)    Parameters below as of 24 Aug 2023 11:05  Patient On (Oxygen Delivery Method): room air        PHYSICAL EXAMINATION:  GENERAL: NAD, well built  HEAD:  Atraumatic, Normocephalic  EYES:  conjunctiva and sclera clear  NECK: - JVD  CHEST/LUNG: Clear to auscultation; No rales, rhonchi, wheezing, or rubs  HEART: Regular rate and rhythm; No murmurs, rubs, or gallops +s1 and +s2  ABDOMEN: Soft, Nontender, Nondistended; Bowel sounds present in all four quadrants, no pain or masses on palpation, no rashes, bruising, or scars  NERVOUS SYSTEM:  Alert & Oriented X3  : voided 2 times overnight  EXTREMITIES:  2+ peripheral radial pulses, diminished dorsalis pedis pulses b/l. Erythema on left distal 1st phalanx. 1+ edema on dorsum of feet b/l.  SKIN: Erythema surrounding medial tibia ulcers with drainage b/l                          8.3    11.16 )-----------( 364      ( 24 Aug 2023 05:59 )             27.9     08-24    138  |  107  |  10  ----------------------------<  144<H>  3.9   |  24  |  0.79    Ca    8.4      24 Aug 2023 05:59  Phos  3.2     08-24  Mg     2.1     08-24    TPro  7.3  /  Alb  2.4<L>  /  TBili  0.2  /  DBili  x   /  AST  8<L>  /  ALT  18  /  AlkPhos  93  08-24    LIVER FUNCTIONS - ( 24 Aug 2023 05:59 )  Alb: 2.4 g/dL / Pro: 7.3 g/dL / ALK PHOS: 93 U/L / ALT: 18 U/L DA / AST: 8 U/L / GGT: x                   I&O's Summary    23 Aug 2023 07:01  -  24 Aug 2023 07:00  --------------------------------------------------------  IN: 780 mL / OUT: 1350 mL / NET: -570 mL    24 Aug 2023 07:01  -  24 Aug 2023 13:27  --------------------------------------------------------  IN: 236 mL / OUT: 0 mL / NET: 236 mL            CAPILLARY BLOOD GLUCOSE      RADIOLOGY & ADDITIONAL TESTS:                   PGY-1 Progress Note discussed with attending    PAGER #: [] TILL 5:00 PM  PLEASE CONTACT ON CALL TEAM:  - On Call Team (Please refer to Deanne) FROM 5:00 PM - 8:30PM  - Nightfloat Team FROM 8:30 -7:30 AM    CHIEF COMPLAINT & BRIEF HOSPITAL COURSE:      INTERVAL HPI/OVERNIGHT EVENTS: Patient was laying comfortable in bed this morning.  [id number] Brenna (Turkmen) was at the bedside. Patient was able to tolerate breakfast this morning and reports pain in legs but states pain is less than yesterday. Reports have two urinary voids overnight denies hematuria.       REVIEW OF SYSTEMS:  CONSTITUTIONAL: Denies fever and chills  RESPIRATORY: Denies cough, wheezing; Denies shortness of breath  CARDIOVASCULAR: Denies chest pain, palpitations, dizziness, + leg swelling  GASTROINTESTINAL: Denies abdominal pain. Denies nausea, vomiting  GENITOURINARY: Denies dysuria or hematuria, urinary frequency  NEUROLOGICAL: Denies headaches, memory loss, loss of strength, numbness, or tremors  SKIN: + lesions on medial tibia b/l    MEDICATIONS  (STANDING):  cefepime   IVPB      cefepime   IVPB 2000 milliGRAM(s) IV Intermittent every 8 hours  gabapentin 300 milliGRAM(s) Oral at bedtime  insulin lispro (ADMELOG) corrective regimen sliding scale   SubCutaneous three times a day before meals  insulin lispro (ADMELOG) corrective regimen sliding scale   SubCutaneous at bedtime  sodium chloride 0.9%. 1000 milliLiter(s) (125 mL/Hr) IV Continuous <Continuous>    MEDICATIONS  (PRN):  acetaminophen     Tablet .. 650 milliGRAM(s) Oral every 6 hours PRN Temp greater or equal to 38C (100.4F), Mild Pain (1 - 3)  melatonin 3 milliGRAM(s) Oral at bedtime PRN Insomnia      Vital Signs Last 24 Hrs  T(C): 37 (24 Aug 2023 11:05), Max: 37.4 (23 Aug 2023 16:03)  T(F): 98.6 (24 Aug 2023 11:05), Max: 99.3 (23 Aug 2023 16:03)  HR: 88 (24 Aug 2023 11:05) (64 - 88)  BP: 104/65 (24 Aug 2023 11:05) (103/56 - 117/72)  BP(mean): --  RR: 18 (24 Aug 2023 11:05) (18 - 19)  SpO2: 94% (24 Aug 2023 11:05) (87% - 98%)    Parameters below as of 24 Aug 2023 11:05  Patient On (Oxygen Delivery Method): room air        PHYSICAL EXAMINATION:  GENERAL: NAD, well built  HEAD:  Atraumatic, Normocephalic  EYES:  conjunctiva and sclera clear  NECK: - JVD  CHEST/LUNG: Clear to auscultation; No rales, rhonchi, wheezing, or rubs  HEART: Regular rate and rhythm; No murmurs, rubs, or gallops +s1 and +s2  ABDOMEN: Soft, Nontender, Nondistended; Bowel sounds present in all four quadrants, no pain or masses on palpation, no rashes, bruising, or scars  NERVOUS SYSTEM:  Alert & Oriented X3  : voided 2 times overnight  EXTREMITIES:  2+ peripheral radial pulses, diminished dorsalis pedis pulses b/l. Erythema on left distal 1st phalanx. 1+ edema on dorsum of feet b/l.  SKIN: Erythema surrounding medial tibia ulcers with drainage b/l                          8.3    11.16 )-----------( 364      ( 24 Aug 2023 05:59 )             27.9     08-24    138  |  107  |  10  ----------------------------<  144<H>  3.9   |  24  |  0.79    Ca    8.4      24 Aug 2023 05:59  Phos  3.2     08-24  Mg     2.1     08-24    TPro  7.3  /  Alb  2.4<L>  /  TBili  0.2  /  DBili  x   /  AST  8<L>  /  ALT  18  /  AlkPhos  93  08-24    LIVER FUNCTIONS - ( 24 Aug 2023 05:59 )  Alb: 2.4 g/dL / Pro: 7.3 g/dL / ALK PHOS: 93 U/L / ALT: 18 U/L DA / AST: 8 U/L / GGT: x                   I&O's Summary    23 Aug 2023 07:01  -  24 Aug 2023 07:00  --------------------------------------------------------  IN: 780 mL / OUT: 1350 mL / NET: -570 mL    24 Aug 2023 07:01  -  24 Aug 2023 13:27  --------------------------------------------------------  IN: 236 mL / OUT: 0 mL / NET: 236 mL            CAPILLARY BLOOD GLUCOSE      RADIOLOGY & ADDITIONAL TESTS:                   PGY-1 Progress Note discussed with attending    PAGER #: [] TILL 5:00 PM  PLEASE CONTACT ON CALL TEAM:  - On Call Team (Please refer to Deanne) FROM 5:00 PM - 8:30PM  - Nightfloat Team FROM 8:30 -7:30 AM      INTERVAL HPI/OVERNIGHT EVENTS: Patient was laying comfortable in bed this morning.  [id number] Brenna (Romanian) was at the bedside. Patient was able to tolerate breakfast this morning and reports pain in legs but states pain is less than yesterday. Reports have two urinary voids overnight denies hematuria.       REVIEW OF SYSTEMS:  CONSTITUTIONAL: Denies fever and chills  RESPIRATORY: Denies cough, wheezing; Denies shortness of breath  CARDIOVASCULAR: Denies chest pain, palpitations, dizziness, + leg swelling  GASTROINTESTINAL: Denies abdominal pain. Denies nausea, vomiting  GENITOURINARY: Denies dysuria or hematuria, urinary frequency  NEUROLOGICAL: Denies headaches, memory loss, loss of strength, numbness, or tremors  SKIN: + lesions on medial tibia b/l    MEDICATIONS  (STANDING):  cefepime   IVPB      cefepime   IVPB 2000 milliGRAM(s) IV Intermittent every 8 hours  gabapentin 300 milliGRAM(s) Oral at bedtime  insulin lispro (ADMELOG) corrective regimen sliding scale   SubCutaneous three times a day before meals  insulin lispro (ADMELOG) corrective regimen sliding scale   SubCutaneous at bedtime  sodium chloride 0.9%. 1000 milliLiter(s) (125 mL/Hr) IV Continuous <Continuous>    MEDICATIONS  (PRN):  acetaminophen     Tablet .. 650 milliGRAM(s) Oral every 6 hours PRN Temp greater or equal to 38C (100.4F), Mild Pain (1 - 3)  melatonin 3 milliGRAM(s) Oral at bedtime PRN Insomnia      Vital Signs Last 24 Hrs  T(C): 37 (24 Aug 2023 11:05), Max: 37.4 (23 Aug 2023 16:03)  T(F): 98.6 (24 Aug 2023 11:05), Max: 99.3 (23 Aug 2023 16:03)  HR: 88 (24 Aug 2023 11:05) (64 - 88)  BP: 104/65 (24 Aug 2023 11:05) (103/56 - 117/72)  BP(mean): --  RR: 18 (24 Aug 2023 11:05) (18 - 19)  SpO2: 94% (24 Aug 2023 11:05) (87% - 98%)    Parameters below as of 24 Aug 2023 11:05  Patient On (Oxygen Delivery Method): room air        PHYSICAL EXAMINATION:  GENERAL: NAD, well built  HEAD:  Atraumatic, Normocephalic  EYES:  conjunctiva and sclera clear  NECK: - JVD  CHEST/LUNG: Clear to auscultation; No rales, rhonchi, wheezing, or rubs  HEART: Regular rate and rhythm; No murmurs, rubs, or gallops +s1 and +s2  ABDOMEN: Soft, Nontender, Nondistended; Bowel sounds present in all four quadrants, no pain or masses on palpation, no rashes, bruising, or scars  NERVOUS SYSTEM:  Alert & Oriented X3  : voided 2 times overnight  EXTREMITIES:  2+ peripheral radial pulses, diminished dorsalis pedis pulses b/l. Erythema on left distal 1st phalanx. 1+ edema on dorsum of feet b/l.  SKIN: Erythema surrounding medial tibia ulcers with drainage b/l                          8.3    11.16 )-----------( 364      ( 24 Aug 2023 05:59 )             27.9     08-24    138  |  107  |  10  ----------------------------<  144<H>  3.9   |  24  |  0.79    Ca    8.4      24 Aug 2023 05:59  Phos  3.2     08-24  Mg     2.1     08-24    TPro  7.3  /  Alb  2.4<L>  /  TBili  0.2  /  DBili  x   /  AST  8<L>  /  ALT  18  /  AlkPhos  93  08-24    LIVER FUNCTIONS - ( 24 Aug 2023 05:59 )  Alb: 2.4 g/dL / Pro: 7.3 g/dL / ALK PHOS: 93 U/L / ALT: 18 U/L DA / AST: 8 U/L / GGT: x                   I&O's Summary    23 Aug 2023 07:01  -  24 Aug 2023 07:00  --------------------------------------------------------  IN: 780 mL / OUT: 1350 mL / NET: -570 mL    24 Aug 2023 07:01  -  24 Aug 2023 13:27  --------------------------------------------------------  IN: 236 mL / OUT: 0 mL / NET: 236 mL            CAPILLARY BLOOD GLUCOSE      RADIOLOGY & ADDITIONAL TESTS:                   PGY-1 Progress Note discussed with attending    PAGER #: [] TILL 5:00 PM  PLEASE CONTACT ON CALL TEAM:  - On Call Team (Please refer to Deanne) FROM 5:00 PM - 8:30PM  - Nightfloat Team FROM 8:30 -7:30 AM      INTERVAL HPI/OVERNIGHT EVENTS: Patient was laying comfortable in bed this morning.  [id number] Brenna (German) was at the bedside. Patient was able to tolerate breakfast this morning and reports pain in legs but states pain is less than yesterday. Reports have two urinary voids overnight denies hematuria.       REVIEW OF SYSTEMS:  CONSTITUTIONAL: Denies fever and chills  RESPIRATORY: Denies cough, wheezing; Denies shortness of breath  CARDIOVASCULAR: Denies chest pain, palpitations, dizziness, + leg swelling  GASTROINTESTINAL: Denies abdominal pain. Denies nausea, vomiting  GENITOURINARY: Denies dysuria or hematuria, urinary frequency  NEUROLOGICAL: Denies headaches, memory loss, loss of strength, numbness, or tremors  SKIN: + lesions on medial tibia b/l    MEDICATIONS  (STANDING):  cefepime   IVPB      cefepime   IVPB 2000 milliGRAM(s) IV Intermittent every 8 hours  gabapentin 300 milliGRAM(s) Oral at bedtime  insulin lispro (ADMELOG) corrective regimen sliding scale   SubCutaneous three times a day before meals  insulin lispro (ADMELOG) corrective regimen sliding scale   SubCutaneous at bedtime  sodium chloride 0.9%. 1000 milliLiter(s) (125 mL/Hr) IV Continuous <Continuous>    MEDICATIONS  (PRN):  acetaminophen     Tablet .. 650 milliGRAM(s) Oral every 6 hours PRN Temp greater or equal to 38C (100.4F), Mild Pain (1 - 3)  melatonin 3 milliGRAM(s) Oral at bedtime PRN Insomnia      Vital Signs Last 24 Hrs  T(C): 37 (24 Aug 2023 11:05), Max: 37.4 (23 Aug 2023 16:03)  T(F): 98.6 (24 Aug 2023 11:05), Max: 99.3 (23 Aug 2023 16:03)  HR: 88 (24 Aug 2023 11:05) (64 - 88)  BP: 104/65 (24 Aug 2023 11:05) (103/56 - 117/72)  BP(mean): --  RR: 18 (24 Aug 2023 11:05) (18 - 19)  SpO2: 94% (24 Aug 2023 11:05) (87% - 98%)    Parameters below as of 24 Aug 2023 11:05  Patient On (Oxygen Delivery Method): room air        PHYSICAL EXAMINATION:  GENERAL: NAD, well built  HEAD:  Atraumatic, Normocephalic  EYES:  conjunctiva and sclera clear  NECK: - JVD  CHEST/LUNG: Clear to auscultation; No rales, rhonchi, wheezing, or rubs  HEART: Regular rate and rhythm; No murmurs, rubs, or gallops +s1 and +s2  ABDOMEN: Soft, Nontender, Nondistended; Bowel sounds present in all four quadrants, no pain or masses on palpation, no rashes, bruising, or scars  NERVOUS SYSTEM:  Alert & Oriented X3  : voided 2 times overnight  EXTREMITIES:  2+ peripheral radial pulses, diminished dorsalis pedis pulses b/l. Erythema on left distal 1st phalanx. 1+ edema on dorsum of feet b/l.  SKIN: Erythema surrounding medial tibia ulcers with drainage b/l                          8.3    11.16 )-----------( 364      ( 24 Aug 2023 05:59 )             27.9     08-24    138  |  107  |  10  ----------------------------<  144<H>  3.9   |  24  |  0.79    Ca    8.4      24 Aug 2023 05:59  Phos  3.2     08-24  Mg     2.1     08-24    TPro  7.3  /  Alb  2.4<L>  /  TBili  0.2  /  DBili  x   /  AST  8<L>  /  ALT  18  /  AlkPhos  93  08-24    LIVER FUNCTIONS - ( 24 Aug 2023 05:59 )  Alb: 2.4 g/dL / Pro: 7.3 g/dL / ALK PHOS: 93 U/L / ALT: 18 U/L DA / AST: 8 U/L / GGT: x                   I&O's Summary    23 Aug 2023 07:01  -  24 Aug 2023 07:00  --------------------------------------------------------  IN: 780 mL / OUT: 1350 mL / NET: -570 mL    24 Aug 2023 07:01  -  24 Aug 2023 13:27  --------------------------------------------------------  IN: 236 mL / OUT: 0 mL / NET: 236 mL            CAPILLARY BLOOD GLUCOSE      RADIOLOGY & ADDITIONAL TESTS:                   PGY-1 Progress Note discussed with attending    PAGER #: [] TILL 5:00 PM  PLEASE CONTACT ON CALL TEAM:  - On Call Team (Please refer to Deanne) FROM 5:00 PM - 8:30PM  - Nightfloat Team FROM 8:30 -7:30 AM      INTERVAL HPI/OVERNIGHT EVENTS: Patient was laying comfortable in bed this morning.  [id number] Brenna (Chinese) was at the bedside. Patient was able to tolerate breakfast this morning and reports pain in legs but states pain is less than yesterday. Reports have two urinary voids overnight denies hematuria.       REVIEW OF SYSTEMS:  CONSTITUTIONAL: Denies fever and chills  RESPIRATORY: Denies cough, wheezing; Denies shortness of breath  CARDIOVASCULAR: Denies chest pain, palpitations, dizziness, + leg swelling  GASTROINTESTINAL: Denies abdominal pain. Denies nausea, vomiting  GENITOURINARY: Denies dysuria or hematuria, urinary frequency  NEUROLOGICAL: Denies headaches, memory loss, loss of strength, numbness, or tremors  SKIN: + lesions on medial tibia b/l    MEDICATIONS  (STANDING):  cefepime   IVPB      cefepime   IVPB 2000 milliGRAM(s) IV Intermittent every 8 hours  gabapentin 300 milliGRAM(s) Oral at bedtime  insulin lispro (ADMELOG) corrective regimen sliding scale   SubCutaneous three times a day before meals  insulin lispro (ADMELOG) corrective regimen sliding scale   SubCutaneous at bedtime  sodium chloride 0.9%. 1000 milliLiter(s) (125 mL/Hr) IV Continuous <Continuous>    MEDICATIONS  (PRN):  acetaminophen     Tablet .. 650 milliGRAM(s) Oral every 6 hours PRN Temp greater or equal to 38C (100.4F), Mild Pain (1 - 3)  melatonin 3 milliGRAM(s) Oral at bedtime PRN Insomnia      Vital Signs Last 24 Hrs  T(C): 37 (24 Aug 2023 11:05), Max: 37.4 (23 Aug 2023 16:03)  T(F): 98.6 (24 Aug 2023 11:05), Max: 99.3 (23 Aug 2023 16:03)  HR: 88 (24 Aug 2023 11:05) (64 - 88)  BP: 104/65 (24 Aug 2023 11:05) (103/56 - 117/72)  BP(mean): --  RR: 18 (24 Aug 2023 11:05) (18 - 19)  SpO2: 94% (24 Aug 2023 11:05) (87% - 98%)    Parameters below as of 24 Aug 2023 11:05  Patient On (Oxygen Delivery Method): room air        PHYSICAL EXAMINATION:  GENERAL: NAD, well built  HEAD:  Atraumatic, Normocephalic  EYES:  conjunctiva and sclera clear  NECK: - JVD  CHEST/LUNG: Clear to auscultation; No rales, rhonchi, wheezing, or rubs  HEART: Regular rate and rhythm; No murmurs, rubs, or gallops +s1 and +s2  ABDOMEN: Soft, Nontender, Nondistended; Bowel sounds present in all four quadrants, no pain or masses on palpation, no rashes, bruising, or scars  NERVOUS SYSTEM:  Alert & Oriented X3  : voided 2 times overnight  EXTREMITIES:  2+ peripheral radial pulses, diminished dorsalis pedis pulses b/l. Erythema on left distal 1st phalanx. 1+ edema on dorsum of feet b/l.  SKIN: Erythema surrounding medial tibia ulcers with drainage b/l                          8.3    11.16 )-----------( 364      ( 24 Aug 2023 05:59 )             27.9     08-24    138  |  107  |  10  ----------------------------<  144<H>  3.9   |  24  |  0.79    Ca    8.4      24 Aug 2023 05:59  Phos  3.2     08-24  Mg     2.1     08-24    TPro  7.3  /  Alb  2.4<L>  /  TBili  0.2  /  DBili  x   /  AST  8<L>  /  ALT  18  /  AlkPhos  93  08-24    LIVER FUNCTIONS - ( 24 Aug 2023 05:59 )  Alb: 2.4 g/dL / Pro: 7.3 g/dL / ALK PHOS: 93 U/L / ALT: 18 U/L DA / AST: 8 U/L / GGT: x                   I&O's Summary    23 Aug 2023 07:01  -  24 Aug 2023 07:00  --------------------------------------------------------  IN: 780 mL / OUT: 1350 mL / NET: -570 mL    24 Aug 2023 07:01  -  24 Aug 2023 13:27  --------------------------------------------------------  IN: 236 mL / OUT: 0 mL / NET: 236 mL            CAPILLARY BLOOD GLUCOSE      RADIOLOGY & ADDITIONAL TESTS:

## 2023-08-24 NOTE — PROGRESS NOTE ADULT - ATTENDING COMMENTS
58 year old man with PMH of DM, HLD, HTN, with chronic B/L lower extremity ulcers  for the past 5 years Follows at [facility]  He comes in on account of dizziness, chills and weakness .Similar symptoms few days ago for which he was admitted to OSH.  He was diagnosed with deep tissue infection and placed on antibiotics, received blood transfusion but had to leave AMA one day ago.  He states he follows with a dermatologist and PCP in [place] but has been told his chronic leg ulcers are not vascular or diabetic in origin after prior work up.  Patient doing better otday; understanding of clinical situation;  right leg swelling and discoloration much improved; . stil with pain onc correction but doppler negative for DVT;  Has longstanding ulcers on both legs appears to be deep seated and concern for OM  Vital Signs Last 24 Hrs  T(C): 36.8 (24 Aug 2023 17:06), Max: 37.3 (24 Aug 2023 07:22)  T(F): 98.2 (24 Aug 2023 17:06), Max: 99.1 (24 Aug 2023 07:22)  HR: 81 (24 Aug 2023 17:06) (64 - 88)  BP: 126/80 (24 Aug 2023 17:06) (104/65 - 126/80)  BP(mean): --  RR: 18 (24 Aug 2023 17:06) (18 - 19)  SpO2: 100% (24 Aug 2023 17:06) (94% - 100%)  Parameters below as of 24 Aug 2023 17:06  Patient On (Oxygen Delivery Method): room air  Psych: AAO x3  Neuro: No gross focal deficits; Power and sensation intact  CVS: S1S2 present, regular, no edema  Resp: BLAE+, No wheeze or Rhonchi  GI: Soft, BS+, Non tender, non distended  Skin: Warm and moist without any rashes  Extr: Right calf tenderness+; Right LE warm, swollen and tender compared to left LE but much imp[roved swelling and discolaration today  Left leg( distal 1/2)  with large deep ulcers- uneven edges, pink to reddish erythematous base with a lot of yellowish fibrinolytic material  Right leg - has very deep ulcer with exposed tendons just above the medial malleolus. Surrounding erythema, swelling and tenderness.                          8.3    11.16 )-----------( 364      ( 24 Aug 2023 05:59 )             27.9   08-24    138  |  107  |  10  ----------------------------<  144<H>  3.9   |  24  |  0.79    Ca    8.4      24 Aug 2023 05:59  Phos  3.2     08-24  Mg     2.1     08-24    TPro  7.3  /  Alb  2.4<L>  /  TBili  0.2  /  DBili  x   /  AST  8<L>  /  ALT  18  /  AlkPhos  93  08-24      Sedimentation Rate, Erythrocyte (08.23.23 @ 07:05) Sedimentation Rate, Erythrocyte: 107 mm/Hr  C-Reactive Protein, Serum (08.23.23 @ 07:05) C-Reactive Protein, Serum: 102 mg/  A1C with Estimated Average Glucose (08.21.23 @ 05:30) A1C with Estimated Average Glucose Result: 6.6:    Iron with Total Binding Capacity (08.22.23 @ 08:00)   Iron - Total Binding Capacity.: 331 ug/dL  % Saturation, Iron: 5 %   Iron Total: 17 ug/dL    Culture - Blood (08.20.23 @ 23:10)   Culture Results: No growth at 48 Hours    < from: MR Lower Ext Joint w/wo IV Cont, Right (08.24.23 @ 17:24) >    IMPRESSION:  1.  Soft tissue ulceration with adjacent cellulitis.  2.  No abscess or acute osteomyelitis.    --- End of Report ---    < end of copied text >        A/P:  58 year old man with hx of DM, HTN, HLD with chronic B/L LE ulcers with chronic wounds managed in [place] presenting with sepsis likely related to his wound infections and concern for OM  D/D:   Sepsis with SSTI of RLE  B/L lower extremity chronic ulcer infection  with cellulitis and suspected osteomyelitis  ARGELIA likely from underlying Sepsis resolved  Acute on chronic microcytic anemia suspicious for iron def anemia related to GI bleeding  Suspected Pyoderma gangrenosum vs venous insufficiency with hypoproteinemia.   Type 2 DM fair control   HTN   Plan:  ID f/u appreciated; d/w Dr. Carter; D/C Vanco as MSSA; Continue Cefepime tocover MSSA and other Gram negative organism from wound Cx  Anemia panel with Iron deficiency; Will need w/u.   GI consult Patient will likely need a Colonoscopy; last Colonoscopy >1 yr ago with multiple polyps removed; recommended repeat  A1c acceptable; Add Gabapentin 600 mg HS for Neuropathic pain  Hold BP meds for hypotension or  low BP  Obtain medication list from [facility] pharmacy: Patient reported taking Lantus, Janumet 2 tabs HS (50/1000), Jardiance 10 gm daily, Statin  Podiatry consult noted; Follow up BRIDGETTE; Vascular Sx consult appreciated; No intervention indicated  High protein diet  iron sucrose 58 year old man with PMH of DM, HLD, HTN, with chronic B/L lower extremity ulcers  for the past 5 years Follows at [facility]  He comes in on account of dizziness, chills and weakness .Similar symptoms few days ago for which he was admitted to OSH.  He was diagnosed with deep tissue infection and placed on antibiotics, received blood transfusion but had to leave AMA one day ago.  He states he follows with a dermatologist and PCP in [place] but has been told his chronic leg ulcers are not vascular or diabetic in origin after prior work up.  Patient doing better otday; understanding of clinical situation;  right leg swelling and discoloration much improved; . stil with pain onc intermediate but doppler negative for DVT;  Has longstanding ulcers on both legs appears to be deep seated and concern for OM  Vital Signs Last 24 Hrs  T(C): 36.8 (24 Aug 2023 17:06), Max: 37.3 (24 Aug 2023 07:22)  T(F): 98.2 (24 Aug 2023 17:06), Max: 99.1 (24 Aug 2023 07:22)  HR: 81 (24 Aug 2023 17:06) (64 - 88)  BP: 126/80 (24 Aug 2023 17:06) (104/65 - 126/80)  BP(mean): --  RR: 18 (24 Aug 2023 17:06) (18 - 19)  SpO2: 100% (24 Aug 2023 17:06) (94% - 100%)  Parameters below as of 24 Aug 2023 17:06  Patient On (Oxygen Delivery Method): room air  Psych: AAO x3  Neuro: No gross focal deficits; Power and sensation intact  CVS: S1S2 present, regular, no edema  Resp: BLAE+, No wheeze or Rhonchi  GI: Soft, BS+, Non tender, non distended  Skin: Warm and moist without any rashes  Extr: Right calf tenderness+; Right LE warm, swollen and tender compared to left LE but much imp[roved swelling and discolaration today  Left leg( distal 1/2)  with large deep ulcers- uneven edges, pink to reddish erythematous base with a lot of yellowish fibrinolytic material  Right leg - has very deep ulcer with exposed tendons just above the medial malleolus. Surrounding erythema, swelling and tenderness.                          8.3    11.16 )-----------( 364      ( 24 Aug 2023 05:59 )             27.9   08-24    138  |  107  |  10  ----------------------------<  144<H>  3.9   |  24  |  0.79    Ca    8.4      24 Aug 2023 05:59  Phos  3.2     08-24  Mg     2.1     08-24    TPro  7.3  /  Alb  2.4<L>  /  TBili  0.2  /  DBili  x   /  AST  8<L>  /  ALT  18  /  AlkPhos  93  08-24      Sedimentation Rate, Erythrocyte (08.23.23 @ 07:05) Sedimentation Rate, Erythrocyte: 107 mm/Hr  C-Reactive Protein, Serum (08.23.23 @ 07:05) C-Reactive Protein, Serum: 102 mg/  A1C with Estimated Average Glucose (08.21.23 @ 05:30) A1C with Estimated Average Glucose Result: 6.6:    Iron with Total Binding Capacity (08.22.23 @ 08:00)   Iron - Total Binding Capacity.: 331 ug/dL  % Saturation, Iron: 5 %   Iron Total: 17 ug/dL    Culture - Blood (08.20.23 @ 23:10)   Culture Results: No growth at 48 Hours    < from: MR Lower Ext Joint w/wo IV Cont, Right (08.24.23 @ 17:24) >    IMPRESSION:  1.  Soft tissue ulceration with adjacent cellulitis.  2.  No abscess or acute osteomyelitis.    --- End of Report ---    < end of copied text >        A/P:  58 year old man with hx of DM, HTN, HLD with chronic B/L LE ulcers with chronic wounds managed in [place] presenting with sepsis likely related to his wound infections and concern for OM  D/D:   Sepsis with SSTI of RLE  B/L lower extremity chronic ulcer infection  with cellulitis and suspected osteomyelitis  ARGELIA likely from underlying Sepsis resolved  Acute on chronic microcytic anemia suspicious for iron def anemia related to GI bleeding  Suspected Pyoderma gangrenosum vs venous insufficiency with hypoproteinemia.   Type 2 DM fair control   HTN   Plan:  ID f/u appreciated; d/w Dr. Carter; D/C Vanco as MSSA; Continue Cefepime tocover MSSA and other Gram negative organism from wound Cx  Anemia panel with Iron deficiency; Will need w/u.   GI consult Patient will likely need a Colonoscopy; last Colonoscopy >1 yr ago with multiple polyps removed; recommended repeat  A1c acceptable; Add Gabapentin 600 mg HS for Neuropathic pain  Hold BP meds for hypotension or  low BP  Obtain medication list from [facility] pharmacy: Patient reported taking Lantus, Janumet 2 tabs HS (50/1000), Jardiance 10 gm daily, Statin  Podiatry consult noted; Follow up BRIDGETTE; Vascular Sx consult appreciated; No intervention indicated  High protein diet  iron sucrose 58 year old man with PMH of DM, HLD, HTN, with chronic B/L lower extremity ulcers  for the past 5 years Follows at [facility]  He comes in on account of dizziness, chills and weakness .Similar symptoms few days ago for which he was admitted to OSH.  He was diagnosed with deep tissue infection and placed on antibiotics, received blood transfusion but had to leave AMA one day ago.  He states he follows with a dermatologist and PCP in [place] but has been told his chronic leg ulcers are not vascular or diabetic in origin after prior work up.  Patient doing better otday; understanding of clinical situation;  right leg swelling and discoloration much improved; . stil with pain onc group home but doppler negative for DVT;  Has longstanding ulcers on both legs appears to be deep seated and concern for OM  Vital Signs Last 24 Hrs  T(C): 36.8 (24 Aug 2023 17:06), Max: 37.3 (24 Aug 2023 07:22)  T(F): 98.2 (24 Aug 2023 17:06), Max: 99.1 (24 Aug 2023 07:22)  HR: 81 (24 Aug 2023 17:06) (64 - 88)  BP: 126/80 (24 Aug 2023 17:06) (104/65 - 126/80)  BP(mean): --  RR: 18 (24 Aug 2023 17:06) (18 - 19)  SpO2: 100% (24 Aug 2023 17:06) (94% - 100%)  Parameters below as of 24 Aug 2023 17:06  Patient On (Oxygen Delivery Method): room air  Psych: AAO x3  Neuro: No gross focal deficits; Power and sensation intact  CVS: S1S2 present, regular, no edema  Resp: BLAE+, No wheeze or Rhonchi  GI: Soft, BS+, Non tender, non distended  Skin: Warm and moist without any rashes  Extr: Right calf tenderness+; Right LE warm, swollen and tender compared to left LE but much imp[roved swelling and discolaration today  Left leg( distal 1/2)  with large deep ulcers- uneven edges, pink to reddish erythematous base with a lot of yellowish fibrinolytic material  Right leg - has very deep ulcer with exposed tendons just above the medial malleolus. Surrounding erythema, swelling and tenderness.                          8.3    11.16 )-----------( 364      ( 24 Aug 2023 05:59 )             27.9   08-24    138  |  107  |  10  ----------------------------<  144<H>  3.9   |  24  |  0.79    Ca    8.4      24 Aug 2023 05:59  Phos  3.2     08-24  Mg     2.1     08-24    TPro  7.3  /  Alb  2.4<L>  /  TBili  0.2  /  DBili  x   /  AST  8<L>  /  ALT  18  /  AlkPhos  93  08-24      Sedimentation Rate, Erythrocyte (08.23.23 @ 07:05) Sedimentation Rate, Erythrocyte: 107 mm/Hr  C-Reactive Protein, Serum (08.23.23 @ 07:05) C-Reactive Protein, Serum: 102 mg/  A1C with Estimated Average Glucose (08.21.23 @ 05:30) A1C with Estimated Average Glucose Result: 6.6:    Iron with Total Binding Capacity (08.22.23 @ 08:00)   Iron - Total Binding Capacity.: 331 ug/dL  % Saturation, Iron: 5 %   Iron Total: 17 ug/dL    Culture - Blood (08.20.23 @ 23:10)   Culture Results: No growth at 48 Hours    < from: MR Lower Ext Joint w/wo IV Cont, Right (08.24.23 @ 17:24) >    IMPRESSION:  1.  Soft tissue ulceration with adjacent cellulitis.  2.  No abscess or acute osteomyelitis.    --- End of Report ---    < end of copied text >        A/P:  58 year old man with hx of DM, HTN, HLD with chronic B/L LE ulcers with chronic wounds managed in [place] presenting with sepsis likely related to his wound infections and concern for OM  D/D:   Sepsis with SSTI of RLE  B/L lower extremity chronic ulcer infection  with cellulitis and suspected osteomyelitis  ARGELIA likely from underlying Sepsis resolved  Acute on chronic microcytic anemia suspicious for iron def anemia related to GI bleeding  Suspected Pyoderma gangrenosum vs venous insufficiency with hypoproteinemia.   Type 2 DM fair control   HTN   Plan:  ID f/u appreciated; d/w Dr. Carter; D/C Vanco as MSSA; Continue Cefepime tocover MSSA and other Gram negative organism from wound Cx  Anemia panel with Iron deficiency; Will need w/u.   GI consult Patient will likely need a Colonoscopy; last Colonoscopy >1 yr ago with multiple polyps removed; recommended repeat  A1c acceptable; Add Gabapentin 600 mg HS for Neuropathic pain  Hold BP meds for hypotension or  low BP  Obtain medication list from [facility] pharmacy: Patient reported taking Lantus, Janumet 2 tabs HS (50/1000), Jardiance 10 gm daily, Statin  Podiatry consult noted; Follow up BRIDGETTE; Vascular Sx consult appreciated; No intervention indicated  High protein diet  iron sucrose

## 2023-08-24 NOTE — PROGRESS NOTE ADULT - PROBLEM SELECTOR PLAN 2
PE: (+) surrounding erythema over RT lower leg ulcer  In the  ED: WBC 17k   -today 9k  -on cefepime  -BRIDGETTE wnl  -neg for DVT   - Podiatry Consult  -recc MRI  -MRI ordered for cellulitis  -Vascular surgery to be consulted PE: (+) surrounding erythema over RT lower leg ulcer  In the  ED: WBC 17k   -today 9k  -on cefepime  -BRIDGETTE wnl  -neg for DVT   - Podiatry Consult  -recc MRI  -MRI ordered for cellulitis  -per Dr. Carter, pyoderma gangrenosum?

## 2023-08-24 NOTE — PROGRESS NOTE ADULT - ASSESSMENT
59 yo M with PMH of  DM, HLD, HTN and diabetic  leg ulcers present with weakness, lightheadedness and infected RT lower extremity ulcer for the past 3 days which has progressively worsened today. IN the ED patient was hypotensive, tachycardic, saturating at 94 on room air. On labs patient did have a white count, on xray of foot  there was soft tissue defect. Patient on cefepime and vancomycin, MRI of leg pending. ID consulted. Vascular consulted.  Admitted to medicine for Sepsis 2/2 to lower extremity infection. 57 yo M with PMH of  DM, HLD, HTN and diabetic  leg ulcers present with weakness, lightheadedness and infected RT lower extremity ulcer for the past 3 days which has progressively worsened today. IN the ED patient was hypotensive, tachycardic, saturating at 94 on room air. On labs patient did have a white count, on xray of foot  there was soft tissue defect. Patient on cefepime and vancomycin, MRI of leg pending. ID consulted. Vascular consulted.  Admitted to medicine for Sepsis 2/2 to lower extremity infection.

## 2023-08-24 NOTE — PROGRESS NOTE ADULT - PROBLEM SELECTOR PLAN 1
-p/w  DM. HTN, diabetic  leg ulcers present with weakness, lightheadedness and infected RT lower extremity ulcer for the past 3 days.  -in the ED patient was tachycardic and hypotensive  Labs  -> WBC 17k, Hb:7.9, Cr:1.76   -on cefepime   - wound culture growing gram negative rods   - F/u Bld cx x 2: +moderate pseudomonas aeruginosa   -MRI leg ordered  -ID consulted Dr. Carter

## 2023-08-24 NOTE — PROGRESS NOTE ADULT - PROBLEM SELECTOR PLAN 3
PE: (+) B/L LE ulcer  - Podiatry Consult  - wound culture growing gram negative rods  -on cefepime   -vascular consulted PE: (+) B/L LE ulcer  - Podiatry Consult  - wound culture growing gram negative rods  -on cefepime   -vascular consulted  -pyoderma gangrenosum work up?

## 2023-08-25 DIAGNOSIS — I82.890 ACUTE EMBOLISM AND THROMBOSIS OF OTHER SPECIFIED VEINS: ICD-10-CM

## 2023-08-25 LAB
ALBUMIN SERPL ELPH-MCNC: 2.3 G/DL — LOW (ref 3.5–5)
ALBUMIN SERPL ELPH-MCNC: 2.3 G/DL — LOW (ref 3.5–5)
ALP SERPL-CCNC: 87 U/L — SIGNIFICANT CHANGE UP (ref 40–120)
ALP SERPL-CCNC: 87 U/L — SIGNIFICANT CHANGE UP (ref 40–120)
ALT FLD-CCNC: 17 U/L DA — SIGNIFICANT CHANGE UP (ref 10–60)
ALT FLD-CCNC: 17 U/L DA — SIGNIFICANT CHANGE UP (ref 10–60)
ANION GAP SERPL CALC-SCNC: 7 MMOL/L — SIGNIFICANT CHANGE UP (ref 5–17)
ANION GAP SERPL CALC-SCNC: 7 MMOL/L — SIGNIFICANT CHANGE UP (ref 5–17)
AST SERPL-CCNC: 9 U/L — LOW (ref 10–40)
AST SERPL-CCNC: 9 U/L — LOW (ref 10–40)
BASOPHILS # BLD AUTO: 0.04 K/UL — SIGNIFICANT CHANGE UP (ref 0–0.2)
BASOPHILS # BLD AUTO: 0.04 K/UL — SIGNIFICANT CHANGE UP (ref 0–0.2)
BASOPHILS NFR BLD AUTO: 0.4 % — SIGNIFICANT CHANGE UP (ref 0–2)
BASOPHILS NFR BLD AUTO: 0.4 % — SIGNIFICANT CHANGE UP (ref 0–2)
BILIRUB SERPL-MCNC: 0.2 MG/DL — SIGNIFICANT CHANGE UP (ref 0.2–1.2)
BILIRUB SERPL-MCNC: 0.2 MG/DL — SIGNIFICANT CHANGE UP (ref 0.2–1.2)
BUN SERPL-MCNC: 11 MG/DL — SIGNIFICANT CHANGE UP (ref 7–18)
BUN SERPL-MCNC: 11 MG/DL — SIGNIFICANT CHANGE UP (ref 7–18)
CALCIUM SERPL-MCNC: 8.7 MG/DL — SIGNIFICANT CHANGE UP (ref 8.4–10.5)
CALCIUM SERPL-MCNC: 8.7 MG/DL — SIGNIFICANT CHANGE UP (ref 8.4–10.5)
CHLORIDE SERPL-SCNC: 105 MMOL/L — SIGNIFICANT CHANGE UP (ref 96–108)
CHLORIDE SERPL-SCNC: 105 MMOL/L — SIGNIFICANT CHANGE UP (ref 96–108)
CK SERPL-CCNC: 24 U/L — LOW (ref 35–232)
CK SERPL-CCNC: 24 U/L — LOW (ref 35–232)
CO2 SERPL-SCNC: 25 MMOL/L — SIGNIFICANT CHANGE UP (ref 22–31)
CO2 SERPL-SCNC: 25 MMOL/L — SIGNIFICANT CHANGE UP (ref 22–31)
CREAT SERPL-MCNC: 0.77 MG/DL — SIGNIFICANT CHANGE UP (ref 0.5–1.3)
CREAT SERPL-MCNC: 0.77 MG/DL — SIGNIFICANT CHANGE UP (ref 0.5–1.3)
CRP SERPL-MCNC: 96 MG/L — HIGH
CRP SERPL-MCNC: 96 MG/L — HIGH
EGFR: 104 ML/MIN/1.73M2 — SIGNIFICANT CHANGE UP
EGFR: 104 ML/MIN/1.73M2 — SIGNIFICANT CHANGE UP
EOSINOPHIL # BLD AUTO: 0.19 K/UL — SIGNIFICANT CHANGE UP (ref 0–0.5)
EOSINOPHIL # BLD AUTO: 0.19 K/UL — SIGNIFICANT CHANGE UP (ref 0–0.5)
EOSINOPHIL NFR BLD AUTO: 2 % — SIGNIFICANT CHANGE UP (ref 0–6)
EOSINOPHIL NFR BLD AUTO: 2 % — SIGNIFICANT CHANGE UP (ref 0–6)
ERYTHROCYTE [SEDIMENTATION RATE] IN BLOOD: 83 MM/HR — HIGH (ref 0–20)
ERYTHROCYTE [SEDIMENTATION RATE] IN BLOOD: 83 MM/HR — HIGH (ref 0–20)
GLUCOSE BLDC GLUCOMTR-MCNC: 136 MG/DL — HIGH (ref 70–99)
GLUCOSE BLDC GLUCOMTR-MCNC: 136 MG/DL — HIGH (ref 70–99)
GLUCOSE BLDC GLUCOMTR-MCNC: 139 MG/DL — HIGH (ref 70–99)
GLUCOSE BLDC GLUCOMTR-MCNC: 139 MG/DL — HIGH (ref 70–99)
GLUCOSE BLDC GLUCOMTR-MCNC: 158 MG/DL — HIGH (ref 70–99)
GLUCOSE BLDC GLUCOMTR-MCNC: 158 MG/DL — HIGH (ref 70–99)
GLUCOSE BLDC GLUCOMTR-MCNC: 163 MG/DL — HIGH (ref 70–99)
GLUCOSE BLDC GLUCOMTR-MCNC: 163 MG/DL — HIGH (ref 70–99)
GLUCOSE SERPL-MCNC: 137 MG/DL — HIGH (ref 70–99)
GLUCOSE SERPL-MCNC: 137 MG/DL — HIGH (ref 70–99)
HCT VFR BLD CALC: 28.5 % — LOW (ref 39–50)
HCT VFR BLD CALC: 28.5 % — LOW (ref 39–50)
HGB BLD-MCNC: 8.3 G/DL — LOW (ref 13–17)
HGB BLD-MCNC: 8.3 G/DL — LOW (ref 13–17)
IMM GRANULOCYTES NFR BLD AUTO: 0.9 % — SIGNIFICANT CHANGE UP (ref 0–0.9)
IMM GRANULOCYTES NFR BLD AUTO: 0.9 % — SIGNIFICANT CHANGE UP (ref 0–0.9)
LYMPHOCYTES # BLD AUTO: 1.56 K/UL — SIGNIFICANT CHANGE UP (ref 1–3.3)
LYMPHOCYTES # BLD AUTO: 1.56 K/UL — SIGNIFICANT CHANGE UP (ref 1–3.3)
LYMPHOCYTES # BLD AUTO: 16.6 % — SIGNIFICANT CHANGE UP (ref 13–44)
LYMPHOCYTES # BLD AUTO: 16.6 % — SIGNIFICANT CHANGE UP (ref 13–44)
MAGNESIUM SERPL-MCNC: 2 MG/DL — SIGNIFICANT CHANGE UP (ref 1.6–2.6)
MAGNESIUM SERPL-MCNC: 2 MG/DL — SIGNIFICANT CHANGE UP (ref 1.6–2.6)
MCHC RBC-ENTMCNC: 23 PG — LOW (ref 27–34)
MCHC RBC-ENTMCNC: 23 PG — LOW (ref 27–34)
MCHC RBC-ENTMCNC: 29.1 GM/DL — LOW (ref 32–36)
MCHC RBC-ENTMCNC: 29.1 GM/DL — LOW (ref 32–36)
MCV RBC AUTO: 78.9 FL — LOW (ref 80–100)
MCV RBC AUTO: 78.9 FL — LOW (ref 80–100)
MONOCYTES # BLD AUTO: 0.88 K/UL — SIGNIFICANT CHANGE UP (ref 0–0.9)
MONOCYTES # BLD AUTO: 0.88 K/UL — SIGNIFICANT CHANGE UP (ref 0–0.9)
MONOCYTES NFR BLD AUTO: 9.4 % — SIGNIFICANT CHANGE UP (ref 2–14)
MONOCYTES NFR BLD AUTO: 9.4 % — SIGNIFICANT CHANGE UP (ref 2–14)
NEUTROPHILS # BLD AUTO: 6.66 K/UL — SIGNIFICANT CHANGE UP (ref 1.8–7.4)
NEUTROPHILS # BLD AUTO: 6.66 K/UL — SIGNIFICANT CHANGE UP (ref 1.8–7.4)
NEUTROPHILS NFR BLD AUTO: 70.7 % — SIGNIFICANT CHANGE UP (ref 43–77)
NEUTROPHILS NFR BLD AUTO: 70.7 % — SIGNIFICANT CHANGE UP (ref 43–77)
NRBC # BLD: 0 /100 WBCS — SIGNIFICANT CHANGE UP (ref 0–0)
NRBC # BLD: 0 /100 WBCS — SIGNIFICANT CHANGE UP (ref 0–0)
PHOSPHATE SERPL-MCNC: 3 MG/DL — SIGNIFICANT CHANGE UP (ref 2.5–4.5)
PHOSPHATE SERPL-MCNC: 3 MG/DL — SIGNIFICANT CHANGE UP (ref 2.5–4.5)
PLATELET # BLD AUTO: 406 K/UL — HIGH (ref 150–400)
PLATELET # BLD AUTO: 406 K/UL — HIGH (ref 150–400)
POTASSIUM SERPL-MCNC: 4.2 MMOL/L — SIGNIFICANT CHANGE UP (ref 3.5–5.3)
POTASSIUM SERPL-MCNC: 4.2 MMOL/L — SIGNIFICANT CHANGE UP (ref 3.5–5.3)
POTASSIUM SERPL-SCNC: 4.2 MMOL/L — SIGNIFICANT CHANGE UP (ref 3.5–5.3)
POTASSIUM SERPL-SCNC: 4.2 MMOL/L — SIGNIFICANT CHANGE UP (ref 3.5–5.3)
PROT SERPL-MCNC: 7.4 G/DL — SIGNIFICANT CHANGE UP (ref 6–8.3)
PROT SERPL-MCNC: 7.4 G/DL — SIGNIFICANT CHANGE UP (ref 6–8.3)
RBC # BLD: 3.61 M/UL — LOW (ref 4.2–5.8)
RBC # BLD: 3.61 M/UL — LOW (ref 4.2–5.8)
RBC # FLD: 17 % — HIGH (ref 10.3–14.5)
RBC # FLD: 17 % — HIGH (ref 10.3–14.5)
SODIUM SERPL-SCNC: 137 MMOL/L — SIGNIFICANT CHANGE UP (ref 135–145)
SODIUM SERPL-SCNC: 137 MMOL/L — SIGNIFICANT CHANGE UP (ref 135–145)
WBC # BLD: 9.41 K/UL — SIGNIFICANT CHANGE UP (ref 3.8–10.5)
WBC # BLD: 9.41 K/UL — SIGNIFICANT CHANGE UP (ref 3.8–10.5)
WBC # FLD AUTO: 9.41 K/UL — SIGNIFICANT CHANGE UP (ref 3.8–10.5)
WBC # FLD AUTO: 9.41 K/UL — SIGNIFICANT CHANGE UP (ref 3.8–10.5)

## 2023-08-25 PROCEDURE — 99233 SBSQ HOSP IP/OBS HIGH 50: CPT | Mod: GC

## 2023-08-25 PROCEDURE — 99232 SBSQ HOSP IP/OBS MODERATE 35: CPT

## 2023-08-25 PROCEDURE — 93971 EXTREMITY STUDY: CPT | Mod: 26,RT

## 2023-08-25 RX ORDER — DICLOXACILLIN SODIUM 500 MG/1
2 CAPSULE ORAL
Qty: 168 | Refills: 0
Start: 2023-08-25 | End: 2023-09-14

## 2023-08-25 RX ORDER — CIPROFLOXACIN LACTATE 400MG/40ML
750 VIAL (ML) INTRAVENOUS
Refills: 0 | Status: DISCONTINUED | OUTPATIENT
Start: 2023-08-25 | End: 2023-08-28

## 2023-08-25 RX ORDER — CIPROFLOXACIN LACTATE 400MG/40ML
1 VIAL (ML) INTRAVENOUS
Qty: 42 | Refills: 0
Start: 2023-08-25 | End: 2023-09-14

## 2023-08-25 RX ORDER — IRON SUCROSE 20 MG/ML
200 INJECTION, SOLUTION INTRAVENOUS EVERY 24 HOURS
Refills: 0 | Status: COMPLETED | OUTPATIENT
Start: 2023-08-25 | End: 2023-08-27

## 2023-08-25 RX ORDER — APIXABAN 2.5 MG/1
1 TABLET, FILM COATED ORAL
Qty: 60 | Refills: 0
Start: 2023-08-25 | End: 2023-09-23

## 2023-08-25 RX ORDER — ENOXAPARIN SODIUM 100 MG/ML
120 INJECTION SUBCUTANEOUS ONCE
Refills: 0 | Status: COMPLETED | OUTPATIENT
Start: 2023-08-25 | End: 2023-08-25

## 2023-08-25 RX ORDER — DICLOXACILLIN SODIUM 500 MG/1
500 CAPSULE ORAL
Refills: 0 | Status: DISCONTINUED | OUTPATIENT
Start: 2023-08-25 | End: 2023-08-28

## 2023-08-25 RX ORDER — GABAPENTIN 400 MG/1
1 CAPSULE ORAL
Qty: 30 | Refills: 0
Start: 2023-08-25 | End: 2023-09-23

## 2023-08-25 RX ADMIN — Medication 650 MILLIGRAM(S): at 09:00

## 2023-08-25 RX ADMIN — GABAPENTIN 300 MILLIGRAM(S): 400 CAPSULE ORAL at 21:07

## 2023-08-25 RX ADMIN — CEFEPIME 100 MILLIGRAM(S): 1 INJECTION, POWDER, FOR SOLUTION INTRAMUSCULAR; INTRAVENOUS at 05:29

## 2023-08-25 RX ADMIN — ENOXAPARIN SODIUM 120 MILLIGRAM(S): 100 INJECTION SUBCUTANEOUS at 13:54

## 2023-08-25 RX ADMIN — Medication 650 MILLIGRAM(S): at 08:10

## 2023-08-25 RX ADMIN — Medication 1: at 08:10

## 2023-08-25 RX ADMIN — DICLOXACILLIN SODIUM 500 MILLIGRAM(S): 500 CAPSULE ORAL at 17:35

## 2023-08-25 RX ADMIN — IRON SUCROSE 110 MILLIGRAM(S): 20 INJECTION, SOLUTION INTRAVENOUS at 19:07

## 2023-08-25 RX ADMIN — Medication 650 MILLIGRAM(S): at 21:38

## 2023-08-25 RX ADMIN — CEFEPIME 100 MILLIGRAM(S): 1 INJECTION, POWDER, FOR SOLUTION INTRAMUSCULAR; INTRAVENOUS at 13:54

## 2023-08-25 RX ADMIN — Medication 650 MILLIGRAM(S): at 22:22

## 2023-08-25 RX ADMIN — Medication 750 MILLIGRAM(S): at 17:35

## 2023-08-25 NOTE — PROGRESS NOTE ADULT - SUBJECTIVE AND OBJECTIVE BOX
INTERVAL HPI/OVERNIGHT EVENTS:  Pt resting comfortably in bed. No acute complaints. Admits to mild pain in the upper R thigh. Denies change in LE pain, denies numbness/tingling. Pt able to ambulate with cane.    MEDICATIONS  (STANDING):  cefepime   IVPB 2000 milliGRAM(s) IV Intermittent every 8 hours  cefepime   IVPB      gabapentin 300 milliGRAM(s) Oral at bedtime  insulin lispro (ADMELOG) corrective regimen sliding scale   SubCutaneous three times a day before meals  insulin lispro (ADMELOG) corrective regimen sliding scale   SubCutaneous at bedtime  sodium chloride 0.9%. 1000 milliLiter(s) (125 mL/Hr) IV Continuous <Continuous>    MEDICATIONS  (PRN):  acetaminophen     Tablet .. 650 milliGRAM(s) Oral every 6 hours PRN Temp greater or equal to 38C (100.4F), Mild Pain (1 - 3)  melatonin 3 milliGRAM(s) Oral at bedtime PRN Insomnia      Vital Signs Last 24 Hrs  T(C): 36.8 (25 Aug 2023 11:01), Max: 36.9 (25 Aug 2023 05:33)  T(F): 98.2 (25 Aug 2023 11:01), Max: 98.4 (25 Aug 2023 05:33)  HR: 60 (25 Aug 2023 11:01) (60 - 92)  BP: 131/67 (25 Aug 2023 11:01) (113/59 - 144/97)  BP(mean): --  RR: 18 (25 Aug 2023 11:01) (18 - 18)  SpO2: 98% (25 Aug 2023 11:01) (96% - 100%)    Parameters below as of 25 Aug 2023 11:01  Patient On (Oxygen Delivery Method): room air        Physical:  General: A&Ox3. NAD.  Resp: Unlabored breathing. Equal chest rise bilaterally.  Abdomen: Soft nondistended, nontender.   LE: Proximal RLE mild erythema in linear patter, tender to palpation corresponding to great saphenous vein. B/l distal LE wrapped, dressings clean, dry, intact. No active drainage, no fluctuance.   Vasc: Femoral pulses palpable. DP and PT pulses palpable b/l.    I&O's Detail    24 Aug 2023 07:01  -  25 Aug 2023 07:00  --------------------------------------------------------  IN:    IV PiggyBack: 200 mL    Oral Fluid: 776 mL  Total IN: 976 mL    OUT:    Voided (mL): 850 mL  Total OUT: 850 mL    Total NET: 126 mL      25 Aug 2023 07:01  -  25 Aug 2023 14:31  --------------------------------------------------------  IN:    Oral Fluid: 430 mL  Total IN: 430 mL    OUT:  Total OUT: 0 mL    Total NET: 430 mL          LABS:                        8.3    9.41  )-----------( 406      ( 25 Aug 2023 07:45 )             28.5             08-25    137  |  105  |  11  ----------------------------<  137<H>  4.2   |  25  |  0.77    Ca    8.7      25 Aug 2023 07:45  Phos  3.0     08-25  Mg     2.0     08-25    TPro  7.4  /  Alb  2.3<L>  /  TBili  0.2  /  DBili  x   /  AST  9<L>  /  ALT  17  /  AlkPhos  87  08-25    < from: US Duplex Venous Lower Ext Ltd, Right (08.25.23 @ 12:00) >    ACC: [id number] EXAM:  US DPLX LWR EXT VEINS LTD RT   ORDERED BY: JIM VELASCO     PROCEDURE DATE:  08/25/2023          INTERPRETATION:  CLINICAL INFORMATION: Palpable cord in right leg.    COMPARISON: right lower extremity venous duplex study dated 8/22/2023.    TECHNIQUE: Duplex sonography of the RIGHT LOWER extremity veins with   color and spectral Doppler, with and without compression.    FINDINGS:    There is normal compressibility of the right common femoral, femoral and   popliteal veins.  The contralateral common femoral vein is patent.  Doppler examination shows normal spontaneous and phasic flow.    No calf vein thrombosis is detected.    The palpable finding corresponds to segmental thrombosis of branch of the   greater saphenous vein in the anterior thigh.    IMPRESSION:  No evidence of right lower extremity deep venous thrombosis.    Superficial thrombosis of branch of the greater saphenous vein in the   anterior right thigh.          --- End of Report ---            GRACIE CHANCE MD; Attending Radiologist  This document has been electronically signed. Aug 25 2023  1:41PM    < end of copied text >     INTERVAL HPI/OVERNIGHT EVENTS:  Pt resting comfortably in bed. No acute complaints. Admits to mild pain in the upper R thigh. Denies change in LE pain, denies numbness/tingling. Pt able to ambulate with cane.    MEDICATIONS  (STANDING):  cefepime   IVPB 2000 milliGRAM(s) IV Intermittent every 8 hours  cefepime   IVPB      gabapentin 300 milliGRAM(s) Oral at bedtime  insulin lispro (ADMELOG) corrective regimen sliding scale   SubCutaneous three times a day before meals  insulin lispro (ADMELOG) corrective regimen sliding scale   SubCutaneous at bedtime  sodium chloride 0.9%. 1000 milliLiter(s) (125 mL/Hr) IV Continuous <Continuous>    MEDICATIONS  (PRN):  acetaminophen     Tablet .. 650 milliGRAM(s) Oral every 6 hours PRN Temp greater or equal to 38C (100.4F), Mild Pain (1 - 3)  melatonin 3 milliGRAM(s) Oral at bedtime PRN Insomnia      Vital Signs Last 24 Hrs  T(C): 36.8 (25 Aug 2023 11:01), Max: 36.9 (25 Aug 2023 05:33)  T(F): 98.2 (25 Aug 2023 11:01), Max: 98.4 (25 Aug 2023 05:33)  HR: 60 (25 Aug 2023 11:01) (60 - 92)  BP: 131/67 (25 Aug 2023 11:01) (113/59 - 144/97)  BP(mean): --  RR: 18 (25 Aug 2023 11:01) (18 - 18)  SpO2: 98% (25 Aug 2023 11:01) (96% - 100%)    Parameters below as of 25 Aug 2023 11:01  Patient On (Oxygen Delivery Method): room air        Physical:  General: A&Ox3. NAD.  Resp: Unlabored breathing. Equal chest rise bilaterally.  Abdomen: Soft nondistended, nontender.   LE: Proximal RLE mild erythema in linear patter, tender to palpation corresponding to great saphenous vein. B/l distal LE wrapped, dressings clean, dry, intact. No active drainage, no fluctuance.   Vasc: Femoral pulses palpable. DP and PT pulses palpable b/l.    I&O's Detail    24 Aug 2023 07:01  -  25 Aug 2023 07:00  --------------------------------------------------------  IN:    IV PiggyBack: 200 mL    Oral Fluid: 776 mL  Total IN: 976 mL    OUT:    Voided (mL): 850 mL  Total OUT: 850 mL    Total NET: 126 mL      25 Aug 2023 07:01  -  25 Aug 2023 14:31  --------------------------------------------------------  IN:    Oral Fluid: 430 mL  Total IN: 430 mL    OUT:  Total OUT: 0 mL    Total NET: 430 mL          LABS:                        8.3    9.41  )-----------( 406      ( 25 Aug 2023 07:45 )             28.5             08-25    137  |  105  |  11  ----------------------------<  137<H>  4.2   |  25  |  0.77    Ca    8.7      25 Aug 2023 07:45  Phos  3.0     08-25  Mg     2.0     08-25    TPro  7.4  /  Alb  2.3<L>  /  TBili  0.2  /  DBili  x   /  AST  9<L>  /  ALT  17  /  AlkPhos  87  08-25    < from: US Duplex Venous Lower Ext Ltd, Right (08.25.23 @ 12:00) >    ACC: [id number] EXAM:  US DPLX LWR EXT VEINS LTD RT   ORDERED BY: JIM VELASCO     PROCEDURE DATE:  08/25/2023          INTERPRETATION:  CLINICAL INFORMATION: Palpable cord in right leg.    COMPARISON: right lower extremity venous duplex study dated 8/22/2023.    TECHNIQUE: Duplex sonography of the RIGHT LOWER extremity veins with   color and spectral Doppler, with and without compression.    FINDINGS:    There is normal compressibility of the right common femoral, femoral and   popliteal veins.  The contralateral common femoral vein is patent.  Doppler examination shows normal spontaneous and phasic flow.    No calf vein thrombosis is detected.    The palpable finding corresponds to segmental thrombosis of branch of the   greater saphenous vein in the anterior thigh.    IMPRESSION:  No evidence of right lower extremity deep venous thrombosis.    Superficial thrombosis of branch of the greater saphenous vein in the   anterior right thigh.          --- End of Report ---            GRACIE CHANCE MD; Attending Radiologist  This document has been electronically signed. Aug 25 2023  1:41PM    < end of copied text >     INTERVAL HPI/OVERNIGHT EVENTS:  Pt resting comfortably in bed. No acute complaints. Admits to mild pain in the upper R thigh. Denies change in LE pain, denies numbness/tingling. Pt able to ambulate with cane.    MEDICATIONS  (STANDING):  cefepime   IVPB 2000 milliGRAM(s) IV Intermittent every 8 hours  cefepime   IVPB      gabapentin 300 milliGRAM(s) Oral at bedtime  insulin lispro (ADMELOG) corrective regimen sliding scale   SubCutaneous three times a day before meals  insulin lispro (ADMELOG) corrective regimen sliding scale   SubCutaneous at bedtime  sodium chloride 0.9%. 1000 milliLiter(s) (125 mL/Hr) IV Continuous <Continuous>    MEDICATIONS  (PRN):  acetaminophen     Tablet .. 650 milliGRAM(s) Oral every 6 hours PRN Temp greater or equal to 38C (100.4F), Mild Pain (1 - 3)  melatonin 3 milliGRAM(s) Oral at bedtime PRN Insomnia      Vital Signs Last 24 Hrs  T(C): 36.8 (25 Aug 2023 11:01), Max: 36.9 (25 Aug 2023 05:33)  T(F): 98.2 (25 Aug 2023 11:01), Max: 98.4 (25 Aug 2023 05:33)  HR: 60 (25 Aug 2023 11:01) (60 - 92)  BP: 131/67 (25 Aug 2023 11:01) (113/59 - 144/97)  BP(mean): --  RR: 18 (25 Aug 2023 11:01) (18 - 18)  SpO2: 98% (25 Aug 2023 11:01) (96% - 100%)    Parameters below as of 25 Aug 2023 11:01  Patient On (Oxygen Delivery Method): room air        Physical:  General: A&Ox3. NAD.  Resp: Unlabored breathing. Equal chest rise bilaterally.  Abdomen: Soft nondistended, nontender.   LE: Proximal RLE mild erythema in linear patter, tender to palpation corresponding to great saphenous vein. B/l distal LE wrapped, dressings clean, dry, intact. No active drainage, no fluctuance.   Vasc: Femoral pulses palpable. DP and PT pulses palpable b/l.    I&O's Detail    24 Aug 2023 07:01  -  25 Aug 2023 07:00  --------------------------------------------------------  IN:    IV PiggyBack: 200 mL    Oral Fluid: 776 mL  Total IN: 976 mL    OUT:    Voided (mL): 850 mL  Total OUT: 850 mL    Total NET: 126 mL      25 Aug 2023 07:01  -  25 Aug 2023 14:31  --------------------------------------------------------  IN:    Oral Fluid: 430 mL  Total IN: 430 mL    OUT:  Total OUT: 0 mL    Total NET: 430 mL          LABS:                        8.3    9.41  )-----------( 406      ( 25 Aug 2023 07:45 )             28.5             08-25    137  |  105  |  11  ----------------------------<  137<H>  4.2   |  25  |  0.77    Ca    8.7      25 Aug 2023 07:45  Phos  3.0     08-25  Mg     2.0     08-25    TPro  7.4  /  Alb  2.3<L>  /  TBili  0.2  /  DBili  x   /  AST  9<L>  /  ALT  17  /  AlkPhos  87  08-25    < from: US Duplex Venous Lower Ext Ltd, Right (08.25.23 @ 12:00) >    ACC: [id number] EXAM:  US DPLX LWR EXT VEINS LTD RT   ORDERED BY: JIM VELASCO     PROCEDURE DATE:  08/25/2023          INTERPRETATION:  CLINICAL INFORMATION: Palpable cord in right leg.    COMPARISON: right lower extremity venous duplex study dated 8/22/2023.    TECHNIQUE: Duplex sonography of the RIGHT LOWER extremity veins with   color and spectral Doppler, with and without compression.    FINDINGS:    There is normal compressibility of the right common femoral, femoral and   popliteal veins.  The contralateral common femoral vein is patent.  Doppler examination shows normal spontaneous and phasic flow.    No calf vein thrombosis is detected.    The palpable finding corresponds to segmental thrombosis of branch of the   greater saphenous vein in the anterior thigh.    IMPRESSION:  No evidence of right lower extremity deep venous thrombosis.    Superficial thrombosis of branch of the greater saphenous vein in the   anterior right thigh.          --- End of Report ---            GRACIE CHANCE MD; Attending Radiologist  This document has been electronically signed. Aug 25 2023  1:41PM    < end of copied text >

## 2023-08-25 NOTE — PROGRESS NOTE ADULT - ASSESSMENT
59 yo M with PMH of  DM, HLD, HTN and diabetic  leg ulcers present with weakness, lightheadedness and infected RT lower extremity ulcer for the past 3 days which has progressively worsened today. IN the ED patient was hypotensive, tachycardic, saturating at 94 on room air. On labs patient did have a white count, on xray of foot  there was soft tissue defect . MRI neg for osteomyelitis. Patient on PO cipro and dicloxacillin. Today noted cord like structure, which came positive for superficial thrombosis. Patient to start on eliquis.   Admitted to medicine for Sepsis 2/2 to lower extremity infection.

## 2023-08-25 NOTE — DIETITIAN INITIAL EVALUATION ADULT - ADD RECOMMEND
28-Jul-2020 07:05
Add MVI/minerals/Vit. C 500mg BID for wound healing & Zinc Sulfate 220mg once daily as needed

## 2023-08-25 NOTE — PROGRESS NOTE ADULT - ASSESSMENT
58 M w PMHx of DM, HTN, HLD. and diabetic leg ulcers for the past 5 years presented to the ED with c/o chills, weakness, lightheadedness and progressively worsening right sided diabetic foot ulcers and admitted for septic shock. Surgical swab and abscess Cx of leg wounds growing multiple organisms, including MSSA, Pseudomonas aeruginosa, Klebsiella oxytoca/Raoultella ornithinolytica. Normal ABIs. MRI of both distal extremities: no osteomyelitis or abscess. Although pt does not have known underlying malignancy nor IBD, additional consideration in this patient would include pyoderma gangrenosum with superinfection of the LE ulcers.      Pt without Doppler evidence of right lower extremity deep venous thrombosis from admission. However, pt now with tender cord in R thigh with concern for DVT that developed since admission. If repeat Doppler now demonstrates thrombosis, wd be concerned about septic thrombophlebitis which would require extended Ab.    # Severe sepsis with b/l diabetic foot ulcers with overlying exudate, and cellulitis superior to the ulcer RLE. No osteo noted in either LE. Sepsis resolved.   --d/c cefepime  --start ciprofloxacin 750mg p.o. bid  --start dicloxacillin 500 mg p.o. QID  --local wound care    #Bilat LE SSTI    # ??DVT RLE--see above    #DM-- management as per medical team    Chart reviewed, including notes and labs, and pt examined again at the bedside (with the intern on ID at the bedside). Dx with severe sepsis on admission likely due to SSTI. Pt with no imaging evidence of bone infection or abscess. Further, pt now with R thigh palpable, tender cord concerning for DVT requiring further w/u. Diagnostic considerations, assessment, and plan discussed with the medical housestaff and with Dr. Canales.   58 M w PMHx of DM, HTN, HLD. and diabetic leg ulcers for the past 5 years presented to the ED with c/o chills, weakness, lightheadedness and progressively worsening right sided diabetic foot ulcers and admitted for septic shock. Surgical swab and abscess Cx of leg wounds growing multiple organisms, including MSSA, Pseudomonas aeruginosa, Klebsiella oxytoca/Raoultella ornithinolytica. Normal ABIs. MRI of both distal extremities: no osteomyelitis or abscess. Although pt does not have known underlying malignancy nor IBD, additional consideration in this patient would include pyoderma gangrenosum with superinfection of the LE ulcers.      Pt without Doppler evidence of right lower extremity deep venous thrombosis from admission. However, pt now with tender cord in R thigh with concern for DVT that developed since admission. If repeat Doppler now demonstrates thrombosis, wd be concerned about septic thrombophlebitis which would require extended Ab.    # Severe sepsis with b/l diabetic foot ulcers with overlying exudate, and cellulitis superior to the ulcer RLE. No osteo noted in either LE. Sepsis resolved.   --d/c cefepime  --start ciprofloxacin 750mg p.o. bid  --start dicloxacillin 500 mg p.o. QID  --local wound care    #Bilat LE SSTI    # ??DVT RLE--see above    #DM-- management as per medical team    Chart reviewed, including notes and labs, and pt examined again at the bedside (with the intern on ID at the bedside). Dx with severe sepsis on admission likely due to SSTI. Pt with no imaging evidence of bone infection or abscess. Further, pt now with R thigh palpable, tender cord concerning for DVT requiring further w/u. Diagnostic considerations, assessment, and plan discussed with the medical housestaff and with Dr. Canales.  ____________________________________________________________________________________________________________________________________________________________________________________________________________________________________________________________________________  Addendum 8/25/23 at 4:45pm:  Pt underwent RLE doppler with finding of superficial thrombosis of a branch of the greater saphenous vein in the R thigh. In view of concern for septic thrombophlebitis, wd complete 3wks of p.o. antibiotics as suggested above, which will cover the organisms that were cultured from the ulcers.     Please call again if needed.     58 M w PMHx of DM, HTN, HLD. and diabetic leg ulcers for the past 5 years presented to the ED with c/o chills, weakness, lightheadedness and progressively worsening right sided diabetic foot ulcers and admitted for septic shock. Surgical swab and abscess Cx of leg wounds growing multiple organisms, including MSSA, Pseudomonas aeruginosa, Klebsiella oxytoca/Raoultella ornithinolytica. Normal ABIs. MRI of both distal extremities: no osteomyelitis or abscess. Although pt does not have known underlying malignancy nor IBD, additional consideration in this patient would include pyoderma gangrenosum with superinfection of the LE ulcers.      Pt without Doppler evidence of right lower extremity deep venous thrombosis from admission. However, pt now with tender cord in R thigh with concern for DVT that developed since admission. If repeat Doppler now demonstrates thrombosis, wd be concerned about septic thrombophlebitis which would require extended Ab.    # Severe sepsis with b/l diabetic foot ulcers with overlying exudate, and cellulitis superior to the ulcer RLE. No osteo noted in either LE. Sepsis resolved.   --d/c cefepime  --start ciprofloxacin 750mg p.o. bid  --start dicloxacillin 500 mg p.o. QID  --local wound care    #Bilat LE SSTI    # ??DVT RLE--see above    #DM-- management as per medical team    Chart reviewed, including notes and labs, and pt examined again at the bedside (with the intern on ID at the bedside). Dx with severe sepsis on admission likely due to SSTI. Pt with no imaging evidence of bone infection or abscess. Further, pt now with R thigh palpable, tender cord concerning for DVT requiring further w/u. Diagnostic considerations, assessment, and plan discussed with the medical housestaff and with Dr. Canalse.  ____________________________________________________________________________________________________________________________________________________________________________________________________________________________________________________________________________  Addendum 8/25/23 at 4:45pm:  Pt underwent RLE doppler with finding of superficial thrombosis of a branch of the greater saphenous vein in the R thigh. In view of concern for septic thrombophlebitis, wd complete 3wks of p.o. antibiotics as suggested above, which will cover the organisms that were cultured from the ulcers. I contacted Dr. Ravin Maloney who supervises the [facility] at [address] [place] [address]. Appointment will be made for patient to f/u at the clinic to have CBC and SMA12 monitored weekly while patient on antibiotics.     Please call again if needed.     58 M w PMHx of DM, HTN, HLD. and diabetic leg ulcers for the past 5 years presented to the ED with c/o chills, weakness, lightheadedness and progressively worsening right sided diabetic foot ulcers and admitted for septic shock. Surgical swab and abscess Cx of leg wounds growing multiple organisms, including MSSA, Pseudomonas aeruginosa, Klebsiella oxytoca/Raoultella ornithinolytica. Normal ABIs. MRI of both distal extremities: no osteomyelitis or abscess. Although pt does not have known underlying malignancy nor IBD, additional consideration in this patient would include pyoderma gangrenosum with superinfection of the LE ulcers.      Pt without Doppler evidence of right lower extremity deep venous thrombosis from admission. However, pt now with tender cord in R thigh with concern for DVT that developed since admission. If repeat Doppler now demonstrates thrombosis, wd be concerned about septic thrombophlebitis which would require extended Ab.    # Severe sepsis with b/l diabetic foot ulcers with overlying exudate, and cellulitis superior to the ulcer RLE. No osteo noted in either LE. Sepsis resolved.   --d/c cefepime  --start ciprofloxacin 750mg p.o. bid  --start dicloxacillin 500 mg p.o. QID  --local wound care    #Bilat LE SSTI    # ??DVT RLE--see above    #DM-- management as per medical team    Chart reviewed, including notes and labs, and pt examined again at the bedside (with the intern on ID at the bedside). Dx with severe sepsis on admission likely due to SSTI. Pt with no imaging evidence of bone infection or abscess. Further, pt now with R thigh palpable, tender cord concerning for DVT requiring further w/u. Diagnostic considerations, assessment, and plan discussed with the medical housestaff and with Dr. Canales.  ____________________________________________________________________________________________________________________________________________________________________________________________________________________________________________________________________________  Addendum 8/25/23 at 4:45pm:  Pt underwent RLE doppler with finding of superficial thrombosis of a branch of the greater saphenous vein in the R thigh. In view of concern for septic thrombophlebitis, wd complete 3wks of p.o. antibiotics as suggested above, which will cover the organisms that were cultured from the ulcers. I contacted Dr. Ravin Maloney who supervises the [facility] at [address] [place] [address]. Appointment will be made for patient to f/u at the clinic to have CBC and SMA12 monitored weekly while patient on antibiotics.     Please call again if needed.     58 M w PMHx of DM, HTN, HLD. and diabetic leg ulcers for the past 5 years presented to the ED with c/o chills, weakness, lightheadedness and progressively worsening right sided diabetic foot ulcers and admitted for septic shock. Surgical swab and abscess Cx of leg wounds growing multiple organisms, including MSSA, Pseudomonas aeruginosa, Klebsiella oxytoca/Raoultella ornithinolytica. Normal ABIs. MRI of both distal extremities: no osteomyelitis or abscess. Although pt does not have known underlying malignancy nor IBD, additional consideration in this patient would include pyoderma gangrenosum with superinfection of the LE ulcers.      Pt without Doppler evidence of right lower extremity deep venous thrombosis from admission. However, pt now with tender cord in R thigh with concern for DVT that developed since admission. If repeat Doppler now demonstrates thrombosis, wd be concerned about septic thrombophlebitis which would require extended Ab.    # Severe sepsis with b/l diabetic foot ulcers with overlying exudate, and cellulitis superior to the ulcer RLE. No osteo noted in either LE. Sepsis resolved.   --d/c cefepime  --start ciprofloxacin 750mg p.o. bid  --start dicloxacillin 500 mg p.o. QID  --local wound care    #Bilat LE SSTI    # ??DVT RLE--see above    #DM-- management as per medical team    Chart reviewed, including notes and labs, and pt examined again at the bedside (with the intern on ID at the bedside). Dx with severe sepsis on admission likely due to SSTI. Pt with no imaging evidence of bone infection or abscess. Further, pt now with R thigh palpable, tender cord concerning for DVT requiring further w/u. Diagnostic considerations, assessment, and plan discussed with the medical housestaff and with Dr. Canales.  ____________________________________________________________________________________________________________________________________________________________________________________________________________________________________________________________________________  Addendum 8/25/23 at 4:45pm:  Pt underwent RLE doppler with finding of superficial thrombosis of a branch of the greater saphenous vein in the R thigh. In view of concern for septic thrombophlebitis, wd complete 3wks of p.o. antibiotics as suggested above, which will cover the organisms that were cultured from the ulcers. I contacted Dr. Ravin Maloney who supervises the [facility] at [address] [place] [address]. Appointment will be made for patient to f/u at the clinic to have CBC and SMA12 monitored weekly while patient on antibiotics.     Please call again if needed.

## 2023-08-25 NOTE — DIETITIAN INITIAL EVALUATION ADULT - SIGNS/SYMPTOMS
Wounds to b/l lower legs to level of subcutaneous tissue/muscle tendon layer of legs with wound care

## 2023-08-25 NOTE — CONSULT NOTE ADULT - SUBJECTIVE AND OBJECTIVE BOX
57 yo M with PMH of DM, HLD, HTN and diabetic leg ulcers present with chills, weakness, lightheadedness and infected RT lower extremity ulcer for the past 3 days which has progressively worsened today. As per pt,  he went to [facility] in [place] 3 days  prior to  admission to [facility]  for his weakness and infected leg ulcer. He mentioned that he received one blood transfusion, antibiotics and was admitted to the hospital. He reports staying in the hospital for a day before leaving the hospital for some personal issue. He reports decreased oral intake today, felt extremely weak and lightheaded. He reports history of ulcer for the past 5 years that has worsened in the past 3 years.  He follows a "dermatologist" at [facility] and he is unable to provide information of his diagnosis. He denies; fever, bodyaches, Chest pain, SOB, URI sxs, N/V/D, abdominal pain, LOC, Head trauma. patient had  h/o of colon polyps  found on colonoscopy ~ 0217-4509, labs c/w iron deficiency anemia, pt is seen by vascular/podiatry, ID for management of R leg cellulitis, on IV abx , noted  noted on 8/25/23  to have R  thigh cord like structure  , Doppler c/w superficial femoral vein thrombophlebitis   we were called for further evaluation  of iron def anemia on superficial vein thrombophlebitis    PMH/PSH as above    Soc h/o ex smoker , quit 20 y ago, lives at home    Fam h/o noncontributory      REVIEW OF SYSTEMS:  CONSTITUTIONAL: (+)weakness, No  fevers or chills  EYES: No conjunctiva redness, No eye discharge  ENT: No nasal congestion, No sore throat, No ear pain, No vertigo   NECK: No pain or stiffness  RESPIRATORY: No cough, wheezing, hemoptysis; No shortness of breath  CARDIOVASCULAR: No chest pain or palpitations  GASTROINTESTINAL: No abdominal or epigastric pain. No nausea, vomiting, or hematemesis; No diarrhea or constipation. No melena or hematochezia.  GENITOURINARY: No dysuria, frequency or hematuria  NEUROLOGICAL: No numbness or weakness  SKIN: (+) B/L leg ulcer, erythema  All other review of systems is negative unless indicated above      PE  Vital Signs Last 24 Hrs  T(C): 36.8 (25 Aug 2023 15:36), Max: 36.9 (25 Aug 2023 05:33)  T(F): 98.2 (25 Aug 2023 15:36), Max: 98.4 (25 Aug 2023 05:33)  HR: 90 (25 Aug 2023 15:36) (60 - 92)  BP: 121/77 (25 Aug 2023 15:36) (113/59 - 144/97)  BP(mean): --  RR: 18 (25 Aug 2023 15:36) (18 - 18)  SpO2: 96% (25 Aug 2023 15:36) (96% - 99%)    Parameters below as of 25 Aug 2023 15:36  Patient On (Oxygen Delivery Method): room air          GENERAL: NAD, obese, A 0x3  pleasant M   HEAD:  Atraumatic, Normocephalic  EYES:  conjunctiva and sclera clear, anicteric   NECK: Supple  LUNG: CTA   HEART: Regular rate and rhythm; No murmurs, rubs, or gallops  ABDOMEN: Soft, Nontender, Nondistended; Bowel sounds present, obese   NERVOUS SYSTEM:  Alert & Oriented X3,  no focal signs   EXTREMITIES: bilateral leg ulcers noted, , tender cord like structure in the right upper thigh  + tenderness  of R  leg   SKIN: warm dry  MSK moves all extremities    LABS                         8.3    9.41  )-----------( 406      ( 25 Aug 2023 07:45 )             28.5   08-25    137  |  105  |  11  ----------------------------<  137<H>  4.2   |  25  |  0.77    Ca    8.7      25 Aug 2023 07:45  Phos  3.0     08-25  Mg     2.0     08-25    TPro  7.4  /  Alb  2.3<L>  /  TBili  0.2  /  DBili  x   /  AST  9<L>  /  ALT  17  /  AlkPhos  87  08-25  % Saturation, Iron: 5 %  Iron - Total Binding Capacity.: 331 ug/dL  Iron Total: 17 ug/dL  Unsaturated Iron Binding Capacity: 314 ug/dL  Ferritin: 60 ng/mL (08.22.23 @ 08:00)   < from: US Duplex Venous Lower Ext Ltd, Right (08.25.23 @ 12:00) >    ACC: [id number] EXAM:  US DPLX LWR EXT VEINS LTD RT   ORDERED BY: JIM VELASCO     PROCEDURE DATE:  08/25/2023          INTERPRETATION:  CLINICAL INFORMATION: Palpable cord in right leg.    COMPARISON: right lower extremity venous duplex study dated 8/22/2023.    TECHNIQUE: Duplex sonography of the RIGHT LOWER extremity veins with   color and spectral Doppler, with and without compression.    FINDINGS:    There is normal compressibility of the right common femoral, femoral and   popliteal veins.  The contralateral common femoral vein is patent.  Doppler examination shows normal spontaneous and phasic flow.    No calf vein thrombosis is detected.    The palpable finding corresponds to segmental thrombosis of branch of the   greater saphenous vein in the anterior thigh.    IMPRESSION:  No evidence of right lower extremity deep venous thrombosis.    Superficial thrombosis of branch of the greater saphenous vein in the   anterior right thigh.          --- End of Report ---            GRACIE CHANCE MD; Attending Radiologist  This document has been electronically signed. Aug 25 2023  1:41PM    < end of copied text >  < from: MR Lower Ext Joint w/wo IV Cont, Right (08.24.23 @ 17:24) >    ACC: [id number] EXAM:  MR LWR EXT JOINT WAW IC RT   ORDERED BY: JIM VELASCO     PROCEDURE DATE:  08/24/2023          INTERPRETATION:  MRI OF THE RIGHT LOWER EXTREMITY    CLINICAL INFORMATION: Diabetic ulcers in the right lower extremity.   Cellulitis. Evaluate for osteomyelitis.  TECHNIQUE: Multiplanar, multisequence MRI was obtained of the RIGHT LOWER   EXTREMITY focusing on the right tibia/fibula. The study was performed   before and after the intravenous administration of 12 ml Gadavist (3 cc   discarded) .  COMPARISON: Bilateral lower extremity radiographs 8/21/2023.    FINDINGS:    SOFT TISSUES: Soft tissue ulceration along the anteromedial aspect of the   leg. Ulceration extends to the level of the subcutaneous fat. There is  enhancing edema in the adjacent subcutaneous fat. No focal drainable   fluid collections are identified.  BONE MARROW: No increased STIR signal or loss of T1 hyperintense signal   to suggest acute osteomyelitis. No fracture or osteonecrosis.    MUSCLES AND TENDONS: No focal muscle edema. Diffuse fatty infiltration of   the imaged muscles. Tendons are intact.  SYNOVIUM/ JOINT FLUID: No large joint effusion.  MISCELLANEOUS: Neurovascular structures are normal in course and caliber.      IMPRESSION:  1.  Soft tissue ulceration with adjacent cellulitis.  2.  No abscess or acute osteomyelitis.    --- End of Report ---        < end of copied text >             57 yo M with PMH of DM, HLD, HTN and diabetic leg ulcers present with chills, weakness, lightheadedness and infected RT lower extremity ulcer for the past 3 days which has progressively worsened today. As per pt,  he went to [facility] in [place] 3 days  prior to  admission to [facility]  for his weakness and infected leg ulcer. He mentioned that he received one blood transfusion, antibiotics and was admitted to the hospital. He reports staying in the hospital for a day before leaving the hospital for some personal issue. He reports decreased oral intake today, felt extremely weak and lightheaded. He reports history of ulcer for the past 5 years that has worsened in the past 3 years.  He follows a "dermatologist" at [facility] and he is unable to provide information of his diagnosis. He denies; fever, bodyaches, Chest pain, SOB, URI sxs, N/V/D, abdominal pain, LOC, Head trauma. patient had  h/o of colon polyps  found on colonoscopy ~ [id number], labs c/w iron deficiency anemia, pt is seen by vascular/podiatry, ID for management of R leg cellulitis, on IV abx , noted  noted on 8/25/23  to have R  thigh cord like structure  , Doppler c/w superficial femoral vein thrombophlebitis   we were called for further evaluation  of iron def anemia on superficial vein thrombophlebitis    PMH/PSH as above    Soc h/o ex smoker , quit 20 y ago, lives at home    Fam h/o noncontributory      REVIEW OF SYSTEMS:  CONSTITUTIONAL: (+)weakness, No  fevers or chills  EYES: No conjunctiva redness, No eye discharge  ENT: No nasal congestion, No sore throat, No ear pain, No vertigo   NECK: No pain or stiffness  RESPIRATORY: No cough, wheezing, hemoptysis; No shortness of breath  CARDIOVASCULAR: No chest pain or palpitations  GASTROINTESTINAL: No abdominal or epigastric pain. No nausea, vomiting, or hematemesis; No diarrhea or constipation. No melena or hematochezia.  GENITOURINARY: No dysuria, frequency or hematuria  NEUROLOGICAL: No numbness or weakness  SKIN: (+) B/L leg ulcer, erythema  All other review of systems is negative unless indicated above      PE  Vital Signs Last 24 Hrs  T(C): 36.8 (25 Aug 2023 15:36), Max: 36.9 (25 Aug 2023 05:33)  T(F): 98.2 (25 Aug 2023 15:36), Max: 98.4 (25 Aug 2023 05:33)  HR: 90 (25 Aug 2023 15:36) (60 - 92)  BP: 121/77 (25 Aug 2023 15:36) (113/59 - 144/97)  BP(mean): --  RR: 18 (25 Aug 2023 15:36) (18 - 18)  SpO2: 96% (25 Aug 2023 15:36) (96% - 99%)    Parameters below as of 25 Aug 2023 15:36  Patient On (Oxygen Delivery Method): room air          GENERAL: NAD, obese, A 0x3  pleasant M   HEAD:  Atraumatic, Normocephalic  EYES:  conjunctiva and sclera clear, anicteric   NECK: Supple  LUNG: CTA   HEART: Regular rate and rhythm; No murmurs, rubs, or gallops  ABDOMEN: Soft, Nontender, Nondistended; Bowel sounds present, obese   NERVOUS SYSTEM:  Alert & Oriented X3,  no focal signs   EXTREMITIES: bilateral leg ulcers noted, , tender cord like structure in the right upper thigh  + tenderness  of R  leg   SKIN: warm dry  MSK moves all extremities    LABS                         8.3    9.41  )-----------( 406      ( 25 Aug 2023 07:45 )             28.5   08-25    137  |  105  |  11  ----------------------------<  137<H>  4.2   |  25  |  0.77    Ca    8.7      25 Aug 2023 07:45  Phos  3.0     08-25  Mg     2.0     08-25    TPro  7.4  /  Alb  2.3<L>  /  TBili  0.2  /  DBili  x   /  AST  9<L>  /  ALT  17  /  AlkPhos  87  08-25  % Saturation, Iron: 5 %  Iron - Total Binding Capacity.: 331 ug/dL  Iron Total: 17 ug/dL  Unsaturated Iron Binding Capacity: 314 ug/dL  Ferritin: 60 ng/mL (08.22.23 @ 08:00)   < from: US Duplex Venous Lower Ext Ltd, Right (08.25.23 @ 12:00) >    ACC: [id number] EXAM:  US DPLX LWR EXT VEINS LTD RT   ORDERED BY: JIM VELASCO     PROCEDURE DATE:  08/25/2023          INTERPRETATION:  CLINICAL INFORMATION: Palpable cord in right leg.    COMPARISON: right lower extremity venous duplex study dated 8/22/2023.    TECHNIQUE: Duplex sonography of the RIGHT LOWER extremity veins with   color and spectral Doppler, with and without compression.    FINDINGS:    There is normal compressibility of the right common femoral, femoral and   popliteal veins.  The contralateral common femoral vein is patent.  Doppler examination shows normal spontaneous and phasic flow.    No calf vein thrombosis is detected.    The palpable finding corresponds to segmental thrombosis of branch of the   greater saphenous vein in the anterior thigh.    IMPRESSION:  No evidence of right lower extremity deep venous thrombosis.    Superficial thrombosis of branch of the greater saphenous vein in the   anterior right thigh.          --- End of Report ---            GRACIE CHANCE MD; Attending Radiologist  This document has been electronically signed. Aug 25 2023  1:41PM    < end of copied text >  < from: MR Lower Ext Joint w/wo IV Cont, Right (08.24.23 @ 17:24) >    ACC: [id number] EXAM:  MR LWR EXT JOINT WAW IC RT   ORDERED BY: JIM VELASCO     PROCEDURE DATE:  08/24/2023          INTERPRETATION:  MRI OF THE RIGHT LOWER EXTREMITY    CLINICAL INFORMATION: Diabetic ulcers in the right lower extremity.   Cellulitis. Evaluate for osteomyelitis.  TECHNIQUE: Multiplanar, multisequence MRI was obtained of the RIGHT LOWER   EXTREMITY focusing on the right tibia/fibula. The study was performed   before and after the intravenous administration of 12 ml Gadavist (3 cc   discarded) .  COMPARISON: Bilateral lower extremity radiographs 8/21/2023.    FINDINGS:    SOFT TISSUES: Soft tissue ulceration along the anteromedial aspect of the   leg. Ulceration extends to the level of the subcutaneous fat. There is  enhancing edema in the adjacent subcutaneous fat. No focal drainable   fluid collections are identified.  BONE MARROW: No increased STIR signal or loss of T1 hyperintense signal   to suggest acute osteomyelitis. No fracture or osteonecrosis.    MUSCLES AND TENDONS: No focal muscle edema. Diffuse fatty infiltration of   the imaged muscles. Tendons are intact.  SYNOVIUM/ JOINT FLUID: No large joint effusion.  MISCELLANEOUS: Neurovascular structures are normal in course and caliber.      IMPRESSION:  1.  Soft tissue ulceration with adjacent cellulitis.  2.  No abscess or acute osteomyelitis.    --- End of Report ---        < end of copied text >             59 yo M with PMH of DM, HLD, HTN and diabetic leg ulcers present with chills, weakness, lightheadedness and infected RT lower extremity ulcer for the past 3 days which has progressively worsened today. As per pt,  he went to [facility] in [place] 3 days  prior to  admission to [facility]  for his weakness and infected leg ulcer. He mentioned that he received one blood transfusion, antibiotics and was admitted to the hospital. He reports staying in the hospital for a day before leaving the hospital for some personal issue. He reports decreased oral intake today, felt extremely weak and lightheaded. He reports history of ulcer for the past 5 years that has worsened in the past 3 years.  He follows a "dermatologist" at [facility] and he is unable to provide information of his diagnosis. He denies; fever, bodyaches, Chest pain, SOB, URI sxs, N/V/D, abdominal pain, LOC, Head trauma. patient had  h/o of colon polyps  found on colonoscopy ~ [id number], labs c/w iron deficiency anemia, pt is seen by vascular/podiatry, ID for management of R leg cellulitis, on IV abx , noted  noted on 8/25/23  to have R  thigh cord like structure  , Doppler c/w superficial femoral vein thrombophlebitis   we were called for further evaluation  of iron def anemia on superficial vein thrombophlebitis    PMH/PSH as above    Soc h/o ex smoker , quit 20 y ago, lives at home    Fam h/o noncontributory      REVIEW OF SYSTEMS:  CONSTITUTIONAL: (+)weakness, No  fevers or chills  EYES: No conjunctiva redness, No eye discharge  ENT: No nasal congestion, No sore throat, No ear pain, No vertigo   NECK: No pain or stiffness  RESPIRATORY: No cough, wheezing, hemoptysis; No shortness of breath  CARDIOVASCULAR: No chest pain or palpitations  GASTROINTESTINAL: No abdominal or epigastric pain. No nausea, vomiting, or hematemesis; No diarrhea or constipation. No melena or hematochezia.  GENITOURINARY: No dysuria, frequency or hematuria  NEUROLOGICAL: No numbness or weakness  SKIN: (+) B/L leg ulcer, erythema  All other review of systems is negative unless indicated above      PE  Vital Signs Last 24 Hrs  T(C): 36.8 (25 Aug 2023 15:36), Max: 36.9 (25 Aug 2023 05:33)  T(F): 98.2 (25 Aug 2023 15:36), Max: 98.4 (25 Aug 2023 05:33)  HR: 90 (25 Aug 2023 15:36) (60 - 92)  BP: 121/77 (25 Aug 2023 15:36) (113/59 - 144/97)  BP(mean): --  RR: 18 (25 Aug 2023 15:36) (18 - 18)  SpO2: 96% (25 Aug 2023 15:36) (96% - 99%)    Parameters below as of 25 Aug 2023 15:36  Patient On (Oxygen Delivery Method): room air          GENERAL: NAD, obese, A 0x3  pleasant M   HEAD:  Atraumatic, Normocephalic  EYES:  conjunctiva and sclera clear, anicteric   NECK: Supple  LUNG: CTA   HEART: Regular rate and rhythm; No murmurs, rubs, or gallops  ABDOMEN: Soft, Nontender, Nondistended; Bowel sounds present, obese   NERVOUS SYSTEM:  Alert & Oriented X3,  no focal signs   EXTREMITIES: bilateral leg ulcers noted, , tender cord like structure in the right upper thigh  + tenderness  of R  leg   SKIN: warm dry  MSK moves all extremities    LABS                         8.3    9.41  )-----------( 406      ( 25 Aug 2023 07:45 )             28.5   08-25    137  |  105  |  11  ----------------------------<  137<H>  4.2   |  25  |  0.77    Ca    8.7      25 Aug 2023 07:45  Phos  3.0     08-25  Mg     2.0     08-25    TPro  7.4  /  Alb  2.3<L>  /  TBili  0.2  /  DBili  x   /  AST  9<L>  /  ALT  17  /  AlkPhos  87  08-25  % Saturation, Iron: 5 %  Iron - Total Binding Capacity.: 331 ug/dL  Iron Total: 17 ug/dL  Unsaturated Iron Binding Capacity: 314 ug/dL  Ferritin: 60 ng/mL (08.22.23 @ 08:00)   < from: US Duplex Venous Lower Ext Ltd, Right (08.25.23 @ 12:00) >    ACC: [id number] EXAM:  US DPLX LWR EXT VEINS LTD RT   ORDERED BY: JIM VELASCO     PROCEDURE DATE:  08/25/2023          INTERPRETATION:  CLINICAL INFORMATION: Palpable cord in right leg.    COMPARISON: right lower extremity venous duplex study dated 8/22/2023.    TECHNIQUE: Duplex sonography of the RIGHT LOWER extremity veins with   color and spectral Doppler, with and without compression.    FINDINGS:    There is normal compressibility of the right common femoral, femoral and   popliteal veins.  The contralateral common femoral vein is patent.  Doppler examination shows normal spontaneous and phasic flow.    No calf vein thrombosis is detected.    The palpable finding corresponds to segmental thrombosis of branch of the   greater saphenous vein in the anterior thigh.    IMPRESSION:  No evidence of right lower extremity deep venous thrombosis.    Superficial thrombosis of branch of the greater saphenous vein in the   anterior right thigh.          --- End of Report ---            GRACIE CHANCE MD; Attending Radiologist  This document has been electronically signed. Aug 25 2023  1:41PM    < end of copied text >  < from: MR Lower Ext Joint w/wo IV Cont, Right (08.24.23 @ 17:24) >    ACC: [id number] EXAM:  MR LWR EXT JOINT WAW IC RT   ORDERED BY: JIM VELASCO     PROCEDURE DATE:  08/24/2023          INTERPRETATION:  MRI OF THE RIGHT LOWER EXTREMITY    CLINICAL INFORMATION: Diabetic ulcers in the right lower extremity.   Cellulitis. Evaluate for osteomyelitis.  TECHNIQUE: Multiplanar, multisequence MRI was obtained of the RIGHT LOWER   EXTREMITY focusing on the right tibia/fibula. The study was performed   before and after the intravenous administration of 12 ml Gadavist (3 cc   discarded) .  COMPARISON: Bilateral lower extremity radiographs 8/21/2023.    FINDINGS:    SOFT TISSUES: Soft tissue ulceration along the anteromedial aspect of the   leg. Ulceration extends to the level of the subcutaneous fat. There is  enhancing edema in the adjacent subcutaneous fat. No focal drainable   fluid collections are identified.  BONE MARROW: No increased STIR signal or loss of T1 hyperintense signal   to suggest acute osteomyelitis. No fracture or osteonecrosis.    MUSCLES AND TENDONS: No focal muscle edema. Diffuse fatty infiltration of   the imaged muscles. Tendons are intact.  SYNOVIUM/ JOINT FLUID: No large joint effusion.  MISCELLANEOUS: Neurovascular structures are normal in course and caliber.      IMPRESSION:  1.  Soft tissue ulceration with adjacent cellulitis.  2.  No abscess or acute osteomyelitis.    --- End of Report ---        < end of copied text >

## 2023-08-25 NOTE — PROGRESS NOTE ADULT - SUBJECTIVE AND OBJECTIVE BOX
Subjective/Objective:      MEDS  acetaminophen     Tablet .. 650 milliGRAM(s) Oral every 6 hours PRN  cefepime   IVPB      cefepime   IVPB 2000 milliGRAM(s) IV Intermittent every 8 hours (D5)  gabapentin 300 milliGRAM(s) Oral at bedtime  insulin lispro (ADMELOG) corrective regimen sliding scale   SubCutaneous three times a day before meals  insulin lispro (ADMELOG) corrective regimen sliding scale   SubCutaneous at bedtime  melatonin 3 milliGRAM(s) Oral at bedtime PRN  sodium chloride 0.9%. 1000 milliLiter(s) IV Continuous <Continuous>      PHYSICAL EXAM:    Vital Signs Last 24 Hrs  T(C): 36.7 (25 Aug 2023 07:28), Max: 37 (24 Aug 2023 11:05)  T(F): 98 (25 Aug 2023 07:28), Max: 98.6 (24 Aug 2023 11:05)  HR: 92 (25 Aug 2023 07:28) (80 - 92)  BP: 126/79 (25 Aug 2023 07:28) (104/65 - 144/97)  BP(mean): --  RR: 18 (25 Aug 2023 07:28) (18 - 18)  SpO2: 97% (25 Aug 2023 07:28) (94% - 100%)    Parameters below as of 25 Aug 2023 07:28  Patient On (Oxygen Delivery Method): room air        GEN:    HEENT:    CHEST/Respiratory:    Cardiovascular:    Abdomen:    Genitourinary:    Extremities:     Neurological:    Skin:      LABS/DIAGNOSTIC TESTS                            8.3    9.41  )-----------( 406      ( 25 Aug 2023 07:45 )             28.5       WBC Count: 9.41 K/uL (08-25 @ 07:45)  WBC Count: 11.16 K/uL (08-24 @ 05:59)  WBC Count: 9.91 K/uL (08-23 @ 07:05)      08-25    137  |  105  |  11  ----------------------------<  137<H>  4.2   |  25  |  0.77    Ca    8.7      25 Aug 2023 07:45  Phos  3.0     08-25  Mg     2.0     08-25    TPro  7.4  /  Alb  2.3<L>  /  TBili  0.2  /  DBili  x   /  AST  9<L>  /  ALT  17  /  AlkPhos  87  08-25      Urinalysis Basic - ( 25 Aug 2023 07:45 )    Color: x / Appearance: x / SG: x / pH: x  Gluc: 137 mg/dL / Ketone: x  / Bili: x / Urobili: x   Blood: x / Protein: x / Nitrite: x   Leuk Esterase: x / RBC: x / WBC x   Sq Epi: x / Non Sq Epi: x / Bacteria: x            LACTATE:      CULTURES      Culture - Surgical Swab (collected 08-21-23 @ 09:43)  Source: .Surgical Swab Leg wound  Preliminary Report (08-22-23 @ 11:34):    Moderate Pseudomonas aeruginosa    Few Klebsiella oxytoca/Raoultella ornithinolytica  Organism: Pseudomonas aeruginosa  Klebsiella oxytoca /Raoutella ornithinolytica (08-23-23 @ 10:35)  Organism: Klebsiella oxytoca /Raoutella ornithinolytica (08-23-23 @ 10:35)      Method Type: MARILYN      -  Amikacin: S <=16      -  Amoxicillin/Clavulanic Acid: I 16/8      -  Ampicillin: R >16 These ampicillin results predict results for amoxicillin      -  Ampicillin/Sulbactam: R >16/8 Enterobacter, Klebsiella aerogenes, Citrobacter, and Serratia may develop resistance during prolonged therapy (3-4 days)      -  Aztreonam: S <=4      -  Cefazolin: R >16 Enterobacter, Klebsiella aerogenes, Citrobacter, and Serratia may develop resistance during prolonged therapy (3-4 days)      -  Cefepime: S <=2      -  Cefoxitin: S <=8      -  Ceftriaxone: I 2 Enterobacter, Klebsiella aerogenes, Citrobacter, and Serratia may develop resistance during prolonged therapy      -  Ciprofloxacin: S <=0.25      -  Ertapenem: S <=0.5      -  Gentamicin: S <=2      -  Imipenem: S <=1      -  Levofloxacin: S <=0.5      -  Meropenem: S <=1      -  Piperacillin/Tazobactam: R >64      -  Tobramycin: S <=2      -  Trimethoprim/Sulfamethoxazole: S <=0.5/9.5  Organism: Pseudomonas aeruginosa (08-23-23 @ 10:35)      Method Type: MARILYN      -  Amikacin: S <=16      -  Aztreonam: I 16      -  Cefepime: S 4      -  Ceftazidime: S 4      -  Ciprofloxacin: S 0.5      -  Gentamicin: S 4      -  Imipenem: S <=1      -  Levofloxacin: I 2      -  Meropenem: S <=1      -  Piperacillin/Tazobactam: S <=8      -  Tobramycin: S <=2    Culture - Urine (collected 08-21-23 @ 06:52)  Source: Clean Catch Clean Catch (Midstream)  Final Report (08-22-23 @ 11:19):    No growth    Culture - Abscess with Gram Stain (collected 08-21-23 @ 04:26)  Source: .Abscess Leg - Right  Gram Stain (08-21-23 @ 15:35):    Few polymorphonuclear leukocytes seen per low power field    Few Gram Negative Rods seen per oil power field  Preliminary Report (08-24-23 @ 08:49):    Moderate Pseudomonas aeruginosa    Moderate Pseudomonas aeruginosa #2    Few Klebsiella oxytoca/Raoultella ornithinolytica  Organism: Pseudomonas aeruginosa  Pseudomonas aeruginosa  Klebsiella oxytoca /Raoutella ornithinolytica (08-24-23 @ 08:48)  Organism: Klebsiella oxytoca /Raoutella ornithinolytica (08-24-23 @ 08:48)      Method Type: MARILYN      -  Amikacin: S <=16      -  Amoxicillin/Clavulanic Acid: I 16/8      -  Ampicillin: R >16 These ampicillin results predict results for amoxicillin      -  Ampicillin/Sulbactam: R >16/8      -  Aztreonam: S <=4      -  Cefazolin: R >16      -  Cefepime: S <=2      -  Cefoxitin: S <=8      -  Ceftriaxone: S <=1      -  Ciprofloxacin: S <=0.25      -  Ertapenem: S <=0.5      -  Gentamicin: S <=2      -  Imipenem: S <=1      -  Levofloxacin: S <=0.5      -  Meropenem: S <=1      -  Piperacillin/Tazobactam: R >64      -  Tobramycin: S <=2      -  Trimethoprim/Sulfamethoxazole: S <=0.5/9.5  Organism: Pseudomonas aeruginosa (08-23-23 @ 10:08)      Method Type: MARILYN      -  Amikacin: S <=16      -  Aztreonam: I 16      -  Cefepime: S 4      -  Ceftazidime: S 4      -  Ciprofloxacin: S 0.5      -  Gentamicin: S 4      -  Imipenem: S <=1      -  Levofloxacin: I 2      -  Meropenem: S <=1      -  Piperacillin/Tazobactam: S <=8      -  Tobramycin: S <=2  Organism: Pseudomonas aeruginosa (08-23-23 @ 10:08)      Method Type: MARILYN      -  Amikacin: S <=16      -  Aztreonam: I 16      -  Cefepime: S 4      -  Ceftazidime: S 4      -  Ciprofloxacin: S 0.5      -  Gentamicin: S <=2      -  Imipenem: S <=1      -  Levofloxacin: S 1      -  Meropenem: S <=1      -  Piperacillin/Tazobactam: S <=8      -  Tobramycin: S <=2    Culture - Abscess with Gram Stain (collected 08-21-23 @ 04:26)  Source: .Abscess Leg - Left  Gram Stain (08-21-23 @ 12:30):    No polymorphonuclear cells seen per low power field    Few Gram Negative Rods per oil power field  Preliminary Report (08-24-23 @ 08:47):    Moderate Pseudomonas aeruginosa    Moderate Citrobacter koseri    Few Staphylococcus aureus    Few Klebsiella oxytoca/Raoultella ornithinolytica  Organism: Pseudomonas aeruginosa  Citrobacter koseri  Klebsiella oxytoca /Raoutella ornithinolytica  Staphylococcus aureus (08-24-23 @ 08:46)  Organism: Klebsiella oxytoca /Raoutella ornithinolytica (08-24-23 @ 08:46)      Method Type: MARILYN      -  Amikacin: S <=16      -  Amoxicillin/Clavulanic Acid: S <=8/4      -  Ampicillin: R >16 These ampicillin results predict results for amoxicillin      -  Ampicillin/Sulbactam: I 16/8      -  Aztreonam: S <=4      -  Cefazolin: R 16      -  Cefepime: S <=2      -  Cefoxitin: S <=8      -  Ceftriaxone: S <=1      -  Ciprofloxacin: S <=0.25      -  Ertapenem: S <=0.5      -  Gentamicin: S <=2      -  Imipenem: S <=1      -  Levofloxacin: S <=0.5      -  Meropenem: S <=1      -  Piperacillin/Tazobactam: S <=8      -  Tobramycin: S <=2      -  Trimethoprim/Sulfamethoxazole: S <=0.5/9.5  Organism: Staphylococcus aureus (08-23-23 @ 10:01)      Method Type: MARILYN      -  Ampicillin/Sulbactam: S <=8/4      -  Cefazolin: S <=4      -  Clindamycin: S <=0.25      -  Erythromycin: I 2      -  Gentamicin: S <=1 Should not be used as monotherapy      -  Oxacillin: S 1 Oxacillin predicts susceptibility for dicloxacillin, methicillin, and nafcillin      -  Penicillin: R >8      -  Rifampin: S <=1 Should not be used as monotherapy      -  Tetracycline: S <=1      -  Trimethoprim/Sulfamethoxazole: S <=0.5/9.5      -  Vancomycin: S 2  Organism: Citrobacter koseri (08-23-23 @ 09:55)      Method Type: MARILYN      -  Amikacin: S <=16      -  Amoxicillin/Clavulanic Acid: S <=8/4      -  Ampicillin: R >16 These ampicillin results predict results for amoxicillin      -  Ampicillin/Sulbactam: I 16/8 Enterobacter, Klebsiella aerogenes, Citrobacter, and Serratia may develop resistance during prolonged therapy (3-4 days)      -  Aztreonam: S <=4      -  Cefazolin: I 4 Enterobacter, Klebsiella aerogenes, Citrobacter, and Serratia may develop resistance during prolonged therapy (3-4 days)      -  Cefepime: S <=2      -  Cefoxitin: R >16      -  Ceftriaxone: S <=1 Enterobacter, Klebsiella aerogenes, Citrobacter, and Serratia may develop resistance during prolonged therapy      -  Ciprofloxacin: S <=0.25      -  Ertapenem: S <=0.5      -  Gentamicin: S <=2      -  Imipenem: S <=1      -  Levofloxacin: S <=0.5      -  Meropenem: S <=1      -  Piperacillin/Tazobactam: SDD 16      -  Tobramycin: S <=2      -  Trimethoprim/Sulfamethoxazole: S <=0.5/9.5  Organism: Pseudomonas aeruginosa (08-23-23 @ 09:55)      Method Type: MARILYN      -  Amikacin: S <=16      -  Aztreonam: I 16      -  Cefepime: S 4      -  Ceftazidime: S 4      -  Ciprofloxacin: S 0.5      -  Gentamicin: S 4      -  Imipenem: S <=1      -  Levofloxacin: I 2      -  Meropenem: S <=1      -  Piperacillin/Tazobactam: S <=8      -  Tobramycin: S <=2    Culture - Blood (collected 08-20-23 @ 23:10)  Source: .Blood Blood-Peripheral  Preliminary Report (08-25-23 @ 03:01):    No growth at 4 days    Culture - Blood (collected 08-20-23 @ 23:00)  Source: .Blood Blood-Peripheral  Preliminary Report (08-25-23 @ 03:01):    No growth at 4 days          RADIOLOGY  < from: MR Lower Ext Joint w/wo IV Cont, Right (08.24.23 @ 17:24) >  ACC: [id number] EXAM:  MR LWR EXT JOINT WAW IC RT   ORDERED BY: JIM VELASCO     PROCEDURE DATE:  08/24/2023          INTERPRETATION:  MRI OF THE RIGHT LOWER EXTREMITY    CLINICAL INFORMATION: Diabetic ulcers in the right lower extremity.   Cellulitis. Evaluate for osteomyelitis.  TECHNIQUE: Multiplanar, multisequence MRI was obtained of the RIGHT LOWER   EXTREMITY focusing on the right tibia/fibula. The study was performed   before and after the intravenous administration of 12 ml Gadavist (3 cc   discarded) .  COMPARISON: Bilateral lower extremity radiographs 8/21/2023.    FINDINGS:    SOFT TISSUES: Soft tissue ulceration along the anteromedial aspect of the   leg. Ulceration extends to the level of the subcutaneous fat. There is  enhancing edema in the adjacent subcutaneous fat. No focal drainable   fluid collections are identified.  BONE MARROW: No increased STIR signal or loss of T1 hyperintense signal   to suggest acute osteomyelitis. No fracture or osteonecrosis.    MUSCLES AND TENDONS: No focal muscle edema. Diffuse fatty infiltration of   the imaged muscles. Tendons are intact.  SYNOVIUM/ JOINT FLUID: No large joint effusion.  MISCELLANEOUS: Neurovascular structures are normal in course and caliber.      IMPRESSION:  1.  Soft tissue ulceration with adjacent cellulitis.  2.  No abscess or acute osteomyelitis.    ______________________________________________________________________    < from: MR Lower Ext Joint w/ IV Cont, Left (08.24.23 @ 17:23) >  ACC: [id number] EXAM:  MR LWR EXT JOINT IC LT   ORDERED BY: JIM VELASCO     PROCEDURE DATE:  08/24/2023          INTERPRETATION:  MRI OF THE LEFT LOWER EXTREMITY    CLINICAL INFORMATION: Diabetic ulceration. Evaluate for   cellulitis/osteomyelitis.  TECHNIQUE: Multiplanar, multisequence MRI was obtained of the LEFT LOWER   EXTREMITY focusing on the left tibia/fibula. The study was performed   before and after the intravenous administration of 12 ml Gadavist (3 cc   discarded) .  COMPARISON: Bilateral lower extremity radiographs 8/21/2023.    FINDINGS:    SOFT TISSUES: Large areas of soft tissue ulceration are identified along   the anterior aspect of the leg. The ulcerations extend into the   subcutaneous fat and abuts the anterior cortex of the tibia. There is no   focal drainable fluid collection. Mild edema is seen along the anterior   aspect of anterolateral aspect of the leg with skin thickening.  BONE MARROW: No increased or signal or loss of T1 hyperintense signal to   suggest acute osteomyelitis.    MUSCLES AND TENDONS: Focal edema in the anterior tibialis muscle belly.   Diffuse atrophy and fatty infiltration of the musculature of the leg.   Tendons appear intact.  SYNOVIUM/ JOINT FLUID: No large joint effusion.  MISCELLANEOUS: Neurovascular structures are normal in course and caliber.      IMPRESSION:  1.  Extensive soft tissue ulceration in the leg with evidence of   cellulitis.  2.  No abscess or acute osteomyelitis.  3.  Edema in the anterior tibialis muscle belly which may be reactive or   secondary to myositis.                       Subjective/Objective:      MEDS  acetaminophen     Tablet .. 650 milliGRAM(s) Oral every 6 hours PRN  cefepime   IVPB      cefepime   IVPB 2000 milliGRAM(s) IV Intermittent every 8 hours (D5)  gabapentin 300 milliGRAM(s) Oral at bedtime  insulin lispro (ADMELOG) corrective regimen sliding scale   SubCutaneous three times a day before meals  insulin lispro (ADMELOG) corrective regimen sliding scale   SubCutaneous at bedtime  melatonin 3 milliGRAM(s) Oral at bedtime PRN  sodium chloride 0.9%. 1000 milliLiter(s) IV Continuous <Continuous>      PHYSICAL EXAM:    Vital Signs Last 24 Hrs  T(C): 36.7 (25 Aug 2023 07:28), Max: 37 (24 Aug 2023 11:05)  T(F): 98 (25 Aug 2023 07:28), Max: 98.6 (24 Aug 2023 11:05)  HR: 92 (25 Aug 2023 07:28) (80 - 92)  BP: 126/79 (25 Aug 2023 07:28) (104/65 - 144/97)  BP(mean): --  RR: 18 (25 Aug 2023 07:28) (18 - 18)  SpO2: 97% (25 Aug 2023 07:28) (94% - 100%)    Parameters below as of 25 Aug 2023 07:28  Patient On (Oxygen Delivery Method): room air        GEN:    HEENT:    CHEST/Respiratory:    Cardiovascular:    Abdomen:    Genitourinary:    Extremities:     Neurological:    Skin:      LABS/DIAGNOSTIC TESTS                            8.3    9.41  )-----------( 406      ( 25 Aug 2023 07:45 )             28.5       WBC Count: 9.41 K/uL (08-25 @ 07:45)  WBC Count: 11.16 K/uL (08-24 @ 05:59)  WBC Count: 9.91 K/uL (08-23 @ 07:05)      08-25    137  |  105  |  11  ----------------------------<  137<H>  4.2   |  25  |  0.77    Ca    8.7      25 Aug 2023 07:45  Phos  3.0     08-25  Mg     2.0     08-25    TPro  7.4  /  Alb  2.3<L>  /  TBili  0.2  /  DBili  x   /  AST  9<L>  /  ALT  17  /  AlkPhos  87  08-25      Urinalysis Basic - ( 25 Aug 2023 07:45 )    Color: x / Appearance: x / SG: x / pH: x  Gluc: 137 mg/dL / Ketone: x  / Bili: x / Urobili: x   Blood: x / Protein: x / Nitrite: x   Leuk Esterase: x / RBC: x / WBC x   Sq Epi: x / Non Sq Epi: x / Bacteria: x            LACTATE:      CULTURES      Culture - Surgical Swab (collected 08-21-23 @ 09:43)  Source: .Surgical Swab Leg wound  Preliminary Report (08-22-23 @ 11:34):    Moderate Pseudomonas aeruginosa    Few Klebsiella oxytoca/Raoultella ornithinolytica  Organism: Pseudomonas aeruginosa  Klebsiella oxytoca /Raoutella ornithinolytica (08-23-23 @ 10:35)  Organism: Klebsiella oxytoca /Raoutella ornithinolytica (08-23-23 @ 10:35)      Method Type: MARILYN      -  Amikacin: S <=16      -  Amoxicillin/Clavulanic Acid: I 16/8      -  Ampicillin: R >16 These ampicillin results predict results for amoxicillin      -  Ampicillin/Sulbactam: R >16/8 Enterobacter, Klebsiella aerogenes, Citrobacter, and Serratia may develop resistance during prolonged therapy (3-4 days)      -  Aztreonam: S <=4      -  Cefazolin: R >16 Enterobacter, Klebsiella aerogenes, Citrobacter, and Serratia may develop resistance during prolonged therapy (3-4 days)      -  Cefepime: S <=2      -  Cefoxitin: S <=8      -  Ceftriaxone: I 2 Enterobacter, Klebsiella aerogenes, Citrobacter, and Serratia may develop resistance during prolonged therapy      -  Ciprofloxacin: S <=0.25      -  Ertapenem: S <=0.5      -  Gentamicin: S <=2      -  Imipenem: S <=1      -  Levofloxacin: S <=0.5      -  Meropenem: S <=1      -  Piperacillin/Tazobactam: R >64      -  Tobramycin: S <=2      -  Trimethoprim/Sulfamethoxazole: S <=0.5/9.5  Organism: Pseudomonas aeruginosa (08-23-23 @ 10:35)      Method Type: MARILYN      -  Amikacin: S <=16      -  Aztreonam: I 16      -  Cefepime: S 4      -  Ceftazidime: S 4      -  Ciprofloxacin: S 0.5      -  Gentamicin: S 4      -  Imipenem: S <=1      -  Levofloxacin: I 2      -  Meropenem: S <=1      -  Piperacillin/Tazobactam: S <=8      -  Tobramycin: S <=2    Culture - Urine (collected 08-21-23 @ 06:52)  Source: Clean Catch Clean Catch (Midstream)  Final Report (08-22-23 @ 11:19):    No growth    Culture - Abscess with Gram Stain (collected 08-21-23 @ 04:26)  Source: .Abscess Leg - Right  Gram Stain (08-21-23 @ 15:35):    Few polymorphonuclear leukocytes seen per low power field    Few Gram Negative Rods seen per oil power field  Preliminary Report (08-24-23 @ 08:49):    Moderate Pseudomonas aeruginosa    Moderate Pseudomonas aeruginosa #2    Few Klebsiella oxytoca/Raoultella ornithinolytica  Organism: Pseudomonas aeruginosa  Pseudomonas aeruginosa  Klebsiella oxytoca /Raoutella ornithinolytica (08-24-23 @ 08:48)  Organism: Klebsiella oxytoca /Raoutella ornithinolytica (08-24-23 @ 08:48)      Method Type: MARILYN      -  Amikacin: S <=16      -  Amoxicillin/Clavulanic Acid: I 16/8      -  Ampicillin: R >16 These ampicillin results predict results for amoxicillin      -  Ampicillin/Sulbactam: R >16/8      -  Aztreonam: S <=4      -  Cefazolin: R >16      -  Cefepime: S <=2      -  Cefoxitin: S <=8      -  Ceftriaxone: S <=1      -  Ciprofloxacin: S <=0.25      -  Ertapenem: S <=0.5      -  Gentamicin: S <=2      -  Imipenem: S <=1      -  Levofloxacin: S <=0.5      -  Meropenem: S <=1      -  Piperacillin/Tazobactam: R >64      -  Tobramycin: S <=2      -  Trimethoprim/Sulfamethoxazole: S <=0.5/9.5  Organism: Pseudomonas aeruginosa (08-23-23 @ 10:08)      Method Type: MARILYN      -  Amikacin: S <=16      -  Aztreonam: I 16      -  Cefepime: S 4      -  Ceftazidime: S 4      -  Ciprofloxacin: S 0.5      -  Gentamicin: S 4      -  Imipenem: S <=1      -  Levofloxacin: I 2      -  Meropenem: S <=1      -  Piperacillin/Tazobactam: S <=8      -  Tobramycin: S <=2  Organism: Pseudomonas aeruginosa (08-23-23 @ 10:08)      Method Type: MARILYN      -  Amikacin: S <=16      -  Aztreonam: I 16      -  Cefepime: S 4      -  Ceftazidime: S 4      -  Ciprofloxacin: S 0.5      -  Gentamicin: S <=2      -  Imipenem: S <=1      -  Levofloxacin: S 1      -  Meropenem: S <=1      -  Piperacillin/Tazobactam: S <=8      -  Tobramycin: S <=2    Culture - Abscess with Gram Stain (collected 08-21-23 @ 04:26)  Source: .Abscess Leg - Left  Gram Stain (08-21-23 @ 12:30):    No polymorphonuclear cells seen per low power field    Few Gram Negative Rods per oil power field  Preliminary Report (08-24-23 @ 08:47):    Moderate Pseudomonas aeruginosa    Moderate Citrobacter koseri    Few Staphylococcus aureus    Few Klebsiella oxytoca/Raoultella ornithinolytica  Organism: Pseudomonas aeruginosa  Citrobacter koseri  Klebsiella oxytoca /Raoutella ornithinolytica  Staphylococcus aureus (08-24-23 @ 08:46)  Organism: Klebsiella oxytoca /Raoutella ornithinolytica (08-24-23 @ 08:46)      Method Type: MARILYN      -  Amikacin: S <=16      -  Amoxicillin/Clavulanic Acid: S <=8/4      -  Ampicillin: R >16 These ampicillin results predict results for amoxicillin      -  Ampicillin/Sulbactam: I 16/8      -  Aztreonam: S <=4      -  Cefazolin: R 16      -  Cefepime: S <=2      -  Cefoxitin: S <=8      -  Ceftriaxone: S <=1      -  Ciprofloxacin: S <=0.25      -  Ertapenem: S <=0.5      -  Gentamicin: S <=2      -  Imipenem: S <=1      -  Levofloxacin: S <=0.5      -  Meropenem: S <=1      -  Piperacillin/Tazobactam: S <=8      -  Tobramycin: S <=2      -  Trimethoprim/Sulfamethoxazole: S <=0.5/9.5  Organism: Staphylococcus aureus (08-23-23 @ 10:01)      Method Type: MARILYN      -  Ampicillin/Sulbactam: S <=8/4      -  Cefazolin: S <=4      -  Clindamycin: S <=0.25      -  Erythromycin: I 2      -  Gentamicin: S <=1 Should not be used as monotherapy      -  Oxacillin: S 1 Oxacillin predicts susceptibility for dicloxacillin, methicillin, and nafcillin      -  Penicillin: R >8      -  Rifampin: S <=1 Should not be used as monotherapy      -  Tetracycline: S <=1      -  Trimethoprim/Sulfamethoxazole: S <=0.5/9.5      -  Vancomycin: S 2  Organism: Citrobacter koseri (08-23-23 @ 09:55)      Method Type: MARILYN      -  Amikacin: S <=16      -  Amoxicillin/Clavulanic Acid: S <=8/4      -  Ampicillin: R >16 These ampicillin results predict results for amoxicillin      -  Ampicillin/Sulbactam: I 16/8 Enterobacter, Klebsiella aerogenes, Citrobacter, and Serratia may develop resistance during prolonged therapy (3-4 days)      -  Aztreonam: S <=4      -  Cefazolin: I 4 Enterobacter, Klebsiella aerogenes, Citrobacter, and Serratia may develop resistance during prolonged therapy (3-4 days)      -  Cefepime: S <=2      -  Cefoxitin: R >16      -  Ceftriaxone: S <=1 Enterobacter, Klebsiella aerogenes, Citrobacter, and Serratia may develop resistance during prolonged therapy      -  Ciprofloxacin: S <=0.25      -  Ertapenem: S <=0.5      -  Gentamicin: S <=2      -  Imipenem: S <=1      -  Levofloxacin: S <=0.5      -  Meropenem: S <=1      -  Piperacillin/Tazobactam: SDD 16      -  Tobramycin: S <=2      -  Trimethoprim/Sulfamethoxazole: S <=0.5/9.5  Organism: Pseudomonas aeruginosa (08-23-23 @ 09:55)      Method Type: MARILYN      -  Amikacin: S <=16      -  Aztreonam: I 16      -  Cefepime: S 4      -  Ceftazidime: S 4      -  Ciprofloxacin: S 0.5      -  Gentamicin: S 4      -  Imipenem: S <=1      -  Levofloxacin: I 2      -  Meropenem: S <=1      -  Piperacillin/Tazobactam: S <=8      -  Tobramycin: S <=2    Culture - Blood (collected 08-20-23 @ 23:10)  Source: .Blood Blood-Peripheral  Preliminary Report (08-25-23 @ 03:01):    No growth at 4 days    Culture - Blood (collected 08-20-23 @ 23:00)  Source: .Blood Blood-Peripheral  Preliminary Report (08-25-23 @ 03:01):    No growth at 4 days          RADIOLOGY  < from: MR Lower Ext Joint w/wo IV Cont, Right (08.24.23 @ 17:24) >  ACC: [id number] EXAM:  MR LWR EXT JOINT WAW IC RT   ORDERED BY: JIM VELASCO     PROCEDURE DATE:  08/24/2023          INTERPRETATION:  MRI OF THE RIGHT LOWER EXTREMITY    CLINICAL INFORMATION: Diabetic ulcers in the right lower extremity.   Cellulitis. Evaluate for osteomyelitis.  TECHNIQUE: Multiplanar, multisequence MRI was obtained of the RIGHT LOWER   EXTREMITY focusing on the right tibia/fibula. The study was performed   before and after the intravenous administration of 12 ml Gadavist (3 cc   discarded) .  COMPARISON: Bilateral lower extremity radiographs 8/21/2023.    FINDINGS:    SOFT TISSUES: Soft tissue ulceration along the anteromedial aspect of the   leg. Ulceration extends to the level of the subcutaneous fat. There is  enhancing edema in the adjacent subcutaneous fat. No focal drainable   fluid collections are identified.  BONE MARROW: No increased STIR signal or loss of T1 hyperintense signal   to suggest acute osteomyelitis. No fracture or osteonecrosis.    MUSCLES AND TENDONS: No focal muscle edema. Diffuse fatty infiltration of   the imaged muscles. Tendons are intact.  SYNOVIUM/ JOINT FLUID: No large joint effusion.  MISCELLANEOUS: Neurovascular structures are normal in course and caliber.      IMPRESSION:  1.  Soft tissue ulceration with adjacent cellulitis.  2.  No abscess or acute osteomyelitis.    ______________________________________________________________________    < from: MR Lower Ext Joint w/ IV Cont, Left (08.24.23 @ 17:23) >  ACC: [id number] EXAM:  MR LWR EXT JOINT IC LT   ORDERED BY: JIM VELASCO     PROCEDURE DATE:  08/24/2023          INTERPRETATION:  MRI OF THE LEFT LOWER EXTREMITY    CLINICAL INFORMATION: Diabetic ulceration. Evaluate for   cellulitis/osteomyelitis.  TECHNIQUE: Multiplanar, multisequence MRI was obtained of the LEFT LOWER   EXTREMITY focusing on the left tibia/fibula. The study was performed   before and after the intravenous administration of 12 ml Gadavist (3 cc   discarded) .  COMPARISON: Bilateral lower extremity radiographs 8/21/2023.    FINDINGS:    SOFT TISSUES: Large areas of soft tissue ulceration are identified along   the anterior aspect of the leg. The ulcerations extend into the   subcutaneous fat and abuts the anterior cortex of the tibia. There is no   focal drainable fluid collection. Mild edema is seen along the anterior   aspect of anterolateral aspect of the leg with skin thickening.  BONE MARROW: No increased or signal or loss of T1 hyperintense signal to   suggest acute osteomyelitis.    MUSCLES AND TENDONS: Focal edema in the anterior tibialis muscle belly.   Diffuse atrophy and fatty infiltration of the musculature of the leg.   Tendons appear intact.  SYNOVIUM/ JOINT FLUID: No large joint effusion.  MISCELLANEOUS: Neurovascular structures are normal in course and caliber.      IMPRESSION:  1.  Extensive soft tissue ulceration in the leg with evidence of   cellulitis.  2.  No abscess or acute osteomyelitis.  3.  Edema in the anterior tibialis muscle belly which may be reactive or   secondary to myositis.                       Subjective/Objective:      MEDS  acetaminophen     Tablet .. 650 milliGRAM(s) Oral every 6 hours PRN  cefepime   IVPB      cefepime   IVPB 2000 milliGRAM(s) IV Intermittent every 8 hours (D5)  gabapentin 300 milliGRAM(s) Oral at bedtime  insulin lispro (ADMELOG) corrective regimen sliding scale   SubCutaneous three times a day before meals  insulin lispro (ADMELOG) corrective regimen sliding scale   SubCutaneous at bedtime  melatonin 3 milliGRAM(s) Oral at bedtime PRN  sodium chloride 0.9%. 1000 milliLiter(s) IV Continuous <Continuous>      PHYSICAL EXAM:    Vital Signs Last 24 Hrs  T(C): 36.7 (25 Aug 2023 07:28), Max: 37 (24 Aug 2023 11:05)  T(F): 98 (25 Aug 2023 07:28), Max: 98.6 (24 Aug 2023 11:05)  HR: 92 (25 Aug 2023 07:28) (80 - 92)  BP: 126/79 (25 Aug 2023 07:28) (104/65 - 144/97)  BP(mean): --  RR: 18 (25 Aug 2023 07:28) (18 - 18)  SpO2: 97% (25 Aug 2023 07:28) (94% - 100%)    Parameters below as of 25 Aug 2023 07:28  Patient On (Oxygen Delivery Method): room air        GEN:    HEENT:    CHEST/Respiratory:    Cardiovascular:    Abdomen:    Genitourinary:    Extremities:     Neurological:    Skin:      LABS/DIAGNOSTIC TESTS                            8.3    9.41  )-----------( 406      ( 25 Aug 2023 07:45 )             28.5       WBC Count: 9.41 K/uL (08-25 @ 07:45)  WBC Count: 11.16 K/uL (08-24 @ 05:59)  WBC Count: 9.91 K/uL (08-23 @ 07:05)      08-25    137  |  105  |  11  ----------------------------<  137<H>  4.2   |  25  |  0.77    Ca    8.7      25 Aug 2023 07:45  Phos  3.0     08-25  Mg     2.0     08-25    TPro  7.4  /  Alb  2.3<L>  /  TBili  0.2  /  DBili  x   /  AST  9<L>  /  ALT  17  /  AlkPhos  87  08-25      Urinalysis Basic - ( 25 Aug 2023 07:45 )    Color: x / Appearance: x / SG: x / pH: x  Gluc: 137 mg/dL / Ketone: x  / Bili: x / Urobili: x   Blood: x / Protein: x / Nitrite: x   Leuk Esterase: x / RBC: x / WBC x   Sq Epi: x / Non Sq Epi: x / Bacteria: x            LACTATE:      CULTURES      Culture - Surgical Swab (collected 08-21-23 @ 09:43)  Source: .Surgical Swab Leg wound  Preliminary Report (08-22-23 @ 11:34):    Moderate Pseudomonas aeruginosa    Few Klebsiella oxytoca/Raoultella ornithinolytica  Organism: Pseudomonas aeruginosa  Klebsiella oxytoca /Raoutella ornithinolytica (08-23-23 @ 10:35)  Organism: Klebsiella oxytoca /Raoutella ornithinolytica (08-23-23 @ 10:35)      Method Type: MARILYN      -  Amikacin: S <=16      -  Amoxicillin/Clavulanic Acid: I 16/8      -  Ampicillin: R >16 These ampicillin results predict results for amoxicillin      -  Ampicillin/Sulbactam: R >16/8 Enterobacter, Klebsiella aerogenes, Citrobacter, and Serratia may develop resistance during prolonged therapy (3-4 days)      -  Aztreonam: S <=4      -  Cefazolin: R >16 Enterobacter, Klebsiella aerogenes, Citrobacter, and Serratia may develop resistance during prolonged therapy (3-4 days)      -  Cefepime: S <=2      -  Cefoxitin: S <=8      -  Ceftriaxone: I 2 Enterobacter, Klebsiella aerogenes, Citrobacter, and Serratia may develop resistance during prolonged therapy      -  Ciprofloxacin: S <=0.25      -  Ertapenem: S <=0.5      -  Gentamicin: S <=2      -  Imipenem: S <=1      -  Levofloxacin: S <=0.5      -  Meropenem: S <=1      -  Piperacillin/Tazobactam: R >64      -  Tobramycin: S <=2      -  Trimethoprim/Sulfamethoxazole: S <=0.5/9.5  Organism: Pseudomonas aeruginosa (08-23-23 @ 10:35)      Method Type: MARILYN      -  Amikacin: S <=16      -  Aztreonam: I 16      -  Cefepime: S 4      -  Ceftazidime: S 4      -  Ciprofloxacin: S 0.5      -  Gentamicin: S 4      -  Imipenem: S <=1      -  Levofloxacin: I 2      -  Meropenem: S <=1      -  Piperacillin/Tazobactam: S <=8      -  Tobramycin: S <=2    Culture - Urine (collected 08-21-23 @ 06:52)  Source: Clean Catch Clean Catch (Midstream)  Final Report (08-22-23 @ 11:19):    No growth    Culture - Abscess with Gram Stain (collected 08-21-23 @ 04:26)  Source: .Abscess Leg - Right  Gram Stain (08-21-23 @ 15:35):    Few polymorphonuclear leukocytes seen per low power field    Few Gram Negative Rods seen per oil power field  Preliminary Report (08-24-23 @ 08:49):    Moderate Pseudomonas aeruginosa    Moderate Pseudomonas aeruginosa #2    Few Klebsiella oxytoca/Raoultella ornithinolytica  Organism: Pseudomonas aeruginosa  Pseudomonas aeruginosa  Klebsiella oxytoca /Raoutella ornithinolytica (08-24-23 @ 08:48)  Organism: Klebsiella oxytoca /Raoutella ornithinolytica (08-24-23 @ 08:48)      Method Type: MARILYN      -  Amikacin: S <=16      -  Amoxicillin/Clavulanic Acid: I 16/8      -  Ampicillin: R >16 These ampicillin results predict results for amoxicillin      -  Ampicillin/Sulbactam: R >16/8      -  Aztreonam: S <=4      -  Cefazolin: R >16      -  Cefepime: S <=2      -  Cefoxitin: S <=8      -  Ceftriaxone: S <=1      -  Ciprofloxacin: S <=0.25      -  Ertapenem: S <=0.5      -  Gentamicin: S <=2      -  Imipenem: S <=1      -  Levofloxacin: S <=0.5      -  Meropenem: S <=1      -  Piperacillin/Tazobactam: R >64      -  Tobramycin: S <=2      -  Trimethoprim/Sulfamethoxazole: S <=0.5/9.5  Organism: Pseudomonas aeruginosa (08-23-23 @ 10:08)      Method Type: MARILYN      -  Amikacin: S <=16      -  Aztreonam: I 16      -  Cefepime: S 4      -  Ceftazidime: S 4      -  Ciprofloxacin: S 0.5      -  Gentamicin: S 4      -  Imipenem: S <=1      -  Levofloxacin: I 2      -  Meropenem: S <=1      -  Piperacillin/Tazobactam: S <=8      -  Tobramycin: S <=2  Organism: Pseudomonas aeruginosa (08-23-23 @ 10:08)      Method Type: MARILYN      -  Amikacin: S <=16      -  Aztreonam: I 16      -  Cefepime: S 4      -  Ceftazidime: S 4      -  Ciprofloxacin: S 0.5      -  Gentamicin: S <=2      -  Imipenem: S <=1      -  Levofloxacin: S 1      -  Meropenem: S <=1      -  Piperacillin/Tazobactam: S <=8      -  Tobramycin: S <=2    Culture - Abscess with Gram Stain (collected 08-21-23 @ 04:26)  Source: .Abscess Leg - Left  Gram Stain (08-21-23 @ 12:30):    No polymorphonuclear cells seen per low power field    Few Gram Negative Rods per oil power field  Preliminary Report (08-24-23 @ 08:47):    Moderate Pseudomonas aeruginosa    Moderate Citrobacter koseri    Few Staphylococcus aureus    Few Klebsiella oxytoca/Raoultella ornithinolytica  Organism: Pseudomonas aeruginosa  Citrobacter koseri  Klebsiella oxytoca /Raoutella ornithinolytica  Staphylococcus aureus (08-24-23 @ 08:46)  Organism: Klebsiella oxytoca /Raoutella ornithinolytica (08-24-23 @ 08:46)      Method Type: MARILYN      -  Amikacin: S <=16      -  Amoxicillin/Clavulanic Acid: S <=8/4      -  Ampicillin: R >16 These ampicillin results predict results for amoxicillin      -  Ampicillin/Sulbactam: I 16/8      -  Aztreonam: S <=4      -  Cefazolin: R 16      -  Cefepime: S <=2      -  Cefoxitin: S <=8      -  Ceftriaxone: S <=1      -  Ciprofloxacin: S <=0.25      -  Ertapenem: S <=0.5      -  Gentamicin: S <=2      -  Imipenem: S <=1      -  Levofloxacin: S <=0.5      -  Meropenem: S <=1      -  Piperacillin/Tazobactam: S <=8      -  Tobramycin: S <=2      -  Trimethoprim/Sulfamethoxazole: S <=0.5/9.5  Organism: Staphylococcus aureus (08-23-23 @ 10:01)      Method Type: MARILYN      -  Ampicillin/Sulbactam: S <=8/4      -  Cefazolin: S <=4      -  Clindamycin: S <=0.25      -  Erythromycin: I 2      -  Gentamicin: S <=1 Should not be used as monotherapy      -  Oxacillin: S 1 Oxacillin predicts susceptibility for dicloxacillin, methicillin, and nafcillin      -  Penicillin: R >8      -  Rifampin: S <=1 Should not be used as monotherapy      -  Tetracycline: S <=1      -  Trimethoprim/Sulfamethoxazole: S <=0.5/9.5      -  Vancomycin: S 2  Organism: Citrobacter koseri (08-23-23 @ 09:55)      Method Type: MARILYN      -  Amikacin: S <=16      -  Amoxicillin/Clavulanic Acid: S <=8/4      -  Ampicillin: R >16 These ampicillin results predict results for amoxicillin      -  Ampicillin/Sulbactam: I 16/8 Enterobacter, Klebsiella aerogenes, Citrobacter, and Serratia may develop resistance during prolonged therapy (3-4 days)      -  Aztreonam: S <=4      -  Cefazolin: I 4 Enterobacter, Klebsiella aerogenes, Citrobacter, and Serratia may develop resistance during prolonged therapy (3-4 days)      -  Cefepime: S <=2      -  Cefoxitin: R >16      -  Ceftriaxone: S <=1 Enterobacter, Klebsiella aerogenes, Citrobacter, and Serratia may develop resistance during prolonged therapy      -  Ciprofloxacin: S <=0.25      -  Ertapenem: S <=0.5      -  Gentamicin: S <=2      -  Imipenem: S <=1      -  Levofloxacin: S <=0.5      -  Meropenem: S <=1      -  Piperacillin/Tazobactam: SDD 16      -  Tobramycin: S <=2      -  Trimethoprim/Sulfamethoxazole: S <=0.5/9.5  Organism: Pseudomonas aeruginosa (08-23-23 @ 09:55)      Method Type: MARILYN      -  Amikacin: S <=16      -  Aztreonam: I 16      -  Cefepime: S 4      -  Ceftazidime: S 4      -  Ciprofloxacin: S 0.5      -  Gentamicin: S 4      -  Imipenem: S <=1      -  Levofloxacin: I 2      -  Meropenem: S <=1      -  Piperacillin/Tazobactam: S <=8      -  Tobramycin: S <=2    Culture - Blood (collected 08-20-23 @ 23:10)  Source: .Blood Blood-Peripheral  Preliminary Report (08-25-23 @ 03:01):    No growth at 4 days    Culture - Blood (collected 08-20-23 @ 23:00)  Source: .Blood Blood-Peripheral  Preliminary Report (08-25-23 @ 03:01):    No growth at 4 days          RADIOLOGY  < from: MR Lower Ext Joint w/wo IV Cont, Right (08.24.23 @ 17:24) >  ACC: [id number] EXAM:  MR LWR EXT JOINT WAW IC RT   ORDERED BY: JIM VELASCO     PROCEDURE DATE:  08/24/2023          INTERPRETATION:  MRI OF THE RIGHT LOWER EXTREMITY    CLINICAL INFORMATION: Diabetic ulcers in the right lower extremity.   Cellulitis. Evaluate for osteomyelitis.  TECHNIQUE: Multiplanar, multisequence MRI was obtained of the RIGHT LOWER   EXTREMITY focusing on the right tibia/fibula. The study was performed   before and after the intravenous administration of 12 ml Gadavist (3 cc   discarded) .  COMPARISON: Bilateral lower extremity radiographs 8/21/2023.    FINDINGS:    SOFT TISSUES: Soft tissue ulceration along the anteromedial aspect of the   leg. Ulceration extends to the level of the subcutaneous fat. There is  enhancing edema in the adjacent subcutaneous fat. No focal drainable   fluid collections are identified.  BONE MARROW: No increased STIR signal or loss of T1 hyperintense signal   to suggest acute osteomyelitis. No fracture or osteonecrosis.    MUSCLES AND TENDONS: No focal muscle edema. Diffuse fatty infiltration of   the imaged muscles. Tendons are intact.  SYNOVIUM/ JOINT FLUID: No large joint effusion.  MISCELLANEOUS: Neurovascular structures are normal in course and caliber.      IMPRESSION:  1.  Soft tissue ulceration with adjacent cellulitis.  2.  No abscess or acute osteomyelitis.    ______________________________________________________________________    < from: MR Lower Ext Joint w/ IV Cont, Left (08.24.23 @ 17:23) >  ACC: [id number] EXAM:  MR LWR EXT JOINT IC LT   ORDERED BY: JIM VELASCO     PROCEDURE DATE:  08/24/2023          INTERPRETATION:  MRI OF THE LEFT LOWER EXTREMITY    CLINICAL INFORMATION: Diabetic ulceration. Evaluate for   cellulitis/osteomyelitis.  TECHNIQUE: Multiplanar, multisequence MRI was obtained of the LEFT LOWER   EXTREMITY focusing on the left tibia/fibula. The study was performed   before and after the intravenous administration of 12 ml Gadavist (3 cc   discarded) .  COMPARISON: Bilateral lower extremity radiographs 8/21/2023.    FINDINGS:    SOFT TISSUES: Large areas of soft tissue ulceration are identified along   the anterior aspect of the leg. The ulcerations extend into the   subcutaneous fat and abuts the anterior cortex of the tibia. There is no   focal drainable fluid collection. Mild edema is seen along the anterior   aspect of anterolateral aspect of the leg with skin thickening.  BONE MARROW: No increased or signal or loss of T1 hyperintense signal to   suggest acute osteomyelitis.    MUSCLES AND TENDONS: Focal edema in the anterior tibialis muscle belly.   Diffuse atrophy and fatty infiltration of the musculature of the leg.   Tendons appear intact.  SYNOVIUM/ JOINT FLUID: No large joint effusion.  MISCELLANEOUS: Neurovascular structures are normal in course and caliber.      IMPRESSION:  1.  Extensive soft tissue ulceration in the leg with evidence of   cellulitis.  2.  No abscess or acute osteomyelitis.  3.  Edema in the anterior tibialis muscle belly which may be reactive or   secondary to myositis.                       Subjective/Objective ( 436995): Overall, less pain in distal LE bilat. However, + pain R thigh.       MEDS  acetaminophen     Tablet .. 650 milliGRAM(s) Oral every 6 hours PRN  cefepime   IVPB      cefepime   IVPB 2000 milliGRAM(s) IV Intermittent every 8 hours (D5)  gabapentin 300 milliGRAM(s) Oral at bedtime  insulin lispro (ADMELOG) corrective regimen sliding scale   SubCutaneous three times a day before meals  insulin lispro (ADMELOG) corrective regimen sliding scale   SubCutaneous at bedtime  melatonin 3 milliGRAM(s) Oral at bedtime PRN  sodium chloride 0.9%. 1000 milliLiter(s) IV Continuous <Continuous>      PHYSICAL EXAM:    Vital Signs Last 24 Hrs  T(C): 36.7 (25 Aug 2023 07:28), Max: 37 (24 Aug 2023 11:05)  T(F): 98 (25 Aug 2023 07:28), Max: 98.6 (24 Aug 2023 11:05)  HR: 92 (25 Aug 2023 07:28) (80 - 92)  BP: 126/79 (25 Aug 2023 07:28) (104/65 - 144/97)  BP(mean): --  RR: 18 (25 Aug 2023 07:28) (18 - 18)  SpO2: 97% (25 Aug 2023 07:28) (94% - 100%)    Parameters below as of 25 Aug 2023 07:28  Patient On (Oxygen Delivery Method): room air      GEN: patient in NAD    CHEST/Respiratory: b/l normal breath sounds, no wheezing, cough, crackles    Cardiovascular: s1 s2 regular, no murmurs or extra hearts sounds    Abdomen: soft, non tender, non distended, bowel sounds present    Genitourinary: no catheter    Extremities: distal LE dressings bilat; + erythema noted below patella on RLE; + tender cord on dorsum of R thigh extending from thigh to about 5cm above the knee with associated mild erythema; b/l onychomycosis    Neurological : A+O x3    Skin: discoloration of left foot extending from 1st digit/MTP area into the 3rd digit/MTP area         LABS/DIAGNOSTIC TESTS                          8.3    9.41  )-----------( 406      ( 25 Aug 2023 07:45 )             28.5       WBC Count: 9.41 K/uL (08-25 @ 07:45)  WBC Count: 11.16 K/uL (08-24 @ 05:59)  WBC Count: 9.91 K/uL (08-23 @ 07:05)      08-25    137  |  105  |  11  ----------------------------<  137<H>  4.2   |  25  |  0.77    Ca    8.7      25 Aug 2023 07:45  Phos  3.0     08-25  Mg     2.0     08-25    TPro  7.4  /  Alb  2.3<L>  /  TBili  0.2  /  DBili  x   /  AST  9<L>  /  ALT  17  /  AlkPhos  87  08-25      Urinalysis Basic - ( 25 Aug 2023 07:45 )    Color: x / Appearance: x / SG: x / pH: x  Gluc: 137 mg/dL / Ketone: x  / Bili: x / Urobili: x   Blood: x / Protein: x / Nitrite: x   Leuk Esterase: x / RBC: x / WBC x   Sq Epi: x / Non Sq Epi: x / Bacteria: x        CULTURES      Culture - Surgical Swab (collected 08-21-23 @ 09:43)  Source: .Surgical Swab Leg wound  Preliminary Report (08-22-23 @ 11:34):    Moderate Pseudomonas aeruginosa    Few Klebsiella oxytoca/Raoultella ornithinolytica  Organism: Pseudomonas aeruginosa  Klebsiella oxytoca /Raoutella ornithinolytica (08-23-23 @ 10:35)  Organism: Klebsiella oxytoca /Raoutella ornithinolytica (08-23-23 @ 10:35)      Method Type: MARILYN      -  Amikacin: S <=16      -  Amoxicillin/Clavulanic Acid: I 16/8      -  Ampicillin: R >16 These ampicillin results predict results for amoxicillin      -  Ampicillin/Sulbactam: R >16/8 Enterobacter, Klebsiella aerogenes, Citrobacter, and Serratia may develop resistance during prolonged therapy (3-4 days)      -  Aztreonam: S <=4      -  Cefazolin: R >16 Enterobacter, Klebsiella aerogenes, Citrobacter, and Serratia may develop resistance during prolonged therapy (3-4 days)      -  Cefepime: S <=2      -  Cefoxitin: S <=8      -  Ceftriaxone: I 2 Enterobacter, Klebsiella aerogenes, Citrobacter, and Serratia may develop resistance during prolonged therapy      -  Ciprofloxacin: S <=0.25      -  Ertapenem: S <=0.5      -  Gentamicin: S <=2      -  Imipenem: S <=1      -  Levofloxacin: S <=0.5      -  Meropenem: S <=1      -  Piperacillin/Tazobactam: R >64      -  Tobramycin: S <=2      -  Trimethoprim/Sulfamethoxazole: S <=0.5/9.5  Organism: Pseudomonas aeruginosa (08-23-23 @ 10:35)      Method Type: MARILYN      -  Amikacin: S <=16      -  Aztreonam: I 16      -  Cefepime: S 4      -  Ceftazidime: S 4      -  Ciprofloxacin: S 0.5      -  Gentamicin: S 4      -  Imipenem: S <=1      -  Levofloxacin: I 2      -  Meropenem: S <=1      -  Piperacillin/Tazobactam: S <=8      -  Tobramycin: S <=2    Culture - Urine (collected 08-21-23 @ 06:52)  Source: Clean Catch Clean Catch (Midstream)  Final Report (08-22-23 @ 11:19):    No growth    Culture - Abscess with Gram Stain (collected 08-21-23 @ 04:26)  Source: .Abscess Leg - Right  Gram Stain (08-21-23 @ 15:35):    Few polymorphonuclear leukocytes seen per low power field    Few Gram Negative Rods seen per oil power field  Preliminary Report (08-24-23 @ 08:49):    Moderate Pseudomonas aeruginosa    Moderate Pseudomonas aeruginosa #2    Few Klebsiella oxytoca/Raoultella ornithinolytica  Organism: Pseudomonas aeruginosa  Pseudomonas aeruginosa  Klebsiella oxytoca /Raoutella ornithinolytica (08-24-23 @ 08:48)  Organism: Klebsiella oxytoca /Raoutella ornithinolytica (08-24-23 @ 08:48)      Method Type: MARILYN      -  Amikacin: S <=16      -  Amoxicillin/Clavulanic Acid: I 16/8      -  Ampicillin: R >16 These ampicillin results predict results for amoxicillin      -  Ampicillin/Sulbactam: R >16/8      -  Aztreonam: S <=4      -  Cefazolin: R >16      -  Cefepime: S <=2      -  Cefoxitin: S <=8      -  Ceftriaxone: S <=1      -  Ciprofloxacin: S <=0.25      -  Ertapenem: S <=0.5      -  Gentamicin: S <=2      -  Imipenem: S <=1      -  Levofloxacin: S <=0.5      -  Meropenem: S <=1      -  Piperacillin/Tazobactam: R >64      -  Tobramycin: S <=2      -  Trimethoprim/Sulfamethoxazole: S <=0.5/9.5  Organism: Pseudomonas aeruginosa (08-23-23 @ 10:08)      Method Type: MARILYN      -  Amikacin: S <=16      -  Aztreonam: I 16      -  Cefepime: S 4      -  Ceftazidime: S 4      -  Ciprofloxacin: S 0.5      -  Gentamicin: S 4      -  Imipenem: S <=1      -  Levofloxacin: I 2      -  Meropenem: S <=1      -  Piperacillin/Tazobactam: S <=8      -  Tobramycin: S <=2  Organism: Pseudomonas aeruginosa (08-23-23 @ 10:08)      Method Type: MARILYN      -  Amikacin: S <=16      -  Aztreonam: I 16      -  Cefepime: S 4      -  Ceftazidime: S 4      -  Ciprofloxacin: S 0.5      -  Gentamicin: S <=2      -  Imipenem: S <=1      -  Levofloxacin: S 1      -  Meropenem: S <=1      -  Piperacillin/Tazobactam: S <=8      -  Tobramycin: S <=2    Culture - Abscess with Gram Stain (collected 08-21-23 @ 04:26)  Source: .Abscess Leg - Left  Gram Stain (08-21-23 @ 12:30):    No polymorphonuclear cells seen per low power field    Few Gram Negative Rods per oil power field  Preliminary Report (08-24-23 @ 08:47):    Moderate Pseudomonas aeruginosa    Moderate Citrobacter koseri    Few Staphylococcus aureus    Few Klebsiella oxytoca/Raoultella ornithinolytica  Organism: Pseudomonas aeruginosa  Citrobacter koseri  Klebsiella oxytoca /Raoutella ornithinolytica  Staphylococcus aureus (08-24-23 @ 08:46)  Organism: Klebsiella oxytoca /Raoutella ornithinolytica (08-24-23 @ 08:46)      Method Type: MARILYN      -  Amikacin: S <=16      -  Amoxicillin/Clavulanic Acid: S <=8/4      -  Ampicillin: R >16 These ampicillin results predict results for amoxicillin      -  Ampicillin/Sulbactam: I 16/8      -  Aztreonam: S <=4      -  Cefazolin: R 16      -  Cefepime: S <=2      -  Cefoxitin: S <=8      -  Ceftriaxone: S <=1      -  Ciprofloxacin: S <=0.25      -  Ertapenem: S <=0.5      -  Gentamicin: S <=2      -  Imipenem: S <=1      -  Levofloxacin: S <=0.5      -  Meropenem: S <=1      -  Piperacillin/Tazobactam: S <=8      -  Tobramycin: S <=2      -  Trimethoprim/Sulfamethoxazole: S <=0.5/9.5  Organism: Staphylococcus aureus (08-23-23 @ 10:01)      Method Type: MARILYN      -  Ampicillin/Sulbactam: S <=8/4      -  Cefazolin: S <=4      -  Clindamycin: S <=0.25      -  Erythromycin: I 2      -  Gentamicin: S <=1 Should not be used as monotherapy      -  Oxacillin: S 1 Oxacillin predicts susceptibility for dicloxacillin, methicillin, and nafcillin      -  Penicillin: R >8      -  Rifampin: S <=1 Should not be used as monotherapy      -  Tetracycline: S <=1      -  Trimethoprim/Sulfamethoxazole: S <=0.5/9.5      -  Vancomycin: S 2  Organism: Citrobacter koseri (08-23-23 @ 09:55)      Method Type: MARILYN      -  Amikacin: S <=16      -  Amoxicillin/Clavulanic Acid: S <=8/4      -  Ampicillin: R >16 These ampicillin results predict results for amoxicillin      -  Ampicillin/Sulbactam: I 16/8 Enterobacter, Klebsiella aerogenes, Citrobacter, and Serratia may develop resistance during prolonged therapy (3-4 days)      -  Aztreonam: S <=4      -  Cefazolin: I 4 Enterobacter, Klebsiella aerogenes, Citrobacter, and Serratia may develop resistance during prolonged therapy (3-4 days)      -  Cefepime: S <=2      -  Cefoxitin: R >16      -  Ceftriaxone: S <=1 Enterobacter, Klebsiella aerogenes, Citrobacter, and Serratia may develop resistance during prolonged therapy      -  Ciprofloxacin: S <=0.25      -  Ertapenem: S <=0.5      -  Gentamicin: S <=2      -  Imipenem: S <=1      -  Levofloxacin: S <=0.5      -  Meropenem: S <=1      -  Piperacillin/Tazobactam: SDD 16      -  Tobramycin: S <=2      -  Trimethoprim/Sulfamethoxazole: S <=0.5/9.5  Organism: Pseudomonas aeruginosa (08-23-23 @ 09:55)      Method Type: MARILYN      -  Amikacin: S <=16      -  Aztreonam: I 16      -  Cefepime: S 4      -  Ceftazidime: S 4      -  Ciprofloxacin: S 0.5      -  Gentamicin: S 4      -  Imipenem: S <=1      -  Levofloxacin: I 2      -  Meropenem: S <=1      -  Piperacillin/Tazobactam: S <=8      -  Tobramycin: S <=2    Culture - Blood (collected 08-20-23 @ 23:10)  Source: .Blood Blood-Peripheral  Preliminary Report (08-25-23 @ 03:01):    No growth at 4 days    Culture - Blood (collected 08-20-23 @ 23:00)  Source: .Blood Blood-Peripheral  Preliminary Report (08-25-23 @ 03:01):    No growth at 4 days          RADIOLOGY  < from: MR Lower Ext Joint w/wo IV Cont, Right (08.24.23 @ 17:24) >  ACC: [id number] EXAM:  MR LWR EXT JOINT WAW IC RT   ORDERED BY: JIM VEALSCO     PROCEDURE DATE:  08/24/2023          INTERPRETATION:  MRI OF THE RIGHT LOWER EXTREMITY    CLINICAL INFORMATION: Diabetic ulcers in the right lower extremity.   Cellulitis. Evaluate for osteomyelitis.  TECHNIQUE: Multiplanar, multisequence MRI was obtained of the RIGHT LOWER   EXTREMITY focusing on the right tibia/fibula. The study was performed   before and after the intravenous administration of 12 ml Gadavist (3 cc   discarded) .  COMPARISON: Bilateral lower extremity radiographs 8/21/2023.    FINDINGS:    SOFT TISSUES: Soft tissue ulceration along the anteromedial aspect of the   leg. Ulceration extends to the level of the subcutaneous fat. There is  enhancing edema in the adjacent subcutaneous fat. No focal drainable   fluid collections are identified.  BONE MARROW: No increased STIR signal or loss of T1 hyperintense signal   to suggest acute osteomyelitis. No fracture or osteonecrosis.    MUSCLES AND TENDONS: No focal muscle edema. Diffuse fatty infiltration of   the imaged muscles. Tendons are intact.  SYNOVIUM/ JOINT FLUID: No large joint effusion.  MISCELLANEOUS: Neurovascular structures are normal in course and caliber.      IMPRESSION:  1.  Soft tissue ulceration with adjacent cellulitis.  2.  No abscess or acute osteomyelitis.    ______________________________________________________________________    < from: MR Lower Ext Joint w/ IV Cont, Left (08.24.23 @ 17:23) >  ACC: [id number] EXAM:  MR LWR EXT JOINT IC LT   ORDERED BY: JIM VELASCO     PROCEDURE DATE:  08/24/2023          INTERPRETATION:  MRI OF THE LEFT LOWER EXTREMITY    CLINICAL INFORMATION: Diabetic ulceration. Evaluate for   cellulitis/osteomyelitis.  TECHNIQUE: Multiplanar, multisequence MRI was obtained of the LEFT LOWER   EXTREMITY focusing on the left tibia/fibula. The study was performed   before and after the intravenous administration of 12 ml Gadavist (3 cc   discarded) .  COMPARISON: Bilateral lower extremity radiographs 8/21/2023.    FINDINGS:    SOFT TISSUES: Large areas of soft tissue ulceration are identified along   the anterior aspect of the leg. The ulcerations extend into the   subcutaneous fat and abuts the anterior cortex of the tibia. There is no   focal drainable fluid collection. Mild edema is seen along the anterior   aspect of anterolateral aspect of the leg with skin thickening.  BONE MARROW: No increased or signal or loss of T1 hyperintense signal to   suggest acute osteomyelitis.    MUSCLES AND TENDONS: Focal edema in the anterior tibialis muscle belly.   Diffuse atrophy and fatty infiltration of the musculature of the leg.   Tendons appear intact.  SYNOVIUM/ JOINT FLUID: No large joint effusion.  MISCELLANEOUS: Neurovascular structures are normal in course and caliber.      IMPRESSION:  1.  Extensive soft tissue ulceration in the leg with evidence of   cellulitis.  2.  No abscess or acute osteomyelitis.  3.  Edema in the anterior tibialis muscle belly which may be reactive or   secondary to myositis.                       Subjective/Objective ( 806502): Overall, less pain in distal LE bilat. However, + pain R thigh.       MEDS  acetaminophen     Tablet .. 650 milliGRAM(s) Oral every 6 hours PRN  cefepime   IVPB      cefepime   IVPB 2000 milliGRAM(s) IV Intermittent every 8 hours (D5)  gabapentin 300 milliGRAM(s) Oral at bedtime  insulin lispro (ADMELOG) corrective regimen sliding scale   SubCutaneous three times a day before meals  insulin lispro (ADMELOG) corrective regimen sliding scale   SubCutaneous at bedtime  melatonin 3 milliGRAM(s) Oral at bedtime PRN  sodium chloride 0.9%. 1000 milliLiter(s) IV Continuous <Continuous>      PHYSICAL EXAM:    Vital Signs Last 24 Hrs  T(C): 36.7 (25 Aug 2023 07:28), Max: 37 (24 Aug 2023 11:05)  T(F): 98 (25 Aug 2023 07:28), Max: 98.6 (24 Aug 2023 11:05)  HR: 92 (25 Aug 2023 07:28) (80 - 92)  BP: 126/79 (25 Aug 2023 07:28) (104/65 - 144/97)  BP(mean): --  RR: 18 (25 Aug 2023 07:28) (18 - 18)  SpO2: 97% (25 Aug 2023 07:28) (94% - 100%)    Parameters below as of 25 Aug 2023 07:28  Patient On (Oxygen Delivery Method): room air      GEN: patient in NAD    CHEST/Respiratory: b/l normal breath sounds, no wheezing, cough, crackles    Cardiovascular: s1 s2 regular, no murmurs or extra hearts sounds    Abdomen: soft, non tender, non distended, bowel sounds present    Genitourinary: no catheter    Extremities: distal LE dressings bilat; + erythema noted below patella on RLE; + tender cord on dorsum of R thigh extending from thigh to about 5cm above the knee with associated mild erythema; b/l onychomycosis    Neurological : A+O x3    Skin: discoloration of left foot extending from 1st digit/MTP area into the 3rd digit/MTP area         LABS/DIAGNOSTIC TESTS                          8.3    9.41  )-----------( 406      ( 25 Aug 2023 07:45 )             28.5       WBC Count: 9.41 K/uL (08-25 @ 07:45)  WBC Count: 11.16 K/uL (08-24 @ 05:59)  WBC Count: 9.91 K/uL (08-23 @ 07:05)      08-25    137  |  105  |  11  ----------------------------<  137<H>  4.2   |  25  |  0.77    Ca    8.7      25 Aug 2023 07:45  Phos  3.0     08-25  Mg     2.0     08-25    TPro  7.4  /  Alb  2.3<L>  /  TBili  0.2  /  DBili  x   /  AST  9<L>  /  ALT  17  /  AlkPhos  87  08-25      Urinalysis Basic - ( 25 Aug 2023 07:45 )    Color: x / Appearance: x / SG: x / pH: x  Gluc: 137 mg/dL / Ketone: x  / Bili: x / Urobili: x   Blood: x / Protein: x / Nitrite: x   Leuk Esterase: x / RBC: x / WBC x   Sq Epi: x / Non Sq Epi: x / Bacteria: x        CULTURES      Culture - Surgical Swab (collected 08-21-23 @ 09:43)  Source: .Surgical Swab Leg wound  Preliminary Report (08-22-23 @ 11:34):    Moderate Pseudomonas aeruginosa    Few Klebsiella oxytoca/Raoultella ornithinolytica  Organism: Pseudomonas aeruginosa  Klebsiella oxytoca /Raoutella ornithinolytica (08-23-23 @ 10:35)  Organism: Klebsiella oxytoca /Raoutella ornithinolytica (08-23-23 @ 10:35)      Method Type: MARILYN      -  Amikacin: S <=16      -  Amoxicillin/Clavulanic Acid: I 16/8      -  Ampicillin: R >16 These ampicillin results predict results for amoxicillin      -  Ampicillin/Sulbactam: R >16/8 Enterobacter, Klebsiella aerogenes, Citrobacter, and Serratia may develop resistance during prolonged therapy (3-4 days)      -  Aztreonam: S <=4      -  Cefazolin: R >16 Enterobacter, Klebsiella aerogenes, Citrobacter, and Serratia may develop resistance during prolonged therapy (3-4 days)      -  Cefepime: S <=2      -  Cefoxitin: S <=8      -  Ceftriaxone: I 2 Enterobacter, Klebsiella aerogenes, Citrobacter, and Serratia may develop resistance during prolonged therapy      -  Ciprofloxacin: S <=0.25      -  Ertapenem: S <=0.5      -  Gentamicin: S <=2      -  Imipenem: S <=1      -  Levofloxacin: S <=0.5      -  Meropenem: S <=1      -  Piperacillin/Tazobactam: R >64      -  Tobramycin: S <=2      -  Trimethoprim/Sulfamethoxazole: S <=0.5/9.5  Organism: Pseudomonas aeruginosa (08-23-23 @ 10:35)      Method Type: MARILYN      -  Amikacin: S <=16      -  Aztreonam: I 16      -  Cefepime: S 4      -  Ceftazidime: S 4      -  Ciprofloxacin: S 0.5      -  Gentamicin: S 4      -  Imipenem: S <=1      -  Levofloxacin: I 2      -  Meropenem: S <=1      -  Piperacillin/Tazobactam: S <=8      -  Tobramycin: S <=2    Culture - Urine (collected 08-21-23 @ 06:52)  Source: Clean Catch Clean Catch (Midstream)  Final Report (08-22-23 @ 11:19):    No growth    Culture - Abscess with Gram Stain (collected 08-21-23 @ 04:26)  Source: .Abscess Leg - Right  Gram Stain (08-21-23 @ 15:35):    Few polymorphonuclear leukocytes seen per low power field    Few Gram Negative Rods seen per oil power field  Preliminary Report (08-24-23 @ 08:49):    Moderate Pseudomonas aeruginosa    Moderate Pseudomonas aeruginosa #2    Few Klebsiella oxytoca/Raoultella ornithinolytica  Organism: Pseudomonas aeruginosa  Pseudomonas aeruginosa  Klebsiella oxytoca /Raoutella ornithinolytica (08-24-23 @ 08:48)  Organism: Klebsiella oxytoca /Raoutella ornithinolytica (08-24-23 @ 08:48)      Method Type: MARILYN      -  Amikacin: S <=16      -  Amoxicillin/Clavulanic Acid: I 16/8      -  Ampicillin: R >16 These ampicillin results predict results for amoxicillin      -  Ampicillin/Sulbactam: R >16/8      -  Aztreonam: S <=4      -  Cefazolin: R >16      -  Cefepime: S <=2      -  Cefoxitin: S <=8      -  Ceftriaxone: S <=1      -  Ciprofloxacin: S <=0.25      -  Ertapenem: S <=0.5      -  Gentamicin: S <=2      -  Imipenem: S <=1      -  Levofloxacin: S <=0.5      -  Meropenem: S <=1      -  Piperacillin/Tazobactam: R >64      -  Tobramycin: S <=2      -  Trimethoprim/Sulfamethoxazole: S <=0.5/9.5  Organism: Pseudomonas aeruginosa (08-23-23 @ 10:08)      Method Type: MARILYN      -  Amikacin: S <=16      -  Aztreonam: I 16      -  Cefepime: S 4      -  Ceftazidime: S 4      -  Ciprofloxacin: S 0.5      -  Gentamicin: S 4      -  Imipenem: S <=1      -  Levofloxacin: I 2      -  Meropenem: S <=1      -  Piperacillin/Tazobactam: S <=8      -  Tobramycin: S <=2  Organism: Pseudomonas aeruginosa (08-23-23 @ 10:08)      Method Type: MARILYN      -  Amikacin: S <=16      -  Aztreonam: I 16      -  Cefepime: S 4      -  Ceftazidime: S 4      -  Ciprofloxacin: S 0.5      -  Gentamicin: S <=2      -  Imipenem: S <=1      -  Levofloxacin: S 1      -  Meropenem: S <=1      -  Piperacillin/Tazobactam: S <=8      -  Tobramycin: S <=2    Culture - Abscess with Gram Stain (collected 08-21-23 @ 04:26)  Source: .Abscess Leg - Left  Gram Stain (08-21-23 @ 12:30):    No polymorphonuclear cells seen per low power field    Few Gram Negative Rods per oil power field  Preliminary Report (08-24-23 @ 08:47):    Moderate Pseudomonas aeruginosa    Moderate Citrobacter koseri    Few Staphylococcus aureus    Few Klebsiella oxytoca/Raoultella ornithinolytica  Organism: Pseudomonas aeruginosa  Citrobacter koseri  Klebsiella oxytoca /Raoutella ornithinolytica  Staphylococcus aureus (08-24-23 @ 08:46)  Organism: Klebsiella oxytoca /Raoutella ornithinolytica (08-24-23 @ 08:46)      Method Type: MARILYN      -  Amikacin: S <=16      -  Amoxicillin/Clavulanic Acid: S <=8/4      -  Ampicillin: R >16 These ampicillin results predict results for amoxicillin      -  Ampicillin/Sulbactam: I 16/8      -  Aztreonam: S <=4      -  Cefazolin: R 16      -  Cefepime: S <=2      -  Cefoxitin: S <=8      -  Ceftriaxone: S <=1      -  Ciprofloxacin: S <=0.25      -  Ertapenem: S <=0.5      -  Gentamicin: S <=2      -  Imipenem: S <=1      -  Levofloxacin: S <=0.5      -  Meropenem: S <=1      -  Piperacillin/Tazobactam: S <=8      -  Tobramycin: S <=2      -  Trimethoprim/Sulfamethoxazole: S <=0.5/9.5  Organism: Staphylococcus aureus (08-23-23 @ 10:01)      Method Type: MARILYN      -  Ampicillin/Sulbactam: S <=8/4      -  Cefazolin: S <=4      -  Clindamycin: S <=0.25      -  Erythromycin: I 2      -  Gentamicin: S <=1 Should not be used as monotherapy      -  Oxacillin: S 1 Oxacillin predicts susceptibility for dicloxacillin, methicillin, and nafcillin      -  Penicillin: R >8      -  Rifampin: S <=1 Should not be used as monotherapy      -  Tetracycline: S <=1      -  Trimethoprim/Sulfamethoxazole: S <=0.5/9.5      -  Vancomycin: S 2  Organism: Citrobacter koseri (08-23-23 @ 09:55)      Method Type: MARILYN      -  Amikacin: S <=16      -  Amoxicillin/Clavulanic Acid: S <=8/4      -  Ampicillin: R >16 These ampicillin results predict results for amoxicillin      -  Ampicillin/Sulbactam: I 16/8 Enterobacter, Klebsiella aerogenes, Citrobacter, and Serratia may develop resistance during prolonged therapy (3-4 days)      -  Aztreonam: S <=4      -  Cefazolin: I 4 Enterobacter, Klebsiella aerogenes, Citrobacter, and Serratia may develop resistance during prolonged therapy (3-4 days)      -  Cefepime: S <=2      -  Cefoxitin: R >16      -  Ceftriaxone: S <=1 Enterobacter, Klebsiella aerogenes, Citrobacter, and Serratia may develop resistance during prolonged therapy      -  Ciprofloxacin: S <=0.25      -  Ertapenem: S <=0.5      -  Gentamicin: S <=2      -  Imipenem: S <=1      -  Levofloxacin: S <=0.5      -  Meropenem: S <=1      -  Piperacillin/Tazobactam: SDD 16      -  Tobramycin: S <=2      -  Trimethoprim/Sulfamethoxazole: S <=0.5/9.5  Organism: Pseudomonas aeruginosa (08-23-23 @ 09:55)      Method Type: MARILYN      -  Amikacin: S <=16      -  Aztreonam: I 16      -  Cefepime: S 4      -  Ceftazidime: S 4      -  Ciprofloxacin: S 0.5      -  Gentamicin: S 4      -  Imipenem: S <=1      -  Levofloxacin: I 2      -  Meropenem: S <=1      -  Piperacillin/Tazobactam: S <=8      -  Tobramycin: S <=2    Culture - Blood (collected 08-20-23 @ 23:10)  Source: .Blood Blood-Peripheral  Preliminary Report (08-25-23 @ 03:01):    No growth at 4 days    Culture - Blood (collected 08-20-23 @ 23:00)  Source: .Blood Blood-Peripheral  Preliminary Report (08-25-23 @ 03:01):    No growth at 4 days          RADIOLOGY  < from: MR Lower Ext Joint w/wo IV Cont, Right (08.24.23 @ 17:24) >  ACC: [id number] EXAM:  MR LWR EXT JOINT WAW IC RT   ORDERED BY: JIM VELASCO     PROCEDURE DATE:  08/24/2023          INTERPRETATION:  MRI OF THE RIGHT LOWER EXTREMITY    CLINICAL INFORMATION: Diabetic ulcers in the right lower extremity.   Cellulitis. Evaluate for osteomyelitis.  TECHNIQUE: Multiplanar, multisequence MRI was obtained of the RIGHT LOWER   EXTREMITY focusing on the right tibia/fibula. The study was performed   before and after the intravenous administration of 12 ml Gadavist (3 cc   discarded) .  COMPARISON: Bilateral lower extremity radiographs 8/21/2023.    FINDINGS:    SOFT TISSUES: Soft tissue ulceration along the anteromedial aspect of the   leg. Ulceration extends to the level of the subcutaneous fat. There is  enhancing edema in the adjacent subcutaneous fat. No focal drainable   fluid collections are identified.  BONE MARROW: No increased STIR signal or loss of T1 hyperintense signal   to suggest acute osteomyelitis. No fracture or osteonecrosis.    MUSCLES AND TENDONS: No focal muscle edema. Diffuse fatty infiltration of   the imaged muscles. Tendons are intact.  SYNOVIUM/ JOINT FLUID: No large joint effusion.  MISCELLANEOUS: Neurovascular structures are normal in course and caliber.      IMPRESSION:  1.  Soft tissue ulceration with adjacent cellulitis.  2.  No abscess or acute osteomyelitis.    ______________________________________________________________________    < from: MR Lower Ext Joint w/ IV Cont, Left (08.24.23 @ 17:23) >  ACC: [id number] EXAM:  MR LWR EXT JOINT IC LT   ORDERED BY: JIM VELASCO     PROCEDURE DATE:  08/24/2023          INTERPRETATION:  MRI OF THE LEFT LOWER EXTREMITY    CLINICAL INFORMATION: Diabetic ulceration. Evaluate for   cellulitis/osteomyelitis.  TECHNIQUE: Multiplanar, multisequence MRI was obtained of the LEFT LOWER   EXTREMITY focusing on the left tibia/fibula. The study was performed   before and after the intravenous administration of 12 ml Gadavist (3 cc   discarded) .  COMPARISON: Bilateral lower extremity radiographs 8/21/2023.    FINDINGS:    SOFT TISSUES: Large areas of soft tissue ulceration are identified along   the anterior aspect of the leg. The ulcerations extend into the   subcutaneous fat and abuts the anterior cortex of the tibia. There is no   focal drainable fluid collection. Mild edema is seen along the anterior   aspect of anterolateral aspect of the leg with skin thickening.  BONE MARROW: No increased or signal or loss of T1 hyperintense signal to   suggest acute osteomyelitis.    MUSCLES AND TENDONS: Focal edema in the anterior tibialis muscle belly.   Diffuse atrophy and fatty infiltration of the musculature of the leg.   Tendons appear intact.  SYNOVIUM/ JOINT FLUID: No large joint effusion.  MISCELLANEOUS: Neurovascular structures are normal in course and caliber.      IMPRESSION:  1.  Extensive soft tissue ulceration in the leg with evidence of   cellulitis.  2.  No abscess or acute osteomyelitis.  3.  Edema in the anterior tibialis muscle belly which may be reactive or   secondary to myositis.                       Subjective/Objective ( 077983): Overall, less pain in distal LE bilat. However, + pain R thigh.       MEDS  acetaminophen     Tablet .. 650 milliGRAM(s) Oral every 6 hours PRN  cefepime   IVPB      cefepime   IVPB 2000 milliGRAM(s) IV Intermittent every 8 hours (D5)  gabapentin 300 milliGRAM(s) Oral at bedtime  insulin lispro (ADMELOG) corrective regimen sliding scale   SubCutaneous three times a day before meals  insulin lispro (ADMELOG) corrective regimen sliding scale   SubCutaneous at bedtime  melatonin 3 milliGRAM(s) Oral at bedtime PRN  sodium chloride 0.9%. 1000 milliLiter(s) IV Continuous <Continuous>      PHYSICAL EXAM:    Vital Signs Last 24 Hrs  T(C): 36.7 (25 Aug 2023 07:28), Max: 37 (24 Aug 2023 11:05)  T(F): 98 (25 Aug 2023 07:28), Max: 98.6 (24 Aug 2023 11:05)  HR: 92 (25 Aug 2023 07:28) (80 - 92)  BP: 126/79 (25 Aug 2023 07:28) (104/65 - 144/97)  BP(mean): --  RR: 18 (25 Aug 2023 07:28) (18 - 18)  SpO2: 97% (25 Aug 2023 07:28) (94% - 100%)    Parameters below as of 25 Aug 2023 07:28  Patient On (Oxygen Delivery Method): room air      GEN: patient in NAD    CHEST/Respiratory: b/l normal breath sounds, no wheezing, cough, crackles    Cardiovascular: s1 s2 regular, no murmurs or extra hearts sounds    Abdomen: soft, non tender, non distended, bowel sounds present    Genitourinary: no catheter    Extremities: distal LE dressings bilat; + erythema noted below patella on RLE; + tender cord on dorsum of R thigh extending from thigh to about 5cm above the knee with associated mild erythema; b/l onychomycosis    Neurological : A+O x3    Skin: discoloration of left foot extending from 1st digit/MTP area into the 3rd digit/MTP area         LABS/DIAGNOSTIC TESTS                          8.3    9.41  )-----------( 406      ( 25 Aug 2023 07:45 )             28.5       WBC Count: 9.41 K/uL (08-25 @ 07:45)  WBC Count: 11.16 K/uL (08-24 @ 05:59)  WBC Count: 9.91 K/uL (08-23 @ 07:05)      08-25    137  |  105  |  11  ----------------------------<  137<H>  4.2   |  25  |  0.77    Ca    8.7      25 Aug 2023 07:45  Phos  3.0     08-25  Mg     2.0     08-25    TPro  7.4  /  Alb  2.3<L>  /  TBili  0.2  /  DBili  x   /  AST  9<L>  /  ALT  17  /  AlkPhos  87  08-25      Urinalysis Basic - ( 25 Aug 2023 07:45 )    Color: x / Appearance: x / SG: x / pH: x  Gluc: 137 mg/dL / Ketone: x  / Bili: x / Urobili: x   Blood: x / Protein: x / Nitrite: x   Leuk Esterase: x / RBC: x / WBC x   Sq Epi: x / Non Sq Epi: x / Bacteria: x        CULTURES      Culture - Surgical Swab (collected 08-21-23 @ 09:43)  Source: .Surgical Swab Leg wound  Preliminary Report (08-22-23 @ 11:34):    Moderate Pseudomonas aeruginosa    Few Klebsiella oxytoca/Raoultella ornithinolytica  Organism: Pseudomonas aeruginosa  Klebsiella oxytoca /Raoutella ornithinolytica (08-23-23 @ 10:35)  Organism: Klebsiella oxytoca /Raoutella ornithinolytica (08-23-23 @ 10:35)      Method Type: MARILYN      -  Amikacin: S <=16      -  Amoxicillin/Clavulanic Acid: I 16/8      -  Ampicillin: R >16 These ampicillin results predict results for amoxicillin      -  Ampicillin/Sulbactam: R >16/8 Enterobacter, Klebsiella aerogenes, Citrobacter, and Serratia may develop resistance during prolonged therapy (3-4 days)      -  Aztreonam: S <=4      -  Cefazolin: R >16 Enterobacter, Klebsiella aerogenes, Citrobacter, and Serratia may develop resistance during prolonged therapy (3-4 days)      -  Cefepime: S <=2      -  Cefoxitin: S <=8      -  Ceftriaxone: I 2 Enterobacter, Klebsiella aerogenes, Citrobacter, and Serratia may develop resistance during prolonged therapy      -  Ciprofloxacin: S <=0.25      -  Ertapenem: S <=0.5      -  Gentamicin: S <=2      -  Imipenem: S <=1      -  Levofloxacin: S <=0.5      -  Meropenem: S <=1      -  Piperacillin/Tazobactam: R >64      -  Tobramycin: S <=2      -  Trimethoprim/Sulfamethoxazole: S <=0.5/9.5  Organism: Pseudomonas aeruginosa (08-23-23 @ 10:35)      Method Type: MARILYN      -  Amikacin: S <=16      -  Aztreonam: I 16      -  Cefepime: S 4      -  Ceftazidime: S 4      -  Ciprofloxacin: S 0.5      -  Gentamicin: S 4      -  Imipenem: S <=1      -  Levofloxacin: I 2      -  Meropenem: S <=1      -  Piperacillin/Tazobactam: S <=8      -  Tobramycin: S <=2    Culture - Urine (collected 08-21-23 @ 06:52)  Source: Clean Catch Clean Catch (Midstream)  Final Report (08-22-23 @ 11:19):    No growth    Culture - Abscess with Gram Stain (collected 08-21-23 @ 04:26)  Source: .Abscess Leg - Right  Gram Stain (08-21-23 @ 15:35):    Few polymorphonuclear leukocytes seen per low power field    Few Gram Negative Rods seen per oil power field  Preliminary Report (08-24-23 @ 08:49):    Moderate Pseudomonas aeruginosa    Moderate Pseudomonas aeruginosa #2    Few Klebsiella oxytoca/Raoultella ornithinolytica  Organism: Pseudomonas aeruginosa  Pseudomonas aeruginosa  Klebsiella oxytoca /Raoutella ornithinolytica (08-24-23 @ 08:48)  Organism: Klebsiella oxytoca /Raoutella ornithinolytica (08-24-23 @ 08:48)      Method Type: MARILYN      -  Amikacin: S <=16      -  Amoxicillin/Clavulanic Acid: I 16/8      -  Ampicillin: R >16 These ampicillin results predict results for amoxicillin      -  Ampicillin/Sulbactam: R >16/8      -  Aztreonam: S <=4      -  Cefazolin: R >16      -  Cefepime: S <=2      -  Cefoxitin: S <=8      -  Ceftriaxone: S <=1      -  Ciprofloxacin: S <=0.25      -  Ertapenem: S <=0.5      -  Gentamicin: S <=2      -  Imipenem: S <=1      -  Levofloxacin: S <=0.5      -  Meropenem: S <=1      -  Piperacillin/Tazobactam: R >64      -  Tobramycin: S <=2      -  Trimethoprim/Sulfamethoxazole: S <=0.5/9.5  Organism: Pseudomonas aeruginosa (08-23-23 @ 10:08)      Method Type: MARILYN      -  Amikacin: S <=16      -  Aztreonam: I 16      -  Cefepime: S 4      -  Ceftazidime: S 4      -  Ciprofloxacin: S 0.5      -  Gentamicin: S 4      -  Imipenem: S <=1      -  Levofloxacin: I 2      -  Meropenem: S <=1      -  Piperacillin/Tazobactam: S <=8      -  Tobramycin: S <=2  Organism: Pseudomonas aeruginosa (08-23-23 @ 10:08)      Method Type: MARILYN      -  Amikacin: S <=16      -  Aztreonam: I 16      -  Cefepime: S 4      -  Ceftazidime: S 4      -  Ciprofloxacin: S 0.5      -  Gentamicin: S <=2      -  Imipenem: S <=1      -  Levofloxacin: S 1      -  Meropenem: S <=1      -  Piperacillin/Tazobactam: S <=8      -  Tobramycin: S <=2    Culture - Abscess with Gram Stain (collected 08-21-23 @ 04:26)  Source: .Abscess Leg - Left  Gram Stain (08-21-23 @ 12:30):    No polymorphonuclear cells seen per low power field    Few Gram Negative Rods per oil power field  Preliminary Report (08-24-23 @ 08:47):    Moderate Pseudomonas aeruginosa    Moderate Citrobacter koseri    Few Staphylococcus aureus    Few Klebsiella oxytoca/Raoultella ornithinolytica  Organism: Pseudomonas aeruginosa  Citrobacter koseri  Klebsiella oxytoca /Raoutella ornithinolytica  Staphylococcus aureus (08-24-23 @ 08:46)  Organism: Klebsiella oxytoca /Raoutella ornithinolytica (08-24-23 @ 08:46)      Method Type: MARILYN      -  Amikacin: S <=16      -  Amoxicillin/Clavulanic Acid: S <=8/4      -  Ampicillin: R >16 These ampicillin results predict results for amoxicillin      -  Ampicillin/Sulbactam: I 16/8      -  Aztreonam: S <=4      -  Cefazolin: R 16      -  Cefepime: S <=2      -  Cefoxitin: S <=8      -  Ceftriaxone: S <=1      -  Ciprofloxacin: S <=0.25      -  Ertapenem: S <=0.5      -  Gentamicin: S <=2      -  Imipenem: S <=1      -  Levofloxacin: S <=0.5      -  Meropenem: S <=1      -  Piperacillin/Tazobactam: S <=8      -  Tobramycin: S <=2      -  Trimethoprim/Sulfamethoxazole: S <=0.5/9.5  Organism: Staphylococcus aureus (08-23-23 @ 10:01)      Method Type: MARILYN      -  Ampicillin/Sulbactam: S <=8/4      -  Cefazolin: S <=4      -  Clindamycin: S <=0.25      -  Erythromycin: I 2      -  Gentamicin: S <=1 Should not be used as monotherapy      -  Oxacillin: S 1 Oxacillin predicts susceptibility for dicloxacillin, methicillin, and nafcillin      -  Penicillin: R >8      -  Rifampin: S <=1 Should not be used as monotherapy      -  Tetracycline: S <=1      -  Trimethoprim/Sulfamethoxazole: S <=0.5/9.5      -  Vancomycin: S 2  Organism: Citrobacter koseri (08-23-23 @ 09:55)      Method Type: MARILYN      -  Amikacin: S <=16      -  Amoxicillin/Clavulanic Acid: S <=8/4      -  Ampicillin: R >16 These ampicillin results predict results for amoxicillin      -  Ampicillin/Sulbactam: I 16/8 Enterobacter, Klebsiella aerogenes, Citrobacter, and Serratia may develop resistance during prolonged therapy (3-4 days)      -  Aztreonam: S <=4      -  Cefazolin: I 4 Enterobacter, Klebsiella aerogenes, Citrobacter, and Serratia may develop resistance during prolonged therapy (3-4 days)      -  Cefepime: S <=2      -  Cefoxitin: R >16      -  Ceftriaxone: S <=1 Enterobacter, Klebsiella aerogenes, Citrobacter, and Serratia may develop resistance during prolonged therapy      -  Ciprofloxacin: S <=0.25      -  Ertapenem: S <=0.5      -  Gentamicin: S <=2      -  Imipenem: S <=1      -  Levofloxacin: S <=0.5      -  Meropenem: S <=1      -  Piperacillin/Tazobactam: SDD 16      -  Tobramycin: S <=2      -  Trimethoprim/Sulfamethoxazole: S <=0.5/9.5  Organism: Pseudomonas aeruginosa (08-23-23 @ 09:55)      Method Type: MARILYN      -  Amikacin: S <=16      -  Aztreonam: I 16      -  Cefepime: S 4      -  Ceftazidime: S 4      -  Ciprofloxacin: S 0.5      -  Gentamicin: S 4      -  Imipenem: S <=1      -  Levofloxacin: I 2      -  Meropenem: S <=1      -  Piperacillin/Tazobactam: S <=8      -  Tobramycin: S <=2    Culture - Blood (collected 08-20-23 @ 23:10)  Source: .Blood Blood-Peripheral  Preliminary Report (08-25-23 @ 03:01):    No growth at 4 days    Culture - Blood (collected 08-20-23 @ 23:00)  Source: .Blood Blood-Peripheral  Preliminary Report (08-25-23 @ 03:01):    No growth at 4 days          RADIOLOGY  < from: MR Lower Ext Joint w/wo IV Cont, Right (08.24.23 @ 17:24) >  ACC: [id number] EXAM:  MR LWR EXT JOINT WAW IC RT   ORDERED BY: JIM VELASCO     PROCEDURE DATE:  08/24/2023          INTERPRETATION:  MRI OF THE RIGHT LOWER EXTREMITY    CLINICAL INFORMATION: Diabetic ulcers in the right lower extremity.   Cellulitis. Evaluate for osteomyelitis.  TECHNIQUE: Multiplanar, multisequence MRI was obtained of the RIGHT LOWER   EXTREMITY focusing on the right tibia/fibula. The study was performed   before and after the intravenous administration of 12 ml Gadavist (3 cc   discarded) .  COMPARISON: Bilateral lower extremity radiographs 8/21/2023.    FINDINGS:    SOFT TISSUES: Soft tissue ulceration along the anteromedial aspect of the   leg. Ulceration extends to the level of the subcutaneous fat. There is  enhancing edema in the adjacent subcutaneous fat. No focal drainable   fluid collections are identified.  BONE MARROW: No increased STIR signal or loss of T1 hyperintense signal   to suggest acute osteomyelitis. No fracture or osteonecrosis.    MUSCLES AND TENDONS: No focal muscle edema. Diffuse fatty infiltration of   the imaged muscles. Tendons are intact.  SYNOVIUM/ JOINT FLUID: No large joint effusion.  MISCELLANEOUS: Neurovascular structures are normal in course and caliber.      IMPRESSION:  1.  Soft tissue ulceration with adjacent cellulitis.  2.  No abscess or acute osteomyelitis.    ______________________________________________________________________    < from: MR Lower Ext Joint w/ IV Cont, Left (08.24.23 @ 17:23) >  ACC: [id number] EXAM:  MR LWR EXT JOINT IC LT   ORDERED BY: JIM VELASCO     PROCEDURE DATE:  08/24/2023          INTERPRETATION:  MRI OF THE LEFT LOWER EXTREMITY    CLINICAL INFORMATION: Diabetic ulceration. Evaluate for   cellulitis/osteomyelitis.  TECHNIQUE: Multiplanar, multisequence MRI was obtained of the LEFT LOWER   EXTREMITY focusing on the left tibia/fibula. The study was performed   before and after the intravenous administration of 12 ml Gadavist (3 cc   discarded) .  COMPARISON: Bilateral lower extremity radiographs 8/21/2023.    FINDINGS:    SOFT TISSUES: Large areas of soft tissue ulceration are identified along   the anterior aspect of the leg. The ulcerations extend into the   subcutaneous fat and abuts the anterior cortex of the tibia. There is no   focal drainable fluid collection. Mild edema is seen along the anterior   aspect of anterolateral aspect of the leg with skin thickening.  BONE MARROW: No increased or signal or loss of T1 hyperintense signal to   suggest acute osteomyelitis.    MUSCLES AND TENDONS: Focal edema in the anterior tibialis muscle belly.   Diffuse atrophy and fatty infiltration of the musculature of the leg.   Tendons appear intact.  SYNOVIUM/ JOINT FLUID: No large joint effusion.  MISCELLANEOUS: Neurovascular structures are normal in course and caliber.      IMPRESSION:  1.  Extensive soft tissue ulceration in the leg with evidence of   cellulitis.  2.  No abscess or acute osteomyelitis.  3.  Edema in the anterior tibialis muscle belly which may be reactive or   secondary to myositis.

## 2023-08-25 NOTE — PROGRESS NOTE ADULT - SUBJECTIVE AND OBJECTIVE BOX
PGY-1 Progress Note discussed with attending    PAGER #: [[phone]] TILL 5:00 PM  PLEASE CONTACT ON CALL TEAM:  - On Call Team (Please refer to Deanne) FROM 5:00 PM - 8:30PM  - Nightfloat Team FROM 8:30 -7:30 AM    INTERVAL HPI/OVERNIGHT EVENTS: Patient was seen at bedside. Patient states that still has bilateral leg pain. Patient states that is same as previous days. However today patient felt a cord like structure in the upper thigh. Patient was told that test will be done, patient reassured.      REVIEW OF SYSTEMS:  CONSTITUTIONAL: No fever, weight loss, or fatigue  RESPIRATORY: No cough, wheezing, chills or hemoptysis; No shortness of breath  CARDIOVASCULAR: No chest pain, palpitations, dizziness, or leg swelling  GASTROINTESTINAL: No abdominal pain. No nausea, vomiting, or hematemesis; No diarrhea or constipation. No melena or hematochezia.  GENITOURINARY: No dysuria or hematuria, urinary frequency  NEUROLOGICAL: No headaches, memory loss, loss of strength, numbness, or tremors  SKIN: pain in the legs that is unchanged, patient complaining of upper thigh cord like lesion     Vital Signs Last 24 Hrs  T(C): 36.8 (25 Aug 2023 15:36), Max: 36.9 (25 Aug 2023 05:33)  T(F): 98.2 (25 Aug 2023 15:36), Max: 98.4 (25 Aug 2023 05:33)  HR: 90 (25 Aug 2023 15:36) (60 - 92)  BP: 121/77 (25 Aug 2023 15:36) (113/59 - 144/97)  BP(mean): --  RR: 18 (25 Aug 2023 15:36) (18 - 18)  SpO2: 96% (25 Aug 2023 15:36) (96% - 100%)    Parameters below as of 25 Aug 2023 15:36  Patient On (Oxygen Delivery Method): room air        PHYSICAL EXAMINATION:  GENERAL: NAD, obese  HEAD:  Atraumatic, Normocephalic  EYES:  conjunctiva and sclera clear  NECK: Supple  CHEST/LUNG: Clear to auscultation. Clear to percussion bilaterally; No rales, rhonchi, wheezing, or rubs  HEART: Regular rate and rhythm; No murmurs, rubs, or gallops  ABDOMEN: Soft, Nontender, Nondistended; Bowel sounds present  NERVOUS SYSTEM:  Alert & Oriented X3,    EXTREMITIES: bilateral leg ulcers noted, per marking on 8/22 no progression, tender cord like structure in the right upper tigh    SKIN: warm dry                          8.3    9.41  )-----------( 406      ( 25 Aug 2023 07:45 )             28.5     08-25    137  |  105  |  11  ----------------------------<  137<H>  4.2   |  25  |  0.77    Ca    8.7      25 Aug 2023 07:45  Phos  3.0     08-25  Mg     2.0     08-25    TPro  7.4  /  Alb  2.3<L>  /  TBili  0.2  /  DBili  x   /  AST  9<L>  /  ALT  17  /  AlkPhos  87  08-25    LIVER FUNCTIONS - ( 25 Aug 2023 07:45 )  Alb: 2.3 g/dL / Pro: 7.4 g/dL / ALK PHOS: 87 U/L / ALT: 17 U/L DA / AST: 9 U/L / GGT: x           CARDIAC MARKERS ( 25 Aug 2023 07:45 )  x     / x     / 24 U/L / x     / x              CAPILLARY BLOOD GLUCOSE      RADIOLOGY & ADDITIONAL TESTS:                   PGY-1 Progress Note discussed with attending    PAGER #: [[phone]] TILL 5:00 PM  PLEASE CONTACT ON CALL TEAM:  - On Call Team (Please refer to Deanne) FROM 5:00 PM - 8:30PM  - Nightfloat Team FROM 8:30 -7:30 AM    INTERVAL HPI/OVERNIGHT EVENTS: Patient was seen at bedside. Patient states that still has bilateral leg pain. Patient states that is same as previous days. However today patient felt a cord like structure in the upper thigh. Patient was told that test will be done, patient reassured.      REVIEW OF SYSTEMS:  CONSTITUTIONAL: No fever, weight loss, or fatigue  RESPIRATORY: No cough, wheezing, chills or hemoptysis; No shortness of breath  CARDIOVASCULAR: No chest pain, palpitations, dizziness, or leg swelling  GASTROINTESTINAL: No abdominal pain. No nausea, vomiting, or hematemesis; No diarrhea or constipation. No melena or hematochezia.  GENITOURINARY: No dysuria or hematuria, urinary frequency  NEUROLOGICAL: No headaches, memory loss, loss of strength, numbness, or tremors  SKIN: pain in the legs that is unchanged, patient complaining of upper thigh cord like lesion     Vital Signs Last 24 Hrs  T(C): 36.8 (25 Aug 2023 15:36), Max: 36.9 (25 Aug 2023 05:33)  T(F): 98.2 (25 Aug 2023 15:36), Max: 98.4 (25 Aug 2023 05:33)  HR: 90 (25 Aug 2023 15:36) (60 - 92)  BP: 121/77 (25 Aug 2023 15:36) (113/59 - 144/97)  BP(mean): --  RR: 18 (25 Aug 2023 15:36) (18 - 18)  SpO2: 96% (25 Aug 2023 15:36) (96% - 100%)    Parameters below as of 25 Aug 2023 15:36  Patient On (Oxygen Delivery Method): room air        PHYSICAL EXAMINATION:  GENERAL: NAD, obese  HEAD:  Atraumatic, Normocephalic  EYES:  conjunctiva and sclera clear  NECK: Supple  CHEST/LUNG: Clear to auscultation. Clear to percussion bilaterally; No rales, rhonchi, wheezing, or rubs  HEART: Regular rate and rhythm; No murmurs, rubs, or gallops  ABDOMEN: Soft, Nontender, Nondistended; Bowel sounds present  NERVOUS SYSTEM:  Alert & Oriented X3,    EXTREMITIES: bilateral leg ulcers noted, per marking on 8/22 no progression, tender cord like structure in the right upper tigh    SKIN: warm dry                          8.3    9.41  )-----------( 406      ( 25 Aug 2023 07:45 )             28.5     08-25    137  |  105  |  11  ----------------------------<  137<H>  4.2   |  25  |  0.77    Ca    8.7      25 Aug 2023 07:45  Phos  3.0     08-25  Mg     2.0     08-25    TPro  7.4  /  Alb  2.3<L>  /  TBili  0.2  /  DBili  x   /  AST  9<L>  /  ALT  17  /  AlkPhos  87  08-25    LIVER FUNCTIONS - ( 25 Aug 2023 07:45 )  Alb: 2.3 g/dL / Pro: 7.4 g/dL / ALK PHOS: 87 U/L / ALT: 17 U/L DA / AST: 9 U/L / GGT: x           CARDIAC MARKERS ( 25 Aug 2023 07:45 )  x     / x     / 24 U/L / x     / x              CAPILLARY BLOOD GLUCOSE      RADIOLOGY & ADDITIONAL TESTS:                   PGY-1 Progress Note discussed with attending    PAGER #: [[phone]] TILL 5:00 PM  PLEASE CONTACT ON CALL TEAM:  - On Call Team (Please refer to Deanne) FROM 5:00 PM - 8:30PM  - Nightfloat Team FROM 8:30 -7:30 AM    INTERVAL HPI/OVERNIGHT EVENTS: Patient was seen at bedside. Patient states that still has bilateral leg pain. Patient states that is same as previous days. However today patient felt a cord like structure in the upper thigh. Patient was told that test will be done, patient reassured.      REVIEW OF SYSTEMS:  CONSTITUTIONAL: No fever, weight loss, or fatigue  RESPIRATORY: No cough, wheezing, chills or hemoptysis; No shortness of breath  CARDIOVASCULAR: No chest pain, palpitations, dizziness, or leg swelling  GASTROINTESTINAL: No abdominal pain. No nausea, vomiting, or hematemesis; No diarrhea or constipation. No melena or hematochezia.  GENITOURINARY: No dysuria or hematuria, urinary frequency  NEUROLOGICAL: No headaches, memory loss, loss of strength, numbness, or tremors  SKIN: pain in the legs that is unchanged, patient complaining of upper thigh cord like lesion     Vital Signs Last 24 Hrs  T(C): 36.8 (25 Aug 2023 15:36), Max: 36.9 (25 Aug 2023 05:33)  T(F): 98.2 (25 Aug 2023 15:36), Max: 98.4 (25 Aug 2023 05:33)  HR: 90 (25 Aug 2023 15:36) (60 - 92)  BP: 121/77 (25 Aug 2023 15:36) (113/59 - 144/97)  BP(mean): --  RR: 18 (25 Aug 2023 15:36) (18 - 18)  SpO2: 96% (25 Aug 2023 15:36) (96% - 100%)    Parameters below as of 25 Aug 2023 15:36  Patient On (Oxygen Delivery Method): room air        PHYSICAL EXAMINATION:  GENERAL: NAD, obese  HEAD:  Atraumatic, Normocephalic  EYES:  conjunctiva and sclera clear  NECK: Supple  CHEST/LUNG: Clear to auscultation. Clear to percussion bilaterally; No rales, rhonchi, wheezing, or rubs  HEART: Regular rate and rhythm; No murmurs, rubs, or gallops  ABDOMEN: Soft, Nontender, Nondistended; Bowel sounds present  NERVOUS SYSTEM:  Alert & Oriented X3,    EXTREMITIES: bilateral leg ulcers noted, per marking on 8/22 no progression, tender cord like structure in the right upper tigh    SKIN: warm dry                          8.3    9.41  )-----------( 406      ( 25 Aug 2023 07:45 )             28.5     08-25    137  |  105  |  11  ----------------------------<  137<H>  4.2   |  25  |  0.77    Ca    8.7      25 Aug 2023 07:45  Phos  3.0     08-25  Mg     2.0     08-25    TPro  7.4  /  Alb  2.3<L>  /  TBili  0.2  /  DBili  x   /  AST  9<L>  /  ALT  17  /  AlkPhos  87  08-25    LIVER FUNCTIONS - ( 25 Aug 2023 07:45 )  Alb: 2.3 g/dL / Pro: 7.4 g/dL / ALK PHOS: 87 U/L / ALT: 17 U/L DA / AST: 9 U/L / GGT: x           CARDIAC MARKERS ( 25 Aug 2023 07:45 )  x     / x     / 24 U/L / x     / x              CAPILLARY BLOOD GLUCOSE      RADIOLOGY & ADDITIONAL TESTS:

## 2023-08-25 NOTE — PROGRESS NOTE ADULT - ASSESSMENT
57 y/o male with b/l LE PVD, now with thrombosis of great saphenous vein   VSS, afebrile, no leukocytosis    US: Superficial thrombosis of branch of the greater saphenous vein in the anterior right thigh.    Plan   - conservative management with NSAIDs, elevation, compression, etc.  - no acute vascular intervention at this time  - continue IV antibiotics   - continue local wound care to distal LE  - remainder of care as per primary team   - discussed with Dr. Hwang and agrees     59 y/o male with b/l LE PVD, now with thrombosis of great saphenous vein   VSS, afebrile, no leukocytosis    US: Superficial thrombosis of branch of the greater saphenous vein in the anterior right thigh.    Plan   - conservative management with NSAIDs, elevation, compression, etc.  - no acute vascular intervention at this time  - continue IV antibiotics   - continue local wound care to distal LE  - remainder of care as per primary team   - discussed with Dr. Hwang and agrees

## 2023-08-25 NOTE — CONSULT NOTE ADULT - CONSULT REASON
B/L LE wound
Leg wounds
infected diabetic foot ulcer and cellulitis
Anemia
IRON DEF ANEMIA  SUPERFICIAL DVT

## 2023-08-25 NOTE — DIETITIAN INITIAL EVALUATION ADULT - NSFNSPHYEXAMSKINFT_GEN_A_CORE
Per chart, pt with "wounds noted b/l lower legs to level of subcutaneous tissue/muscle tendon layer of legs"

## 2023-08-25 NOTE — DIETITIAN INITIAL EVALUATION ADULT - PROBLEM SELECTOR PLAN 6
Pt reports receiving 1pRBC 3 days ago  In the ED  HB:7.9 ( On 7/25/23  HB: 9.4) **[facility]  HIRAMSEY**  Baseline 10  - F/u CBC  - Transfuse Hb < 7 Pt reports receiving 1pRBC 3 days ago  In the ED  HB:7.9 ( On 7/25/23  HB: 9.4) **[facility]  HIRAMSEY**  Baseline 10  - F/u CBC  - Transfuse Hb < 7 Pt reports receiving 1pRBC 3 days ago  In the ED  HB:7.9 ( On 7/25/23  HB: 9.4) **[facility]  HIRAMSEY**  Baseline 10  - F/u CBC  - Transfuse Hb < 7

## 2023-08-25 NOTE — PROGRESS NOTE ADULT - ASSESSMENT
A:  DM wounds    P:   Patient evaluated and Chart reviewed   Discussed diagnosis and treatment with patient  Xrays reviewed  BRIDGETTE/PVR- Right Foot: 1.01, Left Foot-1.00  MRI reviewed- No OM noted   Wound flushed with normal saline  Obtained wound culture to be sent to Lab  Applied santyl, xeroform with dry sterile dressing  Continue with IV antibiotics As Per ID  Weight bearing as tolerated to b/l feet and legs  ID consult appreciated   Offloading to bilateral Heels.   Discussed importance of daily foot examinations and proper shoe gear and to importance of lower Fasting Blood Glucose levels.   Patient stable from a podiatry standpoint. No acute surgical intervention needed.  Podiatry to follow while in house.  Seen, reviewed, and treated with Dr. Farias

## 2023-08-25 NOTE — PROGRESS NOTE ADULT - PROBLEM SELECTOR PLAN 1
PE: (+) surrounding erythema over RT lower leg ulcer   -WBC trending down  -on cipro and dicloxacillin  -BRIDGETTE wnl   - Podiatry Consult  -MRI negative for osteo  -per Dr. Carter, pyoderma gangrenosum?

## 2023-08-25 NOTE — CONSULT NOTE ADULT - ASSESSMENT
# MICROCYTIC  ANEMIA  #  IRON DEFICIENCY ANEMIA   pt with h/o of  polyps on colonoscopy ~ [id number], sched for f/u with GI this hank  noted  iron study  c/w ANEUDY   Pt can benefit  form IV iron x 2-3 doses if  OK by ID ( in setting of tx of  infection)  f/u CBC, PRBC TX prn for HB<7        #  SUPERFICIAL VEIN THROMBOSIS   w/u with doppler above noted  neg for DVT  pt is obese, mail,  sedentary/mostly in bed d/t LE cellulitis,   chronic  b/l leg ulcers  pt reports h/o of CT a/p at [facility] ~ 2 months ago- neg as per pt  recommend  Lovenox 40 mg sq x 4 weeks   call with questions [phone]   d/w primary team    Thank you for the consult  # MICROCYTIC  ANEMIA  #  IRON DEFICIENCY ANEMIA   pt with h/o of  polyps on colonoscopy ~ [phone], sched for f/u with GI this hank  noted  iron study  c/w ANEUDY   Pt can benefit  form IV iron x 2-3 doses if  OK by ID ( in setting of tx of  infection)  f/u CBC, PRBC TX prn for HB<7        #  SUPERFICIAL VEIN THROMBOSIS   w/u with doppler above noted  neg for DVT  pt is obese, mail,  sedentary/mostly in bed d/t LE cellulitis,   chronic  b/l leg ulcers  pt reports h/o of CT a/p at [facility] ~ 2 months ago- neg as per pt  recommend  Lovenox 40 mg sq x 4 weeks   call with questions [phone]   d/w primary team    Thank you for the consult  # MICROCYTIC  ANEMIA  #  IRON DEFICIENCY ANEMIA   pt with h/o of  polyps on colonoscopy ~ [phone], sched for f/u with GI this hank  noted  iron study  c/w ANEUDY   Pt can benefit  form IV iron x 2-3 doses if  OK by ID ( in setting of tx of  infection)  f/u CBC, PRBC TX prn for HB<7        #  SUPERFICIAL VEIN THROMBOSIS   w/u with doppler above noted  neg for DVT  pt is obese, mail,  sedentary/mostly in bed d/t LE cellulitis,   chronic  b/l leg ulcers  pt reports h/o of CT a/p at [facility] ~ 2 months ago- neg as per pt  recommend  Lovenox 40 mg sq x 4 weeks   call with questions [phone]   d/w primary team    Thank you for the consult

## 2023-08-25 NOTE — DIETITIAN INITIAL EVALUATION ADULT - PROBLEM SELECTOR PLAN 5
likely 2/2 to poor oral intake   In the ED:  Cr:1.76, eGFR < 44 ( On 7/25/23 Cr: 0.78, eGFR 103) **[facility] MARK**  Hold   Losartan, Chlorothiazide   - F/u BMP

## 2023-08-25 NOTE — DIETITIAN INITIAL EVALUATION ADULT - PERTINENT LABORATORY DATA
08-25    137  |  105  |  11  ----------------------------<  137<H>  4.2   |  25  |  0.77    Ca    8.7      25 Aug 2023 07:45  Phos  3.0     08-25  Mg     2.0     08-25    TPro  7.4  /  Alb  2.3<L>  /  TBili  0.2  /  DBili  x   /  AST  9<L>  /  ALT  17  /  AlkPhos  87  08-25  POCT Blood Glucose.: 139 mg/dL (08-25-23 @ 12:44)  A1C with Estimated Average Glucose Result: 6.6 % (08-21-23 @ 05:30)

## 2023-08-25 NOTE — PROGRESS NOTE ADULT - PROBLEM SELECTOR PLAN 2
-patient complaining of cord like structure lesion  -Doppler came positive for superficial thrombosis  -vascular consulted, no intervention  -patient to be started on eliquis

## 2023-08-25 NOTE — DIETITIAN INITIAL EVALUATION ADULT - ORAL INTAKE PTA/DIET HISTORY
Per pt. consumes 3 meals provided by wife  Drinks "papaya fruit juice made with water" once daily   No known food allergies  Per pt. consumes 3 meals provided by wife & usually eats at work  Drinks "papaya fruit juice made with water" once daily   No known food allergies

## 2023-08-25 NOTE — DIETITIAN INITIAL EVALUATION ADULT - PROBLEM SELECTOR PLAN 1
p/w  DM. HTN, diabetic  leg ulcers present with weakness, lightheadedness and infected RT lower extremity ulcer for the past 3 days.  In the ED:   Vitals -> BP: 88/55, HR:102, T:97.7, O2 sat: 94% RA  Labs  -> WBC 17k, Hb:7.9, Cr:1.76  s/p Vancomycin, Zosyn IVPB  s/p 2L NS -> /68  EKG NSR  Hold  BP meds in the setting of sepsis   - C/w Vancomycin & Zosyn IVPB  - F/u Bld cx x 2,   - F/u wound cx x 2

## 2023-08-25 NOTE — DIETITIAN INITIAL EVALUATION ADULT - PROBLEM/PLAN-2
Pulmonary Function Test      Ron Santos a 55 year old male who presented for a PFT on 3/3/2022    Reason(s) for Test: KANG  Patient Active Problem List   Diagnosis   • GERD (gastroesophageal reflux disease)   • Benign essential hypertension   • Hyperlipidemia LDL goal <100   • GIOVANA on CPAP   • Type 2 diabetes mellitus with diabetic polyneuropathy (CMS/HCC)   • Contusion of right elbow   • Primary osteoarthritis of right shoulder   • Primary osteoarthritis of right elbow   • Adjustment disorder with mixed anxiety and depressed mood   • Major depressive disorder, recurrent episode, moderate (CMS/HCC)   • Obesity (BMI 30-39.9)   • Pneumonia due to COVID-19 virus   • Type 2 diabetes mellitus with hyperglycemia, without long-term current use of insulin (CMS/HCC)   • Post-COVID-19 syndrome   • Type 2 diabetes mellitus with microalbuminuria, without long-term current use of insulin (CMS/HCC)   • Vitamin D deficiency   • Elevated vitamin B12 level         Social History     Tobacco Use   Smoking Status Former Smoker   • Packs/day: 1.00   • Years: 20.00   • Pack years: 20.00   • Types: Cigarettes   • Quit date: 1/3/2005   • Years since quittin.1   Smokeless Tobacco Former User   • Quit date: 2005         Interpretation was done using the [employer] guidelines.    Data were acceptable and reproducible.    The Forced Vital Capacity (FVC) is  4.94 which is 98% of predicted.    Forced Expiratory Volume in 1 second (FEV1) is  4.01 which is 111% of predicted.    FEV1/FVC ratio is 81%.    Mid Flow Rates (FEF 25-75%) are 3.98 which is 116% of predicted.    Flow Volume Loops are normal.    PFT Graphs are scanned into chart via Media Tab.         Impression  Normal spirometer     Jeannie Cooley MD               Specimen Collected: 22 Last Resulted: 22                         DISPLAY PLAN FREE TEXT

## 2023-08-25 NOTE — PROGRESS NOTE ADULT - SUBJECTIVE AND OBJECTIVE BOX
Interval: Patient was seen resting in bed. Patient was in good spirits today and stated he is feeling better than on admission. No acute overnight events. Denied any pedal complaints.     Patient is a 58y old  Male who presents with a chief complaint of weakness (21 Aug 2023 02:06)    HPI:  59 yo M with PMH of DM, HLD, HTN and diabetic leg ulcers present with chills, weakness, lightheadedness and infected RT lower extremity ulcer for the past 3 days which has progressively worsened today. As per pt,  he went to [facility] in [place] 3 days  ago for his weakness and infected leg ulcer. He mentioned that he received one blood transfusion, antibiotics and was admitted to the hospital. He reports staying in the hospital for a day before leaving the hospital for some personal issue. He reports decreased oral intake today, felt extremely weak and lightheaded. He reports history of ulcer for the past 5 years that has worsened in the past 3 years.  He follows a "dermatologist" at [facility] and he is unable to provide information of his diagnosis. He denies; fever, bodyaches, Chest pain, SOB, URI sxs, N/V/D, abdominal pain, LOC, Head trauma    In the ED   Vitals -> BP: 88/55, HR:102, T:97.7, O2 sat: 94% RA  Labs :  WbC 17k, Hb:7.9, Cr:1.76  EKG : NSR  s/p Vancomycin, Zosyn IVPB  s/p 2L NS ->101/68 (21 Aug 2023 02:06)    Podiatry HPI: Patient stated that he has had leg wounds for 5 years. He seems a dermatologist at [facility] who gives him steroid shots for his wounds and they have worsened. Over the last month his pain has grown and his has become dizzy and weak. Patient states he needs to have santyl on his wounds for every dressing change so that the shooting pain he has subsides. Patient denies any other pedal complaints.    ID#: [id number]  Medications acetaminophen     Tablet .. 650 milliGRAM(s) Oral every 6 hours PRN  cefepime   IVPB      cefepime   IVPB 2000 milliGRAM(s) IV Intermittent every 8 hours  enoxaparin Injectable 120 milliGRAM(s) SubCutaneous once  gabapentin 300 milliGRAM(s) Oral at bedtime  insulin lispro (ADMELOG) corrective regimen sliding scale   SubCutaneous three times a day before meals  insulin lispro (ADMELOG) corrective regimen sliding scale   SubCutaneous at bedtime  melatonin 3 milliGRAM(s) Oral at bedtime PRN  sodium chloride 0.9%. 1000 milliLiter(s) IV Continuous <Continuous>    FHNo pertinent family history in first degree relatives    ,   PMHHTN (hypertension)    Dyslipidemia    DM (diabetes mellitus)    Leg ulcer       PSHNo significant past surgical history        Labs                          8.3    9.41  )-----------( 406      ( 25 Aug 2023 07:45 )             28.5      08-25    137  |  105  |  11  ----------------------------<  137<H>  4.2   |  25  |  0.77    Ca    8.7      25 Aug 2023 07:45  Phos  3.0     08-25  Mg     2.0     08-25    TPro  7.4  /  Alb  2.3<L>  /  TBili  0.2  /  DBili  x   /  AST  9<L>  /  ALT  17  /  AlkPhos  87  08-25     Vital Signs Last 24 Hrs  T(C): 36.8 (25 Aug 2023 11:01), Max: 36.9 (25 Aug 2023 05:33)  T(F): 98.2 (25 Aug 2023 11:01), Max: 98.4 (25 Aug 2023 05:33)  HR: 60 (25 Aug 2023 11:01) (60 - 92)  BP: 131/67 (25 Aug 2023 11:01) (113/59 - 144/97)  BP(mean): --  RR: 18 (25 Aug 2023 11:01) (18 - 18)  SpO2: 98% (25 Aug 2023 11:01) (96% - 100%)    Parameters below as of 25 Aug 2023 11:01  Patient On (Oxygen Delivery Method): room air      Sedimentation Rate, Erythrocyte: 83 mm/Hr (08-25-23 @ 07:45)  Sedimentation Rate, Erythrocyte: 107 mm/Hr (08-23-23 @ 07:05)         C-Reactive Protein, Serum: 102 mg/L (08-23-23 @ 07:05)   WBC Count: 9.41 K/uL (08-25-23 @ 07:45)    PHYSICAL EXAM  LE Focused:    Vasc:  DP and PT pulses palpable 2/4. CFT<3 seconds. No edema noted in legs or feet   Derm: Wounds noted b/l lower legs to level of subcutaneous tissue/muscle tendon layer of legs. No PTB in any of wounds but tendon exposure noted. Wounds are fibrogranular in nature with no drainage noted. No fluctuance crepitus or streaking noted.   Neuro: Gross sensation intact.   MSK: POP to all wounds.       IMAGING:   Xray- recommend MRI if concern for OM  MRI- No OM noted     CULTURES: Klebsiella, Proteus, Raoultella    < from: [facility] Physiol Extremity Lower 3+ Level, BI (08.22.23 @ 16:34) >  Right BRIDGETTE = 1.01  Left BRIDGETTE = 1.00    < end of copied text >   Interval: Patient was seen resting in bed. Patient was in good spirits today and stated he is feeling better than on admission. No acute overnight events. Denied any pedal complaints.     Patient is a 58y old  Male who presents with a chief complaint of weakness (21 Aug 2023 02:06)    HPI:  57 yo M with PMH of DM, HLD, HTN and diabetic leg ulcers present with chills, weakness, lightheadedness and infected RT lower extremity ulcer for the past 3 days which has progressively worsened today. As per pt,  he went to [facility] in [place] 3 days  ago for his weakness and infected leg ulcer. He mentioned that he received one blood transfusion, antibiotics and was admitted to the hospital. He reports staying in the hospital for a day before leaving the hospital for some personal issue. He reports decreased oral intake today, felt extremely weak and lightheaded. He reports history of ulcer for the past 5 years that has worsened in the past 3 years.  He follows a "dermatologist" at [facility] and he is unable to provide information of his diagnosis. He denies; fever, bodyaches, Chest pain, SOB, URI sxs, N/V/D, abdominal pain, LOC, Head trauma    In the ED   Vitals -> BP: 88/55, HR:102, T:97.7, O2 sat: 94% RA  Labs :  WbC 17k, Hb:7.9, Cr:1.76  EKG : NSR  s/p Vancomycin, Zosyn IVPB  s/p 2L NS ->101/68 (21 Aug 2023 02:06)    Podiatry HPI: Patient stated that he has had leg wounds for 5 years. He seems a dermatologist at [facility] who gives him steroid shots for his wounds and they have worsened. Over the last month his pain has grown and his has become dizzy and weak. Patient states he needs to have santyl on his wounds for every dressing change so that the shooting pain he has subsides. Patient denies any other pedal complaints.    ID#: [id number]  Medications acetaminophen     Tablet .. 650 milliGRAM(s) Oral every 6 hours PRN  cefepime   IVPB      cefepime   IVPB 2000 milliGRAM(s) IV Intermittent every 8 hours  enoxaparin Injectable 120 milliGRAM(s) SubCutaneous once  gabapentin 300 milliGRAM(s) Oral at bedtime  insulin lispro (ADMELOG) corrective regimen sliding scale   SubCutaneous three times a day before meals  insulin lispro (ADMELOG) corrective regimen sliding scale   SubCutaneous at bedtime  melatonin 3 milliGRAM(s) Oral at bedtime PRN  sodium chloride 0.9%. 1000 milliLiter(s) IV Continuous <Continuous>    FHNo pertinent family history in first degree relatives    ,   PMHHTN (hypertension)    Dyslipidemia    DM (diabetes mellitus)    Leg ulcer       PSHNo significant past surgical history        Labs                          8.3    9.41  )-----------( 406      ( 25 Aug 2023 07:45 )             28.5      08-25    137  |  105  |  11  ----------------------------<  137<H>  4.2   |  25  |  0.77    Ca    8.7      25 Aug 2023 07:45  Phos  3.0     08-25  Mg     2.0     08-25    TPro  7.4  /  Alb  2.3<L>  /  TBili  0.2  /  DBili  x   /  AST  9<L>  /  ALT  17  /  AlkPhos  87  08-25     Vital Signs Last 24 Hrs  T(C): 36.8 (25 Aug 2023 11:01), Max: 36.9 (25 Aug 2023 05:33)  T(F): 98.2 (25 Aug 2023 11:01), Max: 98.4 (25 Aug 2023 05:33)  HR: 60 (25 Aug 2023 11:01) (60 - 92)  BP: 131/67 (25 Aug 2023 11:01) (113/59 - 144/97)  BP(mean): --  RR: 18 (25 Aug 2023 11:01) (18 - 18)  SpO2: 98% (25 Aug 2023 11:01) (96% - 100%)    Parameters below as of 25 Aug 2023 11:01  Patient On (Oxygen Delivery Method): room air      Sedimentation Rate, Erythrocyte: 83 mm/Hr (08-25-23 @ 07:45)  Sedimentation Rate, Erythrocyte: 107 mm/Hr (08-23-23 @ 07:05)         C-Reactive Protein, Serum: 102 mg/L (08-23-23 @ 07:05)   WBC Count: 9.41 K/uL (08-25-23 @ 07:45)    PHYSICAL EXAM  LE Focused:    Vasc:  DP and PT pulses palpable 2/4. CFT<3 seconds. No edema noted in legs or feet   Derm: Wounds noted b/l lower legs to level of subcutaneous tissue/muscle tendon layer of legs. No PTB in any of wounds but tendon exposure noted. Wounds are fibrogranular in nature with no drainage noted. No fluctuance crepitus or streaking noted.   Neuro: Gross sensation intact.   MSK: POP to all wounds.       IMAGING:   Xray- recommend MRI if concern for OM  MRI- No OM noted     CULTURES: Klebsiella, Proteus, Raoultella    < from: [facility] Physiol Extremity Lower 3+ Level, BI (08.22.23 @ 16:34) >  Right BRIDGETTE = 1.01  Left BRIDGETTE = 1.00    < end of copied text >   Interval: Patient was seen resting in bed. Patient was in good spirits today and stated he is feeling better than on admission. No acute overnight events. Denied any pedal complaints.     Patient is a 58y old  Male who presents with a chief complaint of weakness (21 Aug 2023 02:06)    HPI:  57 yo M with PMH of DM, HLD, HTN and diabetic leg ulcers present with chills, weakness, lightheadedness and infected RT lower extremity ulcer for the past 3 days which has progressively worsened today. As per pt,  he went to [facility] in [place] 3 days  ago for his weakness and infected leg ulcer. He mentioned that he received one blood transfusion, antibiotics and was admitted to the hospital. He reports staying in the hospital for a day before leaving the hospital for some personal issue. He reports decreased oral intake today, felt extremely weak and lightheaded. He reports history of ulcer for the past 5 years that has worsened in the past 3 years.  He follows a "dermatologist" at [facility] and he is unable to provide information of his diagnosis. He denies; fever, bodyaches, Chest pain, SOB, URI sxs, N/V/D, abdominal pain, LOC, Head trauma    In the ED   Vitals -> BP: 88/55, HR:102, T:97.7, O2 sat: 94% RA  Labs :  WbC 17k, Hb:7.9, Cr:1.76  EKG : NSR  s/p Vancomycin, Zosyn IVPB  s/p 2L NS ->101/68 (21 Aug 2023 02:06)    Podiatry HPI: Patient stated that he has had leg wounds for 5 years. He seems a dermatologist at [facility] who gives him steroid shots for his wounds and they have worsened. Over the last month his pain has grown and his has become dizzy and weak. Patient states he needs to have santyl on his wounds for every dressing change so that the shooting pain he has subsides. Patient denies any other pedal complaints.    ID#: [id number]  Medications acetaminophen     Tablet .. 650 milliGRAM(s) Oral every 6 hours PRN  cefepime   IVPB      cefepime   IVPB 2000 milliGRAM(s) IV Intermittent every 8 hours  enoxaparin Injectable 120 milliGRAM(s) SubCutaneous once  gabapentin 300 milliGRAM(s) Oral at bedtime  insulin lispro (ADMELOG) corrective regimen sliding scale   SubCutaneous three times a day before meals  insulin lispro (ADMELOG) corrective regimen sliding scale   SubCutaneous at bedtime  melatonin 3 milliGRAM(s) Oral at bedtime PRN  sodium chloride 0.9%. 1000 milliLiter(s) IV Continuous <Continuous>    FHNo pertinent family history in first degree relatives    ,   PMHHTN (hypertension)    Dyslipidemia    DM (diabetes mellitus)    Leg ulcer       PSHNo significant past surgical history        Labs                          8.3    9.41  )-----------( 406      ( 25 Aug 2023 07:45 )             28.5      08-25    137  |  105  |  11  ----------------------------<  137<H>  4.2   |  25  |  0.77    Ca    8.7      25 Aug 2023 07:45  Phos  3.0     08-25  Mg     2.0     08-25    TPro  7.4  /  Alb  2.3<L>  /  TBili  0.2  /  DBili  x   /  AST  9<L>  /  ALT  17  /  AlkPhos  87  08-25     Vital Signs Last 24 Hrs  T(C): 36.8 (25 Aug 2023 11:01), Max: 36.9 (25 Aug 2023 05:33)  T(F): 98.2 (25 Aug 2023 11:01), Max: 98.4 (25 Aug 2023 05:33)  HR: 60 (25 Aug 2023 11:01) (60 - 92)  BP: 131/67 (25 Aug 2023 11:01) (113/59 - 144/97)  BP(mean): --  RR: 18 (25 Aug 2023 11:01) (18 - 18)  SpO2: 98% (25 Aug 2023 11:01) (96% - 100%)    Parameters below as of 25 Aug 2023 11:01  Patient On (Oxygen Delivery Method): room air      Sedimentation Rate, Erythrocyte: 83 mm/Hr (08-25-23 @ 07:45)  Sedimentation Rate, Erythrocyte: 107 mm/Hr (08-23-23 @ 07:05)         C-Reactive Protein, Serum: 102 mg/L (08-23-23 @ 07:05)   WBC Count: 9.41 K/uL (08-25-23 @ 07:45)    PHYSICAL EXAM  LE Focused:    Vasc:  DP and PT pulses palpable 2/4. CFT<3 seconds. No edema noted in legs or feet   Derm: Wounds noted b/l lower legs to level of subcutaneous tissue/muscle tendon layer of legs. No PTB in any of wounds but tendon exposure noted. Wounds are fibrogranular in nature with no drainage noted. No fluctuance crepitus or streaking noted.   Neuro: Gross sensation intact.   MSK: POP to all wounds.       IMAGING:   Xray- recommend MRI if concern for OM  MRI- No OM noted     CULTURES: Klebsiella, Proteus, Raoultella    < from: [facility] Physiol Extremity Lower 3+ Level, BI (08.22.23 @ 16:34) >  Right BRIDGETTE = 1.01  Left BRIDGETTE = 1.00    < end of copied text >

## 2023-08-25 NOTE — PROGRESS NOTE ADULT - PROBLEM SELECTOR PLAN 3
PE: (+) B/L LE ulcer  - Podiatry Consult  - wound culture growing gram negative rods  -patient on cipro and dicloxacillin  -vascular consulted  -pyoderma gangrenosum work up?

## 2023-08-25 NOTE — DIETITIAN INITIAL EVALUATION ADULT - OTHER INFO
Patient from home lives with family admitted for  Patient from home lives with family admitted for worsening leg ulcer for the past three days. Visited pt. alert & awake, [place] speaking, accepted "[employer]" ID# [id number] - Jurgen, per pt. "not eating much past 3 days", having difficulty with his leg wound to "heal up" therefore "came back to hospital". Pt. states dx. DM >5yrs, see his outpatient PCP at [facility], also received education on eating healthy for his "diabetes". Admits to intentional weight of >20 Lbs as per his PCP, recall dosing wt. 257.4 Lbs on 08/24/23 noted. Pt. accepted nutrition education/hand out on "My Plate Planner with Carbohydrate Counting" in [place], reviewed & reinforced food lists with menu samples, pt. receptive. S/p vascular surgery in am & seen by Podiatry team with ongoing wound care. Patient from home lives with family admitted for worsening leg ulcer for the past three days. Visited pt. alert & awake, Macedonian speaking, accepted "[employer]" ID# [id number] - Jurgen, per pt. "not eating much past 3 days", having difficulty with his leg wound to "heal up" therefore "came back to hospital". Pt. states dx. DM >5yrs, see his outpatient PCP at [facility], also received education on eating healthy for his "diabetes". Admits to intentional weight of >20 Lbs as per his PCP, recall dosing wt. 257.4 Lbs on 08/24/23 noted. Pt. accepted nutrition education/hand out on "My Plate Planner with Carbohydrate Counting" in Macedonian, reviewed & reinforced food lists with menu samples, pt. receptive. S/p vascular surgery in am & seen by Podiatry team with ongoing wound care. Patient from home lives with family admitted for worsening leg ulcer for the past three days. Visited pt. alert & awake, Russian speaking, accepted "[employer]" ID# [id number] - Jurgen, per pt. "not eating much past 3 days", having difficulty with his leg wound to "heal up" therefore "came back to hospital". Pt. states dx. DM >5yrs, see his outpatient PCP at [facility], also received education on eating healthy for his "diabetes". Admits to intentional weight of >20 Lbs as per his PCP, recall dosing wt. 257.4 Lbs on 08/24/23 noted. Pt. accepted nutrition education/hand out on "My Plate Planner with Carbohydrate Counting" in Russian, reviewed & reinforced food lists with menu samples, pt. receptive. S/p vascular surgery in am & seen by Podiatry team with ongoing wound care.

## 2023-08-25 NOTE — PROGRESS NOTE ADULT - ATTENDING COMMENTS
58 year old man with PMH of DM, HLD, HTN, with chronic B/L lower extremity ulcers  for the past 5 years Follows at [facility]  He comes in on account of dizziness, chills and weakness .Similar symptoms few days ago for which he was admitted to OSH.  He was diagnosed with deep tissue infection and placed on antibiotics, received blood transfusion but had to leave AMA one day ago.  He states he follows with a dermatologist and PCP in [place] but has been told his chronic leg ulcers are not vascular or diabetic in origin after prior work up.  Patient doing better otday; understanding of clinical situation;  right leg swelling and discoloration much improved; . stil with pain onc [facility] but doppler negative for DVT;  Has longstanding ulcers on both legs appears to be deep seated and concern for OM    Vital Signs Last 24 Hrs  T(C): 36.8 (25 Aug 2023 15:36), Max: 36.9 (25 Aug 2023 05:33)  T(F): 98.2 (25 Aug 2023 15:36), Max: 98.4 (25 Aug 2023 05:33)  HR: 90 (25 Aug 2023 15:36) (60 - 92)  BP: 121/77 (25 Aug 2023 15:36) (113/59 - 144/97)  RR: 18 (25 Aug 2023 15:36) (18 - 18)  SpO2: 96% (25 Aug 2023 15:36) (96% - 99%): room air      Psych: AAO x3  Neuro: No gross focal deficits; Power and sensation intact  CVS: S1S2 present, regular, no edema  Resp: BLAE+, No wheeze or Rhonchi  GI: Soft, BS+, Non tender, non distended  Skin: Warm and moist without any rashes  Extr: Right calf tenderness+; Right LE warm, swollen and tender compared to left LE but much imp[roved swelling and discolaration today  Left leg( distal 1/2)  with large deep ulcers- uneven edges, pink to reddish erythematous base with a lot of yellowish fibrinolytic material  Right leg - has very deep ulcer with exposed tendons just above the medial malleolus. Surrounding erythema, swelling and tenderness.                          8.3    11.16 )-----------( 364      ( 24 Aug 2023 05:59 )             27.9   08-24    138  |  107  |  10  ----------------------------<  144<H>  3.9   |  24  |  0.79    Ca    8.4      24 Aug 2023 05:59  Phos  3.2     08-24  Mg     2.1     08-24    TPro  7.3  /  Alb  2.4<L>  /  TBili  0.2  /  DBili  x   /  AST  8<L>  /  ALT  18  /  AlkPhos  93  08-24      Sedimentation Rate, Erythrocyte (08.23.23 @ 07:05) Sedimentation Rate, Erythrocyte: 107 mm/Hr  C-Reactive Protein, Serum (08.23.23 @ 07:05) C-Reactive Protein, Serum: 102 mg/  A1C with Estimated Average Glucose (08.21.23 @ 05:30) A1C with Estimated Average Glucose Result: 6.6:    Iron with Total Binding Capacity (08.22.23 @ 08:00)   Iron - Total Binding Capacity.: 331 ug/dL  % Saturation, Iron: 5 %   Iron Total: 17 ug/dL    Culture - Blood (08.20.23 @ 23:10)   Culture Results: No growth at 48 Hours    < from: MR Lower Ext Joint w/wo IV Cont, Right (08.24.23 @ 17:24) >    IMPRESSION:  1.  Soft tissue ulceration with adjacent cellulitis.  2.  No abscess or acute osteomyelitis.    --- End of Report ---    < end of copied text >        A/P:  58 year old man with hx of DM, HTN, HLD with chronic B/L LE ulcers with chronic wounds managed in [place] presenting with sepsis likely related to his wound infections and concern for OM  D/D:   Sepsis with SSTI of RLE  B/L lower extremity chronic ulcer infection  with cellulitis and suspected osteomyelitis  ARGELIA likely from underlying Sepsis resolved  Acute on chronic microcytic anemia suspicious for iron def anemia related to GI bleeding  Suspected Pyoderma gangrenosum vs venous insufficiency with hypoproteinemia.   Type 2 DM fair control   HTN   Plan:  ID f/u appreciated; d/w Dr. Carter; D/C Vanco as MSSA; Continue Cefepime tocover MSSA and other Gram negative organism from wound Cx  Anemia panel with Iron deficiency; Will need w/u.   GI consult Patient will likely need a Colonoscopy; last Colonoscopy >1 yr ago with multiple polyps removed; recommended repeat  A1c acceptable; Add Gabapentin 600 mg HS for Neuropathic pain  Hold BP meds for hypotension or  low BP  Obtain medication list from [facility] pharmacy: Patient reported taking Lantus, Janumet 2 tabs HS (50/1000), Jardiance 10 gm daily, Statin  Podiatry consult noted; Follow up [facility]; Vascular Sx consult appreciated; No intervention indicated  High protein diet  iron sucrose 58 year old man with PMH of DM, HLD, HTN, with chronic B/L lower extremity ulcers  for the past 5 years Follows at [facility]  He comes in on account of dizziness, chills and weakness .Similar symptoms few days ago for which he was admitted to OSH.  He was diagnosed with deep tissue infection and placed on antibiotics, received blood transfusion but had to leave AMA one day ago.  He states he follows with a dermatologist and PCP in [place] but has been told his chronic leg ulcers are not vascular or diabetic in origin after prior work up.  Patient doing better otday; understanding of clinical situation;  right leg swelling and discoloration much improved; . stil with pain onc skilled nursing but doppler negative for DVT;  Has longstanding ulcers on both legs appears to be deep seated and concern for OM    Vital Signs Last 24 Hrs  T(C): 36.8 (25 Aug 2023 15:36), Max: 36.9 (25 Aug 2023 05:33)  T(F): 98.2 (25 Aug 2023 15:36), Max: 98.4 (25 Aug 2023 05:33)  HR: 90 (25 Aug 2023 15:36) (60 - 92)  BP: 121/77 (25 Aug 2023 15:36) (113/59 - 144/97)  RR: 18 (25 Aug 2023 15:36) (18 - 18)  SpO2: 96% (25 Aug 2023 15:36) (96% - 99%): room air      Psych: AAO x3  Neuro: No gross focal deficits; Power and sensation intact  CVS: S1S2 present, regular, no edema  Resp: BLAE+, No wheeze or Rhonchi  GI: Soft, BS+, Non tender, non distended  Skin: Warm and moist without any rashes  Extr: Right calf tenderness+; Right LE warm, swollen and tender compared to left LE but much imp[roved swelling and discolaration today  Left leg( distal 1/2)  with large deep ulcers- uneven edges, pink to reddish erythematous base with a lot of yellowish fibrinolytic material  Right leg - has very deep ulcer with exposed tendons just above the medial malleolus. Surrounding erythema, swelling and tenderness.                          8.3    11.16 )-----------( 364      ( 24 Aug 2023 05:59 )             27.9   08-24    138  |  107  |  10  ----------------------------<  144<H>  3.9   |  24  |  0.79    Ca    8.4      24 Aug 2023 05:59  Phos  3.2     08-24  Mg     2.1     08-24    TPro  7.3  /  Alb  2.4<L>  /  TBili  0.2  /  DBili  x   /  AST  8<L>  /  ALT  18  /  AlkPhos  93  08-24      Sedimentation Rate, Erythrocyte (08.23.23 @ 07:05) Sedimentation Rate, Erythrocyte: 107 mm/Hr  C-Reactive Protein, Serum (08.23.23 @ 07:05) C-Reactive Protein, Serum: 102 mg/  A1C with Estimated Average Glucose (08.21.23 @ 05:30) A1C with Estimated Average Glucose Result: 6.6:    Iron with Total Binding Capacity (08.22.23 @ 08:00)   Iron - Total Binding Capacity.: 331 ug/dL  % Saturation, Iron: 5 %   Iron Total: 17 ug/dL    Culture - Blood (08.20.23 @ 23:10)   Culture Results: No growth at 48 Hours    < from: MR Lower Ext Joint w/wo IV Cont, Right (08.24.23 @ 17:24) >    IMPRESSION:  1.  Soft tissue ulceration with adjacent cellulitis.  2.  No abscess or acute osteomyelitis.    --- End of Report ---    < end of copied text >        A/P:  58 year old man with hx of DM, HTN, HLD with chronic B/L LE ulcers with chronic wounds managed in [place] presenting with sepsis likely related to his wound infections and concern for OM  D/D:   Sepsis with SSTI of RLE  B/L lower extremity chronic ulcer infection  with cellulitis and suspected osteomyelitis  ARGELIA likely from underlying Sepsis resolved  Acute on chronic microcytic anemia suspicious for iron def anemia related to GI bleeding  Suspected Pyoderma gangrenosum vs venous insufficiency with hypoproteinemia.   Type 2 DM fair control   HTN   Plan:  ID f/u appreciated; d/w Dr. Carter; D/C Vanco as MSSA; Continue Cefepime tocover MSSA and other Gram negative organism from wound Cx  Anemia panel with Iron deficiency; Will need w/u.   GI consult Patient will likely need a Colonoscopy; last Colonoscopy >1 yr ago with multiple polyps removed; recommended repeat  A1c acceptable; Add Gabapentin 600 mg HS for Neuropathic pain  Hold BP meds for hypotension or  low BP  Obtain medication list from [facility] pharmacy: Patient reported taking Lantus, Janumet 2 tabs HS (50/1000), Jardiance 10 gm daily, Statin  Podiatry consult noted; Follow up [facility]; Vascular Sx consult appreciated; No intervention indicated  High protein diet  iron sucrose 58 year old man with PMH of DM, HLD, HTN, with chronic B/L lower extremity ulcers  for the past 5 years Follows at [facility]  He comes in on account of dizziness, chills and weakness .Similar symptoms few days ago for which he was admitted to OSH.  He was diagnosed with deep tissue infection and placed on antibiotics, received blood transfusion but had to leave AMA one day ago.  He states he follows with a dermatologist and PCP in [place] but has been told his chronic leg ulcers are not vascular or diabetic in origin after prior work up.  Patient doing better otday; understanding of clinical situation;  right leg swelling and discoloration much improved; . stil with pain onc [facility] but doppler negative for DVT;  Has longstanding ulcers on both legs appears to be deep seated and concern for OM    Vital Signs Last 24 Hrs  T(C): 36.8 (25 Aug 2023 15:36), Max: 36.9 (25 Aug 2023 05:33)  T(F): 98.2 (25 Aug 2023 15:36), Max: 98.4 (25 Aug 2023 05:33)  HR: 90 (25 Aug 2023 15:36) (60 - 92)  BP: 121/77 (25 Aug 2023 15:36) (113/59 - 144/97)  RR: 18 (25 Aug 2023 15:36) (18 - 18)  SpO2: 96% (25 Aug 2023 15:36) (96% - 99%): room air      Psych: AAO x3  Neuro: No gross focal deficits; Power and sensation intact  CVS: S1S2 present, regular, no edema  Resp: BLAE+, No wheeze or Rhonchi  GI: Soft, BS+, Non tender, non distended  Skin: Warm and moist without any rashes  Extr: Right calf tenderness+; Right LE warm, swollen and tender compared to left LE but much imp[roved swelling and discolaration today  Left leg( distal 1/2)  with large deep ulcers- uneven edges, pink to reddish erythematous base with a lot of yellowish fibrinolytic material  Right leg - has very deep ulcer with exposed tendons just above the medial malleolus. Surrounding erythema, swelling and tenderness.                          8.3    11.16 )-----------( 364      ( 24 Aug 2023 05:59 )             27.9   08-24    138  |  107  |  10  ----------------------------<  144<H>  3.9   |  24  |  0.79    Ca    8.4      24 Aug 2023 05:59  Phos  3.2     08-24  Mg     2.1     08-24    TPro  7.3  /  Alb  2.4<L>  /  TBili  0.2  /  DBili  x   /  AST  8<L>  /  ALT  18  /  AlkPhos  93  08-24      Sedimentation Rate, Erythrocyte (08.23.23 @ 07:05) Sedimentation Rate, Erythrocyte: 107 mm/Hr  C-Reactive Protein, Serum (08.23.23 @ 07:05) C-Reactive Protein, Serum: 102 mg/  A1C with Estimated Average Glucose (08.21.23 @ 05:30) A1C with Estimated Average Glucose Result: 6.6:    Iron with Total Binding Capacity (08.22.23 @ 08:00)   Iron - Total Binding Capacity.: 331 ug/dL  % Saturation, Iron: 5 %   Iron Total: 17 ug/dL    Culture - Blood (08.20.23 @ 23:10)   Culture Results: No growth at 48 Hours    < from: MR Lower Ext Joint w/wo IV Cont, Right (08.24.23 @ 17:24) >    IMPRESSION:  1.  Soft tissue ulceration with adjacent cellulitis.  2.  No abscess or acute osteomyelitis.    --- End of Report ---    < end of copied text >        A/P:  58 year old man with hx of DM, HTN, HLD with chronic B/L LE ulcers with chronic wounds managed in [place] presenting with sepsis likely related to his wound infections and concern for OM  D/D:   Sepsis with SSTI of RLE  B/L lower extremity chronic ulcer infection  with cellulitis and suspected osteomyelitis  ARGELIA likely from underlying Sepsis resolved  Acute on chronic microcytic anemia suspicious for iron def anemia related to GI bleeding  Suspected Pyoderma gangrenosum vs venous insufficiency with hypoproteinemia.   Type 2 DM fair control   HTN   Plan:  ID f/u appreciated; d/w Dr. Carter; D/C Vanco as MSSA; Continue Cefepime tocover MSSA and other Gram negative organism from wound Cx  Anemia panel with Iron deficiency; Will need w/u.   GI consult Patient will likely need a Colonoscopy; last Colonoscopy >1 yr ago with multiple polyps removed; recommended repeat  A1c acceptable; Add Gabapentin 600 mg HS for Neuropathic pain  Hold BP meds for hypotension or  low BP  Obtain medication list from [facility] pharmacy: Patient reported taking Lantus, Janumet 2 tabs HS (50/1000), Jardiance 10 gm daily, Statin  Podiatry consult noted; Follow up [facility]; Vascular Sx consult appreciated; No intervention indicated  High protein diet  iron sucrose Patient seen and examined this morning with PGY1 Dr. Temple translating at bedside    58 year old man with PMH of DM, HLD, HTN, with chronic B/L lower extremity ulcers  for the past 5 years Follows at [facility]  He comes in on account of dizziness, chills and weakness .Similar symptoms few days ago for which he was admitted to OSH.  He was diagnosed with deep tissue infection and placed on antibiotics, received blood transfusion but had to leave AMA for personal reasons.  He states he follows with a dermatologist and PCP in [place] but has been told his chronic leg ulcers are not vascular or diabetic in origin after prior work up.    Patient doing well  right leg swelling and discoloration much improved but developed cord like palpable vein tracking up the thigh with significant pain to right leg with minimal touch;  No evidence of Osteomyelitis on MRI; ABIs with no evidence of PVD    Vital Signs Last 24 Hrs  T(C): 36.8 (25 Aug 2023 15:36), Max: 36.9 (25 Aug 2023 05:33)  T(F): 98.2 (25 Aug 2023 15:36), Max: 98.4 (25 Aug 2023 05:33)  HR: 90 (25 Aug 2023 15:36) (60 - 92)  BP: 121/77 (25 Aug 2023 15:36) (113/59 - 144/97)  RR: 18 (25 Aug 2023 15:36) (18 - 18)  SpO2: 96% (25 Aug 2023 15:36) (96% - 99%): room air    P/E:  El;parish male  Psych: AAO x3  Neuro: No gross focal deficits; Power and sensation intact  CVS: S1S2 present, regular, no edema  Resp: BLAE+, No wheeze or Rhonchi  GI: Soft, BS+, Non tender, non distended  Skin: Warm and moist without any rashes  Extr: Right calf tenderness+; Right LE warm, swollen and tender compared to left LE but much imp[roved swelling and discolaration today  Left leg( distal 1/2)  with large deep ulcers- uneven edges, pink to reddish erythematous base with a lot of yellowish fibrinolytic material  Right leg - has very deep ulcer with exposed tendons just above the medial malleolus. Surrounding erythema, swelling and tenderness.                          8.3    11.16 )-----------( 364      ( 24 Aug 2023 05:59 )             27.9   08-24    138  |  107  |  10  ----------------------------<  144<H>  3.9   |  24  |  0.79    Ca    8.4      24 Aug 2023 05:59  Phos  3.2     08-24  Mg     2.1     08-24    TPro  7.3  /  Alb  2.4<L>  /  TBili  0.2  /  DBili  x   /  AST  8<L>  /  ALT  18  /  AlkPhos  93  08-24    A1C with Estimated Average Glucose (08.21.23 @ 05:30) A1C with Estimated Average Glucose Result: 6.6:    Iron with Total Binding Capacity (08.22.23 @ 08:00)   Iron - Total Binding Capacity.: 331 ug/dL  % Saturation, Iron: 5 %   Iron Total: 17 ug/dL    Culture - Blood (08.20.23 @ 23:10)   Culture Results: No growth at 4 days    MR Lower Ext Joint w/wo IV Cont, Right (08.24.23 @ 17:24)    IMPRESSION:  1.  Soft tissue ulceration with adjacent cellulitis.  2.  No abscess or acute osteomyelitis.    A/P:  58 year old man with hx of DM, HTN, HLD with chronic B/L LE ulcers with chronic wounds managed in [place] presenting with sepsis likely related to his wound infections and concern for OM  D/D:   Sepsis with SSTI of RLE  B/L lower extremity chronic ulcer infection  with cellulitis and suspected osteomyelitis  ARGELIA likely from underlying Sepsis resolved  Acute on chronic microcytic anemia suspicious for iron def anemia related to GI bleeding  Suspected Pyoderma gangrenosum vs venous insufficiency with hypoproteinemia.   Type 2 DM fair control   HTN   Plan:  ID f/u appreciated; d/w Dr. Carter; D/C Vanco as MSSA; Continue Cefepime tocover MSSA and other Gram negative organism from wound Cx  Anemia panel with Iron deficiency; Will need w/u.   GI consult Patient will likely need a Colonoscopy; last Colonoscopy >1 yr ago with multiple polyps removed; recommended repeat  A1c acceptable; Add Gabapentin 600 mg HS for Neuropathic pain  Hold BP meds for hypotension or  low BP  Obtain medication list from [facility] pharmacy: Patient reported taking Lantus, Janumet 2 tabs HS (50/1000), Jardiance 10 gm daily, Statin  Podiatry consult noted; Follow up [facility]; Vascular Sx consult appreciated; No intervention indicated  High protein diet  iron sucrose Patient seen and examined this morning with PGY1 Dr. Temple translating at bedside    58 year old man with PMH of DM, HLD, HTN, with chronic B/L lower extremity ulcers  for the past 5 years Follows at [facility]  He comes in on account of dizziness, chills and weakness .Similar symptoms few days ago for which he was admitted to OSH.  He was diagnosed with deep tissue infection and placed on antibiotics, received blood transfusion but had to leave AMA for personal reasons.  He states he follows with a dermatologist and PCP in [place] but has been told his chronic leg ulcers are not vascular or diabetic in origin after prior work up.    Patient doing well  right leg swelling and discoloration much improved but developed cord like palpable vein tracking up the thigh with significant pain to right leg with minimal touch;  No evidence of Osteomyelitis on MRI; ABIs with no evidence of PVD    Vital Signs Last 24 Hrs  T(C): 36.8 (25 Aug 2023 15:36), Max: 36.9 (25 Aug 2023 05:33)  T(F): 98.2 (25 Aug 2023 15:36), Max: 98.4 (25 Aug 2023 05:33)  HR: 90 (25 Aug 2023 15:36) (60 - 92)  BP: 121/77 (25 Aug 2023 15:36) (113/59 - 144/97)  RR: 18 (25 Aug 2023 15:36) (18 - 18)  SpO2: 96% (25 Aug 2023 15:36) (96% - 99%): room air    P/E:  El;parish male  Psych: AAO x3  Neuro: No gross focal deficits; Power and sensation intact  CVS: S1S2 present, regular, no edema  Resp: BLAE+, No wheeze or Rhonchi  GI: Soft, BS+, Non tender, non distended  Skin: Warm and moist without any rashes  Extr: Right calf tenderness+; Right LE warm, swollen and tender compared to left LE but much imp[roved swelling and discolaration today  Left leg( distal 1/2)  with large deep ulcers- uneven edges, pink to reddish erythematous base with a lot of yellowish fibrinolytic material  Right leg - has very deep ulcer with exposed tendons just above the medial malleolus. Surrounding erythema, swelling and tenderness.                          8.3    11.16 )-----------( 364      ( 24 Aug 2023 05:59 )             27.9   08-24    138  |  107  |  10  ----------------------------<  144<H>  3.9   |  24  |  0.79    Ca    8.4      24 Aug 2023 05:59  Phos  3.2     08-24  Mg     2.1     08-24    TPro  7.3  /  Alb  2.4<L>  /  TBili  0.2  /  DBili  x   /  AST  8<L>  /  ALT  18  /  AlkPhos  93  08-24    A1C with Estimated Average Glucose (08.21.23 @ 05:30) A1C with Estimated Average Glucose Result: 6.6:    Iron with Total Binding Capacity (08.22.23 @ 08:00)   Iron - Total Binding Capacity.: 331 ug/dL  % Saturation, Iron: 5 %   Iron Total: 17 ug/dL    Culture - Blood (08.20.23 @ 23:10)   Culture Results: No growth at 4 days    MR Lower Ext Joint w/wo IV Cont, Right (08.24.23 @ 17:24)    IMPRESSION:  1.  Soft tissue ulceration with adjacent cellulitis.  2.  No abscess or acute osteomyelitis.    A/P:  58 year old man with hx of DM, HTN, HLD with chronic B/L LE ulcers with chronic wounds managed in [place] presenting with sepsis likely related to his wound infections and concern for OM  D/D:   Sepsis with SSTI of RLE  B/L lower extremity chronic ulcer infection  with cellulitis and suspected osteomyelitis  ARGELIA likely from underlying Sepsis resolved  Acute on chronic microcytic anemia suspicious for iron def anemia related to GI bleeding  Suspected Pyoderma gangrenosum vs venous insufficiency with hypoproteinemia.   Type 2 DM fair control   HTN   Plan:  ID f/u appreciated; d/w Dr. Carter; D/C Vanco as MSSA; Continue Cefepime tocover MSSA and other Gram negative organism from wound Cx  Anemia panel with Iron deficiency; Will need w/u.   GI consult Patient will likely need a Colonoscopy; last Colonoscopy >1 yr ago with multiple polyps removed; recommended repeat  A1c acceptable; Add Gabapentin 600 mg HS for Neuropathic pain  Hold BP meds for hypotension or  low BP  Obtain medication list from [facility] pharmacy: Patient reported taking Lantus, Janumet 2 tabs HS (50/1000), Jardiance 10 gm daily, Statin  Podiatry consult noted; Follow up [facility]; Vascular Sx consult appreciated; No intervention indicated  High protein diet  iron sucrose Patient seen and examined this morning with PGY1 Dr. Temple translating at bedside    58 year old man with PMH of DM, HLD, HTN, with chronic B/L lower extremity ulcers  for the past 5 years Follows at [facility]  He comes in on account of dizziness, chills and weakness .Similar symptoms few days ago for which he was admitted to OSH.  He was diagnosed with deep tissue infection and placed on antibiotics, received blood transfusion but had to leave AMA for personal reasons.  He states he follows with a dermatologist and PCP in [place] but has been told his chronic leg ulcers are not vascular or diabetic in origin after prior work up.    Patient doing well  right leg swelling and discoloration much improved but developed cord like palpable vein tracking up the thigh with significant pain to right leg with minimal touch;  No evidence of Osteomyelitis on MRI; ABIs with no evidence of PVD    Vital Signs Last 24 Hrs  T(C): 36.8 (25 Aug 2023 15:36), Max: 36.9 (25 Aug 2023 05:33)  T(F): 98.2 (25 Aug 2023 15:36), Max: 98.4 (25 Aug 2023 05:33)  HR: 90 (25 Aug 2023 15:36) (60 - 92)  BP: 121/77 (25 Aug 2023 15:36) (113/59 - 144/97)  RR: 18 (25 Aug 2023 15:36) (18 - 18)  SpO2: 96% (25 Aug 2023 15:36) (96% - 99%): room air    P/E:  El;parish male  Psych: AAO x3  Neuro: No gross focal deficits; Power and sensation intact  CVS: S1S2 present, regular, no edema  Resp: BLAE+, No wheeze or Rhonchi  GI: Soft, BS+, Non tender, non distended  Skin: Warm and moist without any rashes  Extr: Right calf tenderness+; Right LE warm, swollen and tender compared to left LE but much imp[roved swelling and discolaration today  Left leg( distal 1/2)  with large deep ulcers- uneven edges, pink to reddish erythematous base with a lot of yellowish fibrinolytic material  Right leg - has very deep ulcer with exposed tendons just above the medial malleolus. Surrounding erythema, swelling and tenderness.                          8.3    11.16 )-----------( 364      ( 24 Aug 2023 05:59 )             27.9   08-24    138  |  107  |  10  ----------------------------<  144<H>  3.9   |  24  |  0.79    Ca    8.4      24 Aug 2023 05:59  Phos  3.2     08-24  Mg     2.1     08-24    TPro  7.3  /  Alb  2.4<L>  /  TBili  0.2  /  DBili  x   /  AST  8<L>  /  ALT  18  /  AlkPhos  93  08-24    A1C with Estimated Average Glucose (08.21.23 @ 05:30) A1C with Estimated Average Glucose Result: 6.6:    Iron with Total Binding Capacity (08.22.23 @ 08:00)   Iron - Total Binding Capacity.: 331 ug/dL  % Saturation, Iron: 5 %   Iron Total: 17 ug/dL    Culture - Blood (08.20.23 @ 23:10)   Culture Results: No growth at 4 days    MR Lower Ext Joint w/wo IV Cont, Right (08.24.23 @ 17:24)    IMPRESSION:  1.  Soft tissue ulceration with adjacent cellulitis.  2.  No abscess or acute osteomyelitis.    A/P:  58 year old man with hx of DM, HTN, HLD with chronic B/L LE ulcers with chronic wounds managed in [place] presenting with sepsis likely related to his wound infections and concern for OM  D/D:   Sepsis with SSTI of RLE  B/L lower extremity chronic ulcer infection  with cellulitis and suspected osteomyelitis  ARGELIA likely from underlying Sepsis resolved  Acute on chronic microcytic anemia suspicious for iron def anemia related to GI bleeding  Suspected Pyoderma gangrenosum vs venous insufficiency with hypoproteinemia.   Type 2 DM fair control   HTN   Plan:  ID f/u appreciated; d/w Dr. Carter; D/C Vanco as MSSA; Continue Cefepime tocover MSSA and other Gram negative organism from wound Cx  Anemia panel with Iron deficiency; Will need w/u.   GI consult Patient will likely need a Colonoscopy; last Colonoscopy >1 yr ago with multiple polyps removed; recommended repeat  A1c acceptable; Add Gabapentin 600 mg HS for Neuropathic pain  Hold BP meds for hypotension or  low BP  Obtain medication list from [facility] pharmacy: Patient reported taking Lantus, Janumet 2 tabs HS (50/1000), Jardiance 10 gm daily, Statin  Podiatry consult noted; Follow up BRIDGETTE; Vascular Sx consult appreciated; No intervention indicated  High protein diet  iron sucrose Patient seen and examined this morning with PGY1 Dr. Temple translating at bedside    Patient doing well  right leg swelling and discoloration much improved but developed cord like palpable vein tracking up the thigh with significant pain to right leg with minimal touch;  No evidence of Osteomyelitis on MRI; ABIs with no evidence of PVD    Vital Signs Last 24 Hrs  T(C): 36.8 (25 Aug 2023 15:36), Max: 36.9 (25 Aug 2023 05:33)  T(F): 98.2 (25 Aug 2023 15:36), Max: 98.4 (25 Aug 2023 05:33)  HR: 90 (25 Aug 2023 15:36) (60 - 92)  BP: 121/77 (25 Aug 2023 15:36) (113/59 - 144/97)  RR: 18 (25 Aug 2023 15:36) (18 - 18)  SpO2: 96% (25 Aug 2023 15:36) (96% - 99%): room air    P/E:  Elderly male comfortable in bed NAD at rest  Psych: AAO x3  Neuro: No gross focal deficits; Power and sensation intact  CVS: S1S2 present, regular, no edema  Resp: BLAE+, No wheeze or Rhonchi  GI: Soft, BS+, Non tender, non distended  Skin: Warm and moist without any rashes  Extr:  Right LE warmth and swelling much improved; significantly tender to touch; palpable cord extending across right thigh  Left leg( distal 1/2)  with large deep ulcers dressing intact  Right leg - has very deep ulcer with exposed tendons just above the medial malleolus. drssing intact by Podiatry    Labs:                        8.3    9.41  )-----------( 406      ( 25 Aug 2023 07:45 )             28.5   08-25  137  |  105  |  11  ----------------------------<  137<H>  4.2   |  25  |  0.77  Ca    8.7      25 Aug 2023 07:45  Phos  3.0     08-25  Mg     2.0     08-25  TPro  7.4  /  Alb  2.3<L>  /  TBili  0.2  /  DBili  x   /  AST  9<L>  /  ALT  17  /  AlkPhos  87  08-25    A1C with Estimated Average Glucose (08.21.23 @ 05:30) A1C with Estimated Average Glucose Result: 6.6:    Iron with Total Binding Capacity (08.22.23 @ 08:00)   Iron - Total Binding Capacity.: 331 ug/dL  % Saturation, Iron: 5 %   Iron Total: 17 ug/dL    Culture - Blood (08.20.23 @ 23:10)   Culture Results: No growth at 4 days    MR Lower Ext Joint w/wo IV Cont, Right (08.24.23 @ 17:24)    IMPRESSION:  1.  Soft tissue ulceration with adjacent cellulitis.  2.  No abscess or acute osteomyelitis.    A/P: 58 year old man with hx of DM, HTN, HLD with chronic B/L LE ulcers with chronic wounds managed in [place] presenting with sepsis likely related to his wound infections and concern for OM    D/D:   Superficial thrombophlebitis Right thigh concern for Septic phlebitis  Sepsis with SSTI of RLE resolved Sepsis  Acute on chronic microcytic anemia suspicious for iron def anemia related to GI bleeding  B/L lower extremity chronic ulcer infection with cellulitis and suspected osteomyelitis (ruled out)  ARGELIA likely from underlying Sepsis resolved  Suspected Pyoderma gangrenosum (less likely) vs venous insufficiency   Type 2 DM fair control   HTN     Plan:  ID f/u appreciated; d/w Dr. Carter; Recommend switch antibiotics to oral Ciprofloxacin and Dicloxacillin but extend Rx for 3 more weeks given underlying thrombophlebitis  Patient given a dose of Lovenox this morning; US Doppler confirm SVT; will anticoagulate for one month; Monitor closley H/H  Vascular Sx consult appreciated; No intervention indicated  Anemia panel with Iron deficiency; Hematology consult appreciated; agree with Venofer x 3 days; d/w ID Dr. Carter no CI for Iron infusion  Hematology also agrees with anticoagulation for a month if tolerated well given relative immobiltiy given B/L leg ulcers  GI consult Patient will likely need a Colonoscopy; last Colonoscopy >1 yr ago with multiple polyps removed; Pt has appt outpt with GI next month  A1c acceptable; Add Gabapentin 300 mg HS for Neuropathic pain  Hold BP meds for hypotension or  low BP  d/w Housestaff to confirm outpt med with [facility] pharmacy: Patient reported taking Lantus, Janumet 2 tabs HS (50/1000), Jardiance 10 gm daily, Statin  Podiatry f/u appreciated; Wound care as per Podiatry    Discussed with Patient with PGY1 translating and updated Rx plan; will arrange outpt follow up with PCP or alternatively with [facility] with Dr. Ravin Maloney if pt unable to f/u with PCP at [place]. D/C Plan once we can arrange ViVo meds as patient has only Emergency Medicaid needs Apixaban and Antibiotics. Patient seen and examined this morning with PGY1 Dr. Temple translating at bedside    Patient doing well  right leg swelling and discoloration much improved but developed cord like palpable vein tracking up the thigh with significant pain to right leg with minimal touch;  No evidence of Osteomyelitis on MRI; ABIs with no evidence of PVD    Vital Signs Last 24 Hrs  T(C): 36.8 (25 Aug 2023 15:36), Max: 36.9 (25 Aug 2023 05:33)  T(F): 98.2 (25 Aug 2023 15:36), Max: 98.4 (25 Aug 2023 05:33)  HR: 90 (25 Aug 2023 15:36) (60 - 92)  BP: 121/77 (25 Aug 2023 15:36) (113/59 - 144/97)  RR: 18 (25 Aug 2023 15:36) (18 - 18)  SpO2: 96% (25 Aug 2023 15:36) (96% - 99%): room air    P/E:  Elderly male comfortable in bed NAD at rest  Psych: AAO x3  Neuro: No gross focal deficits; Power and sensation intact  CVS: S1S2 present, regular, no edema  Resp: BLAE+, No wheeze or Rhonchi  GI: Soft, BS+, Non tender, non distended  Skin: Warm and moist without any rashes  Extr:  Right LE warmth and swelling much improved; significantly tender to touch; palpable cord extending across right thigh  Left leg( distal 1/2)  with large deep ulcers dressing intact  Right leg - has very deep ulcer with exposed tendons just above the medial malleolus. drssing intact by Podiatry    Labs:                        8.3    9.41  )-----------( 406      ( 25 Aug 2023 07:45 )             28.5   08-25  137  |  105  |  11  ----------------------------<  137<H>  4.2   |  25  |  0.77  Ca    8.7      25 Aug 2023 07:45  Phos  3.0     08-25  Mg     2.0     08-25  TPro  7.4  /  Alb  2.3<L>  /  TBili  0.2  /  DBili  x   /  AST  9<L>  /  ALT  17  /  AlkPhos  87  08-25    A1C with Estimated Average Glucose (08.21.23 @ 05:30) A1C with Estimated Average Glucose Result: 6.6:    Iron with Total Binding Capacity (08.22.23 @ 08:00)   Iron - Total Binding Capacity.: 331 ug/dL  % Saturation, Iron: 5 %   Iron Total: 17 ug/dL    Culture - Blood (08.20.23 @ 23:10)   Culture Results: No growth at 4 days    MR Lower Ext Joint w/wo IV Cont, Right (08.24.23 @ 17:24)    IMPRESSION:  1.  Soft tissue ulceration with adjacent cellulitis.  2.  No abscess or acute osteomyelitis.    A/P: 58 year old man with hx of DM, HTN, HLD with chronic B/L LE ulcers with chronic wounds managed in [place] presenting with sepsis likely related to his wound infections and concern for OM    D/D:   Superficial thrombophlebitis Right thigh concern for Septic phlebitis  Sepsis with SSTI of RLE resolved Sepsis  Acute on chronic microcytic anemia suspicious for iron def anemia related to GI bleeding  B/L lower extremity chronic ulcer infection with cellulitis and suspected osteomyelitis (ruled out)  ARGELIA likely from underlying Sepsis resolved  Suspected Pyoderma gangrenosum (less likely) vs venous insufficiency   Type 2 DM fair control   HTN     Plan:  ID f/u appreciated; d/w Dr. Carter; Recommend switch antibiotics to oral Ciprofloxacin and Dicloxacillin but extend Rx for 3 more weeks given underlying thrombophlebitis  Patient given a dose of Lovenox this morning; US Doppler confirm SVT; will anticoagulate for one month; Monitor closley H/H  Vascular Sx consult appreciated; No intervention indicated  Anemia panel with Iron deficiency; Hematology consult appreciated; agree with Venofer x 3 days; d/w ID Dr. Carter no CI for Iron infusion  Hematology also agrees with anticoagulation for a month if tolerated well given relative immobiltiy given B/L leg ulcers  GI consult Patient will likely need a Colonoscopy; last Colonoscopy >1 yr ago with multiple polyps removed; Pt has appt outpt with GI next month  A1c acceptable; Add Gabapentin 300 mg HS for Neuropathic pain  Hold BP meds for hypotension or  low BP  d/w Housestaff to confirm outpt med with [facility] pharmacy: Patient reported taking Lantus, Janumet 2 tabs HS (50/1000), Jardiance 10 gm daily, Statin  Podiatry f/u appreciated; Wound care as per Podiatry    Discussed with Patient with PGY1 translating and updated Rx plan; will arrange outpt follow up with PCP or alternatively with Student clinic with Dr. Ravin Maloney if pt unable to f/u with PCP at [place]. D/C Plan once we can arrange ViVo meds as patient has only Emergency Medicaid needs Apixaban and Antibiotics. Patient seen and examined this morning with PGY1 Dr. Temple translating at bedside    Patient doing well  right leg swelling and discoloration much improved but developed cord like palpable vein tracking up the thigh with significant pain to right leg with minimal touch;  No evidence of Osteomyelitis on MRI; ABIs with no evidence of PVD    Vital Signs Last 24 Hrs  T(C): 36.8 (25 Aug 2023 15:36), Max: 36.9 (25 Aug 2023 05:33)  T(F): 98.2 (25 Aug 2023 15:36), Max: 98.4 (25 Aug 2023 05:33)  HR: 90 (25 Aug 2023 15:36) (60 - 92)  BP: 121/77 (25 Aug 2023 15:36) (113/59 - 144/97)  RR: 18 (25 Aug 2023 15:36) (18 - 18)  SpO2: 96% (25 Aug 2023 15:36) (96% - 99%): room air    P/E:  Elderly male comfortable in bed NAD at rest  Psych: AAO x3  Neuro: No gross focal deficits; Power and sensation intact  CVS: S1S2 present, regular, no edema  Resp: BLAE+, No wheeze or Rhonchi  GI: Soft, BS+, Non tender, non distended  Skin: Warm and moist without any rashes  Extr:  Right LE warmth and swelling much improved; significantly tender to touch; palpable cord extending across right thigh  Left leg( distal 1/2)  with large deep ulcers dressing intact  Right leg - has very deep ulcer with exposed tendons just above the medial malleolus. drssing intact by Podiatry    Labs:                        8.3    9.41  )-----------( 406      ( 25 Aug 2023 07:45 )             28.5   08-25  137  |  105  |  11  ----------------------------<  137<H>  4.2   |  25  |  0.77  Ca    8.7      25 Aug 2023 07:45  Phos  3.0     08-25  Mg     2.0     08-25  TPro  7.4  /  Alb  2.3<L>  /  TBili  0.2  /  DBili  x   /  AST  9<L>  /  ALT  17  /  AlkPhos  87  08-25    A1C with Estimated Average Glucose (08.21.23 @ 05:30) A1C with Estimated Average Glucose Result: 6.6:    Iron with Total Binding Capacity (08.22.23 @ 08:00)   Iron - Total Binding Capacity.: 331 ug/dL  % Saturation, Iron: 5 %   Iron Total: 17 ug/dL    Culture - Blood (08.20.23 @ 23:10)   Culture Results: No growth at 4 days    MR Lower Ext Joint w/wo IV Cont, Right (08.24.23 @ 17:24)    IMPRESSION:  1.  Soft tissue ulceration with adjacent cellulitis.  2.  No abscess or acute osteomyelitis.    A/P: 58 year old man with hx of DM, HTN, HLD with chronic B/L LE ulcers with chronic wounds managed in [place] presenting with sepsis likely related to his wound infections and concern for OM    D/D:   Superficial thrombophlebitis Right thigh concern for Septic phlebitis  Sepsis with SSTI of RLE resolved Sepsis  Acute on chronic microcytic anemia suspicious for iron def anemia related to GI bleeding  B/L lower extremity chronic ulcer infection with cellulitis and suspected osteomyelitis (ruled out)  ARGELIA likely from underlying Sepsis resolved  Suspected Pyoderma gangrenosum (less likely) vs venous insufficiency   Type 2 DM fair control   HTN     Plan:  ID f/u appreciated; d/w Dr. Carter; Recommend switch antibiotics to oral Ciprofloxacin and Dicloxacillin but extend Rx for 3 more weeks given underlying thrombophlebitis  Patient given a dose of Lovenox this morning; US Doppler confirm SVT; will anticoagulate for one month; Monitor closley H/H  Vascular Sx consult appreciated; No intervention indicated  Anemia panel with Iron deficiency; Hematology consult appreciated; agree with Venofer x 3 days; d/w ID Dr. Carter no CI for Iron infusion  Hematology also agrees with anticoagulation for a month if tolerated well given relative immobiltiy given B/L leg ulcers  GI consult Patient will likely need a Colonoscopy; last Colonoscopy >1 yr ago with multiple polyps removed; Pt has appt outpt with GI next month  A1c acceptable; Add Gabapentin 300 mg HS for Neuropathic pain  Hold BP meds for hypotension or  low BP  d/w Housestaff to confirm outpt med with [facility] pharmacy: Patient reported taking Lantus, Janumet 2 tabs HS (50/1000), Jardiance 10 gm daily, Statin  Podiatry f/u appreciated; Wound care as per Podiatry    Discussed with Patient with PGY1 translating and updated Rx plan; will arrange outpt follow up with PCP or alternatively with Student clinic with Dr. Ravin Maloney if pt unable to f/u with PCP at [place]. D/C Plan once we can arrange ViVo meds as patient has only Emergency Medicaid needs Apixaban and Antibiotics.

## 2023-08-25 NOTE — DIETITIAN INITIAL EVALUATION ADULT - PROBLEM SELECTOR PLAN 3
PE: (+) surrounding erythema over RT lower leg ulcer  In the  ED:  WBC 17k,  s/p Vancomycin, Zosyn IVPB  - C/w Vancomycin & Zosyn IVPB  - Podiatry Consult   - F/u Bld cx x2  - F/u Wound cx x2   - F/u CBC

## 2023-08-25 NOTE — DIETITIAN INITIAL EVALUATION ADULT - PERTINENT MEDS FT
MEDICATIONS  (STANDING):  ciprofloxacin     Tablet 750 milliGRAM(s) Oral two times a day  dicloxacillin 500 milliGRAM(s) Oral four times a day  gabapentin 300 milliGRAM(s) Oral at bedtime  insulin lispro (ADMELOG) corrective regimen sliding scale   SubCutaneous three times a day before meals  insulin lispro (ADMELOG) corrective regimen sliding scale   SubCutaneous at bedtime  sodium chloride 0.9%. 1000 milliLiter(s) (125 mL/Hr) IV Continuous <Continuous>    MEDICATIONS  (PRN):  acetaminophen     Tablet .. 650 milliGRAM(s) Oral every 6 hours PRN Temp greater or equal to 38C (100.4F), Mild Pain (1 - 3)  melatonin 3 milliGRAM(s) Oral at bedtime PRN Insomnia

## 2023-08-26 LAB
ALBUMIN SERPL ELPH-MCNC: 2.4 G/DL — LOW (ref 3.5–5)
ALBUMIN SERPL ELPH-MCNC: 2.4 G/DL — LOW (ref 3.5–5)
ALP SERPL-CCNC: 87 U/L — SIGNIFICANT CHANGE UP (ref 40–120)
ALP SERPL-CCNC: 87 U/L — SIGNIFICANT CHANGE UP (ref 40–120)
ALT FLD-CCNC: 18 U/L DA — SIGNIFICANT CHANGE UP (ref 10–60)
ALT FLD-CCNC: 18 U/L DA — SIGNIFICANT CHANGE UP (ref 10–60)
ANION GAP SERPL CALC-SCNC: 8 MMOL/L — SIGNIFICANT CHANGE UP (ref 5–17)
ANION GAP SERPL CALC-SCNC: 8 MMOL/L — SIGNIFICANT CHANGE UP (ref 5–17)
AST SERPL-CCNC: 10 U/L — SIGNIFICANT CHANGE UP (ref 10–40)
AST SERPL-CCNC: 10 U/L — SIGNIFICANT CHANGE UP (ref 10–40)
BILIRUB SERPL-MCNC: 0.2 MG/DL — SIGNIFICANT CHANGE UP (ref 0.2–1.2)
BILIRUB SERPL-MCNC: 0.2 MG/DL — SIGNIFICANT CHANGE UP (ref 0.2–1.2)
BUN SERPL-MCNC: 14 MG/DL — SIGNIFICANT CHANGE UP (ref 7–18)
BUN SERPL-MCNC: 14 MG/DL — SIGNIFICANT CHANGE UP (ref 7–18)
CALCIUM SERPL-MCNC: 8.7 MG/DL — SIGNIFICANT CHANGE UP (ref 8.4–10.5)
CALCIUM SERPL-MCNC: 8.7 MG/DL — SIGNIFICANT CHANGE UP (ref 8.4–10.5)
CHLORIDE SERPL-SCNC: 105 MMOL/L — SIGNIFICANT CHANGE UP (ref 96–108)
CHLORIDE SERPL-SCNC: 105 MMOL/L — SIGNIFICANT CHANGE UP (ref 96–108)
CO2 SERPL-SCNC: 24 MMOL/L — SIGNIFICANT CHANGE UP (ref 22–31)
CO2 SERPL-SCNC: 24 MMOL/L — SIGNIFICANT CHANGE UP (ref 22–31)
CREAT SERPL-MCNC: 0.82 MG/DL — SIGNIFICANT CHANGE UP (ref 0.5–1.3)
CREAT SERPL-MCNC: 0.82 MG/DL — SIGNIFICANT CHANGE UP (ref 0.5–1.3)
CULTURE RESULTS: SIGNIFICANT CHANGE UP
EGFR: 102 ML/MIN/1.73M2 — SIGNIFICANT CHANGE UP
EGFR: 102 ML/MIN/1.73M2 — SIGNIFICANT CHANGE UP
GLUCOSE BLDC GLUCOMTR-MCNC: 135 MG/DL — HIGH (ref 70–99)
GLUCOSE BLDC GLUCOMTR-MCNC: 135 MG/DL — HIGH (ref 70–99)
GLUCOSE BLDC GLUCOMTR-MCNC: 141 MG/DL — HIGH (ref 70–99)
GLUCOSE BLDC GLUCOMTR-MCNC: 141 MG/DL — HIGH (ref 70–99)
GLUCOSE BLDC GLUCOMTR-MCNC: 153 MG/DL — HIGH (ref 70–99)
GLUCOSE BLDC GLUCOMTR-MCNC: 153 MG/DL — HIGH (ref 70–99)
GLUCOSE BLDC GLUCOMTR-MCNC: 155 MG/DL — HIGH (ref 70–99)
GLUCOSE BLDC GLUCOMTR-MCNC: 155 MG/DL — HIGH (ref 70–99)
GLUCOSE SERPL-MCNC: 122 MG/DL — HIGH (ref 70–99)
GLUCOSE SERPL-MCNC: 122 MG/DL — HIGH (ref 70–99)
HCT VFR BLD CALC: 27.5 % — LOW (ref 39–50)
HCT VFR BLD CALC: 27.5 % — LOW (ref 39–50)
HGB BLD-MCNC: 8.2 G/DL — LOW (ref 13–17)
HGB BLD-MCNC: 8.2 G/DL — LOW (ref 13–17)
MAGNESIUM SERPL-MCNC: 2.3 MG/DL — SIGNIFICANT CHANGE UP (ref 1.6–2.6)
MAGNESIUM SERPL-MCNC: 2.3 MG/DL — SIGNIFICANT CHANGE UP (ref 1.6–2.6)
MCHC RBC-ENTMCNC: 23 PG — LOW (ref 27–34)
MCHC RBC-ENTMCNC: 23 PG — LOW (ref 27–34)
MCHC RBC-ENTMCNC: 29.8 GM/DL — LOW (ref 32–36)
MCHC RBC-ENTMCNC: 29.8 GM/DL — LOW (ref 32–36)
MCV RBC AUTO: 77.2 FL — LOW (ref 80–100)
MCV RBC AUTO: 77.2 FL — LOW (ref 80–100)
NRBC # BLD: 0 /100 WBCS — SIGNIFICANT CHANGE UP (ref 0–0)
NRBC # BLD: 0 /100 WBCS — SIGNIFICANT CHANGE UP (ref 0–0)
ORGANISM # SPEC MICROSCOPIC CNT: SIGNIFICANT CHANGE UP
PHOSPHATE SERPL-MCNC: 2.8 MG/DL — SIGNIFICANT CHANGE UP (ref 2.5–4.5)
PHOSPHATE SERPL-MCNC: 2.8 MG/DL — SIGNIFICANT CHANGE UP (ref 2.5–4.5)
PLATELET # BLD AUTO: 427 K/UL — HIGH (ref 150–400)
PLATELET # BLD AUTO: 427 K/UL — HIGH (ref 150–400)
POTASSIUM SERPL-MCNC: 3.9 MMOL/L — SIGNIFICANT CHANGE UP (ref 3.5–5.3)
POTASSIUM SERPL-MCNC: 3.9 MMOL/L — SIGNIFICANT CHANGE UP (ref 3.5–5.3)
POTASSIUM SERPL-SCNC: 3.9 MMOL/L — SIGNIFICANT CHANGE UP (ref 3.5–5.3)
POTASSIUM SERPL-SCNC: 3.9 MMOL/L — SIGNIFICANT CHANGE UP (ref 3.5–5.3)
PROT SERPL-MCNC: 7.4 G/DL — SIGNIFICANT CHANGE UP (ref 6–8.3)
PROT SERPL-MCNC: 7.4 G/DL — SIGNIFICANT CHANGE UP (ref 6–8.3)
RBC # BLD: 3.56 M/UL — LOW (ref 4.2–5.8)
RBC # BLD: 3.56 M/UL — LOW (ref 4.2–5.8)
RBC # FLD: 16.8 % — HIGH (ref 10.3–14.5)
RBC # FLD: 16.8 % — HIGH (ref 10.3–14.5)
SODIUM SERPL-SCNC: 137 MMOL/L — SIGNIFICANT CHANGE UP (ref 135–145)
SODIUM SERPL-SCNC: 137 MMOL/L — SIGNIFICANT CHANGE UP (ref 135–145)
SPECIMEN SOURCE: SIGNIFICANT CHANGE UP
WBC # BLD: 8.06 K/UL — SIGNIFICANT CHANGE UP (ref 3.8–10.5)
WBC # BLD: 8.06 K/UL — SIGNIFICANT CHANGE UP (ref 3.8–10.5)
WBC # FLD AUTO: 8.06 K/UL — SIGNIFICANT CHANGE UP (ref 3.8–10.5)
WBC # FLD AUTO: 8.06 K/UL — SIGNIFICANT CHANGE UP (ref 3.8–10.5)

## 2023-08-26 PROCEDURE — 99232 SBSQ HOSP IP/OBS MODERATE 35: CPT

## 2023-08-26 RX ORDER — APIXABAN 2.5 MG/1
5 TABLET, FILM COATED ORAL EVERY 12 HOURS
Refills: 0 | Status: DISCONTINUED | OUTPATIENT
Start: 2023-08-26 | End: 2023-08-28

## 2023-08-26 RX ADMIN — DICLOXACILLIN SODIUM 500 MILLIGRAM(S): 500 CAPSULE ORAL at 05:49

## 2023-08-26 RX ADMIN — Medication 650 MILLIGRAM(S): at 12:33

## 2023-08-26 RX ADMIN — APIXABAN 5 MILLIGRAM(S): 2.5 TABLET, FILM COATED ORAL at 21:29

## 2023-08-26 RX ADMIN — DICLOXACILLIN SODIUM 500 MILLIGRAM(S): 500 CAPSULE ORAL at 01:12

## 2023-08-26 RX ADMIN — DICLOXACILLIN SODIUM 500 MILLIGRAM(S): 500 CAPSULE ORAL at 23:19

## 2023-08-26 RX ADMIN — Medication 750 MILLIGRAM(S): at 17:28

## 2023-08-26 RX ADMIN — Medication 650 MILLIGRAM(S): at 23:19

## 2023-08-26 RX ADMIN — GABAPENTIN 300 MILLIGRAM(S): 400 CAPSULE ORAL at 21:29

## 2023-08-26 RX ADMIN — DICLOXACILLIN SODIUM 500 MILLIGRAM(S): 500 CAPSULE ORAL at 17:28

## 2023-08-26 RX ADMIN — Medication 750 MILLIGRAM(S): at 05:48

## 2023-08-26 RX ADMIN — Medication 650 MILLIGRAM(S): at 14:00

## 2023-08-26 RX ADMIN — DICLOXACILLIN SODIUM 500 MILLIGRAM(S): 500 CAPSULE ORAL at 11:05

## 2023-08-26 RX ADMIN — Medication 1: at 17:27

## 2023-08-26 RX ADMIN — Medication 650 MILLIGRAM(S): at 23:49

## 2023-08-26 RX ADMIN — IRON SUCROSE 110 MILLIGRAM(S): 20 INJECTION, SOLUTION INTRAVENOUS at 18:43

## 2023-08-26 RX ADMIN — Medication 1: at 11:45

## 2023-08-26 NOTE — PROGRESS NOTE ADULT - ASSESSMENT
A:  DM wounds b/l    P:   Patient evaluated and Chart reviewed   Discussed diagnosis and treatment with patient  Xrays reviewed  BRIDGETTE/PVR- Right Foot: 1.01, Left Foot-1.00  MRI reviewed- No OM noted   Wound cx reviewed   Applied santyl, xeroform with dry sterile dressing to b/l legs   Continue with IV antibiotics As Per ID  Weight bearing as tolerated to b/l feet and legs  ID consult appreciated   Offloading to bilateral Heels.   Discussed importance of daily foot examinations and proper shoe gear and to importance of lower Fasting Blood Glucose levels.   Podiatry to follow while in house.  Seen, reviewed, and treated with Dr. Farias

## 2023-08-26 NOTE — PROGRESS NOTE ADULT - ASSESSMENT
# MICROCYTIC  ANEMIA  #  IRON DEFICIENCY ANEMIA   pt with h/o of  polyps on colonoscopy ~ [id number], sched for f/u with GI this hank  noted  iron study  c/w ANEUDY   Pt can benefit  form IV iron x 2-3 doses  started on IV iron = OK by ID   f/u CBC, PRBC TX prn for HB<7        #  SUPERFICIAL VEIN THROMBOSIS   w/u with doppler above noted  neg for DVT  pt is obese, mail,  sedentary/mostly in bed d/t LE cellulitis,   chronic  b/l leg ulcers  pt reports h/o of CT a/p at [facility] ~ 2 months ago- neg as per pt  started on   Lovenox 40 mg sq  qd,   can be changed to  DOAC  in the future   recommend to complete 4 weeks   call with questions [phone]   d/w primary team   # MICROCYTIC  ANEMIA  #  IRON DEFICIENCY ANEMIA   pt with h/o of  polyps on colonoscopy ~ [id number], sched for f/u with GI this hank  noted  iron study  c/w ANEUDY   Pt can benefit  form IV iron x 2-3 doses  started on IV iron = OK by ID   f/u CBC, PRBC TX prn for HB<7        #  SUPERFICIAL VEIN THROMBOSIS   w/u with doppler above noted  neg for DVT  pt is obese, mail,  sedentary/mostly in bed d/t LE cellulitis,   chronic  b/l leg ulcers  pt reports h/o of CT a/p at [facility] ~ 2 months ago- neg as per pt  started on   Lovenox 40 mg sq  qd,   can be changed to  DOAC  in the future   recommend to complete 4 weeks   call with questions [phone]   d/w primary team   # MICROCYTIC  ANEMIA  #  IRON DEFICIENCY ANEMIA   pt with h/o of  polyps on colonoscopy ~ [id number], sched for f/u with GI this hank  noted  iron study  c/w ANEUDY   Pt can benefit  form IV iron x 2-3 doses  started on IV iron = OK by ID   f/u CBC, PRBC TX prn for HB<7        #  SUPERFICIAL VEIN THROMBOSIS   w/u with doppler above noted  neg for DVT  pt is obese, mail,  sedentary/mostly in bed d/t LE cellulitis,   chronic  b/l leg ulcers  pt reports h/o of CT a/p at [facility] ~ 2 months ago- neg as per pt  started on   Lovenox 40 mg sq  qd,   can be changed to  DOAC  in the future   recommend to complete 4 weeks   call with questions [phone]   d/w primary team

## 2023-08-26 NOTE — PROGRESS NOTE ADULT - SUBJECTIVE AND OBJECTIVE BOX
Interval: Patient was seen resting in bed. Patient states he is feeling better than on admission, and feels that his wounds are getting better. No acute overnight events. Denied any pedal complaints.     Patient is a 58y old  Male who presents with a chief complaint of weakness (21 Aug 2023 02:06)    HPI:  57 yo M with PMH of DM, HLD, HTN and diabetic leg ulcers present with chills, weakness, lightheadedness and infected RT lower extremity ulcer for the past 3 days which has progressively worsened today. As per pt,  he went to [facility] in [place] 3 days  ago for his weakness and infected leg ulcer. He mentioned that he received one blood transfusion, antibiotics and was admitted to the hospital. He reports staying in the hospital for a day before leaving the hospital for some personal issue. He reports decreased oral intake today, felt extremely weak and lightheaded. He reports history of ulcer for the past 5 years that has worsened in the past 3 years.  He follows a "dermatologist" at [facility] and he is unable to provide information of his diagnosis. He denies; fever, bodyaches, Chest pain, SOB, URI sxs, N/V/D, abdominal pain, LOC, Head trauma    In the ED   Vitals -> BP: 88/55, HR:102, T:97.7, O2 sat: 94% RA  Labs :  WbC 17k, Hb:7.9, Cr:1.76  EKG : NSR  s/p Vancomycin, Zosyn IVPB  s/p 2L NS ->101/68 (21 Aug 2023 02:06)    Podiatry HPI: Patient stated that he has had leg wounds for 5 years. He seems a dermatologist at [facility] who gives him steroid shots for his wounds and they have worsened. Over the last month his pain has grown and his has become dizzy and weak. Patient states he needs to have santyl on his wounds for every dressing change so that the shooting pain he has subsides. Patient denies any other pedal complaints.    ID#: [id number]    Medications acetaminophen     Tablet .. 650 milliGRAM(s) Oral every 6 hours PRN  ciprofloxacin     Tablet 750 milliGRAM(s) Oral two times a day  dicloxacillin 500 milliGRAM(s) Oral four times a day  gabapentin 300 milliGRAM(s) Oral at bedtime  insulin lispro (ADMELOG) corrective regimen sliding scale   SubCutaneous three times a day before meals  insulin lispro (ADMELOG) corrective regimen sliding scale   SubCutaneous at bedtime  iron sucrose IVPB 200 milliGRAM(s) IV Intermittent every 24 hours  melatonin 3 milliGRAM(s) Oral at bedtime PRN  sodium chloride 0.9%. 1000 milliLiter(s) IV Continuous <Continuous>    FHNo pertinent family history in first degree relatives    ,   PMHHTN (hypertension)    Dyslipidemia    DM (diabetes mellitus)    Leg ulcer       PSHNo significant past surgical history        Labs                          8.2    8.06  )-----------( 427      ( 26 Aug 2023 07:48 )             27.5      08-26    137  |  105  |  14  ----------------------------<  122<H>  3.9   |  24  |  0.82    Ca    8.7      26 Aug 2023 07:48  Phos  2.8     08-26  Mg     2.3     08-26    TPro  7.4  /  Alb  2.4<L>  /  TBili  0.2  /  DBili  x   /  AST  10  /  ALT  18  /  AlkPhos  87  08-26     Vital Signs Last 24 Hrs  T(C): 37.5 (26 Aug 2023 07:10), Max: 37.5 (26 Aug 2023 07:10)  T(F): 99.5 (26 Aug 2023 07:10), Max: 99.5 (26 Aug 2023 07:10)  HR: 84 (26 Aug 2023 07:10) (60 - 90)  BP: 137/88 (26 Aug 2023 07:10) (110/74 - 137/88)  BP(mean): --  RR: 18 (26 Aug 2023 07:10) (18 - 18)  SpO2: 95% (26 Aug 2023 07:10) (95% - 98%)    Parameters below as of 26 Aug 2023 07:10  Patient On (Oxygen Delivery Method): room air      Sedimentation Rate, Erythrocyte: 83 mm/Hr (08-25-23 @ 07:45)  Sedimentation Rate, Erythrocyte: 107 mm/Hr (08-23-23 @ 07:05)         C-Reactive Protein, Serum: 96 mg/L (08-25-23 @ 07:45)  C-Reactive Protein, Serum: 102 mg/L (08-23-23 @ 07:05)   WBC Count: 8.06 K/uL (08-26-23 @ 07:48)      PHYSICAL EXAM  LE Focused:    Vasc:  DP and PT pulses palpable 2/4. CFT<3 seconds. No edema noted in legs or feet   Derm: Wounds noted b/l lower legs to level of subcutaneous tissue/muscle tendon layer of legs. No PTB in any of wounds but tendon exposure noted. Wounds are fibrogranular in nature with no drainage noted. No fluctuance crepitus or streaking noted.   Neuro: Gross sensation intact.   MSK: POP to all wounds.       < from: MR Lower Ext Joint w/wo IV Cont, Right (08.24.23 @ 17:24) >  INTERPRETATION:  MRI OF THE RIGHT LOWER EXTREMITY    CLINICAL INFORMATION: Diabetic ulcers in the right lower extremity.   Cellulitis. Evaluate for osteomyelitis.  TECHNIQUE: Multiplanar, multisequence MRI was obtained of the RIGHT LOWER   EXTREMITY focusing on the right tibia/fibula. The study was performed   before and after the intravenous administration of 12 ml Gadavist (3 cc   discarded) .  COMPARISON: Bilateral lower extremity radiographs 8/21/2023.    FINDINGS:    SOFT TISSUES: Soft tissue ulceration along the anteromedial aspect of the   leg. Ulceration extends to the level of the subcutaneous fat. There is  enhancing edema in the adjacent subcutaneous fat. No focal drainable   fluid collections are identified.  BONE MARROW: No increased STIR signal or loss of T1 hyperintense signal   to suggest acute osteomyelitis. No fracture or osteonecrosis.    MUSCLES AND TENDONS: No focal muscle edema. Diffuse fatty infiltration of   the imaged muscles. Tendons are intact.  SYNOVIUM/ JOINT FLUID: No large joint effusion.  MISCELLANEOUS: Neurovascular structures are normal in course and caliber.      IMPRESSION:  1.  Soft tissue ulceration with adjacent cellulitis.  2.  No abscess or acute osteomyelitis.    < end of copied text >  < from: [facility] Physiol Extremity Lower 3+ Level, BI (08.22.23 @ 16:34) >  INTERPRETATION:  Clinical indication: Peripheral arterial disease    COMPARISON: None    FINDINGS: There are normal pulse volume recordings bilaterally. There is   no abnormal segmental pressure gradient.    Right BRIDGETTE = 1.01  Left BRIDGETTE = 1.00    IMPRESSION: Normal ABIs.    --- End of Report ---    < end of copied text >  < from: Xray Tibia + Fibula 2 Views, Bilateral (08.21.23 @ 14:58) >  INTERPRETATION:  Clinical history: 58-year-old male, leg wounds.    Four views of the right leg without comparison.    FINDINGS: Mild degenerative change with no fracture, dislocation or gross   cortical destruction.    Soft tissue defect at the medial leg measuring 11 x 1.5 cm.    IMPRESSION:  Large soft tissue defect at the medial leg distally, if there is   continued clinical concern follow-up MRI can be ordered    --- End of Report ---      < end of copied text >      IMAGING:   Xray- recommend MRI if concern for OM  MRI- No OM noted     CULTURES: Klebsiella, Proteus, Raoultella    < from: [facility] Physiol Extremity Lower 3+ Level, BI (08.22.23 @ 16:34) >  Right BRIDGETTE = 1.01  Left BRIDGETTE = 1.00    < end of copied text >   Interval: Patient was seen resting in bed. Patient states he is feeling better than on admission, and feels that his wounds are getting better. No acute overnight events. Denied any pedal complaints.     Patient is a 58y old  Male who presents with a chief complaint of weakness (21 Aug 2023 02:06)    HPI:  57 yo M with PMH of DM, HLD, HTN and diabetic leg ulcers present with chills, weakness, lightheadedness and infected RT lower extremity ulcer for the past 3 days which has progressively worsened today. As per pt,  he went to [facility] in [place] 3 days  ago for his weakness and infected leg ulcer. He mentioned that he received one blood transfusion, antibiotics and was admitted to the hospital. He reports staying in the hospital for a day before leaving the hospital for some personal issue. He reports decreased oral intake today, felt extremely weak and lightheaded. He reports history of ulcer for the past 5 years that has worsened in the past 3 years.  He follows a "dermatologist" at [facility] and he is unable to provide information of his diagnosis. He denies; fever, bodyaches, Chest pain, SOB, URI sxs, N/V/D, abdominal pain, LOC, Head trauma    In the ED   Vitals -> BP: 88/55, HR:102, T:97.7, O2 sat: 94% RA  Labs :  WbC 17k, Hb:7.9, Cr:1.76  EKG : NSR  s/p Vancomycin, Zosyn IVPB  s/p 2L NS ->101/68 (21 Aug 2023 02:06)    Podiatry HPI: Patient stated that he has had leg wounds for 5 years. He seems a dermatologist at [facility] who gives him steroid shots for his wounds and they have worsened. Over the last month his pain has grown and his has become dizzy and weak. Patient states he needs to have santyl on his wounds for every dressing change so that the shooting pain he has subsides. Patient denies any other pedal complaints.    ID#: [id number]    Medications acetaminophen     Tablet .. 650 milliGRAM(s) Oral every 6 hours PRN  ciprofloxacin     Tablet 750 milliGRAM(s) Oral two times a day  dicloxacillin 500 milliGRAM(s) Oral four times a day  gabapentin 300 milliGRAM(s) Oral at bedtime  insulin lispro (ADMELOG) corrective regimen sliding scale   SubCutaneous three times a day before meals  insulin lispro (ADMELOG) corrective regimen sliding scale   SubCutaneous at bedtime  iron sucrose IVPB 200 milliGRAM(s) IV Intermittent every 24 hours  melatonin 3 milliGRAM(s) Oral at bedtime PRN  sodium chloride 0.9%. 1000 milliLiter(s) IV Continuous <Continuous>    FHNo pertinent family history in first degree relatives    ,   PMHHTN (hypertension)    Dyslipidemia    DM (diabetes mellitus)    Leg ulcer       PSHNo significant past surgical history        Labs                          8.2    8.06  )-----------( 427      ( 26 Aug 2023 07:48 )             27.5      08-26    137  |  105  |  14  ----------------------------<  122<H>  3.9   |  24  |  0.82    Ca    8.7      26 Aug 2023 07:48  Phos  2.8     08-26  Mg     2.3     08-26    TPro  7.4  /  Alb  2.4<L>  /  TBili  0.2  /  DBili  x   /  AST  10  /  ALT  18  /  AlkPhos  87  08-26     Vital Signs Last 24 Hrs  T(C): 37.5 (26 Aug 2023 07:10), Max: 37.5 (26 Aug 2023 07:10)  T(F): 99.5 (26 Aug 2023 07:10), Max: 99.5 (26 Aug 2023 07:10)  HR: 84 (26 Aug 2023 07:10) (60 - 90)  BP: 137/88 (26 Aug 2023 07:10) (110/74 - 137/88)  BP(mean): --  RR: 18 (26 Aug 2023 07:10) (18 - 18)  SpO2: 95% (26 Aug 2023 07:10) (95% - 98%)    Parameters below as of 26 Aug 2023 07:10  Patient On (Oxygen Delivery Method): room air      Sedimentation Rate, Erythrocyte: 83 mm/Hr (08-25-23 @ 07:45)  Sedimentation Rate, Erythrocyte: 107 mm/Hr (08-23-23 @ 07:05)         C-Reactive Protein, Serum: 96 mg/L (08-25-23 @ 07:45)  C-Reactive Protein, Serum: 102 mg/L (08-23-23 @ 07:05)   WBC Count: 8.06 K/uL (08-26-23 @ 07:48)      PHYSICAL EXAM  LE Focused:    Vasc:  DP and PT pulses palpable 2/4. CFT<3 seconds. No edema noted in legs or feet   Derm: Wounds noted b/l lower legs to level of subcutaneous tissue/muscle tendon layer of legs. No PTB in any of wounds but tendon exposure noted. Wounds are fibrogranular in nature with no drainage noted. No fluctuance crepitus or streaking noted.   Neuro: Gross sensation intact.   MSK: POP to all wounds.       < from: MR Lower Ext Joint w/wo IV Cont, Right (08.24.23 @ 17:24) >  INTERPRETATION:  MRI OF THE RIGHT LOWER EXTREMITY    CLINICAL INFORMATION: Diabetic ulcers in the right lower extremity.   Cellulitis. Evaluate for osteomyelitis.  TECHNIQUE: Multiplanar, multisequence MRI was obtained of the RIGHT LOWER   EXTREMITY focusing on the right tibia/fibula. The study was performed   before and after the intravenous administration of 12 ml Gadavist (3 cc   discarded) .  COMPARISON: Bilateral lower extremity radiographs 8/21/2023.    FINDINGS:    SOFT TISSUES: Soft tissue ulceration along the anteromedial aspect of the   leg. Ulceration extends to the level of the subcutaneous fat. There is  enhancing edema in the adjacent subcutaneous fat. No focal drainable   fluid collections are identified.  BONE MARROW: No increased STIR signal or loss of T1 hyperintense signal   to suggest acute osteomyelitis. No fracture or osteonecrosis.    MUSCLES AND TENDONS: No focal muscle edema. Diffuse fatty infiltration of   the imaged muscles. Tendons are intact.  SYNOVIUM/ JOINT FLUID: No large joint effusion.  MISCELLANEOUS: Neurovascular structures are normal in course and caliber.      IMPRESSION:  1.  Soft tissue ulceration with adjacent cellulitis.  2.  No abscess or acute osteomyelitis.    < end of copied text >  < from: [facility] Physiol Extremity Lower 3+ Level, BI (08.22.23 @ 16:34) >  INTERPRETATION:  Clinical indication: Peripheral arterial disease    COMPARISON: None    FINDINGS: There are normal pulse volume recordings bilaterally. There is   no abnormal segmental pressure gradient.    Right BRIDGETTE = 1.01  Left BRIDGETTE = 1.00    IMPRESSION: Normal ABIs.    --- End of Report ---    < end of copied text >  < from: Xray Tibia + Fibula 2 Views, Bilateral (08.21.23 @ 14:58) >  INTERPRETATION:  Clinical history: 58-year-old male, leg wounds.    Four views of the right leg without comparison.    FINDINGS: Mild degenerative change with no fracture, dislocation or gross   cortical destruction.    Soft tissue defect at the medial leg measuring 11 x 1.5 cm.    IMPRESSION:  Large soft tissue defect at the medial leg distally, if there is   continued clinical concern follow-up MRI can be ordered    --- End of Report ---      < end of copied text >      IMAGING:   Xray- recommend MRI if concern for OM  MRI- No OM noted     CULTURES: Klebsiella, Proteus, Raoultella    < from: [facility] Physiol Extremity Lower 3+ Level, BI (08.22.23 @ 16:34) >  Right BRIDGETTE = 1.01  Left BRIDGETTE = 1.00    < end of copied text >   Interval: Patient was seen resting in bed. Patient states he is feeling better than on admission, and feels that his wounds are getting better. No acute overnight events. Denied any pedal complaints.     Patient is a 58y old  Male who presents with a chief complaint of weakness (21 Aug 2023 02:06)    HPI:  57 yo M with PMH of DM, HLD, HTN and diabetic leg ulcers present with chills, weakness, lightheadedness and infected RT lower extremity ulcer for the past 3 days which has progressively worsened today. As per pt,  he went to [facility] in [place] 3 days  ago for his weakness and infected leg ulcer. He mentioned that he received one blood transfusion, antibiotics and was admitted to the hospital. He reports staying in the hospital for a day before leaving the hospital for some personal issue. He reports decreased oral intake today, felt extremely weak and lightheaded. He reports history of ulcer for the past 5 years that has worsened in the past 3 years.  He follows a "dermatologist" at [facility] and he is unable to provide information of his diagnosis. He denies; fever, bodyaches, Chest pain, SOB, URI sxs, N/V/D, abdominal pain, LOC, Head trauma    In the ED   Vitals -> BP: 88/55, HR:102, T:97.7, O2 sat: 94% RA  Labs :  WbC 17k, Hb:7.9, Cr:1.76  EKG : NSR  s/p Vancomycin, Zosyn IVPB  s/p 2L NS ->101/68 (21 Aug 2023 02:06)    Podiatry HPI: Patient stated that he has had leg wounds for 5 years. He seems a dermatologist at [facility] who gives him steroid shots for his wounds and they have worsened. Over the last month his pain has grown and his has become dizzy and weak. Patient states he needs to have santyl on his wounds for every dressing change so that the shooting pain he has subsides. Patient denies any other pedal complaints.    ID#: [id number]    Medications acetaminophen     Tablet .. 650 milliGRAM(s) Oral every 6 hours PRN  ciprofloxacin     Tablet 750 milliGRAM(s) Oral two times a day  dicloxacillin 500 milliGRAM(s) Oral four times a day  gabapentin 300 milliGRAM(s) Oral at bedtime  insulin lispro (ADMELOG) corrective regimen sliding scale   SubCutaneous three times a day before meals  insulin lispro (ADMELOG) corrective regimen sliding scale   SubCutaneous at bedtime  iron sucrose IVPB 200 milliGRAM(s) IV Intermittent every 24 hours  melatonin 3 milliGRAM(s) Oral at bedtime PRN  sodium chloride 0.9%. 1000 milliLiter(s) IV Continuous <Continuous>    FHNo pertinent family history in first degree relatives    ,   PMHHTN (hypertension)    Dyslipidemia    DM (diabetes mellitus)    Leg ulcer       PSHNo significant past surgical history        Labs                          8.2    8.06  )-----------( 427      ( 26 Aug 2023 07:48 )             27.5      08-26    137  |  105  |  14  ----------------------------<  122<H>  3.9   |  24  |  0.82    Ca    8.7      26 Aug 2023 07:48  Phos  2.8     08-26  Mg     2.3     08-26    TPro  7.4  /  Alb  2.4<L>  /  TBili  0.2  /  DBili  x   /  AST  10  /  ALT  18  /  AlkPhos  87  08-26     Vital Signs Last 24 Hrs  T(C): 37.5 (26 Aug 2023 07:10), Max: 37.5 (26 Aug 2023 07:10)  T(F): 99.5 (26 Aug 2023 07:10), Max: 99.5 (26 Aug 2023 07:10)  HR: 84 (26 Aug 2023 07:10) (60 - 90)  BP: 137/88 (26 Aug 2023 07:10) (110/74 - 137/88)  BP(mean): --  RR: 18 (26 Aug 2023 07:10) (18 - 18)  SpO2: 95% (26 Aug 2023 07:10) (95% - 98%)    Parameters below as of 26 Aug 2023 07:10  Patient On (Oxygen Delivery Method): room air      Sedimentation Rate, Erythrocyte: 83 mm/Hr (08-25-23 @ 07:45)  Sedimentation Rate, Erythrocyte: 107 mm/Hr (08-23-23 @ 07:05)         C-Reactive Protein, Serum: 96 mg/L (08-25-23 @ 07:45)  C-Reactive Protein, Serum: 102 mg/L (08-23-23 @ 07:05)   WBC Count: 8.06 K/uL (08-26-23 @ 07:48)      PHYSICAL EXAM  LE Focused:    Vasc:  DP and PT pulses palpable 2/4. CFT<3 seconds. No edema noted in legs or feet   Derm: Wounds noted b/l lower legs to level of subcutaneous tissue/muscle tendon layer of legs. No PTB in any of wounds but tendon exposure noted. Wounds are fibrogranular in nature with no drainage noted. No fluctuance crepitus or streaking noted.   Neuro: Gross sensation intact.   MSK: POP to all wounds.       < from: MR Lower Ext Joint w/wo IV Cont, Right (08.24.23 @ 17:24) >  INTERPRETATION:  MRI OF THE RIGHT LOWER EXTREMITY    CLINICAL INFORMATION: Diabetic ulcers in the right lower extremity.   Cellulitis. Evaluate for osteomyelitis.  TECHNIQUE: Multiplanar, multisequence MRI was obtained of the RIGHT LOWER   EXTREMITY focusing on the right tibia/fibula. The study was performed   before and after the intravenous administration of 12 ml Gadavist (3 cc   discarded) .  COMPARISON: Bilateral lower extremity radiographs 8/21/2023.    FINDINGS:    SOFT TISSUES: Soft tissue ulceration along the anteromedial aspect of the   leg. Ulceration extends to the level of the subcutaneous fat. There is  enhancing edema in the adjacent subcutaneous fat. No focal drainable   fluid collections are identified.  BONE MARROW: No increased STIR signal or loss of T1 hyperintense signal   to suggest acute osteomyelitis. No fracture or osteonecrosis.    MUSCLES AND TENDONS: No focal muscle edema. Diffuse fatty infiltration of   the imaged muscles. Tendons are intact.  SYNOVIUM/ JOINT FLUID: No large joint effusion.  MISCELLANEOUS: Neurovascular structures are normal in course and caliber.      IMPRESSION:  1.  Soft tissue ulceration with adjacent cellulitis.  2.  No abscess or acute osteomyelitis.    < end of copied text >  < from: [facility] Physiol Extremity Lower 3+ Level, BI (08.22.23 @ 16:34) >  INTERPRETATION:  Clinical indication: Peripheral arterial disease    COMPARISON: None    FINDINGS: There are normal pulse volume recordings bilaterally. There is   no abnormal segmental pressure gradient.    Right BRIDGETTE = 1.01  Left BRIDGETTE = 1.00    IMPRESSION: Normal ABIs.    --- End of Report ---    < end of copied text >  < from: Xray Tibia + Fibula 2 Views, Bilateral (08.21.23 @ 14:58) >  INTERPRETATION:  Clinical history: 58-year-old male, leg wounds.    Four views of the right leg without comparison.    FINDINGS: Mild degenerative change with no fracture, dislocation or gross   cortical destruction.    Soft tissue defect at the medial leg measuring 11 x 1.5 cm.    IMPRESSION:  Large soft tissue defect at the medial leg distally, if there is   continued clinical concern follow-up MRI can be ordered    --- End of Report ---      < end of copied text >      IMAGING:   Xray- recommend MRI if concern for OM  MRI- No OM noted     CULTURES: Klebsiella, Proteus, Raoultella    < from: [facility] Physiol Extremity Lower 3+ Level, BI (08.22.23 @ 16:34) >  Right BRIDGETTE = 1.01  Left BRIDGETTE = 1.00    < end of copied text >

## 2023-08-26 NOTE — PROGRESS NOTE ADULT - ASSESSMENT
A/P: 58 year old man with hx of DM, HTN, HLD with chronic B/L LE ulcers with chronic wounds managed in [place] presenting with sepsis likely related to his wound infections and concern for OM      D/D:   Superficial thrombophlebitis Right thigh concern for Septic phlebitis  Sepsis with SSTI of RLE resolved Sepsis  Acute on chronic microcytic anemia suspicious for iron def anemia related to GI bleeding  B/L lower extremity chronic ulcer infection with cellulitis and suspected osteomyelitis (ruled out)  ARGELIA likely from underlying Sepsis resolved  Suspected Pyoderma gangrenosum (less likely) vs venous insufficiency   Type 2 DM fair control   HTN     Plan:  ID f/u appreciated; d/w Dr. Carter; Recommend switch antibiotics to oral Ciprofloxacin and Dicloxacillin but extend Rx for 3 more weeks given underlying thrombophlebitis  Patient given a dose of Lovenox this morning; US Doppler confirm SVT; will anticoagulate for one month; Monitor closley H/H  Vascular Sx consult appreciated; No intervention indicated  Anemia panel with Iron deficiency; Hematology consult appreciated; agree with Venofer x 3 days; d/w ID Dr. Carter no CI for Iron infusion  Hematology also agrees with anticoagulation for a month if tolerated well given relative immobiltiy given B/L leg ulcers  GI consult Patient will likely need a Colonoscopy; last Colonoscopy >1 yr ago with multiple polyps removed; Pt has appt outpt with GI next month  A1c acceptable; Add Gabapentin 300 mg HS for Neuropathic pain  Hold BP meds for hypotension or  low BP  d/w Housestaff to confirm outpt med with [facility] pharmacy: Patient reported taking Lantus, Janumet 2 tabs HS (50/1000), Jardiance 10 gm daily, Statin        Podiatry f/u appreciated; Wound care as per Podiatry    Discussed with Patient with PGY1 translating and updated Rx plan; will arrange outpt follow up with PCP or alternatively with Student clinic with Dr. Ravin Maloney if pt unable to f/u with PCP at [place]. D/C Plan once we can arrange ViVo meds as patient has only Emergency Medicaid needs Apixaban and Antibiotics.  A/P: 58 year old man with hx of DM, HTN, HLD with chronic B/L LE ulcers with chronic wounds managed in [place] presenting with sepsis likely related to his wound infections and concern for OM      D/D:   Superficial thrombophlebitis Right thigh concern for Septic phlebitis  Sepsis with SSTI of RLE resolved Sepsis  Acute on chronic microcytic anemia suspicious for iron def anemia related to GI bleeding  B/L lower extremity chronic ulcer infection with cellulitis and suspected osteomyelitis (ruled out)  ARGELIA likely from underlying Sepsis resolved  Suspected Pyoderma gangrenosum (less likely) vs venous insufficiency   Type 2 DM fair control   HTN     Plan:  ID f/u appreciated; d/w Dr. Carter; Recommend switch antibiotics to oral Ciprofloxacin and Dicloxacillin but extend Rx for 3 more weeks given underlying thrombophlebitis  Patient given a dose of Lovenox this morning; US Doppler confirm SVT; will anticoagulate for one month; Monitor closley H/H  Vascular Sx consult appreciated; No intervention indicated  Anemia panel with Iron deficiency; Hematology consult appreciated; agree with Venofer x 3 days; d/w ID Dr. Carter no CI for Iron infusion  Hematology also agrees with anticoagulation for a month if tolerated well given relative immobiltiy given B/L leg ulcers  GI consult Patient will likely need a Colonoscopy; last Colonoscopy >1 yr ago with multiple polyps removed; Pt has appt outpt with GI next month  A1c acceptable; Add Gabapentin 300 mg HS for Neuropathic pain  Hold BP meds for hypotension or  low BP  d/w Housestaff to confirm outpt med with [facility] pharmacy: Patient reported taking Lantus, Janumet 2 tabs HS (50/1000), Jardiance 10 gm daily, Statin        Podiatry f/u appreciated; Wound care as per Podiatry    Discussed with Patient with PGY1 translating and updated Rx plan; will arrange outpt follow up with PCP or alternatively with Student clinic with Dr. Ravin Maloney if pt unable to f/u with PCP at [place]. D/C Plan once we can arrange ViVo meds as patient has only Emergency Medicaid needs Apixaban and Antibiotics.  A/P: 58 year old man with hx of DM, HTN, HLD with chronic B/L LE ulcers with chronic wounds managed in [place] presenting with sepsis likely related to his wound infections and concern for OM      D/D:   Superficial thrombophlebitis Right thigh concern for Septic phlebitis  Sepsis with SSTI of RLE resolved Sepsis  Acute on chronic microcytic anemia suspicious for iron def anemia related to GI bleeding  B/L lower extremity chronic ulcer infection with cellulitis and suspected osteomyelitis (ruled out)  ARGELIA likely from underlying Sepsis resolved  Suspected Pyoderma gangrenosum (less likely) vs venous insufficiency   Type 2 DM fair control   HTN     Plan:  ID f/u appreciated; d/w Dr. Carter; Recommend switch antibiotics to oral Ciprofloxacin and Dicloxacillin but extend Rx for 3 more weeks given underlying thrombophlebitis  Patient given a dose of Lovenox this morning; US Doppler confirm SVT; will anticoagulate for one month; Monitor closley H/H  Vascular Sx consult appreciated; No intervention indicated  Anemia panel with Iron deficiency; Hematology consult appreciated; agree with Venofer x 3 days; d/w ID Dr. Carter no CI for Iron infusion  Hematology also agrees with anticoagulation for a month if tolerated well given relative immobiltiy given B/L leg ulcers  GI consult Patient will likely need a Colonoscopy; last Colonoscopy >1 yr ago with multiple polyps removed; Pt has appt outpt with GI next month  A1c acceptable; Add Gabapentin 300 mg HS for Neuropathic pain  Hold BP meds for hypotension or  low BP  d/w Housestaff to confirm outpt med with [facility] pharmacy: Patient reported taking Lantus, Janumet 2 tabs HS (50/1000), Jardiance 10 gm daily, Statin        Podiatry f/u appreciated; Wound care as per Podiatry    Discussed with Patient with PGY1 translating and updated Rx plan; will arrange outpt follow up with PCP or alternatively with Student clinic with Dr. Ravin Maloney if pt unable to f/u with PCP at [place]. D/C Plan once we can arrange ViVo meds as patient has only Emergency Medicaid needs Apixaban and Antibiotics.  A/P: 58 year old man with hx of DM, HTN, HLD with chronic B/L LE ulcers with chronic wounds managed in [place] presenting with sepsis likely related to his wound infections and concern for OM    D/D:   Superficial thrombophlebitis Right thigh concern for Septic phlebitis  Sepsis with SSTI of RLE resolved Sepsis  Acute on chronic microcytic anemia suspicious for iron def anemia related to GI bleeding  B/L lower extremity chronic ulcer infection with cellulitis and suspected osteomyelitis (ruled out)  ARGELIA likely from underlying Sepsis resolved  Suspected Pyoderma gangrenosum (less likely) vs venous insufficiency   Type 2 DM fair control   HTN     Plan:  Continue oral antibiotics Ciprofloxacin and Dicloxacillin but extend Rx for 3 more weeks given underlying thrombophlebitis as per ID   US Doppler confirm SVT; will anticoagulate for one month; Monitor closley H/H  Hematology recommended a/c for a month; Will give Apixaban 5mg BID  Vascular Sx consult appreciated; No intervention indicated  Anemia panel with Iron deficiency; Hematology consult appreciated; agree with Venofer x 3 days; d/w ID Dr. Carter no CI for Iron infusion  Hematology also agrees with anticoagulation for a month if tolerated well given relative immobiltiy given B/L leg ulcers  GI consult Patient will likely need a Colonoscopy; last Colonoscopy >1 yr ago with multiple polyps removed; Pt has appt outpt with GI next month  A1c acceptable; Add Gabapentin 300 mg HS for Neuropathic pain  Hold BP meds for hypotension or  low BP  d/w Housestaff to confirm outpt med with [facility] pharmacy: Patient reported taking Lantus, Janumet 2 tabs HS (50/1000), Jardiance 10 gm daily, Statin        Podiatry f/u appreciated; Wound care as per Podiatry    Discussed with Patient with PGY1 translating and updated Rx plan; will arrange outpt follow up with PCP or alternatively with Student clinic with Dr. Ravin Maloney if pt unable to f/u with PCP at [place]. D/C Plan once we can arrange ViVo meds as patient has only Emergency Medicaid needs Apixaban and Antibiotics.  A/P: 58 year old man with hx of DM, HTN, HLD with chronic B/L LE ulcers with chronic wounds managed in [place] presenting with sepsis likely related to his wound infections and concern for OM    D/D:   Superficial thrombophlebitis Right thigh concern for Septic phlebitis  Sepsis with SSTI of RLE resolved Sepsis  Acute on chronic microcytic anemia suspicious for iron def anemia related to GI bleeding  B/L lower extremity chronic ulcer infection with cellulitis and suspected osteomyelitis (ruled out)  ARGELIA likely from underlying Sepsis resolved  Suspected Pyoderma gangrenosum (less likely) vs venous insufficiency   Type 2 DM fair control   HTN     Plan:  Continue oral antibiotics Ciprofloxacin and Dicloxacillin but extend Rx for 3 more weeks given underlying thrombophlebitis as per ID   US Doppler confirm SVT; will anticoagulate for one month; Monitor closley H/H  Hematology recommended a/c for a month; Will give Apixaban 5mg BID  Vascular Sx consult appreciated; No intervention indicated  Anemia panel with Iron deficiency; Hematology consult appreciated; agree with Venofer x 3 days; d/w ID Dr. Carter no CI for Iron infusion  Hematology also agrees with anticoagulation for a month if tolerated well given relative immobiltiy given B/L leg ulcers  GI consult Patient will likely need a Colonoscopy; last Colonoscopy >1 yr ago with multiple polyps removed; Pt has appt outpt with GI next month  A1c acceptable; Add Gabapentin 300 mg HS for Neuropathic pain  Hold BP meds for hypotension or  low BP  d/w Housestaff to confirm outpt med with [facility] pharmacy: Patient reported taking Lantus, Janumet 2 tabs HS (50/1000), Jardiance 10 gm daily, Statin        Podiatry f/u appreciated; Wound care as per Podiatry    Discussed with Patient with PGY1 translating and updated Rx plan; will arrange outpt follow up with PCP or alternatively with Student clinic with Dr. Ravin Maloney if pt unable to f/u with PCP at [place]. D/C Plan once we can arrange ViVo meds as patient has only Emergency Medicaid needs Apixaban and Antibiotics.  A/P: 58 year old man with hx of DM, HTN, HLD with chronic B/L LE ulcers with chronic wounds managed in [place] presenting with sepsis likely related to his wound infections and concern for OM    D/D:   Superficial thrombophlebitis Right thigh concern for Septic phlebitis  Sepsis with SSTI of RLE resolved Sepsis  Acute on chronic microcytic anemia suspicious for iron def anemia related to GI bleeding  B/L lower extremity chronic ulcer infection with cellulitis and suspected osteomyelitis (ruled out)  ARGELIA likely from underlying Sepsis resolved  Suspected Pyoderma gangrenosum (less likely) vs venous insufficiency   Type 2 DM fair control   HTN     Plan:  Continue oral antibiotics Ciprofloxacin and Dicloxacillin but extend Rx for 3 more weeks given underlying thrombophlebitis as per ID   US Doppler confirm SVT; will anticoagulate for one month; Monitor closley H/H  Hematology recommended a/c for a month; Will give Apixaban 5mg BID  Vascular Sx consult appreciated; No intervention indicated  Anemia panel with Iron deficiency; Hematology consult appreciated; agree with Venofer x 3 days; d/w ID Dr. Carter no CI for Iron infusion  Hematology also agrees with anticoagulation for a month if tolerated well given relative immobiltiy given B/L leg ulcers  GI consult Patient will likely need a Colonoscopy; last Colonoscopy >1 yr ago with multiple polyps removed; Pt has appt outpt with GI next month  A1c acceptable; Add Gabapentin 300 mg HS for Neuropathic pain  Hold BP meds for hypotension or  low BP  d/w Housestaff to confirm outpt med with [facility] pharmacy: Patient reported taking Lantus, Janumet 2 tabs HS (50/1000), Jardiance 10 gm daily, Statin        Podiatry f/u appreciated; Wound care as per Podiatry    Discussed with Patient with PGY1 translating and updated Rx plan; will arrange outpt follow up with PCP or alternatively with Student clinic with Dr. Ravin Maloney if pt unable to f/u with PCP at [place]. D/C Plan once we can arrange ViVo meds as patient has only Emergency Medicaid needs Apixaban and Antibiotics.  A/P: 58 year old man with hx of DM, HTN, HLD with chronic B/L LE ulcers with chronic wounds managed in [place] presenting with sepsis likely related to his wound infections and concern for OM    D/D:   Superficial thrombophlebitis Right thigh concern for Septic phlebitis  Sepsis with SSTI of RLE resolved Sepsis  Acute on chronic microcytic anemia suspicious for iron def anemia related to GI bleeding  B/L lower extremity chronic ulcer infection with cellulitis and suspected osteomyelitis (ruled out)  ARGELIA likely from underlying Sepsis resolved  Suspected Pyoderma gangrenosum (less likely) vs venous insufficiency   Type 2 DM fair control   HTN     Plan:  Continue oral antibiotics Ciprofloxacin and Dicloxacillin but extend Rx for 3 more weeks given underlying thrombophlebitis as per ID   US Doppler confirm SVT; will anticoagulate for one month; Monitor closley H/H  Hematology recommended a/c for a month; Will give Apixaban 5mg BID  Vascular Sx consult appreciated; No intervention indicated  Anemia panel with Iron deficiency; Hematology consult appreciated; agree with Venofer x 3 days; d/w ID Dr. Carter no CI for Iron infusion  Hematology also agrees with anticoagulation for a month if tolerated well given relative immobility given B/L leg ulcers  GI consult Patient will likely need a Colonoscopy; last Colonoscopy >1 yr ago with multiple polyps removed; Pt has appt outpt with GI next month  A1c acceptable; Add Gabapentin 300 mg HS for Neuropathic pain  Hold BP meds for hypotension or  low BP  d/w Housestaff to confirm outpt med with [facility] pharmacy: Patient reported taking Lantus, Janumet 2 tabs HS (50/1000), Jardiance 10 gm daily, Statin  Podiatry f/u appreciated; Wound care as per Podiatry    Discussed with Patient  will arrange outpt follow up with PCP or alternatively with Student clinic with Dr. Ravin Maloney if pt unable to f/u with PCP at [place]. D/C Plan once we can arrange ViVo meds as patient has only Emergency Medicaid needs Apixaban and Antibiotics.  A/P: 58 year old man with hx of DM, HTN, HLD with chronic B/L LE ulcers with chronic wounds managed in [place] presenting with sepsis likely related to his wound infections and concern for OM    D/D:   Superficial thrombophlebitis Right thigh concern for Septic phlebitis  Sepsis with SSTI of RLE resolved Sepsis  Acute on chronic microcytic anemia suspicious for iron def anemia related to GI bleeding  B/L lower extremity chronic ulcer infection with cellulitis and suspected osteomyelitis (ruled out)  ARGELIA likely from underlying Sepsis resolved  Suspected Pyoderma gangrenosum (less likely) vs venous insufficiency   Type 2 DM fair control   HTN     Plan:  Continue oral antibiotics Ciprofloxacin and Dicloxacillin but extend Rx for 3 more weeks given underlying thrombophlebitis as per ID   US Doppler confirm SVT; will anticoagulate for one month; Monitor closley H/H  Hematology recommended a/c for a month; Will give Apixaban 5mg BID  Vascular Sx consult appreciated; No intervention indicated  Anemia panel with Iron deficiency; Hematology consult appreciated; agree with Venofer x 3 days; d/w ID Dr. Carter no CI for Iron infusion  Hematology also agrees with anticoagulation for a month if tolerated well given relative immobility given B/L leg ulcers  GI consult Patient will likely need a Colonoscopy; last Colonoscopy >1 yr ago with multiple polyps removed; Pt has appt outpt with GI next month  A1c acceptable; Add Gabapentin 300 mg HS for Neuropathic pain  Hold BP meds for hypotension or  low BP  d/w Housestaff to confirm outpt med with [facility] pharmacy: Patient reported taking Lantus, Janumet 2 tabs HS (50/1000), Jardiance 10 gm daily, Statin  Podiatry f/u appreciated; Wound care as per Podiatry    Discussed with Patient  will arrange outpt follow up with PCP or alternatively with Student clinic with Dr. Ravin Maloney if pt unable to f/u with PCP at [place]. D/C Plan once we can arrange ViVo meds as patient has only Emergency Medicaid needs Apixaban and Antibiotics.  A/P: 58 year old man with hx of DM, HTN, HLD with chronic B/L LE ulcers with chronic wounds managed in [place] presenting with sepsis likely related to his wound infections and concern for OM    D/D:   Superficial thrombophlebitis Right thigh concern for Septic phlebitis  Sepsis with SSTI of RLE resolved Sepsis  Acute on chronic microcytic anemia suspicious for iron def anemia related to GI bleeding  B/L lower extremity chronic ulcer infection with cellulitis and suspected osteomyelitis (ruled out)  ARGELIA likely from underlying Sepsis resolved  Suspected Pyoderma gangrenosum (less likely) vs venous insufficiency   Type 2 DM fair control   HTN     Plan:  Continue oral antibiotics Ciprofloxacin and Dicloxacillin but extend Rx for 3 more weeks given underlying thrombophlebitis as per ID   US Doppler confirm SVT; will anticoagulate for one month; Monitor closley H/H  Hematology recommended a/c for a month; Will give Apixaban 5mg BID  Vascular Sx consult appreciated; No intervention indicated  Anemia panel with Iron deficiency; Hematology consult appreciated; agree with Venofer x 3 days; d/w ID Dr. Carter no CI for Iron infusion  Hematology also agrees with anticoagulation for a month if tolerated well given relative immobility given B/L leg ulcers  GI consult Patient will likely need a Colonoscopy; last Colonoscopy >1 yr ago with multiple polyps removed; Pt has appt outpt with GI next month  A1c acceptable; Add Gabapentin 300 mg HS for Neuropathic pain  Hold BP meds for hypotension or  low BP  d/w Housestaff to confirm outpt med with [facility] pharmacy: Patient reported taking Lantus, Janumet 2 tabs HS (50/1000), Jardiance 10 gm daily, Statin  Podiatry f/u appreciated; Wound care as per Podiatry    Discussed with Patient  will arrange outpt follow up with PCP or alternatively with Student clinic with Dr. Ravin Maloney if pt unable to f/u with PCP at [place]. D/C Plan once we can arrange ViVo meds as patient has only Emergency Medicaid needs Apixaban and Antibiotics.

## 2023-08-26 NOTE — PROGRESS NOTE ADULT - SUBJECTIVE AND OBJECTIVE BOX
59 yo M with PMH of DM, HLD, HTN and diabetic leg ulcers present with chills, weakness, lightheadedness and infected RT lower extremity ulcer for the past 3 days which has progressively worsened today. As per pt,  he went to [facility] in [place] 3 days  prior to  admission to [facility]  for his weakness and infected leg ulcer. He mentioned that he received one blood transfusion, antibiotics and was admitted to the hospital. He reports staying in the hospital for a day before leaving the hospital for some personal issue. He reports decreased oral intake today, felt extremely weak and lightheaded. He reports history of ulcer for the past 5 years that has worsened in the past 3 years.  He follows a "dermatologist" at [facility] and he is unable to provide information of his diagnosis. He denies; fever, bodyaches, Chest pain, SOB, URI sxs, N/V/D, abdominal pain, LOC, Head trauma. patient had  h/o of colon polyps  found on colonoscopy ~ [id number], labs c/w iron deficiency anemia, pt is seen by vascular/podiatry, ID for management of R leg cellulitis, on IV abx , noted  noted on 8/25/23  to have R  thigh cord like structure  , Doppler c/w superficial femoral vein thrombophlebitis   we were called for further evaluation  of iron def anemia on superficial vein thrombophlebitis  Today  no new c/o       PE        Vital Signs Last 24 Hrs  T(C): 37.4 (26 Aug 2023 11:05), Max: 37.5 (26 Aug 2023 07:10)  T(F): 99.3 (26 Aug 2023 11:05), Max: 99.5 (26 Aug 2023 07:10)  HR: 88 (26 Aug 2023 11:05) (74 - 90)  BP: 130/80 (26 Aug 2023 11:05) (110/74 - 137/88)  BP(mean): --  RR: 18 (26 Aug 2023 11:05) (18 - 18)  SpO2: 96% (26 Aug 2023 11:05) (95% - 98%)    Parameters below as of 26 Aug 2023 11:05  Patient On (Oxygen Delivery Method): room air              GENERAL: NAD, obese, A 0x3  pleasant M   HEAD:  Atraumatic, Normocephalic  EYES:  conjunctiva and sclera clear, anicteric   NECK: Supple  LUNG: CTA   HEART: Regular rate and rhythm; No murmurs, rubs, or gallops  ABDOMEN: Soft, Nontender, Nondistended; Bowel sounds present, obese   NERVOUS SYSTEM:  Alert & Oriented X3,  no focal signs   EXTREMITIES: bilateral leg ulcers noted, , tender cord like structure in the right upper thigh  + tenderness  of R  leg   SKIN: warm dry      LABS                         8.2    8.06  )-----------( 427      ( 26 Aug 2023 07:48 )             27.5   08-26    137  |  105  |  14  ----------------------------<  122<H>  3.9   |  24  |  0.82    Ca    8.7      26 Aug 2023 07:48  Phos  2.8     08-26  Mg     2.3     08-26    TPro  7.4  /  Alb  2.4<L>  /  TBili  0.2  /  DBili  x   /  AST  10  /  ALT  18  /  AlkPhos  87  08-26          % Saturation, Iron: 5 %  Iron - Total Binding Capacity.: 331 ug/dL  Iron Total: 17 ug/dL  Unsaturated Iron Binding Capacity: 314 ug/dL  Ferritin: 60 ng/mL (08.22.23 @ 08:00)   < from: US Duplex Venous Lower Ext Ltd, Right (08.25.23 @ 12:00) >    ACC: [id number] EXAM:  US DPLX LWR EXT VEINS LTD RT   ORDERED BY: JIM VELASCO     PROCEDURE DATE:  08/25/2023          INTERPRETATION:  CLINICAL INFORMATION: Palpable cord in right leg.    COMPARISON: right lower extremity venous duplex study dated 8/22/2023.    TECHNIQUE: Duplex sonography of the RIGHT LOWER extremity veins with   color and spectral Doppler, with and without compression.    FINDINGS:    There is normal compressibility of the right common femoral, femoral and   popliteal veins.  The contralateral common femoral vein is patent.  Doppler examination shows normal spontaneous and phasic flow.    No calf vein thrombosis is detected.    The palpable finding corresponds to segmental thrombosis of branch of the   greater saphenous vein in the anterior thigh.    IMPRESSION:  No evidence of right lower extremity deep venous thrombosis.    Superficial thrombosis of branch of the greater saphenous vein in the   anterior right thigh.          --- End of Report ---            GRACIE CHANCE MD; Attending Radiologist  This document has been electronically signed. Aug 25 2023  1:41PM    < end of copied text >  < from: MR Lower Ext Joint w/wo IV Cont, Right (08.24.23 @ 17:24) >    ACC: [id number] EXAM:  MR LWR EXT JOINT WAW IC RT   ORDERED BY: JIM VELASCO     PROCEDURE DATE:  08/24/2023          INTERPRETATION:  MRI OF THE RIGHT LOWER EXTREMITY    CLINICAL INFORMATION: Diabetic ulcers in the right lower extremity.   Cellulitis. Evaluate for osteomyelitis.  TECHNIQUE: Multiplanar, multisequence MRI was obtained of the RIGHT LOWER   EXTREMITY focusing on the right tibia/fibula. The study was performed   before and after the intravenous administration of 12 ml Gadavist (3 cc   discarded) .  COMPARISON: Bilateral lower extremity radiographs 8/21/2023.    FINDINGS:    SOFT TISSUES: Soft tissue ulceration along the anteromedial aspect of the   leg. Ulceration extends to the level of the subcutaneous fat. There is  enhancing edema in the adjacent subcutaneous fat. No focal drainable   fluid collections are identified.  BONE MARROW: No increased STIR signal or loss of T1 hyperintense signal   to suggest acute osteomyelitis. No fracture or osteonecrosis.    MUSCLES AND TENDONS: No focal muscle edema. Diffuse fatty infiltration of   the imaged muscles. Tendons are intact.  SYNOVIUM/ JOINT FLUID: No large joint effusion.  MISCELLANEOUS: Neurovascular structures are normal in course and caliber.      IMPRESSION:  1.  Soft tissue ulceration with adjacent cellulitis.  2.  No abscess or acute osteomyelitis.    --- End of Report ---        < end of copied text >             57 yo M with PMH of DM, HLD, HTN and diabetic leg ulcers present with chills, weakness, lightheadedness and infected RT lower extremity ulcer for the past 3 days which has progressively worsened today. As per pt,  he went to [facility] in [place] 3 days  prior to  admission to [facility]  for his weakness and infected leg ulcer. He mentioned that he received one blood transfusion, antibiotics and was admitted to the hospital. He reports staying in the hospital for a day before leaving the hospital for some personal issue. He reports decreased oral intake today, felt extremely weak and lightheaded. He reports history of ulcer for the past 5 years that has worsened in the past 3 years.  He follows a "dermatologist" at [facility] and he is unable to provide information of his diagnosis. He denies; fever, bodyaches, Chest pain, SOB, URI sxs, N/V/D, abdominal pain, LOC, Head trauma. patient had  h/o of colon polyps  found on colonoscopy ~ [id number], labs c/w iron deficiency anemia, pt is seen by vascular/podiatry, ID for management of R leg cellulitis, on IV abx , noted  noted on 8/25/23  to have R  thigh cord like structure  , Doppler c/w superficial femoral vein thrombophlebitis   we were called for further evaluation  of iron def anemia on superficial vein thrombophlebitis  Today  no new c/o       PE        Vital Signs Last 24 Hrs  T(C): 37.4 (26 Aug 2023 11:05), Max: 37.5 (26 Aug 2023 07:10)  T(F): 99.3 (26 Aug 2023 11:05), Max: 99.5 (26 Aug 2023 07:10)  HR: 88 (26 Aug 2023 11:05) (74 - 90)  BP: 130/80 (26 Aug 2023 11:05) (110/74 - 137/88)  BP(mean): --  RR: 18 (26 Aug 2023 11:05) (18 - 18)  SpO2: 96% (26 Aug 2023 11:05) (95% - 98%)    Parameters below as of 26 Aug 2023 11:05  Patient On (Oxygen Delivery Method): room air              GENERAL: NAD, obese, A 0x3  pleasant M   HEAD:  Atraumatic, Normocephalic  EYES:  conjunctiva and sclera clear, anicteric   NECK: Supple  LUNG: CTA   HEART: Regular rate and rhythm; No murmurs, rubs, or gallops  ABDOMEN: Soft, Nontender, Nondistended; Bowel sounds present, obese   NERVOUS SYSTEM:  Alert & Oriented X3,  no focal signs   EXTREMITIES: bilateral leg ulcers noted, , tender cord like structure in the right upper thigh  + tenderness  of R  leg   SKIN: warm dry      LABS                         8.2    8.06  )-----------( 427      ( 26 Aug 2023 07:48 )             27.5   08-26    137  |  105  |  14  ----------------------------<  122<H>  3.9   |  24  |  0.82    Ca    8.7      26 Aug 2023 07:48  Phos  2.8     08-26  Mg     2.3     08-26    TPro  7.4  /  Alb  2.4<L>  /  TBili  0.2  /  DBili  x   /  AST  10  /  ALT  18  /  AlkPhos  87  08-26          % Saturation, Iron: 5 %  Iron - Total Binding Capacity.: 331 ug/dL  Iron Total: 17 ug/dL  Unsaturated Iron Binding Capacity: 314 ug/dL  Ferritin: 60 ng/mL (08.22.23 @ 08:00)   < from: US Duplex Venous Lower Ext Ltd, Right (08.25.23 @ 12:00) >    ACC: [id number] EXAM:  US DPLX LWR EXT VEINS LTD RT   ORDERED BY: JIM VELASCO     PROCEDURE DATE:  08/25/2023          INTERPRETATION:  CLINICAL INFORMATION: Palpable cord in right leg.    COMPARISON: right lower extremity venous duplex study dated 8/22/2023.    TECHNIQUE: Duplex sonography of the RIGHT LOWER extremity veins with   color and spectral Doppler, with and without compression.    FINDINGS:    There is normal compressibility of the right common femoral, femoral and   popliteal veins.  The contralateral common femoral vein is patent.  Doppler examination shows normal spontaneous and phasic flow.    No calf vein thrombosis is detected.    The palpable finding corresponds to segmental thrombosis of branch of the   greater saphenous vein in the anterior thigh.    IMPRESSION:  No evidence of right lower extremity deep venous thrombosis.    Superficial thrombosis of branch of the greater saphenous vein in the   anterior right thigh.          --- End of Report ---            GRACIE CHANCE MD; Attending Radiologist  This document has been electronically signed. Aug 25 2023  1:41PM    < end of copied text >  < from: MR Lower Ext Joint w/wo IV Cont, Right (08.24.23 @ 17:24) >    ACC: [id number] EXAM:  MR LWR EXT JOINT WAW IC RT   ORDERED BY: JIM VELASCO     PROCEDURE DATE:  08/24/2023          INTERPRETATION:  MRI OF THE RIGHT LOWER EXTREMITY    CLINICAL INFORMATION: Diabetic ulcers in the right lower extremity.   Cellulitis. Evaluate for osteomyelitis.  TECHNIQUE: Multiplanar, multisequence MRI was obtained of the RIGHT LOWER   EXTREMITY focusing on the right tibia/fibula. The study was performed   before and after the intravenous administration of 12 ml Gadavist (3 cc   discarded) .  COMPARISON: Bilateral lower extremity radiographs 8/21/2023.    FINDINGS:    SOFT TISSUES: Soft tissue ulceration along the anteromedial aspect of the   leg. Ulceration extends to the level of the subcutaneous fat. There is  enhancing edema in the adjacent subcutaneous fat. No focal drainable   fluid collections are identified.  BONE MARROW: No increased STIR signal or loss of T1 hyperintense signal   to suggest acute osteomyelitis. No fracture or osteonecrosis.    MUSCLES AND TENDONS: No focal muscle edema. Diffuse fatty infiltration of   the imaged muscles. Tendons are intact.  SYNOVIUM/ JOINT FLUID: No large joint effusion.  MISCELLANEOUS: Neurovascular structures are normal in course and caliber.      IMPRESSION:  1.  Soft tissue ulceration with adjacent cellulitis.  2.  No abscess or acute osteomyelitis.    --- End of Report ---        < end of copied text >             59 yo M with PMH of DM, HLD, HTN and diabetic leg ulcers present with chills, weakness, lightheadedness and infected RT lower extremity ulcer for the past 3 days which has progressively worsened today. As per pt,  he went to [facility] in [place] 3 days  prior to  admission to [facility]  for his weakness and infected leg ulcer. He mentioned that he received one blood transfusion, antibiotics and was admitted to the hospital. He reports staying in the hospital for a day before leaving the hospital for some personal issue. He reports decreased oral intake today, felt extremely weak and lightheaded. He reports history of ulcer for the past 5 years that has worsened in the past 3 years.  He follows a "dermatologist" at [facility] and he is unable to provide information of his diagnosis. He denies; fever, bodyaches, Chest pain, SOB, URI sxs, N/V/D, abdominal pain, LOC, Head trauma. patient had  h/o of colon polyps  found on colonoscopy ~ [id number], labs c/w iron deficiency anemia, pt is seen by vascular/podiatry, ID for management of R leg cellulitis, on IV abx , noted  noted on 8/25/23  to have R  thigh cord like structure  , Doppler c/w superficial femoral vein thrombophlebitis   we were called for further evaluation  of iron def anemia on superficial vein thrombophlebitis  Today  no new c/o       PE        Vital Signs Last 24 Hrs  T(C): 37.4 (26 Aug 2023 11:05), Max: 37.5 (26 Aug 2023 07:10)  T(F): 99.3 (26 Aug 2023 11:05), Max: 99.5 (26 Aug 2023 07:10)  HR: 88 (26 Aug 2023 11:05) (74 - 90)  BP: 130/80 (26 Aug 2023 11:05) (110/74 - 137/88)  BP(mean): --  RR: 18 (26 Aug 2023 11:05) (18 - 18)  SpO2: 96% (26 Aug 2023 11:05) (95% - 98%)    Parameters below as of 26 Aug 2023 11:05  Patient On (Oxygen Delivery Method): room air              GENERAL: NAD, obese, A 0x3  pleasant M   HEAD:  Atraumatic, Normocephalic  EYES:  conjunctiva and sclera clear, anicteric   NECK: Supple  LUNG: CTA   HEART: Regular rate and rhythm; No murmurs, rubs, or gallops  ABDOMEN: Soft, Nontender, Nondistended; Bowel sounds present, obese   NERVOUS SYSTEM:  Alert & Oriented X3,  no focal signs   EXTREMITIES: bilateral leg ulcers noted, , tender cord like structure in the right upper thigh  + tenderness  of R  leg   SKIN: warm dry      LABS                         8.2    8.06  )-----------( 427      ( 26 Aug 2023 07:48 )             27.5   08-26    137  |  105  |  14  ----------------------------<  122<H>  3.9   |  24  |  0.82    Ca    8.7      26 Aug 2023 07:48  Phos  2.8     08-26  Mg     2.3     08-26    TPro  7.4  /  Alb  2.4<L>  /  TBili  0.2  /  DBili  x   /  AST  10  /  ALT  18  /  AlkPhos  87  08-26          % Saturation, Iron: 5 %  Iron - Total Binding Capacity.: 331 ug/dL  Iron Total: 17 ug/dL  Unsaturated Iron Binding Capacity: 314 ug/dL  Ferritin: 60 ng/mL (08.22.23 @ 08:00)   < from: US Duplex Venous Lower Ext Ltd, Right (08.25.23 @ 12:00) >    ACC: [id number] EXAM:  US DPLX LWR EXT VEINS LTD RT   ORDERED BY: JIM VELASCO     PROCEDURE DATE:  08/25/2023          INTERPRETATION:  CLINICAL INFORMATION: Palpable cord in right leg.    COMPARISON: right lower extremity venous duplex study dated 8/22/2023.    TECHNIQUE: Duplex sonography of the RIGHT LOWER extremity veins with   color and spectral Doppler, with and without compression.    FINDINGS:    There is normal compressibility of the right common femoral, femoral and   popliteal veins.  The contralateral common femoral vein is patent.  Doppler examination shows normal spontaneous and phasic flow.    No calf vein thrombosis is detected.    The palpable finding corresponds to segmental thrombosis of branch of the   greater saphenous vein in the anterior thigh.    IMPRESSION:  No evidence of right lower extremity deep venous thrombosis.    Superficial thrombosis of branch of the greater saphenous vein in the   anterior right thigh.          --- End of Report ---            GRACIE CHANCE MD; Attending Radiologist  This document has been electronically signed. Aug 25 2023  1:41PM    < end of copied text >  < from: MR Lower Ext Joint w/wo IV Cont, Right (08.24.23 @ 17:24) >    ACC: [id number] EXAM:  MR LWR EXT JOINT WAW IC RT   ORDERED BY: JIM VELASCO     PROCEDURE DATE:  08/24/2023          INTERPRETATION:  MRI OF THE RIGHT LOWER EXTREMITY    CLINICAL INFORMATION: Diabetic ulcers in the right lower extremity.   Cellulitis. Evaluate for osteomyelitis.  TECHNIQUE: Multiplanar, multisequence MRI was obtained of the RIGHT LOWER   EXTREMITY focusing on the right tibia/fibula. The study was performed   before and after the intravenous administration of 12 ml Gadavist (3 cc   discarded) .  COMPARISON: Bilateral lower extremity radiographs 8/21/2023.    FINDINGS:    SOFT TISSUES: Soft tissue ulceration along the anteromedial aspect of the   leg. Ulceration extends to the level of the subcutaneous fat. There is  enhancing edema in the adjacent subcutaneous fat. No focal drainable   fluid collections are identified.  BONE MARROW: No increased STIR signal or loss of T1 hyperintense signal   to suggest acute osteomyelitis. No fracture or osteonecrosis.    MUSCLES AND TENDONS: No focal muscle edema. Diffuse fatty infiltration of   the imaged muscles. Tendons are intact.  SYNOVIUM/ JOINT FLUID: No large joint effusion.  MISCELLANEOUS: Neurovascular structures are normal in course and caliber.      IMPRESSION:  1.  Soft tissue ulceration with adjacent cellulitis.  2.  No abscess or acute osteomyelitis.    --- End of Report ---        < end of copied text >

## 2023-08-26 NOTE — PROGRESS NOTE ADULT - SUBJECTIVE AND OBJECTIVE BOX
Patient seen and examined this morning    Patient doing well  right leg swelling and discoloration much improved but developed cord like palpable vein tracking up the thigh with significant pain to right leg with minimal touch; Today pain is much better controlled as per patient; ;  No evidence of Osteomyelitis on MRI; ABIs with no evidence of PVD    MEDICATIONS  (STANDING):  ciprofloxacin     Tablet 750 milliGRAM(s) Oral two times a day  dicloxacillin 500 milliGRAM(s) Oral four times a day  gabapentin 300 milliGRAM(s) Oral at bedtime  insulin lispro (ADMELOG) corrective regimen sliding scale   SubCutaneous three times a day before meals  insulin lispro (ADMELOG) corrective regimen sliding scale   SubCutaneous at bedtime  iron sucrose IVPB 200 milliGRAM(s) IV Intermittent every 24 hours  sodium chloride 0.9%. 1000 milliLiter(s) (125 mL/Hr) IV Continuous <Continuous>    MEDICATIONS  (PRN):  acetaminophen     Tablet .. 650 milliGRAM(s) Oral every 6 hours PRN Temp greater or equal to 38C (100.4F), Mild Pain (1 - 3)  melatonin 3 milliGRAM(s) Oral at bedtime PRN Insomnia      Vital Signs Last 24 Hrs  T(C): 37 (26 Aug 2023 19:15), Max: 37.5 (26 Aug 2023 07:10)  T(F): 98.6 (26 Aug 2023 19:15), Max: 99.5 (26 Aug 2023 07:10)  HR: 89 (26 Aug 2023 19:15) (74 - 90)  BP: 137/84 (26 Aug 2023 19:15) (110/74 - 137/88)  RR: 18 (26 Aug 2023 19:15) (18 - 18)  SpO2: 97% (26 Aug 2023 19:15) (95% - 98%): room air    P/E:  Elderly male comfortable in bed NAD at rest  Psych: AAO x3  Neuro: No gross focal deficits; Power and sensation intact  CVS: S1S2 present, regular, no edema  Resp: BLAE+, No wheeze or Rhonchi  GI: Soft, BS+, Non tender, non distended  Skin: Warm and moist without any rashes  Extr:  Right LE warmth and swelling much improved; significantly tender to touch; palpable cord extending across right thigh  Left leg( distal 1/2)  with large deep ulcers dressing intact  Right leg - has very deep ulcer with exposed tendons just above the medial malleolus. drssing intact by Podiatry    Labs:                        8.2    8.06  )-----------( 427      ( 26 Aug 2023 07:48 )             27.5   08-26    137  |  105  |  14  ----------------------------<  122<H>  3.9   |  24  |  0.82  Ca    8.7      26 Aug 2023 07:48  Phos  2.8     08-26  Mg     2.3     08-26  TPro  7.4  /  Alb  2.4<L>  /  TBili  0.2  /  DBili  x   /  AST  10  /  ALT  18  /  AlkPhos  87  08-26    A1C with Estimated Average Glucose (08.21.23 @ 05:30) A1C with Estimated Average Glucose Result: 6.6:    Iron with Total Binding Capacity (08.22.23 @ 08:00)   Iron - Total Binding Capacity.: 331 ug/dL  % Saturation, Iron: 5 %   Iron Total: 17 ug/dL    Culture - Blood (08.20.23 @ 23:10)   Culture Results: No growth at 4 days    MR Lower Ext Joint w/wo IV Cont, Right (08.24.23 @ 17:24)    IMPRESSION:  1.  Soft tissue ulceration with adjacent cellulitis.  2.  No abscess or acute osteomyelitis.     Patient seen and examined this morning    Patient doing well  right leg swelling and discoloration much improved but developed cord like palpable vein tracking up the thigh with significant pain to right leg with minimal touch; No evidence of Osteomyelitis on MRI; ABIs with no evidence of PVD  Pt seen by Hematology and Vascular; Hematology recommended prophylactic anticoagulation x one month     Today pain is much better controlled as per patient; Patient with Emergency Medicaid needs medications arranged; Medications submitted to [facility] await approval and delivery    MEDICATIONS  (STANDING):  ciprofloxacin     Tablet 750 milliGRAM(s) Oral two times a day  dicloxacillin 500 milliGRAM(s) Oral four times a day  gabapentin 300 milliGRAM(s) Oral at bedtime  insulin lispro (ADMELOG) corrective regimen sliding scale   SubCutaneous three times a day before meals  insulin lispro (ADMELOG) corrective regimen sliding scale   SubCutaneous at bedtime  iron sucrose IVPB 200 milliGRAM(s) IV Intermittent every 24 hours  sodium chloride 0.9%. 1000 milliLiter(s) (125 mL/Hr) IV Continuous <Continuous>    MEDICATIONS  (PRN):  acetaminophen     Tablet .. 650 milliGRAM(s) Oral every 6 hours PRN Temp greater or equal to 38C (100.4F), Mild Pain (1 - 3)  melatonin 3 milliGRAM(s) Oral at bedtime PRN Insomnia    Vital Signs Last 24 Hrs  T(C): 37 (26 Aug 2023 19:15), Max: 37.5 (26 Aug 2023 07:10)  T(F): 98.6 (26 Aug 2023 19:15), Max: 99.5 (26 Aug 2023 07:10)  HR: 89 (26 Aug 2023 19:15) (74 - 90)  BP: 137/84 (26 Aug 2023 19:15) (110/74 - 137/88)  RR: 18 (26 Aug 2023 19:15) (18 - 18)  SpO2: 97% (26 Aug 2023 19:15) (95% - 98%): room air    P/E:  Elderly male comfortable in bed NAD at rest  Psych: AAO x3  Neuro: No gross focal deficits; Power and sensation intact  CVS: S1S2 present, regular, no edema  Resp: BLAE+, No wheeze or Rhonchi  GI: Soft, BS+, Non tender, non distended  Skin: Warm and moist without any rashes  Extr:  Right LE warmth and swelling much improved; much less tender today; palpable cord extending across right thigh  Left leg( distal 1/2)  with large deep ulcers dressing intact  Right leg - has very deep ulcer with exposed tendons just above the medial malleolus. drssing intact by Podiatry    Labs:                        8.2    8.06  )-----------( 427      ( 26 Aug 2023 07:48 )             27.5   08-26    137  |  105  |  14  ----------------------------<  122<H>  3.9   |  24  |  0.82  Ca    8.7      26 Aug 2023 07:48  Phos  2.8     08-26  Mg     2.3     08-26  TPro  7.4  /  Alb  2.4<L>  /  TBili  0.2  /  DBili  x   /  AST  10  /  ALT  18  /  AlkPhos  87  08-26    A1C with Estimated Average Glucose (08.21.23 @ 05:30) A1C with Estimated Average Glucose Result: 6.6:    Iron with Total Binding Capacity (08.22.23 @ 08:00)   Iron - Total Binding Capacity.: 331 ug/dL  % Saturation, Iron: 5 %   Iron Total: 17 ug/dL    Culture - Blood (08.20.23 @ 23:10)   Culture Results: No growth at 4 days    MR Lower Ext Joint w/wo IV Cont, Right (08.24.23 @ 17:24)    IMPRESSION:  1.  Soft tissue ulceration with adjacent cellulitis.  2.  No abscess or acute osteomyelitis.     Patient seen and examined this morning    Patient doing well  right leg swelling and discoloration much improved but developed cord like palpable vein tracking up the thigh with significant pain to right leg with minimal touch; No evidence of Osteomyelitis on MRI; ABIs with no evidence of PVD  Pt seen by Hematology and Vascular; Hematology recommended prophylactic anticoagulation x one month     Today pain is much better controlled as per patient; Patient with Emergency Medicaid needs medications arranged; Medications submitted to [facility] await approval and delivery    MEDICATIONS  (STANDING):  ciprofloxacin     Tablet 750 milliGRAM(s) Oral two times a day  dicloxacillin 500 milliGRAM(s) Oral four times a day  gabapentin 300 milliGRAM(s) Oral at bedtime  insulin lispro (ADMELOG) corrective regimen sliding scale   SubCutaneous three times a day before meals  insulin lispro (ADMELOG) corrective regimen sliding scale   SubCutaneous at bedtime  iron sucrose IVPB 200 milliGRAM(s) IV Intermittent every 24 hours  sodium chloride 0.9%. 1000 milliLiter(s) (125 mL/Hr) IV Continuous <Continuous>    MEDICATIONS  (PRN):  acetaminophen     Tablet .. 650 milliGRAM(s) Oral every 6 hours PRN Temp greater or equal to 38C (100.4F), Mild Pain (1 - 3)  melatonin 3 milliGRAM(s) Oral at bedtime PRN Insomnia    Vital Signs Last 24 Hrs  T(C): 37 (26 Aug 2023 19:15), Max: 37.5 (26 Aug 2023 07:10)  T(F): 98.6 (26 Aug 2023 19:15), Max: 99.5 (26 Aug 2023 07:10)  HR: 89 (26 Aug 2023 19:15) (74 - 90)  BP: 137/84 (26 Aug 2023 19:15) (110/74 - 137/88)  RR: 18 (26 Aug 2023 19:15) (18 - 18)  SpO2: 97% (26 Aug 2023 19:15) (95% - 98%): room air    P/E:  Elderly male comfortable in bed NAD at rest  Psych: AAO x3  Neuro: No gross focal deficits; Power and sensation intact  CVS: S1S2 present, regular, no edema  Resp: BLAE+, No wheeze or Rhonchi  GI: Soft, BS+, Non tender, non distended  Skin: Warm and moist without any rashes  Extr:  Right LE warmth and swelling much improved; much less tender today; palpable cord extending across right thigh  Left leg( distal 1/2)  with large deep ulcers dressing intact  Right leg - has very deep ulcer with exposed tendons just above the medial malleolus. drssing intact by Podiatry    Labs:                        8.2    8.06  )-----------( 427      ( 26 Aug 2023 07:48 )             27.5   08-26    137  |  105  |  14  ----------------------------<  122<H>  3.9   |  24  |  0.82  Ca    8.7      26 Aug 2023 07:48  Phos  2.8     08-26  Mg     2.3     08-26  TPro  7.4  /  Alb  2.4<L>  /  TBili  0.2  /  DBili  x   /  AST  10  /  ALT  18  /  AlkPhos  87  08-26    A1C with Estimated Average Glucose (08.21.23 @ 05:30) A1C with Estimated Average Glucose Result: 6.6:    Iron with Total Binding Capacity (08.22.23 @ 08:00)   Iron - Total Binding Capacity.: 331 ug/dL  % Saturation, Iron: 5 %   Iron Total: 17 ug/dL    Culture - Blood (08.20.23 @ 23:10)   Culture Results: No growth at 4 days    MR Lower Ext Joint w/wo IV Cont, Right (08.24.23 @ 17:24)    IMPRESSION:  1.  Soft tissue ulceration with adjacent cellulitis.  2.  No abscess or acute osteomyelitis.    US Duplex Venous Lower Ext Ltd, Right (08.25.23 @ 12:00)     FINDINGS:  There is normal compressibility of the right common femoral, femoral and popliteal veins.  The contralateral common femoral vein is patent.  Doppler examination shows normal spontaneous and phasic flow.  No calf vein thrombosis is detected.  The palpable finding corresponds to segmental thrombosis of branch of the greater saphenous vein in the anterior thigh.    IMPRESSION:  No evidence of right lower extremity deep venous thrombosis.  Superficial thrombosis of branch of the greater saphenous vein in the anterior right thigh.

## 2023-08-27 LAB
ALBUMIN SERPL ELPH-MCNC: 2.6 G/DL — LOW (ref 3.5–5)
ALBUMIN SERPL ELPH-MCNC: 2.6 G/DL — LOW (ref 3.5–5)
ALP SERPL-CCNC: 94 U/L — SIGNIFICANT CHANGE UP (ref 40–120)
ALP SERPL-CCNC: 94 U/L — SIGNIFICANT CHANGE UP (ref 40–120)
ALT FLD-CCNC: 17 U/L DA — SIGNIFICANT CHANGE UP (ref 10–60)
ALT FLD-CCNC: 17 U/L DA — SIGNIFICANT CHANGE UP (ref 10–60)
ANION GAP SERPL CALC-SCNC: 6 MMOL/L — SIGNIFICANT CHANGE UP (ref 5–17)
ANION GAP SERPL CALC-SCNC: 6 MMOL/L — SIGNIFICANT CHANGE UP (ref 5–17)
AST SERPL-CCNC: 11 U/L — SIGNIFICANT CHANGE UP (ref 10–40)
AST SERPL-CCNC: 11 U/L — SIGNIFICANT CHANGE UP (ref 10–40)
BILIRUB SERPL-MCNC: 0.1 MG/DL — LOW (ref 0.2–1.2)
BILIRUB SERPL-MCNC: 0.1 MG/DL — LOW (ref 0.2–1.2)
BUN SERPL-MCNC: 13 MG/DL — SIGNIFICANT CHANGE UP (ref 7–18)
BUN SERPL-MCNC: 13 MG/DL — SIGNIFICANT CHANGE UP (ref 7–18)
CALCIUM SERPL-MCNC: 8.8 MG/DL — SIGNIFICANT CHANGE UP (ref 8.4–10.5)
CALCIUM SERPL-MCNC: 8.8 MG/DL — SIGNIFICANT CHANGE UP (ref 8.4–10.5)
CHLORIDE SERPL-SCNC: 105 MMOL/L — SIGNIFICANT CHANGE UP (ref 96–108)
CHLORIDE SERPL-SCNC: 105 MMOL/L — SIGNIFICANT CHANGE UP (ref 96–108)
CO2 SERPL-SCNC: 27 MMOL/L — SIGNIFICANT CHANGE UP (ref 22–31)
CO2 SERPL-SCNC: 27 MMOL/L — SIGNIFICANT CHANGE UP (ref 22–31)
CREAT SERPL-MCNC: 0.89 MG/DL — SIGNIFICANT CHANGE UP (ref 0.5–1.3)
CREAT SERPL-MCNC: 0.89 MG/DL — SIGNIFICANT CHANGE UP (ref 0.5–1.3)
EGFR: 99 ML/MIN/1.73M2 — SIGNIFICANT CHANGE UP
EGFR: 99 ML/MIN/1.73M2 — SIGNIFICANT CHANGE UP
GLUCOSE BLDC GLUCOMTR-MCNC: 110 MG/DL — HIGH (ref 70–99)
GLUCOSE BLDC GLUCOMTR-MCNC: 110 MG/DL — HIGH (ref 70–99)
GLUCOSE BLDC GLUCOMTR-MCNC: 135 MG/DL — HIGH (ref 70–99)
GLUCOSE BLDC GLUCOMTR-MCNC: 135 MG/DL — HIGH (ref 70–99)
GLUCOSE BLDC GLUCOMTR-MCNC: 137 MG/DL — HIGH (ref 70–99)
GLUCOSE BLDC GLUCOMTR-MCNC: 137 MG/DL — HIGH (ref 70–99)
GLUCOSE BLDC GLUCOMTR-MCNC: 162 MG/DL — HIGH (ref 70–99)
GLUCOSE BLDC GLUCOMTR-MCNC: 162 MG/DL — HIGH (ref 70–99)
GLUCOSE SERPL-MCNC: 123 MG/DL — HIGH (ref 70–99)
GLUCOSE SERPL-MCNC: 123 MG/DL — HIGH (ref 70–99)
HCT VFR BLD CALC: 29.4 % — LOW (ref 39–50)
HCT VFR BLD CALC: 29.4 % — LOW (ref 39–50)
HGB BLD-MCNC: 8.8 G/DL — LOW (ref 13–17)
HGB BLD-MCNC: 8.8 G/DL — LOW (ref 13–17)
MAGNESIUM SERPL-MCNC: 2.3 MG/DL — SIGNIFICANT CHANGE UP (ref 1.6–2.6)
MAGNESIUM SERPL-MCNC: 2.3 MG/DL — SIGNIFICANT CHANGE UP (ref 1.6–2.6)
MCHC RBC-ENTMCNC: 23.2 PG — LOW (ref 27–34)
MCHC RBC-ENTMCNC: 23.2 PG — LOW (ref 27–34)
MCHC RBC-ENTMCNC: 29.9 GM/DL — LOW (ref 32–36)
MCHC RBC-ENTMCNC: 29.9 GM/DL — LOW (ref 32–36)
MCV RBC AUTO: 77.4 FL — LOW (ref 80–100)
MCV RBC AUTO: 77.4 FL — LOW (ref 80–100)
NRBC # BLD: 1 /100 WBCS — HIGH (ref 0–0)
NRBC # BLD: 1 /100 WBCS — HIGH (ref 0–0)
PHOSPHATE SERPL-MCNC: 2.8 MG/DL — SIGNIFICANT CHANGE UP (ref 2.5–4.5)
PHOSPHATE SERPL-MCNC: 2.8 MG/DL — SIGNIFICANT CHANGE UP (ref 2.5–4.5)
PLATELET # BLD AUTO: 477 K/UL — HIGH (ref 150–400)
PLATELET # BLD AUTO: 477 K/UL — HIGH (ref 150–400)
POTASSIUM SERPL-MCNC: 4.3 MMOL/L — SIGNIFICANT CHANGE UP (ref 3.5–5.3)
POTASSIUM SERPL-MCNC: 4.3 MMOL/L — SIGNIFICANT CHANGE UP (ref 3.5–5.3)
POTASSIUM SERPL-SCNC: 4.3 MMOL/L — SIGNIFICANT CHANGE UP (ref 3.5–5.3)
POTASSIUM SERPL-SCNC: 4.3 MMOL/L — SIGNIFICANT CHANGE UP (ref 3.5–5.3)
PROT SERPL-MCNC: 8 G/DL — SIGNIFICANT CHANGE UP (ref 6–8.3)
PROT SERPL-MCNC: 8 G/DL — SIGNIFICANT CHANGE UP (ref 6–8.3)
RBC # BLD: 3.8 M/UL — LOW (ref 4.2–5.8)
RBC # BLD: 3.8 M/UL — LOW (ref 4.2–5.8)
RBC # FLD: 17.2 % — HIGH (ref 10.3–14.5)
RBC # FLD: 17.2 % — HIGH (ref 10.3–14.5)
SODIUM SERPL-SCNC: 138 MMOL/L — SIGNIFICANT CHANGE UP (ref 135–145)
SODIUM SERPL-SCNC: 138 MMOL/L — SIGNIFICANT CHANGE UP (ref 135–145)
WBC # BLD: 9.64 K/UL — SIGNIFICANT CHANGE UP (ref 3.8–10.5)
WBC # BLD: 9.64 K/UL — SIGNIFICANT CHANGE UP (ref 3.8–10.5)
WBC # FLD AUTO: 9.64 K/UL — SIGNIFICANT CHANGE UP (ref 3.8–10.5)
WBC # FLD AUTO: 9.64 K/UL — SIGNIFICANT CHANGE UP (ref 3.8–10.5)

## 2023-08-27 PROCEDURE — 99232 SBSQ HOSP IP/OBS MODERATE 35: CPT | Mod: GC

## 2023-08-27 RX ADMIN — Medication 650 MILLIGRAM(S): at 21:55

## 2023-08-27 RX ADMIN — Medication 650 MILLIGRAM(S): at 09:30

## 2023-08-27 RX ADMIN — Medication 750 MILLIGRAM(S): at 06:48

## 2023-08-27 RX ADMIN — GABAPENTIN 300 MILLIGRAM(S): 400 CAPSULE ORAL at 21:17

## 2023-08-27 RX ADMIN — DICLOXACILLIN SODIUM 500 MILLIGRAM(S): 500 CAPSULE ORAL at 12:07

## 2023-08-27 RX ADMIN — DICLOXACILLIN SODIUM 500 MILLIGRAM(S): 500 CAPSULE ORAL at 06:49

## 2023-08-27 RX ADMIN — APIXABAN 5 MILLIGRAM(S): 2.5 TABLET, FILM COATED ORAL at 06:49

## 2023-08-27 RX ADMIN — Medication 1: at 12:06

## 2023-08-27 RX ADMIN — DICLOXACILLIN SODIUM 500 MILLIGRAM(S): 500 CAPSULE ORAL at 20:07

## 2023-08-27 RX ADMIN — IRON SUCROSE 110 MILLIGRAM(S): 20 INJECTION, SOLUTION INTRAVENOUS at 17:55

## 2023-08-27 RX ADMIN — Medication 750 MILLIGRAM(S): at 17:55

## 2023-08-27 RX ADMIN — APIXABAN 5 MILLIGRAM(S): 2.5 TABLET, FILM COATED ORAL at 17:55

## 2023-08-27 RX ADMIN — Medication 650 MILLIGRAM(S): at 08:48

## 2023-08-27 RX ADMIN — Medication 650 MILLIGRAM(S): at 21:18

## 2023-08-27 NOTE — PROGRESS NOTE ADULT - PROBLEM SELECTOR PLAN 7
Home med: Losartan, Chlorothiazide   - MED REC NEEDED  - Hold BP Med  if  BP: <110/60,
Home med: Losartan, Chlorothiazide   - Hold BP Med  if  BP: <110/60,

## 2023-08-27 NOTE — PROGRESS NOTE ADULT - ASSESSMENT
59 yo M with PMH of  DM, HLD, HTN and diabetic  leg ulcers present with weakness, lightheadedness and infected RT lower extremity ulcer for the past 3 days which has progressively worsened today. IN the ED patient was hypotensive, tachycardic, saturating at 94 on room air. On labs patient did have a white count, on xray of foot  there was soft tissue defect . MRI neg for osteomyelitis. Patient on PO cipro and dicloxacillin. Patient with superficial thrombosis.  Patient to start on eliquis. Patient pending medication.    Admitted to medicine for Sepsis 2/2 to lower extremity infection.

## 2023-08-27 NOTE — PROGRESS NOTE ADULT - ASSESSMENT
A:  DM wounds b/l    P:   Patient evaluated and Chart reviewed   Discussed diagnosis and treatment with patient  Xrays reviewed  BRIDGETTE/PVR- Right Foot: 1.01, Left Foot-1.00  MRI reviewed- No OM noted   Wound cx reviewed   Applied santyl, xeroform with dry sterile dressing to b/l legs   Continue with IV antibiotics As Per ID  Weight bearing as tolerated to b/l feet and legs  ID consult appreciated   Offloading to bilateral Heels.   Discussed importance of daily foot examinations and proper shoe gear and to importance of lower Fasting Blood Glucose levels.   Podiatry to follow while in house.  Seen, reviewed, and treated with Dr. Jarrett GAO: santyl, xeroform, 4x4 gauze, Kerlix, ace

## 2023-08-27 NOTE — PROGRESS NOTE ADULT - PROBLEM SELECTOR PLAN 3
PE: (+) B/L LE ulcer  - Podiatry Consult  - wound culture growing gram negative rods  -patient on cipro and dicloxacillin  -vascular consulted

## 2023-08-27 NOTE — PROGRESS NOTE ADULT - PROBLEM SELECTOR PLAN 10
Heparin for DVT prophy

## 2023-08-27 NOTE — PROGRESS NOTE ADULT - SUBJECTIVE AND OBJECTIVE BOX
Interval: Patient was seen resting in bed. No acute overnight events.     Patient is a 58y old  Male who presents with a chief complaint of weakness (21 Aug 2023 02:06)    HPI:  57 yo M with PMH of DM, HLD, HTN and diabetic leg ulcers present with chills, weakness, lightheadedness and infected RT lower extremity ulcer for the past 3 days which has progressively worsened today. As per pt,  he went to [facility] in [place] 3 days  ago for his weakness and infected leg ulcer. He mentioned that he received one blood transfusion, antibiotics and was admitted to the hospital. He reports staying in the hospital for a day before leaving the hospital for some personal issue. He reports decreased oral intake today, felt extremely weak and lightheaded. He reports history of ulcer for the past 5 years that has worsened in the past 3 years.  He follows a "dermatologist" at [facility] and he is unable to provide information of his diagnosis. He denies; fever, bodyaches, Chest pain, SOB, URI sxs, N/V/D, abdominal pain, LOC, Head trauma    In the ED   Vitals -> BP: 88/55, HR:102, T:97.7, O2 sat: 94% RA  Labs :  WbC 17k, Hb:7.9, Cr:1.76  EKG : NSR  s/p Vancomycin, Zosyn IVPB  s/p 2L NS ->101/68 (21 Aug 2023 02:06)    Podiatry HPI: Patient stated that he has had leg wounds for 5 years. He seems a dermatologist at [facility] who gives him steroid shots for his wounds and they have worsened. Over the last month his pain has grown and his has become dizzy and weak. Patient states he needs to have santyl on his wounds for every dressing change so that the shooting pain he has subsides. Patient denies any other pedal complaints.    ID#: [id number]      Patient admits to  (-) Fevers, (-) Chills, (-) Nausea, (-) Vomiting, (-) Shortness of Breath (-) calf pain (-) chest pain     Medications acetaminophen     Tablet .. 650 milliGRAM(s) Oral every 6 hours PRN  apixaban 5 milliGRAM(s) Oral every 12 hours  ciprofloxacin     Tablet 750 milliGRAM(s) Oral two times a day  dicloxacillin 500 milliGRAM(s) Oral four times a day  gabapentin 300 milliGRAM(s) Oral at bedtime  insulin lispro (ADMELOG) corrective regimen sliding scale   SubCutaneous three times a day before meals  insulin lispro (ADMELOG) corrective regimen sliding scale   SubCutaneous at bedtime  iron sucrose IVPB 200 milliGRAM(s) IV Intermittent every 24 hours  melatonin 3 milliGRAM(s) Oral at bedtime PRN  sodium chloride 0.9%. 1000 milliLiter(s) IV Continuous <Continuous>    FHNo pertinent family history in first degree relatives    ,   PMHHTN (hypertension)    Dyslipidemia    DM (diabetes mellitus)    Leg ulcer       PSHNo significant past surgical history        Labs                          8.8    9.64  )-----------( 477      ( 27 Aug 2023 07:08 )             29.4      08-27    138  |  105  |  13  ----------------------------<  123<H>  4.3   |  27  |  0.89    Ca    8.8      27 Aug 2023 07:08  Phos  2.8     08-27  Mg     2.3     08-27    TPro  8.0  /  Alb  2.6<L>  /  TBili  0.1<L>  /  DBili  x   /  AST  11  /  ALT  17  /  AlkPhos  94  08-27     Vital Signs Last 24 Hrs  T(C): 37.3 (27 Aug 2023 11:06), Max: 37.3 (27 Aug 2023 11:06)  T(F): 99.1 (27 Aug 2023 11:06), Max: 99.1 (27 Aug 2023 11:06)  HR: 86 (27 Aug 2023 11:06) (81 - 99)  BP: 125/77 (27 Aug 2023 11:06) (112/70 - 137/84)  BP(mean): --  RR: 18 (27 Aug 2023 11:06) (18 - 18)  SpO2: 97% (27 Aug 2023 11:06) (93% - 100%)    Parameters below as of 27 Aug 2023 11:06  Patient On (Oxygen Delivery Method): room air      Sedimentation Rate, Erythrocyte: 83 mm/Hr (08-25-23 @ 07:45)  Sedimentation Rate, Erythrocyte: 107 mm/Hr (08-23-23 @ 07:05)         C-Reactive Protein, Serum: 96 mg/L (08-25-23 @ 07:45)  C-Reactive Protein, Serum: 102 mg/L (08-23-23 @ 07:05)   WBC Count: 9.64 K/uL (08-27-23 @ 07:08)      ROS: Unremarkable outside HPI      PHYSICAL EXAM  LE Focused:    Vasc:  DP and PT pulses palpable 2/4. CFT<3 seconds. No edema noted in legs or feet   Derm: Wounds noted b/l lower legs to level of subcutaneous tissue/muscle tendon layer of legs. No PTB in any of wounds but tendon exposure noted. Wounds are fibrogranular in nature with no drainage noted. No fluctuance crepitus or streaking noted.   Neuro: Gross sensation intact.   MSK: POP to all wounds.       < from: MR Lower Ext Joint w/wo IV Cont, Right (08.24.23 @ 17:24) >  INTERPRETATION:  MRI OF THE RIGHT LOWER EXTREMITY    CLINICAL INFORMATION: Diabetic ulcers in the right lower extremity.   Cellulitis. Evaluate for osteomyelitis.  TECHNIQUE: Multiplanar, multisequence MRI was obtained of the RIGHT LOWER   EXTREMITY focusing on the right tibia/fibula. The study was performed   before and after the intravenous administration of 12 ml Gadavist (3 cc   discarded) .  COMPARISON: Bilateral lower extremity radiographs 8/21/2023.    FINDINGS:    SOFT TISSUES: Soft tissue ulceration along the anteromedial aspect of the   leg. Ulceration extends to the level of the subcutaneous fat. There is  enhancing edema in the adjacent subcutaneous fat. No focal drainable   fluid collections are identified.  BONE MARROW: No increased STIR signal or loss of T1 hyperintense signal   to suggest acute osteomyelitis. No fracture or osteonecrosis.    MUSCLES AND TENDONS: No focal muscle edema. Diffuse fatty infiltration of   the imaged muscles. Tendons are intact.  SYNOVIUM/ JOINT FLUID: No large joint effusion.  MISCELLANEOUS: Neurovascular structures are normal in course and caliber.      IMPRESSION:  1.  Soft tissue ulceration with adjacent cellulitis.  2.  No abscess or acute osteomyelitis.    < end of copied text >  < from: [facility] Physiol Extremity Lower 3+ Level, BI (08.22.23 @ 16:34) >  INTERPRETATION:  Clinical indication: Peripheral arterial disease    COMPARISON: None    FINDINGS: There are normal pulse volume recordings bilaterally. There is   no abnormal segmental pressure gradient.    Right BRIDGETTE = 1.01  Left BRIDGETTE = 1.00    IMPRESSION: Normal ABIs.    --- End of Report ---    < end of copied text >  < from: Xray Tibia + Fibula 2 Views, Bilateral (08.21.23 @ 14:58) >  INTERPRETATION:  Clinical history: 58-year-old male, leg wounds.    Four views of the right leg without comparison.    FINDINGS: Mild degenerative change with no fracture, dislocation or gross   cortical destruction.    Soft tissue defect at the medial leg measuring 11 x 1.5 cm.    IMPRESSION:  Large soft tissue defect at the medial leg distally, if there is   continued clinical concern follow-up MRI can be ordered    --- End of Report ---      < end of copied text >      IMAGING:   Xray- recommend MRI if concern for OM  MRI- No OM noted     CULTURES: Klebsiella, Proteus, Raoultella    < from: [facility] Physiol Extremity Lower 3+ Level, BI (08.22.23 @ 16:34) >  Right BRIDGETTE = 1.01  Left BRIDGETTE = 1.00    < end of copied text >   Interval: Patient was seen resting in bed. No acute overnight events.     Patient is a 58y old  Male who presents with a chief complaint of weakness (21 Aug 2023 02:06)    HPI:  59 yo M with PMH of DM, HLD, HTN and diabetic leg ulcers present with chills, weakness, lightheadedness and infected RT lower extremity ulcer for the past 3 days which has progressively worsened today. As per pt,  he went to [facility] in [place] 3 days  ago for his weakness and infected leg ulcer. He mentioned that he received one blood transfusion, antibiotics and was admitted to the hospital. He reports staying in the hospital for a day before leaving the hospital for some personal issue. He reports decreased oral intake today, felt extremely weak and lightheaded. He reports history of ulcer for the past 5 years that has worsened in the past 3 years.  He follows a "dermatologist" at [facility] and he is unable to provide information of his diagnosis. He denies; fever, bodyaches, Chest pain, SOB, URI sxs, N/V/D, abdominal pain, LOC, Head trauma    In the ED   Vitals -> BP: 88/55, HR:102, T:97.7, O2 sat: 94% RA  Labs :  WbC 17k, Hb:7.9, Cr:1.76  EKG : NSR  s/p Vancomycin, Zosyn IVPB  s/p 2L NS ->101/68 (21 Aug 2023 02:06)    Podiatry HPI: Patient stated that he has had leg wounds for 5 years. He seems a dermatologist at [facility] who gives him steroid shots for his wounds and they have worsened. Over the last month his pain has grown and his has become dizzy and weak. Patient states he needs to have santyl on his wounds for every dressing change so that the shooting pain he has subsides. Patient denies any other pedal complaints.    ID#: [id number]      Patient admits to  (-) Fevers, (-) Chills, (-) Nausea, (-) Vomiting, (-) Shortness of Breath (-) calf pain (-) chest pain     Medications acetaminophen     Tablet .. 650 milliGRAM(s) Oral every 6 hours PRN  apixaban 5 milliGRAM(s) Oral every 12 hours  ciprofloxacin     Tablet 750 milliGRAM(s) Oral two times a day  dicloxacillin 500 milliGRAM(s) Oral four times a day  gabapentin 300 milliGRAM(s) Oral at bedtime  insulin lispro (ADMELOG) corrective regimen sliding scale   SubCutaneous three times a day before meals  insulin lispro (ADMELOG) corrective regimen sliding scale   SubCutaneous at bedtime  iron sucrose IVPB 200 milliGRAM(s) IV Intermittent every 24 hours  melatonin 3 milliGRAM(s) Oral at bedtime PRN  sodium chloride 0.9%. 1000 milliLiter(s) IV Continuous <Continuous>    FHNo pertinent family history in first degree relatives    ,   PMHHTN (hypertension)    Dyslipidemia    DM (diabetes mellitus)    Leg ulcer       PSHNo significant past surgical history        Labs                          8.8    9.64  )-----------( 477      ( 27 Aug 2023 07:08 )             29.4      08-27    138  |  105  |  13  ----------------------------<  123<H>  4.3   |  27  |  0.89    Ca    8.8      27 Aug 2023 07:08  Phos  2.8     08-27  Mg     2.3     08-27    TPro  8.0  /  Alb  2.6<L>  /  TBili  0.1<L>  /  DBili  x   /  AST  11  /  ALT  17  /  AlkPhos  94  08-27     Vital Signs Last 24 Hrs  T(C): 37.3 (27 Aug 2023 11:06), Max: 37.3 (27 Aug 2023 11:06)  T(F): 99.1 (27 Aug 2023 11:06), Max: 99.1 (27 Aug 2023 11:06)  HR: 86 (27 Aug 2023 11:06) (81 - 99)  BP: 125/77 (27 Aug 2023 11:06) (112/70 - 137/84)  BP(mean): --  RR: 18 (27 Aug 2023 11:06) (18 - 18)  SpO2: 97% (27 Aug 2023 11:06) (93% - 100%)    Parameters below as of 27 Aug 2023 11:06  Patient On (Oxygen Delivery Method): room air      Sedimentation Rate, Erythrocyte: 83 mm/Hr (08-25-23 @ 07:45)  Sedimentation Rate, Erythrocyte: 107 mm/Hr (08-23-23 @ 07:05)         C-Reactive Protein, Serum: 96 mg/L (08-25-23 @ 07:45)  C-Reactive Protein, Serum: 102 mg/L (08-23-23 @ 07:05)   WBC Count: 9.64 K/uL (08-27-23 @ 07:08)      ROS: Unremarkable outside HPI      PHYSICAL EXAM  LE Focused:    Vasc:  DP and PT pulses palpable 2/4. CFT<3 seconds. No edema noted in legs or feet   Derm: Wounds noted b/l lower legs to level of subcutaneous tissue/muscle tendon layer of legs. No PTB in any of wounds but tendon exposure noted. Wounds are fibrogranular in nature with no drainage noted. No fluctuance crepitus or streaking noted.   Neuro: Gross sensation intact.   MSK: POP to all wounds.       < from: MR Lower Ext Joint w/wo IV Cont, Right (08.24.23 @ 17:24) >  INTERPRETATION:  MRI OF THE RIGHT LOWER EXTREMITY    CLINICAL INFORMATION: Diabetic ulcers in the right lower extremity.   Cellulitis. Evaluate for osteomyelitis.  TECHNIQUE: Multiplanar, multisequence MRI was obtained of the RIGHT LOWER   EXTREMITY focusing on the right tibia/fibula. The study was performed   before and after the intravenous administration of 12 ml Gadavist (3 cc   discarded) .  COMPARISON: Bilateral lower extremity radiographs 8/21/2023.    FINDINGS:    SOFT TISSUES: Soft tissue ulceration along the anteromedial aspect of the   leg. Ulceration extends to the level of the subcutaneous fat. There is  enhancing edema in the adjacent subcutaneous fat. No focal drainable   fluid collections are identified.  BONE MARROW: No increased STIR signal or loss of T1 hyperintense signal   to suggest acute osteomyelitis. No fracture or osteonecrosis.    MUSCLES AND TENDONS: No focal muscle edema. Diffuse fatty infiltration of   the imaged muscles. Tendons are intact.  SYNOVIUM/ JOINT FLUID: No large joint effusion.  MISCELLANEOUS: Neurovascular structures are normal in course and caliber.      IMPRESSION:  1.  Soft tissue ulceration with adjacent cellulitis.  2.  No abscess or acute osteomyelitis.    < end of copied text >  < from: [facility] Physiol Extremity Lower 3+ Level, BI (08.22.23 @ 16:34) >  INTERPRETATION:  Clinical indication: Peripheral arterial disease    COMPARISON: None    FINDINGS: There are normal pulse volume recordings bilaterally. There is   no abnormal segmental pressure gradient.    Right BRIDGETTE = 1.01  Left BRIDGETTE = 1.00    IMPRESSION: Normal ABIs.    --- End of Report ---    < end of copied text >  < from: Xray Tibia + Fibula 2 Views, Bilateral (08.21.23 @ 14:58) >  INTERPRETATION:  Clinical history: 58-year-old male, leg wounds.    Four views of the right leg without comparison.    FINDINGS: Mild degenerative change with no fracture, dislocation or gross   cortical destruction.    Soft tissue defect at the medial leg measuring 11 x 1.5 cm.    IMPRESSION:  Large soft tissue defect at the medial leg distally, if there is   continued clinical concern follow-up MRI can be ordered    --- End of Report ---      < end of copied text >      IMAGING:   Xray- recommend MRI if concern for OM  MRI- No OM noted     CULTURES: Klebsiella, Proteus, Raoultella    < from: [facility] Physiol Extremity Lower 3+ Level, BI (08.22.23 @ 16:34) >  Right BRIDGETTE = 1.01  Left BRIDGETTE = 1.00    < end of copied text >   Interval: Patient was seen resting in bed. No acute overnight events.     Patient is a 58y old  Male who presents with a chief complaint of weakness (21 Aug 2023 02:06)    HPI:  57 yo M with PMH of DM, HLD, HTN and diabetic leg ulcers present with chills, weakness, lightheadedness and infected RT lower extremity ulcer for the past 3 days which has progressively worsened today. As per pt,  he went to [facility] in [place] 3 days  ago for his weakness and infected leg ulcer. He mentioned that he received one blood transfusion, antibiotics and was admitted to the hospital. He reports staying in the hospital for a day before leaving the hospital for some personal issue. He reports decreased oral intake today, felt extremely weak and lightheaded. He reports history of ulcer for the past 5 years that has worsened in the past 3 years.  He follows a "dermatologist" at [facility] and he is unable to provide information of his diagnosis. He denies; fever, bodyaches, Chest pain, SOB, URI sxs, N/V/D, abdominal pain, LOC, Head trauma    In the ED   Vitals -> BP: 88/55, HR:102, T:97.7, O2 sat: 94% RA  Labs :  WbC 17k, Hb:7.9, Cr:1.76  EKG : NSR  s/p Vancomycin, Zosyn IVPB  s/p 2L NS ->101/68 (21 Aug 2023 02:06)    Podiatry HPI: Patient stated that he has had leg wounds for 5 years. He seems a dermatologist at [facility] who gives him steroid shots for his wounds and they have worsened. Over the last month his pain has grown and his has become dizzy and weak. Patient states he needs to have santyl on his wounds for every dressing change so that the shooting pain he has subsides. Patient denies any other pedal complaints.    ID#: [id number]      Patient admits to  (-) Fevers, (-) Chills, (-) Nausea, (-) Vomiting, (-) Shortness of Breath (-) calf pain (-) chest pain     Medications acetaminophen     Tablet .. 650 milliGRAM(s) Oral every 6 hours PRN  apixaban 5 milliGRAM(s) Oral every 12 hours  ciprofloxacin     Tablet 750 milliGRAM(s) Oral two times a day  dicloxacillin 500 milliGRAM(s) Oral four times a day  gabapentin 300 milliGRAM(s) Oral at bedtime  insulin lispro (ADMELOG) corrective regimen sliding scale   SubCutaneous three times a day before meals  insulin lispro (ADMELOG) corrective regimen sliding scale   SubCutaneous at bedtime  iron sucrose IVPB 200 milliGRAM(s) IV Intermittent every 24 hours  melatonin 3 milliGRAM(s) Oral at bedtime PRN  sodium chloride 0.9%. 1000 milliLiter(s) IV Continuous <Continuous>    FHNo pertinent family history in first degree relatives    ,   PMHHTN (hypertension)    Dyslipidemia    DM (diabetes mellitus)    Leg ulcer       PSHNo significant past surgical history        Labs                          8.8    9.64  )-----------( 477      ( 27 Aug 2023 07:08 )             29.4      08-27    138  |  105  |  13  ----------------------------<  123<H>  4.3   |  27  |  0.89    Ca    8.8      27 Aug 2023 07:08  Phos  2.8     08-27  Mg     2.3     08-27    TPro  8.0  /  Alb  2.6<L>  /  TBili  0.1<L>  /  DBili  x   /  AST  11  /  ALT  17  /  AlkPhos  94  08-27     Vital Signs Last 24 Hrs  T(C): 37.3 (27 Aug 2023 11:06), Max: 37.3 (27 Aug 2023 11:06)  T(F): 99.1 (27 Aug 2023 11:06), Max: 99.1 (27 Aug 2023 11:06)  HR: 86 (27 Aug 2023 11:06) (81 - 99)  BP: 125/77 (27 Aug 2023 11:06) (112/70 - 137/84)  BP(mean): --  RR: 18 (27 Aug 2023 11:06) (18 - 18)  SpO2: 97% (27 Aug 2023 11:06) (93% - 100%)    Parameters below as of 27 Aug 2023 11:06  Patient On (Oxygen Delivery Method): room air      Sedimentation Rate, Erythrocyte: 83 mm/Hr (08-25-23 @ 07:45)  Sedimentation Rate, Erythrocyte: 107 mm/Hr (08-23-23 @ 07:05)         C-Reactive Protein, Serum: 96 mg/L (08-25-23 @ 07:45)  C-Reactive Protein, Serum: 102 mg/L (08-23-23 @ 07:05)   WBC Count: 9.64 K/uL (08-27-23 @ 07:08)      ROS: Unremarkable outside HPI      PHYSICAL EXAM  LE Focused:    Vasc:  DP and PT pulses palpable 2/4. CFT<3 seconds. No edema noted in legs or feet   Derm: Wounds noted b/l lower legs to level of subcutaneous tissue/muscle tendon layer of legs. No PTB in any of wounds but tendon exposure noted. Wounds are fibrogranular in nature with no drainage noted. No fluctuance crepitus or streaking noted.   Neuro: Gross sensation intact.   MSK: POP to all wounds.       < from: MR Lower Ext Joint w/wo IV Cont, Right (08.24.23 @ 17:24) >  INTERPRETATION:  MRI OF THE RIGHT LOWER EXTREMITY    CLINICAL INFORMATION: Diabetic ulcers in the right lower extremity.   Cellulitis. Evaluate for osteomyelitis.  TECHNIQUE: Multiplanar, multisequence MRI was obtained of the RIGHT LOWER   EXTREMITY focusing on the right tibia/fibula. The study was performed   before and after the intravenous administration of 12 ml Gadavist (3 cc   discarded) .  COMPARISON: Bilateral lower extremity radiographs 8/21/2023.    FINDINGS:    SOFT TISSUES: Soft tissue ulceration along the anteromedial aspect of the   leg. Ulceration extends to the level of the subcutaneous fat. There is  enhancing edema in the adjacent subcutaneous fat. No focal drainable   fluid collections are identified.  BONE MARROW: No increased STIR signal or loss of T1 hyperintense signal   to suggest acute osteomyelitis. No fracture or osteonecrosis.    MUSCLES AND TENDONS: No focal muscle edema. Diffuse fatty infiltration of   the imaged muscles. Tendons are intact.  SYNOVIUM/ JOINT FLUID: No large joint effusion.  MISCELLANEOUS: Neurovascular structures are normal in course and caliber.      IMPRESSION:  1.  Soft tissue ulceration with adjacent cellulitis.  2.  No abscess or acute osteomyelitis.    < end of copied text >  < from: [facility] Physiol Extremity Lower 3+ Level, BI (08.22.23 @ 16:34) >  INTERPRETATION:  Clinical indication: Peripheral arterial disease    COMPARISON: None    FINDINGS: There are normal pulse volume recordings bilaterally. There is   no abnormal segmental pressure gradient.    Right BRIDGETTE = 1.01  Left BRIDGETTE = 1.00    IMPRESSION: Normal ABIs.    --- End of Report ---    < end of copied text >  < from: Xray Tibia + Fibula 2 Views, Bilateral (08.21.23 @ 14:58) >  INTERPRETATION:  Clinical history: 58-year-old male, leg wounds.    Four views of the right leg without comparison.    FINDINGS: Mild degenerative change with no fracture, dislocation or gross   cortical destruction.    Soft tissue defect at the medial leg measuring 11 x 1.5 cm.    IMPRESSION:  Large soft tissue defect at the medial leg distally, if there is   continued clinical concern follow-up MRI can be ordered    --- End of Report ---      < end of copied text >      IMAGING:   Xray- recommend MRI if concern for OM  MRI- No OM noted     CULTURES: Klebsiella, Proteus, Raoultella    < from: [facility] Physiol Extremity Lower 3+ Level, BI (08.22.23 @ 16:34) >  Right BRIDGETTE = 1.01  Left BRIDGETTE = 1.00    < end of copied text >

## 2023-08-27 NOTE — PROGRESS NOTE ADULT - ATTENDING COMMENTS
Patient seen and examined this morning    Patient doing well  right leg swelling and discoloration much improved but developed cord like palpable vein tracking up the thigh with significant pain to right leg with minimal touch; No evidence of Osteomyelitis on MRI; ABIs with no evidence of PVD  Pt seen by Hematology and Vascular; Hematology recommended prophylactic anticoagulation x one month     Today pain is much better controlled as per patient; Patient with Emergency Medicaid needs medications arranged; Medications submitted to [facility] await approval and delivery    P/E:  Elderly male comfortable in bed NAD at rest  Psych: AAO x3  Neuro: No gross focal deficits; Power and sensation intact  CVS: S1S2 present, regular, no edema  Resp: BLAE+, No wheeze or Rhonchi  GI: Soft, BS+, Non tender, non distended  Skin: Warm and moist without any rashes  Extr:  Right LE warmth and swelling much improved; much less tender today; palpable cord extending across right thigh  Left leg( distal 1/2)  with large deep ulcers dressing intact  Right leg - has very deep ulcer with exposed tendons just above the medial malleolus. drssing intact by Podiatry    Labs:                        8.2    8.06  )-----------( 427      ( 26 Aug 2023 07:48 )             27.5   08-26    137  |  105  |  14  ----------------------------<  122<H>  3.9   |  24  |  0.82  Ca    8.7      26 Aug 2023 07:48  Phos  2.8     08-26  Mg     2.3     08-26  TPro  7.4  /  Alb  2.4<L>  /  TBili  0.2  /  DBili  x   /  AST  10  /  ALT  18  /  AlkPhos  87  08-26    A1C with Estimated Average Glucose (08.21.23 @ 05:30) A1C with Estimated Average Glucose Result: 6.6:    Iron with Total Binding Capacity (08.22.23 @ 08:00)   Iron - Total Binding Capacity.: 331 ug/dL  % Saturation, Iron: 5 %   Iron Total: 17 ug/dL    Culture - Blood (08.20.23 @ 23:10)   Culture Results: No growth at 4 days    MR Lower Ext Joint w/wo IV Cont, Right (08.24.23 @ 17:24)    IMPRESSION:  1.  Soft tissue ulceration with adjacent cellulitis.  2.  No abscess or acute osteomyelitis.    US Duplex Venous Lower Ext Ltd, Right (08.25.23 @ 12:00)     IMPRESSION:  No evidence of right lower extremity deep venous thrombosis.  Superficial thrombosis of branch of the greater saphenous vein in the anterior right thigh.        A/P: 58 year old man with hx of DM, HTN, HLD with chronic B/L LE ulcers with chronic wounds managed in [place] presenting with sepsis likely related to his wound infections and concern for OM    D/D:   Superficial thrombophlebitis Right thigh concern for Septic phlebitis  Sepsis with SSTI of RLE resolved Sepsis  Acute on chronic microcytic anemia suspicious for iron def anemia related to GI bleeding  B/L lower extremity chronic ulcer infection with cellulitis and suspected osteomyelitis (ruled out)  ARGELIA likely from underlying Sepsis resolved  Suspected Pyoderma gangrenosum (less likely) vs venous insufficiency   Type 2 DM fair control   HTN     Plan:  Continue oral antibiotics Ciprofloxacin and Dicloxacillin but extend Rx for 3 more weeks given underlying thrombophlebitis as per ID   US Doppler confirm SVT; will anticoagulate for one month; Monitor closley H/H  Hematology recommended a/c for a month; Will give Apixaban 5mg BID  Vascular Sx consult appreciated; No intervention indicated  Anemia panel with Iron deficiency; Hematology consult appreciated; agree with Venofer x 3 days; d/w ID Dr. Carter no CI for Iron infusion  Hematology also agrees with anticoagulation for a month if tolerated well given relative immobility given B/L leg ulcers  GI consult Patient will likely need a Colonoscopy; last Colonoscopy >1 yr ago with multiple polyps removed; Pt has appt outpt with GI next month  A1c acceptable; Add Gabapentin 300 mg HS for Neuropathic pain  Hold BP meds for hypotension or  low BP  d/w Housestaff to confirm outpt med with [facility] pharmacy: Patient reported taking Lantus, Janumet 2 tabs HS (50/1000), Jardiance 10 gm daily, Statin  Podiatry f/u appreciated; Wound care as per Podiatry    Discussed with Patient  will arrange outpt follow up with PCP or alternatively with [facility] with Dr. Ravin Maloney if pt unable to f/u with PCP at [place]. D/C Plan once we can arrange [facility] meds as patient has only Emergency Medicaid needs Apixaban and Antibiotics. Patient seen and examined this morning    Patient doing well  right leg swelling and discoloration much improved but developed cord like palpable vein tracking up the thigh with significant pain to right leg with minimal touch; No evidence of Osteomyelitis on MRI; ABIs with no evidence of PVD  Pt seen by Hematology and Vascular; Hematology recommended prophylactic anticoagulation x one month     Today pain is much better controlled as per patient; Patient with Emergency Medicaid needs medications arranged; Medications submitted to [facility] await approval and delivery    P/E:  Elderly male comfortable in bed NAD at rest  Psych: AAO x3  Neuro: No gross focal deficits; Power and sensation intact  CVS: S1S2 present, regular, no edema  Resp: BLAE+, No wheeze or Rhonchi  GI: Soft, BS+, Non tender, non distended  Skin: Warm and moist without any rashes  Extr:  Right LE warmth and swelling much improved; much less tender today; palpable cord extending across right thigh  Left leg( distal 1/2)  with large deep ulcers dressing intact  Right leg - has very deep ulcer with exposed tendons just above the medial malleolus. drssing intact by Podiatry    Labs:                        8.2    8.06  )-----------( 427      ( 26 Aug 2023 07:48 )             27.5   08-26    137  |  105  |  14  ----------------------------<  122<H>  3.9   |  24  |  0.82  Ca    8.7      26 Aug 2023 07:48  Phos  2.8     08-26  Mg     2.3     08-26  TPro  7.4  /  Alb  2.4<L>  /  TBili  0.2  /  DBili  x   /  AST  10  /  ALT  18  /  AlkPhos  87  08-26    A1C with Estimated Average Glucose (08.21.23 @ 05:30) A1C with Estimated Average Glucose Result: 6.6:    Iron with Total Binding Capacity (08.22.23 @ 08:00)   Iron - Total Binding Capacity.: 331 ug/dL  % Saturation, Iron: 5 %   Iron Total: 17 ug/dL    Culture - Blood (08.20.23 @ 23:10)   Culture Results: No growth at 4 days    MR Lower Ext Joint w/wo IV Cont, Right (08.24.23 @ 17:24)    IMPRESSION:  1.  Soft tissue ulceration with adjacent cellulitis.  2.  No abscess or acute osteomyelitis.    US Duplex Venous Lower Ext Ltd, Right (08.25.23 @ 12:00)     IMPRESSION:  No evidence of right lower extremity deep venous thrombosis.  Superficial thrombosis of branch of the greater saphenous vein in the anterior right thigh.        A/P: 58 year old man with hx of DM, HTN, HLD with chronic B/L LE ulcers with chronic wounds managed in [place] presenting with sepsis likely related to his wound infections and concern for OM    D/D:   Superficial thrombophlebitis Right thigh concern for Septic phlebitis  Sepsis with SSTI of RLE resolved Sepsis  Acute on chronic microcytic anemia suspicious for iron def anemia related to GI bleeding  B/L lower extremity chronic ulcer infection with cellulitis and suspected osteomyelitis (ruled out)  ARGELIA likely from underlying Sepsis resolved  Suspected Pyoderma gangrenosum (less likely) vs venous insufficiency   Type 2 DM fair control   HTN     Plan:  Continue oral antibiotics Ciprofloxacin and Dicloxacillin but extend Rx for 3 more weeks given underlying thrombophlebitis as per ID   US Doppler confirm SVT; will anticoagulate for one month; Monitor closley H/H  Hematology recommended a/c for a month; Will give Apixaban 5mg BID  Vascular Sx consult appreciated; No intervention indicated  Anemia panel with Iron deficiency; Hematology consult appreciated; agree with Venofer x 3 days; d/w ID Dr. Carter no CI for Iron infusion  Hematology also agrees with anticoagulation for a month if tolerated well given relative immobility given B/L leg ulcers  GI consult Patient will likely need a Colonoscopy; last Colonoscopy >1 yr ago with multiple polyps removed; Pt has appt outpt with GI next month  A1c acceptable; Add Gabapentin 300 mg HS for Neuropathic pain  Hold BP meds for hypotension or  low BP  d/w Housestaff to confirm outpt med with [facility] pharmacy: Patient reported taking Lantus, Janumet 2 tabs HS (50/1000), Jardiance 10 gm daily, Statin  Podiatry f/u appreciated; Wound care as per Podiatry    Discussed with Patient  will arrange outpt follow up with PCP or alternatively with [facility] clinic with Dr. Ravin Maloney if pt unable to f/u with PCP at [place]. D/C Plan once we can arrange [facility] meds as patient has only Emergency Medicaid needs Apixaban and Antibiotics. Patient seen and examined this morning    Patient doing well  right leg swelling and discoloration much improved but developed cord like palpable vein tracking up the thigh with significant pain to right leg with minimal touch; No evidence of Osteomyelitis on MRI; ABIs with no evidence of PVD  Pt seen by Hematology and Vascular; Hematology recommended prophylactic anticoagulation x one month     Today pain is much better controlled as per patient; Patient with Emergency Medicaid needs medications arranged; Medications submitted to [facility] await approval and delivery    P/E:  Elderly male comfortable in bed NAD at rest  Psych: AAO x3  Neuro: No gross focal deficits; Power and sensation intact  CVS: S1S2 present, regular, no edema  Resp: BLAE+, No wheeze or Rhonchi  GI: Soft, BS+, Non tender, non distended  Skin: Warm and moist without any rashes  Extr:  Right LE warmth and swelling much improved; much less tender today; palpable cord extending across right thigh  Left leg( distal 1/2)  with large deep ulcers dressing intact  Right leg - has very deep ulcer with exposed tendons just above the medial malleolus. drssing intact by Podiatry    Labs:                        8.2    8.06  )-----------( 427      ( 26 Aug 2023 07:48 )             27.5   08-26    137  |  105  |  14  ----------------------------<  122<H>  3.9   |  24  |  0.82  Ca    8.7      26 Aug 2023 07:48  Phos  2.8     08-26  Mg     2.3     08-26  TPro  7.4  /  Alb  2.4<L>  /  TBili  0.2  /  DBili  x   /  AST  10  /  ALT  18  /  AlkPhos  87  08-26    A1C with Estimated Average Glucose (08.21.23 @ 05:30) A1C with Estimated Average Glucose Result: 6.6:    Iron with Total Binding Capacity (08.22.23 @ 08:00)   Iron - Total Binding Capacity.: 331 ug/dL  % Saturation, Iron: 5 %   Iron Total: 17 ug/dL    Culture - Blood (08.20.23 @ 23:10)   Culture Results: No growth at 4 days    MR Lower Ext Joint w/wo IV Cont, Right (08.24.23 @ 17:24)    IMPRESSION:  1.  Soft tissue ulceration with adjacent cellulitis.  2.  No abscess or acute osteomyelitis.    US Duplex Venous Lower Ext Ltd, Right (08.25.23 @ 12:00)     IMPRESSION:  No evidence of right lower extremity deep venous thrombosis.  Superficial thrombosis of branch of the greater saphenous vein in the anterior right thigh.        A/P: 58 year old man with hx of DM, HTN, HLD with chronic B/L LE ulcers with chronic wounds managed in [place] presenting with sepsis likely related to his wound infections and concern for OM    D/D:   Superficial thrombophlebitis Right thigh concern for Septic phlebitis  Sepsis with SSTI of RLE resolved Sepsis  Acute on chronic microcytic anemia suspicious for iron def anemia related to GI bleeding  B/L lower extremity chronic ulcer infection with cellulitis and suspected osteomyelitis (ruled out)  ARGELIA likely from underlying Sepsis resolved  Suspected Pyoderma gangrenosum (less likely) vs venous insufficiency   Type 2 DM fair control   HTN     Plan:  Continue oral antibiotics Ciprofloxacin and Dicloxacillin but extend Rx for 3 more weeks given underlying thrombophlebitis as per ID   US Doppler confirm SVT; will anticoagulate for one month; Monitor closley H/H  Hematology recommended a/c for a month; Will give Apixaban 5mg BID  Vascular Sx consult appreciated; No intervention indicated  Anemia panel with Iron deficiency; Hematology consult appreciated; agree with Venofer x 3 days; d/w ID Dr. Carter no CI for Iron infusion  Hematology also agrees with anticoagulation for a month if tolerated well given relative immobility given B/L leg ulcers  GI consult Patient will likely need a Colonoscopy; last Colonoscopy >1 yr ago with multiple polyps removed; Pt has appt outpt with GI next month  A1c acceptable; Add Gabapentin 300 mg HS for Neuropathic pain  Hold BP meds for hypotension or  low BP  d/w Housestaff to confirm outpt med with [facility] pharmacy: Patient reported taking Lantus, Janumet 2 tabs HS (50/1000), Jardiance 10 gm daily, Statin  Podiatry f/u appreciated; Wound care as per Podiatry    Discussed with Patient  will arrange outpt follow up with PCP or alternatively with [facility] clinic with Dr. Ravin Maloney if pt unable to f/u with PCP at [place]. D/C Plan once we can arrange [facility] meds as patient has only Emergency Medicaid needs Apixaban and Antibiotics. Patient seen and examined this morning. Discussed with PGY1 Dr. Temple    Patient doing well  right leg swelling and discoloration much improved but developed cord like palpable vein tracking up the thigh with significant pain to right leg with minimal touch noted to have Thrombophlebitis; No evidence of Osteomyelitis on MRI; ABIs with no evidence of PVD  Pt seen by Hematology and Vascular; Hematology recommended prophylactic anticoagulation x one month    Patient doing wel;  pain is much better controlled as per patient; Patient with Emergency Medicaid needs medications arranged; Medications submitted to [facility] await approval and delivery    Vital Signs Last 24 Hrs  T(C): 36.9 (27 Aug 2023 15:58), Max: 37.3 (27 Aug 2023 11:06)  T(F): 98.4 (27 Aug 2023 15:58), Max: 99.1 (27 Aug 2023 11:06)  HR: 86 (27 Aug 2023 15:58) (81 - 99)  BP: 111/63 (27 Aug 2023 15:58) (111/63 - 137/84)  RR: 18 (27 Aug 2023 15:58) (18 - 18)  SpO2: 96% (27 Aug 2023 15:58) (93% - 100%): room air    P/E:  Elderly male comfortable in bed NAD at rest  Psych: AAO x3  Neuro: No gross focal deficits; Power and sensation intact  CVS: S1S2 present, regular, no edema  Resp: BLAE+, No wheeze or Rhonchi  GI: Soft, BS+, Non tender, non distended  Skin: Warm and moist without any rashes  Extr:  Right LE warmth and swelling much improved; much less tendernes; palpable cord extending across right thigh  Left leg( distal 1/2)  with large deep ulcers dressing intact  Right leg - has very deep ulcer with exposed tendons just above the medial malleolus. dressing intact     Labs: Reviewed as below; stable today                        8.8    9.64  )-----------( 477      ( 27 Aug 2023 07:08 )             29.4     08-27    138  |  105  |  13  ----------------------------<  123<H>  4.3   |  27  |  0.89    Ca    8.8      27 Aug 2023 07:08  Phos  2.8     08-27  Mg     2.3     08-27    TPro  8.0  /  Alb  2.6<L>  /  TBili  0.1<L>  /  DBili  x   /  AST  11  /  ALT  17  /  AlkPhos  94  08-27    A1C with Estimated Average Glucose (08.21.23 @ 05:30) A1C with Estimated Average Glucose Result: 6.6:    Culture - Blood (08.20.23 @ 23:10) Culture Results: No growth at 4 days    MR Lower Ext Joint w/wo IV Cont, Right (08.24.23 @ 17:24)    IMPRESSION:  1.  Soft tissue ulceration with adjacent cellulitis.  2.  No abscess or acute osteomyelitis.    US Duplex Venous Lower Ext Ltd, Right (08.25.23 @ 12:00)   IMPRESSION:  No evidence of right lower extremity deep venous thrombosis.  Superficial thrombosis of branch of the greater saphenous vein in the anterior right thigh.    D/D:   Superficial thrombophlebitis Right thigh concern for Septic phlebitis  Sepsis with SSTI of RLE resolved Sepsis  Acute on chronic microcytic anemia suspicious for iron def anemia related to GI bleeding  B/L lower extremity chronic ulcer infection with cellulitis and suspected osteomyelitis (ruled out)  ARGELIA likely from underlying Sepsis resolved  Suspected Pyoderma gangrenosum (less likely) vs venous insufficiency   Type 2 DM fair control   HTN     Plan:  Continue oral antibiotics Ciprofloxacin and Dicloxacillin but extend Rx for 3 more weeks given underlying thrombophlebitis as per ID  US Doppler confirm SVT; will anticoagulate for one month; Monitor closley H/H  Hematology recommended a/c for a month; Will give Apixaban 5mg BID; d/w Dr. Sandhu agrees  Vascular Sx consult appreciated; No intervention indicated  Anemia panel with Iron deficiency; Hematology consult appreciated; agree with Venofer x 3 days; d/w ID; no CI for Iron infusion  GI consult Patient will likely need a Colonoscopy; last Colonoscopy >1 yr ago with multiple polyps removed; Pt has appt outpt with GI next month  A1c acceptable; Add Gabapentin 300 mg HS for Neuropathic pain  Hold BP meds for hypotension or  low BP   Patient reported taking Lantus, Janumet 2 tabs HS (50/1000), Jardiance 10 gm daily, Statin; Please confirm dosing with outpt [facility] pharmacy prior to discharge  Podiatry f/u; Wound care as per Podiatry  Labs stable; jt hold off daily labs unless clinical status changes    Discussed with Patient  will arrange outpt follow up with PCP or alternatively with Student clinic with Dr. Ravin Maloney if pt unable to f/u with PCP at [place]. D/C Plan once we can arrange [facility] meds as patient has only Emergency Medicaid needs Apixaban and Antibiotics. Likely D/C AM Patient seen and examined this morning. Discussed with PGY1 Dr. Temple    Patient doing well  right leg swelling and discoloration much improved but developed cord like palpable vein tracking up the thigh with significant pain to right leg with minimal touch noted to have Thrombophlebitis; No evidence of Osteomyelitis on MRI; ABIs with no evidence of PVD  Pt seen by Hematology and Vascular; Hematology recommended prophylactic anticoagulation x one month    Patient doing wel;  pain is much better controlled as per patient; Patient with Emergency Medicaid needs medications arranged; Medications submitted to [facility] await approval and delivery    Vital Signs Last 24 Hrs  T(C): 36.9 (27 Aug 2023 15:58), Max: 37.3 (27 Aug 2023 11:06)  T(F): 98.4 (27 Aug 2023 15:58), Max: 99.1 (27 Aug 2023 11:06)  HR: 86 (27 Aug 2023 15:58) (81 - 99)  BP: 111/63 (27 Aug 2023 15:58) (111/63 - 137/84)  RR: 18 (27 Aug 2023 15:58) (18 - 18)  SpO2: 96% (27 Aug 2023 15:58) (93% - 100%): room air    P/E:  Elderly male comfortable in bed NAD at rest  Psych: AAO x3  Neuro: No gross focal deficits; Power and sensation intact  CVS: S1S2 present, regular, no edema  Resp: BLAE+, No wheeze or Rhonchi  GI: Soft, BS+, Non tender, non distended  Skin: Warm and moist without any rashes  Extr:  Right LE warmth and swelling much improved; much less tendernes; palpable cord extending across right thigh  Left leg( distal 1/2)  with large deep ulcers dressing intact  Right leg - has very deep ulcer with exposed tendons just above the medial malleolus. dressing intact     Labs: Reviewed as below; stable today                        8.8    9.64  )-----------( 477      ( 27 Aug 2023 07:08 )             29.4     08-27    138  |  105  |  13  ----------------------------<  123<H>  4.3   |  27  |  0.89    Ca    8.8      27 Aug 2023 07:08  Phos  2.8     08-27  Mg     2.3     08-27    TPro  8.0  /  Alb  2.6<L>  /  TBili  0.1<L>  /  DBili  x   /  AST  11  /  ALT  17  /  AlkPhos  94  08-27    A1C with Estimated Average Glucose (08.21.23 @ 05:30) A1C with Estimated Average Glucose Result: 6.6:    Culture - Blood (08.20.23 @ 23:10) Culture Results: No growth at 4 days    MR Lower Ext Joint w/wo IV Cont, Right (08.24.23 @ 17:24)    IMPRESSION:  1.  Soft tissue ulceration with adjacent cellulitis.  2.  No abscess or acute osteomyelitis.    US Duplex Venous Lower Ext Ltd, Right (08.25.23 @ 12:00)   IMPRESSION:  No evidence of right lower extremity deep venous thrombosis.  Superficial thrombosis of branch of the greater saphenous vein in the anterior right thigh.    D/D:   Superficial thrombophlebitis Right thigh concern for Septic phlebitis  Sepsis with SSTI of RLE resolved Sepsis  Acute on chronic microcytic anemia suspicious for iron def anemia related to GI bleeding  B/L lower extremity chronic ulcer infection with cellulitis and suspected osteomyelitis (ruled out)  ARGELIA likely from underlying Sepsis resolved  Suspected Pyoderma gangrenosum (less likely) vs venous insufficiency   Type 2 DM fair control   HTN     Plan:  Continue oral antibiotics Ciprofloxacin and Dicloxacillin but extend Rx for 3 more weeks given underlying thrombophlebitis as per ID  US Doppler confirm SVT; will anticoagulate for one month; Monitor closley H/H  Hematology recommended a/c for a month; Will give Apixaban 5mg BID; d/w Dr. Sandhu agrees  Vascular Sx consult appreciated; No intervention indicated  Anemia panel with Iron deficiency; Hematology consult appreciated; agree with Venofer x 3 days; d/w ID; no CI for Iron infusion  GI consult Patient will likely need a Colonoscopy; last Colonoscopy >1 yr ago with multiple polyps removed; Pt has appt outpt with GI next month  A1c acceptable; Add Gabapentin 300 mg HS for Neuropathic pain  Hold BP meds for hypotension or  low BP   Patient reported taking Lantus, Janumet 2 tabs HS (50/1000), Jardiance 10 gm daily, Statin; Please confirm dosing with outpt [facility] pharmacy prior to discharge  Podiatry f/u; Wound care as per Podiatry  Labs stable; jt hold off daily labs unless clinical status changes    Discussed with Patient  will arrange outpt follow up with PCP or alternatively with [facility] clinic with Dr. Ravin Maloney if pt unable to f/u with PCP at [place]. D/C Plan once we can arrange [facility] meds as patient has only Emergency Medicaid needs Apixaban and Antibiotics. Likely D/C AM Patient seen and examined this morning. Discussed with PGY1 Dr. Temple    Patient doing well  right leg swelling and discoloration much improved but developed cord like palpable vein tracking up the thigh with significant pain to right leg with minimal touch noted to have Thrombophlebitis; No evidence of Osteomyelitis on MRI; ABIs with no evidence of PVD  Pt seen by Hematology and Vascular; Hematology recommended prophylactic anticoagulation x one month    Patient doing wel;  pain is much better controlled as per patient; Patient with Emergency Medicaid needs medications arranged; Medications submitted to [facility] pharmacy await approval and delivery    Vital Signs Last 24 Hrs  T(C): 36.9 (27 Aug 2023 15:58), Max: 37.3 (27 Aug 2023 11:06)  T(F): 98.4 (27 Aug 2023 15:58), Max: 99.1 (27 Aug 2023 11:06)  HR: 86 (27 Aug 2023 15:58) (81 - 99)  BP: 111/63 (27 Aug 2023 15:58) (111/63 - 137/84)  RR: 18 (27 Aug 2023 15:58) (18 - 18)  SpO2: 96% (27 Aug 2023 15:58) (93% - 100%): room air    P/E:  Elderly male comfortable in bed NAD at rest  Psych: AAO x3  Neuro: No gross focal deficits; Power and sensation intact  CVS: S1S2 present, regular, no edema  Resp: BLAE+, No wheeze or Rhonchi  GI: Soft, BS+, Non tender, non distended  Skin: Warm and moist without any rashes  Extr:  Right LE warmth and swelling much improved; much less tendernes; palpable cord extending across right thigh  Left leg( distal 1/2)  with large deep ulcers dressing intact  Right leg - has very deep ulcer with exposed tendons just above the medial malleolus. dressing intact     Labs: Reviewed as below; stable today                        8.8    9.64  )-----------( 477      ( 27 Aug 2023 07:08 )             29.4     08-27    138  |  105  |  13  ----------------------------<  123<H>  4.3   |  27  |  0.89    Ca    8.8      27 Aug 2023 07:08  Phos  2.8     08-27  Mg     2.3     08-27    TPro  8.0  /  Alb  2.6<L>  /  TBili  0.1<L>  /  DBili  x   /  AST  11  /  ALT  17  /  AlkPhos  94  08-27    A1C with Estimated Average Glucose (08.21.23 @ 05:30) A1C with Estimated Average Glucose Result: 6.6:    Culture - Blood (08.20.23 @ 23:10) Culture Results: No growth at 4 days    MR Lower Ext Joint w/wo IV Cont, Right (08.24.23 @ 17:24)    IMPRESSION:  1.  Soft tissue ulceration with adjacent cellulitis.  2.  No abscess or acute osteomyelitis.    US Duplex Venous Lower Ext Ltd, Right (08.25.23 @ 12:00)   IMPRESSION:  No evidence of right lower extremity deep venous thrombosis.  Superficial thrombosis of branch of the greater saphenous vein in the anterior right thigh.    D/D:   Superficial thrombophlebitis Right thigh concern for Septic phlebitis  Sepsis with SSTI of RLE resolved Sepsis  Acute on chronic microcytic anemia suspicious for iron def anemia related to GI bleeding  B/L lower extremity chronic ulcer infection with cellulitis and suspected osteomyelitis (ruled out)  ARGELIA likely from underlying Sepsis resolved  Suspected Pyoderma gangrenosum (less likely) vs venous insufficiency   Type 2 DM fair control   HTN     Plan:  Continue oral antibiotics Ciprofloxacin and Dicloxacillin but extend Rx for 3 more weeks given underlying thrombophlebitis as per ID  US Doppler confirm SVT; will anticoagulate for one month; Monitor closley H/H  Hematology recommended a/c for a month; Will give Apixaban 5mg BID; d/w Dr. Sandhu agrees  Vascular Sx consult appreciated; No intervention indicated  Anemia panel with Iron deficiency; Hematology consult appreciated; agree with Venofer x 3 days; d/w ID; no CI for Iron infusion  GI consult Patient will likely need a Colonoscopy; last Colonoscopy >1 yr ago with multiple polyps removed; Pt has appt outpt with GI next month  A1c acceptable; Add Gabapentin 300 mg HS for Neuropathic pain  Hold BP meds for hypotension or  low BP   Patient reported taking Lantus, Janumet 2 tabs HS (50/1000), Jardiance 10 gm daily, Statin; Please confirm dosing with outpt [facility] pharmacy prior to discharge  Podiatry f/u; Wound care as per Podiatry  Labs stable; jt hold off daily labs unless clinical status changes    Discussed with Patient  will arrange outpt follow up with PCP or alternatively with [facility] with Dr. Ravin Maloney if pt unable to f/u with PCP at [place]. D/C Plan once we can arrange ViVo meds as patient has only Emergency Medicaid needs Apixaban and Antibiotics. Likely D/C AM

## 2023-08-27 NOTE — PROGRESS NOTE ADULT - ASSESSMENT
# MICROCYTIC  ANEMIA  #  IRON DEFICIENCY ANEMIA   pt with h/o of  polyps on colonoscopy ~ [id number], sched for f/u with GI this hank  noted  iron study  c/w ANEUDY   cont with  IV iron x 3 doses    f/u CBC, PRBC TX prn for HB<7        #  SUPERFICIAL VEIN THROMBOSIS   w/u with doppler above noted  neg for DVT  pt is obese, male   sedentary/mostly in bed d/t LE cellulitis,   chronic  b/l leg ulcers  pt reports h/o of CT a/p at [facility] ~ 2 months ago- neg as per pt  started on   Lovenox 40 mg sq  qd,   changed to  DOAC  tolerating well    recommend to complete 4 weeks as an outpt   cont with topical compress prn   call with questions [phone]   d/w primary team   # MICROCYTIC  ANEMIA  #  IRON DEFICIENCY ANEMIA   pt with h/o of  polyps on colonoscopy ~ [id number], sched for f/u with GI this hank  noted  iron study  c/w ANEUDY   cont with  IV iron x 3 doses    f/u CBC, PRBC TX prn for HB<7        #  SUPERFICIAL VEIN THROMBOSIS   w/u with doppler above noted  neg for DVT  pt is obese, male   sedentary/mostly in bed d/t LE cellulitis,   chronic  b/l leg ulcers  pt reports h/o of CT a/p at [facility] ~ 2 months ago- neg as per pt  started on   Lovenox 40 mg sq  qd,   changed to  DOAC  tolerating well    recommend to complete 4 weeks as an outpt   cont with topical compress prn   call with questions [phone]   d/w primary team   # MICROCYTIC  ANEMIA  #  IRON DEFICIENCY ANEMIA   pt with h/o of  polyps on colonoscopy ~ [id number], sched for f/u with GI this hank  noted  iron study  c/w ANEUDY   cont with  IV iron x 3 doses    f/u CBC, PRBC TX prn for HB<7        #  SUPERFICIAL VEIN THROMBOSIS   w/u with doppler above noted  neg for DVT  pt is obese, male   sedentary/mostly in bed d/t LE cellulitis,   chronic  b/l leg ulcers  pt reports h/o of CT a/p at [facility] ~ 2 months ago- neg as per pt  started on   Lovenox 40 mg sq  qd,   changed to  DOAC  tolerating well    recommend to complete 4 weeks as an outpt   cont with topical compress prn   call with questions [phone]   d/w primary team

## 2023-08-27 NOTE — PROGRESS NOTE ADULT - SUBJECTIVE AND OBJECTIVE BOX
PGY-1 Progress Note discussed with attending    PAGER #: [[phone]] TILL 5:00 PM  PLEASE CONTACT ON CALL TEAM:  - On Call Team (Please refer to Deanne) FROM 5:00 PM - 8:30PM  - Nightfloat Team FROM 8:30 -7:30 AM    INTERVAL HPI/OVERNIGHT EVENTS: Patient was seen at bedside. Patient with no acute concerns. Patient asking about when is going home, concerns and questions answered.       REVIEW OF SYSTEMS:  CONSTITUTIONAL: No fever, weight loss, or fatigue  RESPIRATORY: No cough, wheezing, chills or hemoptysis; No shortness of breath  CARDIOVASCULAR: No chest pain, palpitations, dizziness, or leg swelling  GASTROINTESTINAL: No abdominal pain. No nausea, vomiting, or hematemesis; No diarrhea or constipation. No melena or hematochezia.  GENITOURINARY: No dysuria or hematuria, urinary frequency  NEUROLOGICAL: No headaches, memory loss, loss of strength, numbness, or tremors  SKIN: decreased pain in the right lower leg compared to previous days     Vital Signs Last 24 Hrs  T(C): 36.9 (27 Aug 2023 15:58), Max: 37.3 (27 Aug 2023 11:06)  T(F): 98.4 (27 Aug 2023 15:58), Max: 99.1 (27 Aug 2023 11:06)  HR: 86 (27 Aug 2023 15:58) (81 - 99)  BP: 111/63 (27 Aug 2023 15:58) (111/63 - 137/84)  BP(mean): --  RR: 18 (27 Aug 2023 15:58) (18 - 18)  SpO2: 96% (27 Aug 2023 15:58) (93% - 100%)    Parameters below as of 27 Aug 2023 15:58  Patient On (Oxygen Delivery Method): room air        PHYSICAL EXAMINATION:  GENERAL: NAD,  HEAD:  Atraumatic, Normocephalic  EYES:  conjunctiva and sclera clear  NECK: Supple,  CHEST/LUNG: Clear to auscultation. Clear to percussion bilaterally; No rales, rhonchi, wheezing, or rubs  HEART: Regular rate and rhythm; No murmurs, rubs, or gallops  ABDOMEN: Soft, Nontender, Nondistended; Bowel sounds present  NERVOUS SYSTEM:  Alert & Oriented X3,    EXTREMITIES: moderate tenderness to palpation in the right lower extremity, much improved from previous examinations  SKIN: warm dry                          8.8    9.64  )-----------( 477      ( 27 Aug 2023 07:08 )             29.4     08-27    138  |  105  |  13  ----------------------------<  123<H>  4.3   |  27  |  0.89    Ca    8.8      27 Aug 2023 07:08  Phos  2.8     08-27  Mg     2.3     08-27    TPro  8.0  /  Alb  2.6<L>  /  TBili  0.1<L>  /  DBili  x   /  AST  11  /  ALT  17  /  AlkPhos  94  08-27    LIVER FUNCTIONS - ( 27 Aug 2023 07:08 )  Alb: 2.6 g/dL / Pro: 8.0 g/dL / ALK PHOS: 94 U/L / ALT: 17 U/L DA / AST: 11 U/L / GGT: x                   CAPILLARY BLOOD GLUCOSE      RADIOLOGY & ADDITIONAL TESTS:                   PGY-1 Progress Note discussed with attending    PAGER #: [[phone]] TILL 5:00 PM  PLEASE CONTACT ON CALL TEAM:  - On Call Team (Please refer to Deanne) FROM 5:00 PM - 8:30PM  - Nightfloat Team FROM 8:30 -7:30 AM    INTERVAL HPI/OVERNIGHT EVENTS: Patient was seen at bedside. Patient with no acute concerns. Patient asking about when is going home, concerns and questions answered.       REVIEW OF SYSTEMS:  CONSTITUTIONAL: No fever, weight loss, or fatigue  RESPIRATORY: No cough, wheezing, chills or hemoptysis; No shortness of breath  CARDIOVASCULAR: No chest pain, palpitations, dizziness, or leg swelling  GASTROINTESTINAL: No abdominal pain. No nausea, vomiting, or hematemesis; No diarrhea or constipation. No melena or hematochezia.  GENITOURINARY: No dysuria or hematuria, urinary frequency  NEUROLOGICAL: No headaches, memory loss, loss of strength, numbness, or tremors  SKIN: decreased pain in the right lower leg compared to previous days     Vital Signs Last 24 Hrs  T(C): 36.9 (27 Aug 2023 15:58), Max: 37.3 (27 Aug 2023 11:06)  T(F): 98.4 (27 Aug 2023 15:58), Max: 99.1 (27 Aug 2023 11:06)  HR: 86 (27 Aug 2023 15:58) (81 - 99)  BP: 111/63 (27 Aug 2023 15:58) (111/63 - 137/84)  BP(mean): --  RR: 18 (27 Aug 2023 15:58) (18 - 18)  SpO2: 96% (27 Aug 2023 15:58) (93% - 100%)    Parameters below as of 27 Aug 2023 15:58  Patient On (Oxygen Delivery Method): room air        PHYSICAL EXAMINATION:  GENERAL: NAD,  HEAD:  Atraumatic, Normocephalic  EYES:  conjunctiva and sclera clear  NECK: Supple,  CHEST/LUNG: Clear to auscultation. Clear to percussion bilaterally; No rales, rhonchi, wheezing, or rubs  HEART: Regular rate and rhythm; No murmurs, rubs, or gallops  ABDOMEN: Soft, Nontender, Nondistended; Bowel sounds present  NERVOUS SYSTEM:  Alert & Oriented X3,    EXTREMITIES: moderate tenderness to palpation in the right lower extremity, much improved from previous examinations  SKIN: warm dry                          8.8    9.64  )-----------( 477      ( 27 Aug 2023 07:08 )             29.4     08-27    138  |  105  |  13  ----------------------------<  123<H>  4.3   |  27  |  0.89    Ca    8.8      27 Aug 2023 07:08  Phos  2.8     08-27  Mg     2.3     08-27    TPro  8.0  /  Alb  2.6<L>  /  TBili  0.1<L>  /  DBili  x   /  AST  11  /  ALT  17  /  AlkPhos  94  08-27    LIVER FUNCTIONS - ( 27 Aug 2023 07:08 )  Alb: 2.6 g/dL / Pro: 8.0 g/dL / ALK PHOS: 94 U/L / ALT: 17 U/L DA / AST: 11 U/L / GGT: x                   CAPILLARY BLOOD GLUCOSE      RADIOLOGY & ADDITIONAL TESTS:                   PGY-1 Progress Note discussed with attending    PAGER #: [[phone]] TILL 5:00 PM  PLEASE CONTACT ON CALL TEAM:  - On Call Team (Please refer to Deanne) FROM 5:00 PM - 8:30PM  - Nightfloat Team FROM 8:30 -7:30 AM    INTERVAL HPI/OVERNIGHT EVENTS: Patient was seen at bedside. Patient with no acute concerns. Patient asking about when is going home, concerns and questions answered.       REVIEW OF SYSTEMS:  CONSTITUTIONAL: No fever, weight loss, or fatigue  RESPIRATORY: No cough, wheezing, chills or hemoptysis; No shortness of breath  CARDIOVASCULAR: No chest pain, palpitations, dizziness, or leg swelling  GASTROINTESTINAL: No abdominal pain. No nausea, vomiting, or hematemesis; No diarrhea or constipation. No melena or hematochezia.  GENITOURINARY: No dysuria or hematuria, urinary frequency  NEUROLOGICAL: No headaches, memory loss, loss of strength, numbness, or tremors  SKIN: decreased pain in the right lower leg compared to previous days     Vital Signs Last 24 Hrs  T(C): 36.9 (27 Aug 2023 15:58), Max: 37.3 (27 Aug 2023 11:06)  T(F): 98.4 (27 Aug 2023 15:58), Max: 99.1 (27 Aug 2023 11:06)  HR: 86 (27 Aug 2023 15:58) (81 - 99)  BP: 111/63 (27 Aug 2023 15:58) (111/63 - 137/84)  BP(mean): --  RR: 18 (27 Aug 2023 15:58) (18 - 18)  SpO2: 96% (27 Aug 2023 15:58) (93% - 100%)    Parameters below as of 27 Aug 2023 15:58  Patient On (Oxygen Delivery Method): room air        PHYSICAL EXAMINATION:  GENERAL: NAD,  HEAD:  Atraumatic, Normocephalic  EYES:  conjunctiva and sclera clear  NECK: Supple,  CHEST/LUNG: Clear to auscultation. Clear to percussion bilaterally; No rales, rhonchi, wheezing, or rubs  HEART: Regular rate and rhythm; No murmurs, rubs, or gallops  ABDOMEN: Soft, Nontender, Nondistended; Bowel sounds present  NERVOUS SYSTEM:  Alert & Oriented X3,    EXTREMITIES: moderate tenderness to palpation in the right lower extremity, much improved from previous examinations  SKIN: warm dry                          8.8    9.64  )-----------( 477      ( 27 Aug 2023 07:08 )             29.4     08-27    138  |  105  |  13  ----------------------------<  123<H>  4.3   |  27  |  0.89    Ca    8.8      27 Aug 2023 07:08  Phos  2.8     08-27  Mg     2.3     08-27    TPro  8.0  /  Alb  2.6<L>  /  TBili  0.1<L>  /  DBili  x   /  AST  11  /  ALT  17  /  AlkPhos  94  08-27    LIVER FUNCTIONS - ( 27 Aug 2023 07:08 )  Alb: 2.6 g/dL / Pro: 8.0 g/dL / ALK PHOS: 94 U/L / ALT: 17 U/L DA / AST: 11 U/L / GGT: x                   CAPILLARY BLOOD GLUCOSE      RADIOLOGY & ADDITIONAL TESTS:

## 2023-08-27 NOTE — PROGRESS NOTE ADULT - PROBLEM SELECTOR PROBLEM 3
Diabetic ulcer of both lower extremities

## 2023-08-27 NOTE — PROGRESS NOTE ADULT - SUBJECTIVE AND OBJECTIVE BOX
57 yo M with PMH of DM, HLD, HTN and diabetic leg ulcers present with chills, weakness, lightheadedness and infected RT lower extremity ulcer for the past 3 days which has progressively worsened today. As per pt,  he went to [facility] in [place] 3 days  prior to  admission to [facility]  for his weakness and infected leg ulcer. He mentioned that he received one blood transfusion, antibiotics and was admitted to the hospital. He reports staying in the hospital for a day before leaving the hospital for some personal issue. He reports decreased oral intake today, felt extremely weak and lightheaded. He reports history of ulcer for the past 5 years that has worsened in the past 3 years.  He follows a "dermatologist" at [facility] and he is unable to provide information of his diagnosis. He denies; fever, bodyaches, Chest pain, SOB, URI sxs, N/V/D, abdominal pain, LOC, Head trauma. patient had  h/o of colon polyps  found on colonoscopy ~ 5535-0230, labs c/w iron deficiency anemia, pt is seen by vascular/podiatry, ID for management of R leg cellulitis, on IV abx , noted  noted on 8/25/23  to have R  thigh cord like structure  , Doppler c/w superficial femoral vein thrombophlebitis   we were called for further evaluation  of iron def anemia on superficial vein thrombophlebitis  Today  leg pain improving       PE        Vital Signs Last 24 Hrs  T(C): 37.3 (08-27-23 @ 11:06), Max: 37.3 (08-27-23 @ 11:06)  T(F): 99.1 (08-27-23 @ 11:06), Max: 99.1 (08-27-23 @ 11:06)  HR: 86 (08-27-23 @ 11:06) (81 - 99)  BP: 125/77 (08-27-23 @ 11:06) (112/70 - 137/84)  BP(mean): --  RR: 18 (08-27-23 @ 11:06) (18 - 18)  SpO2: 97% (08-27-23 @ 11:06) (93% - 100%)                  GENERAL: NAD, obese, A 0x3  pleasant M   HEAD:  Atraumatic, Normocephalic  EYES:  conjunctiva and sclera clear, anicteric   NECK: Supple  LUNG: CTA   HEART: Regular rate and rhythm; No murmurs, rubs, or gallops  ABDOMEN: Soft, Nontender, Nondistended; Bowel sounds present, obese   NERVOUS SYSTEM:  Alert & Oriented X3,  no focal signs   EXTREMITIES: bilateral leg ulcers noted, , tender cord like structure in the right upper thigh     decreased tenderness  of R  leg   SKIN: warm dry      LABS                                              8.8    9.64  )-----------( 477      ( 27 Aug 2023 07:08 )             29.4     08-27    138  |  105  |  13  ----------------------------<  123<H>  4.3   |  27  |  0.89    Ca    8.8      27 Aug 2023 07:08  Phos  2.8     08-27  Mg     2.3     08-27    TPro  8.0  /  Alb  2.6<L>  /  TBili  0.1<L>  /  DBili  x   /  AST  11  /  ALT  17  /  AlkPhos  94  08-27          % Saturation, Iron: 5 %  Iron - Total Binding Capacity.: 331 ug/dL  Iron Total: 17 ug/dL  Unsaturated Iron Binding Capacity: 314 ug/dL  Ferritin: 60 ng/mL (08.22.23 @ 08:00)   < from: US Duplex Venous Lower Ext Ltd, Right (08.25.23 @ 12:00) >    ACC: [id number] EXAM:  US DPLX LWR EXT VEINS LTD RT   ORDERED BY: JIM VELASCO     PROCEDURE DATE:  08/25/2023          INTERPRETATION:  CLINICAL INFORMATION: Palpable cord in right leg.    COMPARISON: right lower extremity venous duplex study dated 8/22/2023.    TECHNIQUE: Duplex sonography of the RIGHT LOWER extremity veins with   color and spectral Doppler, with and without compression.    FINDINGS:    There is normal compressibility of the right common femoral, femoral and   popliteal veins.  The contralateral common femoral vein is patent.  Doppler examination shows normal spontaneous and phasic flow.    No calf vein thrombosis is detected.    The palpable finding corresponds to segmental thrombosis of branch of the   greater saphenous vein in the anterior thigh.    IMPRESSION:  No evidence of right lower extremity deep venous thrombosis.    Superficial thrombosis of branch of the greater saphenous vein in the   anterior right thigh.          --- End of Report ---            GRACIE CHANCE MD; Attending Radiologist  This document has been electronically signed. Aug 25 2023  1:41PM    < end of copied text >  < from: MR Lower Ext Joint w/wo IV Cont, Right (08.24.23 @ 17:24) >    ACC: [id number] EXAM:  MR LWR EXT JOINT WAW IC RT   ORDERED BY: JIM VELASCO     PROCEDURE DATE:  08/24/2023          INTERPRETATION:  MRI OF THE RIGHT LOWER EXTREMITY    CLINICAL INFORMATION: Diabetic ulcers in the right lower extremity.   Cellulitis. Evaluate for osteomyelitis.  TECHNIQUE: Multiplanar, multisequence MRI was obtained of the RIGHT LOWER   EXTREMITY focusing on the right tibia/fibula. The study was performed   before and after the intravenous administration of 12 ml Gadavist (3 cc   discarded) .  COMPARISON: Bilateral lower extremity radiographs 8/21/2023.    FINDINGS:    SOFT TISSUES: Soft tissue ulceration along the anteromedial aspect of the   leg. Ulceration extends to the level of the subcutaneous fat. There is  enhancing edema in the adjacent subcutaneous fat. No focal drainable   fluid collections are identified.  BONE MARROW: No increased STIR signal or loss of T1 hyperintense signal   to suggest acute osteomyelitis. No fracture or osteonecrosis.    MUSCLES AND TENDONS: No focal muscle edema. Diffuse fatty infiltration of   the imaged muscles. Tendons are intact.  SYNOVIUM/ JOINT FLUID: No large joint effusion.  MISCELLANEOUS: Neurovascular structures are normal in course and caliber.      IMPRESSION:  1.  Soft tissue ulceration with adjacent cellulitis.  2.  No abscess or acute osteomyelitis.    --- End of Report ---        < end of copied text >             59 yo M with PMH of DM, HLD, HTN and diabetic leg ulcers present with chills, weakness, lightheadedness and infected RT lower extremity ulcer for the past 3 days which has progressively worsened today. As per pt,  he went to [facility] in [place] 3 days  prior to  admission to [facility]  for his weakness and infected leg ulcer. He mentioned that he received one blood transfusion, antibiotics and was admitted to the hospital. He reports staying in the hospital for a day before leaving the hospital for some personal issue. He reports decreased oral intake today, felt extremely weak and lightheaded. He reports history of ulcer for the past 5 years that has worsened in the past 3 years.  He follows a "dermatologist" at [facility] and he is unable to provide information of his diagnosis. He denies; fever, bodyaches, Chest pain, SOB, URI sxs, N/V/D, abdominal pain, LOC, Head trauma. patient had  h/o of colon polyps  found on colonoscopy ~ 2715-9166, labs c/w iron deficiency anemia, pt is seen by vascular/podiatry, ID for management of R leg cellulitis, on IV abx , noted  noted on 8/25/23  to have R  thigh cord like structure  , Doppler c/w superficial femoral vein thrombophlebitis   we were called for further evaluation  of iron def anemia on superficial vein thrombophlebitis  Today  leg pain improving       PE        Vital Signs Last 24 Hrs  T(C): 37.3 (08-27-23 @ 11:06), Max: 37.3 (08-27-23 @ 11:06)  T(F): 99.1 (08-27-23 @ 11:06), Max: 99.1 (08-27-23 @ 11:06)  HR: 86 (08-27-23 @ 11:06) (81 - 99)  BP: 125/77 (08-27-23 @ 11:06) (112/70 - 137/84)  BP(mean): --  RR: 18 (08-27-23 @ 11:06) (18 - 18)  SpO2: 97% (08-27-23 @ 11:06) (93% - 100%)                  GENERAL: NAD, obese, A 0x3  pleasant M   HEAD:  Atraumatic, Normocephalic  EYES:  conjunctiva and sclera clear, anicteric   NECK: Supple  LUNG: CTA   HEART: Regular rate and rhythm; No murmurs, rubs, or gallops  ABDOMEN: Soft, Nontender, Nondistended; Bowel sounds present, obese   NERVOUS SYSTEM:  Alert & Oriented X3,  no focal signs   EXTREMITIES: bilateral leg ulcers noted, , tender cord like structure in the right upper thigh     decreased tenderness  of R  leg   SKIN: warm dry      LABS                                              8.8    9.64  )-----------( 477      ( 27 Aug 2023 07:08 )             29.4     08-27    138  |  105  |  13  ----------------------------<  123<H>  4.3   |  27  |  0.89    Ca    8.8      27 Aug 2023 07:08  Phos  2.8     08-27  Mg     2.3     08-27    TPro  8.0  /  Alb  2.6<L>  /  TBili  0.1<L>  /  DBili  x   /  AST  11  /  ALT  17  /  AlkPhos  94  08-27          % Saturation, Iron: 5 %  Iron - Total Binding Capacity.: 331 ug/dL  Iron Total: 17 ug/dL  Unsaturated Iron Binding Capacity: 314 ug/dL  Ferritin: 60 ng/mL (08.22.23 @ 08:00)   < from: US Duplex Venous Lower Ext Ltd, Right (08.25.23 @ 12:00) >    ACC: [id number] EXAM:  US DPLX LWR EXT VEINS LTD RT   ORDERED BY: JIM VELASCO     PROCEDURE DATE:  08/25/2023          INTERPRETATION:  CLINICAL INFORMATION: Palpable cord in right leg.    COMPARISON: right lower extremity venous duplex study dated 8/22/2023.    TECHNIQUE: Duplex sonography of the RIGHT LOWER extremity veins with   color and spectral Doppler, with and without compression.    FINDINGS:    There is normal compressibility of the right common femoral, femoral and   popliteal veins.  The contralateral common femoral vein is patent.  Doppler examination shows normal spontaneous and phasic flow.    No calf vein thrombosis is detected.    The palpable finding corresponds to segmental thrombosis of branch of the   greater saphenous vein in the anterior thigh.    IMPRESSION:  No evidence of right lower extremity deep venous thrombosis.    Superficial thrombosis of branch of the greater saphenous vein in the   anterior right thigh.          --- End of Report ---            GRACIE CHANCE MD; Attending Radiologist  This document has been electronically signed. Aug 25 2023  1:41PM    < end of copied text >  < from: MR Lower Ext Joint w/wo IV Cont, Right (08.24.23 @ 17:24) >    ACC: [id number] EXAM:  MR LWR EXT JOINT WAW IC RT   ORDERED BY: JIM VELASCO     PROCEDURE DATE:  08/24/2023          INTERPRETATION:  MRI OF THE RIGHT LOWER EXTREMITY    CLINICAL INFORMATION: Diabetic ulcers in the right lower extremity.   Cellulitis. Evaluate for osteomyelitis.  TECHNIQUE: Multiplanar, multisequence MRI was obtained of the RIGHT LOWER   EXTREMITY focusing on the right tibia/fibula. The study was performed   before and after the intravenous administration of 12 ml Gadavist (3 cc   discarded) .  COMPARISON: Bilateral lower extremity radiographs 8/21/2023.    FINDINGS:    SOFT TISSUES: Soft tissue ulceration along the anteromedial aspect of the   leg. Ulceration extends to the level of the subcutaneous fat. There is  enhancing edema in the adjacent subcutaneous fat. No focal drainable   fluid collections are identified.  BONE MARROW: No increased STIR signal or loss of T1 hyperintense signal   to suggest acute osteomyelitis. No fracture or osteonecrosis.    MUSCLES AND TENDONS: No focal muscle edema. Diffuse fatty infiltration of   the imaged muscles. Tendons are intact.  SYNOVIUM/ JOINT FLUID: No large joint effusion.  MISCELLANEOUS: Neurovascular structures are normal in course and caliber.      IMPRESSION:  1.  Soft tissue ulceration with adjacent cellulitis.  2.  No abscess or acute osteomyelitis.    --- End of Report ---        < end of copied text >             57 yo M with PMH of DM, HLD, HTN and diabetic leg ulcers present with chills, weakness, lightheadedness and infected RT lower extremity ulcer for the past 3 days which has progressively worsened today. As per pt,  he went to [facility] in [place] 3 days  prior to  admission to [facility]  for his weakness and infected leg ulcer. He mentioned that he received one blood transfusion, antibiotics and was admitted to the hospital. He reports staying in the hospital for a day before leaving the hospital for some personal issue. He reports decreased oral intake today, felt extremely weak and lightheaded. He reports history of ulcer for the past 5 years that has worsened in the past 3 years.  He follows a "dermatologist" at [facility] and he is unable to provide information of his diagnosis. He denies; fever, bodyaches, Chest pain, SOB, URI sxs, N/V/D, abdominal pain, LOC, Head trauma. patient had  h/o of colon polyps  found on colonoscopy ~ 2599-8784, labs c/w iron deficiency anemia, pt is seen by vascular/podiatry, ID for management of R leg cellulitis, on IV abx , noted  noted on 8/25/23  to have R  thigh cord like structure  , Doppler c/w superficial femoral vein thrombophlebitis   we were called for further evaluation  of iron def anemia on superficial vein thrombophlebitis  Today  leg pain improving       PE        Vital Signs Last 24 Hrs  T(C): 37.3 (08-27-23 @ 11:06), Max: 37.3 (08-27-23 @ 11:06)  T(F): 99.1 (08-27-23 @ 11:06), Max: 99.1 (08-27-23 @ 11:06)  HR: 86 (08-27-23 @ 11:06) (81 - 99)  BP: 125/77 (08-27-23 @ 11:06) (112/70 - 137/84)  BP(mean): --  RR: 18 (08-27-23 @ 11:06) (18 - 18)  SpO2: 97% (08-27-23 @ 11:06) (93% - 100%)                  GENERAL: NAD, obese, A 0x3  pleasant M   HEAD:  Atraumatic, Normocephalic  EYES:  conjunctiva and sclera clear, anicteric   NECK: Supple  LUNG: CTA   HEART: Regular rate and rhythm; No murmurs, rubs, or gallops  ABDOMEN: Soft, Nontender, Nondistended; Bowel sounds present, obese   NERVOUS SYSTEM:  Alert & Oriented X3,  no focal signs   EXTREMITIES: bilateral leg ulcers noted, , tender cord like structure in the right upper thigh     decreased tenderness  of R  leg   SKIN: warm dry      LABS                                              8.8    9.64  )-----------( 477      ( 27 Aug 2023 07:08 )             29.4     08-27    138  |  105  |  13  ----------------------------<  123<H>  4.3   |  27  |  0.89    Ca    8.8      27 Aug 2023 07:08  Phos  2.8     08-27  Mg     2.3     08-27    TPro  8.0  /  Alb  2.6<L>  /  TBili  0.1<L>  /  DBili  x   /  AST  11  /  ALT  17  /  AlkPhos  94  08-27          % Saturation, Iron: 5 %  Iron - Total Binding Capacity.: 331 ug/dL  Iron Total: 17 ug/dL  Unsaturated Iron Binding Capacity: 314 ug/dL  Ferritin: 60 ng/mL (08.22.23 @ 08:00)   < from: US Duplex Venous Lower Ext Ltd, Right (08.25.23 @ 12:00) >    ACC: [id number] EXAM:  US DPLX LWR EXT VEINS LTD RT   ORDERED BY: JIM VELASCO     PROCEDURE DATE:  08/25/2023          INTERPRETATION:  CLINICAL INFORMATION: Palpable cord in right leg.    COMPARISON: right lower extremity venous duplex study dated 8/22/2023.    TECHNIQUE: Duplex sonography of the RIGHT LOWER extremity veins with   color and spectral Doppler, with and without compression.    FINDINGS:    There is normal compressibility of the right common femoral, femoral and   popliteal veins.  The contralateral common femoral vein is patent.  Doppler examination shows normal spontaneous and phasic flow.    No calf vein thrombosis is detected.    The palpable finding corresponds to segmental thrombosis of branch of the   greater saphenous vein in the anterior thigh.    IMPRESSION:  No evidence of right lower extremity deep venous thrombosis.    Superficial thrombosis of branch of the greater saphenous vein in the   anterior right thigh.          --- End of Report ---            GRACIE CHANCE MD; Attending Radiologist  This document has been electronically signed. Aug 25 2023  1:41PM    < end of copied text >  < from: MR Lower Ext Joint w/wo IV Cont, Right (08.24.23 @ 17:24) >    ACC: [id number] EXAM:  MR LWR EXT JOINT WAW IC RT   ORDERED BY: JIM VELASCO     PROCEDURE DATE:  08/24/2023          INTERPRETATION:  MRI OF THE RIGHT LOWER EXTREMITY    CLINICAL INFORMATION: Diabetic ulcers in the right lower extremity.   Cellulitis. Evaluate for osteomyelitis.  TECHNIQUE: Multiplanar, multisequence MRI was obtained of the RIGHT LOWER   EXTREMITY focusing on the right tibia/fibula. The study was performed   before and after the intravenous administration of 12 ml Gadavist (3 cc   discarded) .  COMPARISON: Bilateral lower extremity radiographs 8/21/2023.    FINDINGS:    SOFT TISSUES: Soft tissue ulceration along the anteromedial aspect of the   leg. Ulceration extends to the level of the subcutaneous fat. There is  enhancing edema in the adjacent subcutaneous fat. No focal drainable   fluid collections are identified.  BONE MARROW: No increased STIR signal or loss of T1 hyperintense signal   to suggest acute osteomyelitis. No fracture or osteonecrosis.    MUSCLES AND TENDONS: No focal muscle edema. Diffuse fatty infiltration of   the imaged muscles. Tendons are intact.  SYNOVIUM/ JOINT FLUID: No large joint effusion.  MISCELLANEOUS: Neurovascular structures are normal in course and caliber.      IMPRESSION:  1.  Soft tissue ulceration with adjacent cellulitis.  2.  No abscess or acute osteomyelitis.    --- End of Report ---        < end of copied text >

## 2023-08-27 NOTE — PROGRESS NOTE ADULT - PROBLEM SELECTOR PLAN 1
PE: (+) surrounding erythema over RT lower leg ulcer   -on cipro and dicloxacillin  -BRIDGETTE wnl   - Podiatry Consult  -MRI negative for osteo  -per Dr. Carter  -patient pending medications for discharge

## 2023-08-27 NOTE — PROGRESS NOTE ADULT - PROBLEM SELECTOR PLAN 6
likely 2/2 to poor oral intake   In the ED: Cr:1.76, eGFR < 44 ( On 7/25/23 Cr: 0.78, eGFR 103) **Constantine MARK**  - Cr trending down likely 2/2 to poor oral intake   In the ED: Cr:1.76, eGFR < 44 ( On 7/25/23 Cr: 0.78, eGFR 103) **Constanitne MARK**  - Cr trending down

## 2023-08-27 NOTE — PROGRESS NOTE ADULT - PROBLEM SELECTOR PROBLEM 2
Cellulitis of right leg
Superficial vein thrombosis
Cellulitis of right leg
Cellulitis of right leg
Superficial vein thrombosis

## 2023-08-28 ENCOUNTER — TRANSCRIPTION ENCOUNTER (OUTPATIENT)
Age: 58
End: 2023-08-28

## 2023-08-28 VITALS
DIASTOLIC BLOOD PRESSURE: 84 MMHG | HEART RATE: 87 BPM | RESPIRATION RATE: 19 BRPM | TEMPERATURE: 98 F | SYSTOLIC BLOOD PRESSURE: 130 MMHG | OXYGEN SATURATION: 98 %

## 2023-08-28 LAB
GLUCOSE BLDC GLUCOMTR-MCNC: 122 MG/DL — HIGH (ref 70–99)
GLUCOSE BLDC GLUCOMTR-MCNC: 122 MG/DL — HIGH (ref 70–99)
GLUCOSE BLDC GLUCOMTR-MCNC: 124 MG/DL — HIGH (ref 70–99)
GLUCOSE BLDC GLUCOMTR-MCNC: 124 MG/DL — HIGH (ref 70–99)
GLUCOSE BLDC GLUCOMTR-MCNC: 137 MG/DL — HIGH (ref 70–99)
GLUCOSE BLDC GLUCOMTR-MCNC: 137 MG/DL — HIGH (ref 70–99)

## 2023-08-28 PROCEDURE — 99239 HOSP IP/OBS DSCHRG MGMT >30: CPT | Mod: GC

## 2023-08-28 RX ORDER — CHLORHEXIDINE GLUCONATE 213 G/1000ML
1 SOLUTION TOPICAL
Refills: 0 | Status: DISCONTINUED | OUTPATIENT
Start: 2023-08-28 | End: 2023-08-28

## 2023-08-28 RX ADMIN — Medication 750 MILLIGRAM(S): at 17:56

## 2023-08-28 RX ADMIN — Medication 750 MILLIGRAM(S): at 05:41

## 2023-08-28 RX ADMIN — APIXABAN 5 MILLIGRAM(S): 2.5 TABLET, FILM COATED ORAL at 17:56

## 2023-08-28 RX ADMIN — DICLOXACILLIN SODIUM 500 MILLIGRAM(S): 500 CAPSULE ORAL at 11:49

## 2023-08-28 RX ADMIN — Medication 650 MILLIGRAM(S): at 20:30

## 2023-08-28 RX ADMIN — CHLORHEXIDINE GLUCONATE 1 APPLICATION(S): 213 SOLUTION TOPICAL at 11:50

## 2023-08-28 RX ADMIN — Medication 650 MILLIGRAM(S): at 09:30

## 2023-08-28 RX ADMIN — Medication 650 MILLIGRAM(S): at 19:38

## 2023-08-28 RX ADMIN — DICLOXACILLIN SODIUM 500 MILLIGRAM(S): 500 CAPSULE ORAL at 16:50

## 2023-08-28 RX ADMIN — DICLOXACILLIN SODIUM 500 MILLIGRAM(S): 500 CAPSULE ORAL at 00:59

## 2023-08-28 RX ADMIN — Medication 650 MILLIGRAM(S): at 08:44

## 2023-08-28 RX ADMIN — DICLOXACILLIN SODIUM 500 MILLIGRAM(S): 500 CAPSULE ORAL at 05:41

## 2023-08-28 RX ADMIN — APIXABAN 5 MILLIGRAM(S): 2.5 TABLET, FILM COATED ORAL at 05:41

## 2023-08-28 NOTE — PROGRESS NOTE ADULT - PROVIDER SPECIALTY LIST ADULT
Hospitalist
Infectious Disease
Podiatry
Heme/Onc
Heme/Onc
Internal Medicine
Vascular Surgery
Podiatry
Heme/Onc
Podiatry
Podiatry
Infectious Disease
Internal Medicine
Internal Medicine

## 2023-08-28 NOTE — DISCHARGE NOTE NURSING/CASE MANAGEMENT/SOCIAL WORK - PATIENT PORTAL LINK FT
You can access the FollowMyHealth Patient Portal offered by [facility] by registering at the following website: [email or web address]. By joining [facility]’s FollowMyHealth portal, you will also be able to view your health information using other applications (apps) compatible with our system. You can access the FollowMyHealth Patient Portal offered by [facility] by registering at the following website: [email or web address]. By joining [facility]’s FollowMyHealth portal, you will also be able to view your health information using other applications (apps) compatible with our system. You can access the FollowMyHealth Patient Portal offered by [facility] by registering at the following website: [email or web address]. By joining [facility]’s FollowMyHealth portal, you will also be able to view your health information using other applications (apps) compatible with our system.

## 2023-08-28 NOTE — DISCHARGE NOTE NURSING/CASE MANAGEMENT/SOCIAL WORK - NSDCPEFALRISK_GEN_ALL_CORE
For information on Fall & Injury Prevention, visit: [email or web address] OR  [email or web address]-injury OR  https://www.cdc.gov/steadi/patient.html For information on Fall & Injury Prevention, visit: [email or web address]-[email or web address]-mobility OR  [email or web address]-[email or web address]-[email or web address]-[email or web address]-avoid-injury OR  https://www.cdc.gov/steadi/patient.html For information on Fall & Injury Prevention, visit: [email or web address]://www.[facility]/news/fall-prevention-protects-[email or web address]-maintains-health-[email or web address]-mobility OR  https://www.[facility]/news/fall-prevention-tips-[email or web address]-injury OR  https://www.cdc.gov/steadi/patient.html

## 2023-08-28 NOTE — PROGRESS NOTE ADULT - SUBJECTIVE AND OBJECTIVE BOX
59 yo M with PMH of DM, HLD, HTN and diabetic leg ulcers present with chills, weakness, lightheadedness and infected RT lower extremity ulcer for the past 3 days which has progressively worsened today. As per pt,  he went to [facility] in [place] 3 days  prior to  admission to [facility]  for his weakness and infected leg ulcer. He mentioned that he received one blood transfusion, antibiotics and was admitted to the hospital. He reports staying in the hospital for a day before leaving the hospital for some personal issue. He reports decreased oral intake today, felt extremely weak and lightheaded. He reports history of ulcer for the past 5 years that has worsened in the past 3 years.  He follows a "dermatologist" at [facility] and he is unable to provide information of his diagnosis. He denies; fever, bodyaches, Chest pain, SOB, URI sxs, N/V/D, abdominal pain, LOC, Head trauma. patient had  h/o of colon polyps  found on colonoscopy ~ 0935-5836, labs c/w iron deficiency anemia, pt is seen by vascular/podiatry, ID for management of R leg cellulitis, on IV abx , noted  noted on 8/25/23  to have R  thigh cord like structure  , Doppler c/w superficial femoral vein thrombophlebitis   we were called for further evaluation  of iron def anemia on superficial vein thrombophlebitis  Today  feeling better       PE          Vital Signs Last 24 Hrs  T(C): 36.9 (28 Aug 2023 15:59), Max: 37 (27 Aug 2023 23:52)  T(F): 98.4 (28 Aug 2023 15:59), Max: 98.6 (27 Aug 2023 23:52)  HR: 87 (28 Aug 2023 15:59) (83 - 96)  BP: 130/84 (28 Aug 2023 15:59) (102/69 - 130/84)  BP(mean): --  RR: 19 (28 Aug 2023 15:59) (17 - 19)  SpO2: 98% (28 Aug 2023 15:59) (96% - 98%)    Parameters below as of 28 Aug 2023 15:59  Patient On (Oxygen Delivery Method): room air                  GENERAL: NAD, obese, A 0x3  pleasant M   HEAD:  Atraumatic, Normocephalic  EYES:  conjunctiva and sclera clear, anicteric   NECK: Supple  LUNG: CTA   HEART: Regular rate and rhythm; No murmurs, rubs, or gallops  ABDOMEN: Soft, Nontender, Nondistended; Bowel sounds present, obese   NERVOUS SYSTEM:  Alert & Oriented X3,  no focal signs   EXTREMITIES: bilateral leg ulcers noted, , tender cord like structure in the right upper thigh     decreased tenderness  of R  leg   SKIN: warm dry      LABS                                                   8.8    9.64  )-----------( 477      ( 27 Aug 2023 07:08 )             29.4   08-27    138  |  105  |  13  ----------------------------<  123<H>  4.3   |  27  |  0.89    Ca    8.8      27 Aug 2023 07:08  Phos  2.8     08-27  Mg     2.3     08-27    TPro  8.0  /  Alb  2.6<L>  /  TBili  0.1<L>  /  DBili  x   /  AST  11  /  ALT  17  /  AlkPhos  94  08-27        % Saturation, Iron: 5 %  Iron - Total Binding Capacity.: 331 ug/dL  Iron Total: 17 ug/dL  Unsaturated Iron Binding Capacity: 314 ug/dL  Ferritin: 60 ng/mL (08.22.23 @ 08:00)   < from: US Duplex Venous Lower Ext Ltd, Right (08.25.23 @ 12:00) >    ACC: [id number] EXAM:  US DPLX LWR EXT VEINS LTD RT   ORDERED BY: JIM VELASCO     PROCEDURE DATE:  08/25/2023          INTERPRETATION:  CLINICAL INFORMATION: Palpable cord in right leg.    COMPARISON: right lower extremity venous duplex study dated 8/22/2023.    TECHNIQUE: Duplex sonography of the RIGHT LOWER extremity veins with   color and spectral Doppler, with and without compression.    FINDINGS:    There is normal compressibility of the right common femoral, femoral and   popliteal veins.  The contralateral common femoral vein is patent.  Doppler examination shows normal spontaneous and phasic flow.    No calf vein thrombosis is detected.    The palpable finding corresponds to segmental thrombosis of branch of the   greater saphenous vein in the anterior thigh.    IMPRESSION:  No evidence of right lower extremity deep venous thrombosis.    Superficial thrombosis of branch of the greater saphenous vein in the   anterior right thigh.          --- End of Report ---            GRACIE CHANCE MD; Attending Radiologist  This document has been electronically signed. Aug 25 2023  1:41PM    < end of copied text >  < from: MR Lower Ext Joint w/wo IV Cont, Right (08.24.23 @ 17:24) >    ACC: [id number] EXAM:  MR LWR EXT JOINT WAW IC RT   ORDERED BY: JIM VELASCO     PROCEDURE DATE:  08/24/2023          INTERPRETATION:  MRI OF THE RIGHT LOWER EXTREMITY    CLINICAL INFORMATION: Diabetic ulcers in the right lower extremity.   Cellulitis. Evaluate for osteomyelitis.  TECHNIQUE: Multiplanar, multisequence MRI was obtained of the RIGHT LOWER   EXTREMITY focusing on the right tibia/fibula. The study was performed   before and after the intravenous administration of 12 ml Gadavist (3 cc   discarded) .  COMPARISON: Bilateral lower extremity radiographs 8/21/2023.    FINDINGS:    SOFT TISSUES: Soft tissue ulceration along the anteromedial aspect of the   leg. Ulceration extends to the level of the subcutaneous fat. There is  enhancing edema in the adjacent subcutaneous fat. No focal drainable   fluid collections are identified.  BONE MARROW: No increased STIR signal or loss of T1 hyperintense signal   to suggest acute osteomyelitis. No fracture or osteonecrosis.    MUSCLES AND TENDONS: No focal muscle edema. Diffuse fatty infiltration of   the imaged muscles. Tendons are intact.  SYNOVIUM/ JOINT FLUID: No large joint effusion.  MISCELLANEOUS: Neurovascular structures are normal in course and caliber.      IMPRESSION:  1.  Soft tissue ulceration with adjacent cellulitis.  2.  No abscess or acute osteomyelitis.    --- End of Report ---        < end of copied text >             57 yo M with PMH of DM, HLD, HTN and diabetic leg ulcers present with chills, weakness, lightheadedness and infected RT lower extremity ulcer for the past 3 days which has progressively worsened today. As per pt,  he went to [facility] in [place] 3 days  prior to  admission to [facility]  for his weakness and infected leg ulcer. He mentioned that he received one blood transfusion, antibiotics and was admitted to the hospital. He reports staying in the hospital for a day before leaving the hospital for some personal issue. He reports decreased oral intake today, felt extremely weak and lightheaded. He reports history of ulcer for the past 5 years that has worsened in the past 3 years.  He follows a "dermatologist" at [facility] and he is unable to provide information of his diagnosis. He denies; fever, bodyaches, Chest pain, SOB, URI sxs, N/V/D, abdominal pain, LOC, Head trauma. patient had  h/o of colon polyps  found on colonoscopy ~ 4057-2499, labs c/w iron deficiency anemia, pt is seen by vascular/podiatry, ID for management of R leg cellulitis, on IV abx , noted  noted on 8/25/23  to have R  thigh cord like structure  , Doppler c/w superficial femoral vein thrombophlebitis   we were called for further evaluation  of iron def anemia on superficial vein thrombophlebitis  Today  feeling better       PE          Vital Signs Last 24 Hrs  T(C): 36.9 (28 Aug 2023 15:59), Max: 37 (27 Aug 2023 23:52)  T(F): 98.4 (28 Aug 2023 15:59), Max: 98.6 (27 Aug 2023 23:52)  HR: 87 (28 Aug 2023 15:59) (83 - 96)  BP: 130/84 (28 Aug 2023 15:59) (102/69 - 130/84)  BP(mean): --  RR: 19 (28 Aug 2023 15:59) (17 - 19)  SpO2: 98% (28 Aug 2023 15:59) (96% - 98%)    Parameters below as of 28 Aug 2023 15:59  Patient On (Oxygen Delivery Method): room air                  GENERAL: NAD, obese, A 0x3  pleasant M   HEAD:  Atraumatic, Normocephalic  EYES:  conjunctiva and sclera clear, anicteric   NECK: Supple  LUNG: CTA   HEART: Regular rate and rhythm; No murmurs, rubs, or gallops  ABDOMEN: Soft, Nontender, Nondistended; Bowel sounds present, obese   NERVOUS SYSTEM:  Alert & Oriented X3,  no focal signs   EXTREMITIES: bilateral leg ulcers noted, , tender cord like structure in the right upper thigh     decreased tenderness  of R  leg   SKIN: warm dry      LABS                                                   8.8    9.64  )-----------( 477      ( 27 Aug 2023 07:08 )             29.4   08-27    138  |  105  |  13  ----------------------------<  123<H>  4.3   |  27  |  0.89    Ca    8.8      27 Aug 2023 07:08  Phos  2.8     08-27  Mg     2.3     08-27    TPro  8.0  /  Alb  2.6<L>  /  TBili  0.1<L>  /  DBili  x   /  AST  11  /  ALT  17  /  AlkPhos  94  08-27        % Saturation, Iron: 5 %  Iron - Total Binding Capacity.: 331 ug/dL  Iron Total: 17 ug/dL  Unsaturated Iron Binding Capacity: 314 ug/dL  Ferritin: 60 ng/mL (08.22.23 @ 08:00)   < from: US Duplex Venous Lower Ext Ltd, Right (08.25.23 @ 12:00) >    ACC: [id number] EXAM:  US DPLX LWR EXT VEINS LTD RT   ORDERED BY: JIM VELASCO     PROCEDURE DATE:  08/25/2023          INTERPRETATION:  CLINICAL INFORMATION: Palpable cord in right leg.    COMPARISON: right lower extremity venous duplex study dated 8/22/2023.    TECHNIQUE: Duplex sonography of the RIGHT LOWER extremity veins with   color and spectral Doppler, with and without compression.    FINDINGS:    There is normal compressibility of the right common femoral, femoral and   popliteal veins.  The contralateral common femoral vein is patent.  Doppler examination shows normal spontaneous and phasic flow.    No calf vein thrombosis is detected.    The palpable finding corresponds to segmental thrombosis of branch of the   greater saphenous vein in the anterior thigh.    IMPRESSION:  No evidence of right lower extremity deep venous thrombosis.    Superficial thrombosis of branch of the greater saphenous vein in the   anterior right thigh.          --- End of Report ---            GRACIE CHANCE MD; Attending Radiologist  This document has been electronically signed. Aug 25 2023  1:41PM    < end of copied text >  < from: MR Lower Ext Joint w/wo IV Cont, Right (08.24.23 @ 17:24) >    ACC: [id number] EXAM:  MR LWR EXT JOINT WAW IC RT   ORDERED BY: JIM VELASCO     PROCEDURE DATE:  08/24/2023          INTERPRETATION:  MRI OF THE RIGHT LOWER EXTREMITY    CLINICAL INFORMATION: Diabetic ulcers in the right lower extremity.   Cellulitis. Evaluate for osteomyelitis.  TECHNIQUE: Multiplanar, multisequence MRI was obtained of the RIGHT LOWER   EXTREMITY focusing on the right tibia/fibula. The study was performed   before and after the intravenous administration of 12 ml Gadavist (3 cc   discarded) .  COMPARISON: Bilateral lower extremity radiographs 8/21/2023.    FINDINGS:    SOFT TISSUES: Soft tissue ulceration along the anteromedial aspect of the   leg. Ulceration extends to the level of the subcutaneous fat. There is  enhancing edema in the adjacent subcutaneous fat. No focal drainable   fluid collections are identified.  BONE MARROW: No increased STIR signal or loss of T1 hyperintense signal   to suggest acute osteomyelitis. No fracture or osteonecrosis.    MUSCLES AND TENDONS: No focal muscle edema. Diffuse fatty infiltration of   the imaged muscles. Tendons are intact.  SYNOVIUM/ JOINT FLUID: No large joint effusion.  MISCELLANEOUS: Neurovascular structures are normal in course and caliber.      IMPRESSION:  1.  Soft tissue ulceration with adjacent cellulitis.  2.  No abscess or acute osteomyelitis.    --- End of Report ---        < end of copied text >             57 yo M with PMH of DM, HLD, HTN and diabetic leg ulcers present with chills, weakness, lightheadedness and infected RT lower extremity ulcer for the past 3 days which has progressively worsened today. As per pt,  he went to [facility] in [place] 3 days  prior to  admission to [facility]  for his weakness and infected leg ulcer. He mentioned that he received one blood transfusion, antibiotics and was admitted to the hospital. He reports staying in the hospital for a day before leaving the hospital for some personal issue. He reports decreased oral intake today, felt extremely weak and lightheaded. He reports history of ulcer for the past 5 years that has worsened in the past 3 years.  He follows a "dermatologist" at [facility] and he is unable to provide information of his diagnosis. He denies; fever, bodyaches, Chest pain, SOB, URI sxs, N/V/D, abdominal pain, LOC, Head trauma. patient had  h/o of colon polyps  found on colonoscopy ~ [id number], labs c/w iron deficiency anemia, pt is seen by vascular/podiatry, ID for management of R leg cellulitis, on IV abx , noted  noted on 8/25/23  to have R  thigh cord like structure  , Doppler c/w superficial femoral vein thrombophlebitis   we were called for further evaluation  of iron def anemia on superficial vein thrombophlebitis  Today  feeling better       PE          Vital Signs Last 24 Hrs  T(C): 36.9 (28 Aug 2023 15:59), Max: 37 (27 Aug 2023 23:52)  T(F): 98.4 (28 Aug 2023 15:59), Max: 98.6 (27 Aug 2023 23:52)  HR: 87 (28 Aug 2023 15:59) (83 - 96)  BP: 130/84 (28 Aug 2023 15:59) (102/69 - 130/84)  BP(mean): --  RR: 19 (28 Aug 2023 15:59) (17 - 19)  SpO2: 98% (28 Aug 2023 15:59) (96% - 98%)    Parameters below as of 28 Aug 2023 15:59  Patient On (Oxygen Delivery Method): room air                  GENERAL: NAD, obese, A 0x3  pleasant M   HEAD:  Atraumatic, Normocephalic  EYES:  conjunctiva and sclera clear, anicteric   NECK: Supple  LUNG: CTA   HEART: Regular rate and rhythm; No murmurs, rubs, or gallops  ABDOMEN: Soft, Nontender, Nondistended; Bowel sounds present, obese   NERVOUS SYSTEM:  Alert & Oriented X3,  no focal signs   EXTREMITIES: bilateral leg ulcers noted, , tender cord like structure in the right upper thigh     decreased tenderness  of R  leg   SKIN: warm dry      LABS                                                   8.8    9.64  )-----------( 477      ( 27 Aug 2023 07:08 )             29.4   08-27    138  |  105  |  13  ----------------------------<  123<H>  4.3   |  27  |  0.89    Ca    8.8      27 Aug 2023 07:08  Phos  2.8     08-27  Mg     2.3     08-27    TPro  8.0  /  Alb  2.6<L>  /  TBili  0.1<L>  /  DBili  x   /  AST  11  /  ALT  17  /  AlkPhos  94  08-27        % Saturation, Iron: 5 %  Iron - Total Binding Capacity.: 331 ug/dL  Iron Total: 17 ug/dL  Unsaturated Iron Binding Capacity: 314 ug/dL  Ferritin: 60 ng/mL (08.22.23 @ 08:00)   < from: US Duplex Venous Lower Ext Ltd, Right (08.25.23 @ 12:00) >    ACC: [id number] EXAM:  US DPLX LWR EXT VEINS LTD RT   ORDERED BY: JIM VELASCO     PROCEDURE DATE:  08/25/2023          INTERPRETATION:  CLINICAL INFORMATION: Palpable cord in right leg.    COMPARISON: right lower extremity venous duplex study dated 8/22/2023.    TECHNIQUE: Duplex sonography of the RIGHT LOWER extremity veins with   color and spectral Doppler, with and without compression.    FINDINGS:    There is normal compressibility of the right common femoral, femoral and   popliteal veins.  The contralateral common femoral vein is patent.  Doppler examination shows normal spontaneous and phasic flow.    No calf vein thrombosis is detected.    The palpable finding corresponds to segmental thrombosis of branch of the   greater saphenous vein in the anterior thigh.    IMPRESSION:  No evidence of right lower extremity deep venous thrombosis.    Superficial thrombosis of branch of the greater saphenous vein in the   anterior right thigh.          --- End of Report ---            GRACIE CHANCE MD; Attending Radiologist  This document has been electronically signed. Aug 25 2023  1:41PM    < end of copied text >  < from: MR Lower Ext Joint w/wo IV Cont, Right (08.24.23 @ 17:24) >    ACC: [id number] EXAM:  MR LWR EXT JOINT WAW IC RT   ORDERED BY: JIM VELASCO     PROCEDURE DATE:  08/24/2023          INTERPRETATION:  MRI OF THE RIGHT LOWER EXTREMITY    CLINICAL INFORMATION: Diabetic ulcers in the right lower extremity.   Cellulitis. Evaluate for osteomyelitis.  TECHNIQUE: Multiplanar, multisequence MRI was obtained of the RIGHT LOWER   EXTREMITY focusing on the right tibia/fibula. The study was performed   before and after the intravenous administration of 12 ml Gadavist (3 cc   discarded) .  COMPARISON: Bilateral lower extremity radiographs 8/21/2023.    FINDINGS:    SOFT TISSUES: Soft tissue ulceration along the anteromedial aspect of the   leg. Ulceration extends to the level of the subcutaneous fat. There is  enhancing edema in the adjacent subcutaneous fat. No focal drainable   fluid collections are identified.  BONE MARROW: No increased STIR signal or loss of T1 hyperintense signal   to suggest acute osteomyelitis. No fracture or osteonecrosis.    MUSCLES AND TENDONS: No focal muscle edema. Diffuse fatty infiltration of   the imaged muscles. Tendons are intact.  SYNOVIUM/ JOINT FLUID: No large joint effusion.  MISCELLANEOUS: Neurovascular structures are normal in course and caliber.      IMPRESSION:  1.  Soft tissue ulceration with adjacent cellulitis.  2.  No abscess or acute osteomyelitis.    --- End of Report ---        < end of copied text >

## 2023-08-28 NOTE — PROGRESS NOTE ADULT - SUBJECTIVE AND OBJECTIVE BOX
Interval: Patient was seen resting in bed. Patient states he is feeling better than on admission, and feels that his wounds are getting better. No acute overnight events. Denied any pedal complaints.     Patient is a 58y old  Male who presents with a chief complaint of weakness (21 Aug 2023 02:06)    HPI:  59 yo M with PMH of DM, HLD, HTN and diabetic leg ulcers present with chills, weakness, lightheadedness and infected RT lower extremity ulcer for the past 3 days which has progressively worsened today. As per pt,  he went to [facility] in [place] 3 days  ago for his weakness and infected leg ulcer. He mentioned that he received one blood transfusion, antibiotics and was admitted to the hospital. He reports staying in the hospital for a day before leaving the hospital for some personal issue. He reports decreased oral intake today, felt extremely weak and lightheaded. He reports history of ulcer for the past 5 years that has worsened in the past 3 years.  He follows a "dermatologist" at [facility] and he is unable to provide information of his diagnosis. He denies; fever, bodyaches, Chest pain, SOB, URI sxs, N/V/D, abdominal pain, LOC, Head trauma    In the ED   Vitals -> BP: 88/55, HR:102, T:97.7, O2 sat: 94% RA  Labs :  WbC 17k, Hb:7.9, Cr:1.76  EKG : NSR  s/p Vancomycin, Zosyn IVPB  s/p 2L NS ->101/68 (21 Aug 2023 02:06)    Podiatry HPI: Patient stated that he has had leg wounds for 5 years. He seems a dermatologist at [facility] who gives him steroid shots for his wounds and they have worsened. Over the last month his pain has grown and his has become dizzy and weak. Patient states he needs to have santyl on his wounds for every dressing change so that the shooting pain he has subsides. Patient denies any other pedal complaints.    ID#: [id number]    Medications acetaminophen     Tablet .. 650 milliGRAM(s) Oral every 6 hours PRN  apixaban 5 milliGRAM(s) Oral every 12 hours  ciprofloxacin     Tablet 750 milliGRAM(s) Oral two times a day  dicloxacillin 500 milliGRAM(s) Oral four times a day  gabapentin 300 milliGRAM(s) Oral at bedtime  insulin lispro (ADMELOG) corrective regimen sliding scale   SubCutaneous three times a day before meals  insulin lispro (ADMELOG) corrective regimen sliding scale   SubCutaneous at bedtime  melatonin 3 milliGRAM(s) Oral at bedtime PRN  sodium chloride 0.9%. 1000 milliLiter(s) IV Continuous <Continuous>    FHNo pertinent family history in first degree relatives    ,   PMHHTN (hypertension)    Dyslipidemia    DM (diabetes mellitus)    Leg ulcer       PSHNo significant past surgical history        Labs                          8.8    9.64  )-----------( 477      ( 27 Aug 2023 07:08 )             29.4      08-27    138  |  105  |  13  ----------------------------<  123<H>  4.3   |  27  |  0.89    Ca    8.8      27 Aug 2023 07:08  Phos  2.8     08-27  Mg     2.3     08-27    TPro  8.0  /  Alb  2.6<L>  /  TBili  0.1<L>  /  DBili  x   /  AST  11  /  ALT  17  /  AlkPhos  94  08-27     Vital Signs Last 24 Hrs  T(C): 36.6 (28 Aug 2023 07:30), Max: 37.3 (27 Aug 2023 11:06)  T(F): 97.9 (28 Aug 2023 07:30), Max: 99.1 (27 Aug 2023 11:06)  HR: 88 (28 Aug 2023 07:30) (83 - 96)  BP: 102/69 (28 Aug 2023 07:30) (102/69 - 127/74)  BP(mean): --  RR: 18 (28 Aug 2023 07:30) (17 - 18)  SpO2: 97% (28 Aug 2023 07:30) (96% - 97%)    Parameters below as of 28 Aug 2023 07:30  Patient On (Oxygen Delivery Method): room air      Sedimentation Rate, Erythrocyte: 83 mm/Hr (08-25-23 @ 07:45)  Sedimentation Rate, Erythrocyte: 107 mm/Hr (08-23-23 @ 07:05)         C-Reactive Protein, Serum: 96 mg/L (08-25-23 @ 07:45)  C-Reactive Protein, Serum: 102 mg/L (08-23-23 @ 07:05)     PHYSICAL EXAM  LE Focused:    Vasc:  DP and PT pulses palpable 2/4. CFT<3 seconds. No edema noted in legs or feet   Derm: Wounds noted b/l lower legs to level of subcutaneous tissue/muscle tendon layer of legs. No PTB in any of wounds but tendon exposure noted. Wounds are fibrogranular in nature with no drainage noted. No fluctuance crepitus or streaking noted.   Neuro: Gross sensation intact.   MSK: POP to all wounds.       < from: MR Lower Ext Joint w/wo IV Cont, Right (08.24.23 @ 17:24) >  INTERPRETATION:  MRI OF THE RIGHT LOWER EXTREMITY    CLINICAL INFORMATION: Diabetic ulcers in the right lower extremity.   Cellulitis. Evaluate for osteomyelitis.  TECHNIQUE: Multiplanar, multisequence MRI was obtained of the RIGHT LOWER   EXTREMITY focusing on the right tibia/fibula. The study was performed   before and after the intravenous administration of 12 ml Gadavist (3 cc   discarded) .  COMPARISON: Bilateral lower extremity radiographs 8/21/2023.    FINDINGS:    SOFT TISSUES: Soft tissue ulceration along the anteromedial aspect of the   leg. Ulceration extends to the level of the subcutaneous fat. There is  enhancing edema in the adjacent subcutaneous fat. No focal drainable   fluid collections are identified.  BONE MARROW: No increased STIR signal or loss of T1 hyperintense signal   to suggest acute osteomyelitis. No fracture or osteonecrosis.    MUSCLES AND TENDONS: No focal muscle edema. Diffuse fatty infiltration of   the imaged muscles. Tendons are intact.  SYNOVIUM/ JOINT FLUID: No large joint effusion.  MISCELLANEOUS: Neurovascular structures are normal in course and caliber.      IMPRESSION:  1.  Soft tissue ulceration with adjacent cellulitis.  2.  No abscess or acute osteomyelitis.    < end of copied text >  < from: [facility] Physiol Extremity Lower 3+ Level, BI (08.22.23 @ 16:34) >  INTERPRETATION:  Clinical indication: Peripheral arterial disease    COMPARISON: None    FINDINGS: There are normal pulse volume recordings bilaterally. There is   no abnormal segmental pressure gradient.    Right BRIDGETTE = 1.01  Left BRIDGETTE = 1.00    IMPRESSION: Normal ABIs.    --- End of Report ---    < end of copied text >  < from: Xray Tibia + Fibula 2 Views, Bilateral (08.21.23 @ 14:58) >  INTERPRETATION:  Clinical history: 58-year-old male, leg wounds.    Four views of the right leg without comparison.    FINDINGS: Mild degenerative change with no fracture, dislocation or gross   cortical destruction.    Soft tissue defect at the medial leg measuring 11 x 1.5 cm.    IMPRESSION:  Large soft tissue defect at the medial leg distally, if there is   continued clinical concern follow-up MRI can be ordered    --- End of Report ---      < end of copied text >      IMAGING:   Xray- recommend MRI if concern for OM  MRI- No OM noted     CULTURES: Klebsiella, Proteus, Raoultella    < from: [facility] Physiol Extremity Lower 3+ Level, BI (08.22.23 @ 16:34) >  Right BRIDGETTE = 1.01  Left BRIDGETTE = 1.00    < end of copied text >   Interval: Patient was seen resting in bed. Patient states he is feeling better than on admission, and feels that his wounds are getting better. No acute overnight events. Denied any pedal complaints.     Patient is a 58y old  Male who presents with a chief complaint of weakness (21 Aug 2023 02:06)    HPI:  57 yo M with PMH of DM, HLD, HTN and diabetic leg ulcers present with chills, weakness, lightheadedness and infected RT lower extremity ulcer for the past 3 days which has progressively worsened today. As per pt,  he went to [facility] in [place] 3 days  ago for his weakness and infected leg ulcer. He mentioned that he received one blood transfusion, antibiotics and was admitted to the hospital. He reports staying in the hospital for a day before leaving the hospital for some personal issue. He reports decreased oral intake today, felt extremely weak and lightheaded. He reports history of ulcer for the past 5 years that has worsened in the past 3 years.  He follows a "dermatologist" at [facility] and he is unable to provide information of his diagnosis. He denies; fever, bodyaches, Chest pain, SOB, URI sxs, N/V/D, abdominal pain, LOC, Head trauma    In the ED   Vitals -> BP: 88/55, HR:102, T:97.7, O2 sat: 94% RA  Labs :  WbC 17k, Hb:7.9, Cr:1.76  EKG : NSR  s/p Vancomycin, Zosyn IVPB  s/p 2L NS ->101/68 (21 Aug 2023 02:06)    Podiatry HPI: Patient stated that he has had leg wounds for 5 years. He seems a dermatologist at [facility] who gives him steroid shots for his wounds and they have worsened. Over the last month his pain has grown and his has become dizzy and weak. Patient states he needs to have santyl on his wounds for every dressing change so that the shooting pain he has subsides. Patient denies any other pedal complaints.    ID#: [id number]    Medications acetaminophen     Tablet .. 650 milliGRAM(s) Oral every 6 hours PRN  apixaban 5 milliGRAM(s) Oral every 12 hours  ciprofloxacin     Tablet 750 milliGRAM(s) Oral two times a day  dicloxacillin 500 milliGRAM(s) Oral four times a day  gabapentin 300 milliGRAM(s) Oral at bedtime  insulin lispro (ADMELOG) corrective regimen sliding scale   SubCutaneous three times a day before meals  insulin lispro (ADMELOG) corrective regimen sliding scale   SubCutaneous at bedtime  melatonin 3 milliGRAM(s) Oral at bedtime PRN  sodium chloride 0.9%. 1000 milliLiter(s) IV Continuous <Continuous>    FHNo pertinent family history in first degree relatives    ,   PMHHTN (hypertension)    Dyslipidemia    DM (diabetes mellitus)    Leg ulcer       PSHNo significant past surgical history        Labs                          8.8    9.64  )-----------( 477      ( 27 Aug 2023 07:08 )             29.4      08-27    138  |  105  |  13  ----------------------------<  123<H>  4.3   |  27  |  0.89    Ca    8.8      27 Aug 2023 07:08  Phos  2.8     08-27  Mg     2.3     08-27    TPro  8.0  /  Alb  2.6<L>  /  TBili  0.1<L>  /  DBili  x   /  AST  11  /  ALT  17  /  AlkPhos  94  08-27     Vital Signs Last 24 Hrs  T(C): 36.6 (28 Aug 2023 07:30), Max: 37.3 (27 Aug 2023 11:06)  T(F): 97.9 (28 Aug 2023 07:30), Max: 99.1 (27 Aug 2023 11:06)  HR: 88 (28 Aug 2023 07:30) (83 - 96)  BP: 102/69 (28 Aug 2023 07:30) (102/69 - 127/74)  BP(mean): --  RR: 18 (28 Aug 2023 07:30) (17 - 18)  SpO2: 97% (28 Aug 2023 07:30) (96% - 97%)    Parameters below as of 28 Aug 2023 07:30  Patient On (Oxygen Delivery Method): room air      Sedimentation Rate, Erythrocyte: 83 mm/Hr (08-25-23 @ 07:45)  Sedimentation Rate, Erythrocyte: 107 mm/Hr (08-23-23 @ 07:05)         C-Reactive Protein, Serum: 96 mg/L (08-25-23 @ 07:45)  C-Reactive Protein, Serum: 102 mg/L (08-23-23 @ 07:05)     PHYSICAL EXAM  LE Focused:    Vasc:  DP and PT pulses palpable 2/4. CFT<3 seconds. No edema noted in legs or feet   Derm: Wounds noted b/l lower legs to level of subcutaneous tissue/muscle tendon layer of legs. No PTB in any of wounds but tendon exposure noted. Wounds are fibrogranular in nature with no drainage noted. No fluctuance crepitus or streaking noted.   Neuro: Gross sensation intact.   MSK: POP to all wounds.       < from: MR Lower Ext Joint w/wo IV Cont, Right (08.24.23 @ 17:24) >  INTERPRETATION:  MRI OF THE RIGHT LOWER EXTREMITY    CLINICAL INFORMATION: Diabetic ulcers in the right lower extremity.   Cellulitis. Evaluate for osteomyelitis.  TECHNIQUE: Multiplanar, multisequence MRI was obtained of the RIGHT LOWER   EXTREMITY focusing on the right tibia/fibula. The study was performed   before and after the intravenous administration of 12 ml Gadavist (3 cc   discarded) .  COMPARISON: Bilateral lower extremity radiographs 8/21/2023.    FINDINGS:    SOFT TISSUES: Soft tissue ulceration along the anteromedial aspect of the   leg. Ulceration extends to the level of the subcutaneous fat. There is  enhancing edema in the adjacent subcutaneous fat. No focal drainable   fluid collections are identified.  BONE MARROW: No increased STIR signal or loss of T1 hyperintense signal   to suggest acute osteomyelitis. No fracture or osteonecrosis.    MUSCLES AND TENDONS: No focal muscle edema. Diffuse fatty infiltration of   the imaged muscles. Tendons are intact.  SYNOVIUM/ JOINT FLUID: No large joint effusion.  MISCELLANEOUS: Neurovascular structures are normal in course and caliber.      IMPRESSION:  1.  Soft tissue ulceration with adjacent cellulitis.  2.  No abscess or acute osteomyelitis.    < end of copied text >  < from: [facility] Physiol Extremity Lower 3+ Level, BI (08.22.23 @ 16:34) >  INTERPRETATION:  Clinical indication: Peripheral arterial disease    COMPARISON: None    FINDINGS: There are normal pulse volume recordings bilaterally. There is   no abnormal segmental pressure gradient.    Right BRIDGETTE = 1.01  Left BRIDGETTE = 1.00    IMPRESSION: Normal ABIs.    --- End of Report ---    < end of copied text >  < from: Xray Tibia + Fibula 2 Views, Bilateral (08.21.23 @ 14:58) >  INTERPRETATION:  Clinical history: 58-year-old male, leg wounds.    Four views of the right leg without comparison.    FINDINGS: Mild degenerative change with no fracture, dislocation or gross   cortical destruction.    Soft tissue defect at the medial leg measuring 11 x 1.5 cm.    IMPRESSION:  Large soft tissue defect at the medial leg distally, if there is   continued clinical concern follow-up MRI can be ordered    --- End of Report ---      < end of copied text >      IMAGING:   Xray- recommend MRI if concern for OM  MRI- No OM noted     CULTURES: Klebsiella, Proteus, Raoultella    < from: [facility] Physiol Extremity Lower 3+ Level, BI (08.22.23 @ 16:34) >  Right BRIDGETTE = 1.01  Left BRIDGETTE = 1.00    < end of copied text >   Interval: Patient was seen resting in bed. Patient states he is feeling better than on admission, and feels that his wounds are getting better. No acute overnight events. Denied any pedal complaints.     Patient is a 58y old  Male who presents with a chief complaint of weakness (21 Aug 2023 02:06)    HPI:  57 yo M with PMH of DM, HLD, HTN and diabetic leg ulcers present with chills, weakness, lightheadedness and infected RT lower extremity ulcer for the past 3 days which has progressively worsened today. As per pt,  he went to [facility] in [place] 3 days  ago for his weakness and infected leg ulcer. He mentioned that he received one blood transfusion, antibiotics and was admitted to the hospital. He reports staying in the hospital for a day before leaving the hospital for some personal issue. He reports decreased oral intake today, felt extremely weak and lightheaded. He reports history of ulcer for the past 5 years that has worsened in the past 3 years.  He follows a "dermatologist" at [facility] and he is unable to provide information of his diagnosis. He denies; fever, bodyaches, Chest pain, SOB, URI sxs, N/V/D, abdominal pain, LOC, Head trauma    In the ED   Vitals -> BP: 88/55, HR:102, T:97.7, O2 sat: 94% RA  Labs :  WbC 17k, Hb:7.9, Cr:1.76  EKG : NSR  s/p Vancomycin, Zosyn IVPB  s/p 2L NS ->101/68 (21 Aug 2023 02:06)    Podiatry HPI: Patient stated that he has had leg wounds for 5 years. He seems a dermatologist at [facility] who gives him steroid shots for his wounds and they have worsened. Over the last month his pain has grown and his has become dizzy and weak. Patient states he needs to have santyl on his wounds for every dressing change so that the shooting pain he has subsides. Patient denies any other pedal complaints.    ID#: [id number]    Medications acetaminophen     Tablet .. 650 milliGRAM(s) Oral every 6 hours PRN  apixaban 5 milliGRAM(s) Oral every 12 hours  ciprofloxacin     Tablet 750 milliGRAM(s) Oral two times a day  dicloxacillin 500 milliGRAM(s) Oral four times a day  gabapentin 300 milliGRAM(s) Oral at bedtime  insulin lispro (ADMELOG) corrective regimen sliding scale   SubCutaneous three times a day before meals  insulin lispro (ADMELOG) corrective regimen sliding scale   SubCutaneous at bedtime  melatonin 3 milliGRAM(s) Oral at bedtime PRN  sodium chloride 0.9%. 1000 milliLiter(s) IV Continuous <Continuous>    FHNo pertinent family history in first degree relatives    ,   PMHHTN (hypertension)    Dyslipidemia    DM (diabetes mellitus)    Leg ulcer       PSHNo significant past surgical history        Labs                          8.8    9.64  )-----------( 477      ( 27 Aug 2023 07:08 )             29.4      08-27    138  |  105  |  13  ----------------------------<  123<H>  4.3   |  27  |  0.89    Ca    8.8      27 Aug 2023 07:08  Phos  2.8     08-27  Mg     2.3     08-27    TPro  8.0  /  Alb  2.6<L>  /  TBili  0.1<L>  /  DBili  x   /  AST  11  /  ALT  17  /  AlkPhos  94  08-27     Vital Signs Last 24 Hrs  T(C): 36.6 (28 Aug 2023 07:30), Max: 37.3 (27 Aug 2023 11:06)  T(F): 97.9 (28 Aug 2023 07:30), Max: 99.1 (27 Aug 2023 11:06)  HR: 88 (28 Aug 2023 07:30) (83 - 96)  BP: 102/69 (28 Aug 2023 07:30) (102/69 - 127/74)  BP(mean): --  RR: 18 (28 Aug 2023 07:30) (17 - 18)  SpO2: 97% (28 Aug 2023 07:30) (96% - 97%)    Parameters below as of 28 Aug 2023 07:30  Patient On (Oxygen Delivery Method): room air      Sedimentation Rate, Erythrocyte: 83 mm/Hr (08-25-23 @ 07:45)  Sedimentation Rate, Erythrocyte: 107 mm/Hr (08-23-23 @ 07:05)         C-Reactive Protein, Serum: 96 mg/L (08-25-23 @ 07:45)  C-Reactive Protein, Serum: 102 mg/L (08-23-23 @ 07:05)     PHYSICAL EXAM  LE Focused:    Vasc:  DP and PT pulses palpable 2/4. CFT<3 seconds. No edema noted in legs or feet   Derm: Wounds noted b/l lower legs to level of subcutaneous tissue/muscle tendon layer of legs. No PTB in any of wounds but tendon exposure noted. Wounds are fibrogranular in nature with no drainage noted. No fluctuance crepitus or streaking noted.   Neuro: Gross sensation intact.   MSK: POP to all wounds.       < from: MR Lower Ext Joint w/wo IV Cont, Right (08.24.23 @ 17:24) >  INTERPRETATION:  MRI OF THE RIGHT LOWER EXTREMITY    CLINICAL INFORMATION: Diabetic ulcers in the right lower extremity.   Cellulitis. Evaluate for osteomyelitis.  TECHNIQUE: Multiplanar, multisequence MRI was obtained of the RIGHT LOWER   EXTREMITY focusing on the right tibia/fibula. The study was performed   before and after the intravenous administration of 12 ml Gadavist (3 cc   discarded) .  COMPARISON: Bilateral lower extremity radiographs 8/21/2023.    FINDINGS:    SOFT TISSUES: Soft tissue ulceration along the anteromedial aspect of the   leg. Ulceration extends to the level of the subcutaneous fat. There is  enhancing edema in the adjacent subcutaneous fat. No focal drainable   fluid collections are identified.  BONE MARROW: No increased STIR signal or loss of T1 hyperintense signal   to suggest acute osteomyelitis. No fracture or osteonecrosis.    MUSCLES AND TENDONS: No focal muscle edema. Diffuse fatty infiltration of   the imaged muscles. Tendons are intact.  SYNOVIUM/ JOINT FLUID: No large joint effusion.  MISCELLANEOUS: Neurovascular structures are normal in course and caliber.      IMPRESSION:  1.  Soft tissue ulceration with adjacent cellulitis.  2.  No abscess or acute osteomyelitis.    < end of copied text >  < from: [facility] Physiol Extremity Lower 3+ Level, BI (08.22.23 @ 16:34) >  INTERPRETATION:  Clinical indication: Peripheral arterial disease    COMPARISON: None    FINDINGS: There are normal pulse volume recordings bilaterally. There is   no abnormal segmental pressure gradient.    Right BRIDGETTE = 1.01  Left BRIDGETTE = 1.00    IMPRESSION: Normal ABIs.    --- End of Report ---    < end of copied text >  < from: Xray Tibia + Fibula 2 Views, Bilateral (08.21.23 @ 14:58) >  INTERPRETATION:  Clinical history: 58-year-old male, leg wounds.    Four views of the right leg without comparison.    FINDINGS: Mild degenerative change with no fracture, dislocation or gross   cortical destruction.    Soft tissue defect at the medial leg measuring 11 x 1.5 cm.    IMPRESSION:  Large soft tissue defect at the medial leg distally, if there is   continued clinical concern follow-up MRI can be ordered    --- End of Report ---      < end of copied text >      IMAGING:   Xray- recommend MRI if concern for OM  MRI- No OM noted     CULTURES: Klebsiella, Proteus, Raoultella    < from: [facility] Physiol Extremity Lower 3+ Level, BI (08.22.23 @ 16:34) >  Right BRIDGETTE = 1.01  Left BRIDGETTE = 1.00    < end of copied text >

## 2023-08-28 NOTE — PROGRESS NOTE ADULT - ASSESSMENT
# MICROCYTIC  ANEMIA  #  IRON DEFICIENCY ANEMIA   pt with h/o of  polyps on colonoscopy ~ [id number], sched for f/u with GI this hank  noted  iron study  c/w ANEUDY   s/p   IV iron x 3 doses  noted improvement in  h/h   f/u CBC, PRBC TX prn for HB<7        #  SUPERFICIAL VEIN THROMBOSIS   w/u with doppler above   neg for DVT  pt is obese, male   sedentary/mostly in bed d/t LE cellulitis,   chronic  b/l leg ulcers  High risk for DVT in the future   pt reports h/o of CT a/p at [facility] ~ 2 months ago- neg as per pt  on   DOAC at present   tolerating well    recommend to complete 4 weeks as an outpt   cont with topical compress prn   f/u with his  outside medical; team  call with questions [phone]    # MICROCYTIC  ANEMIA  #  IRON DEFICIENCY ANEMIA   pt with h/o of  polyps on colonoscopy ~ [id number], sched for f/u with GI this hank  noted  iron study  c/w ANEUDY   s/p   IV iron x 3 doses  noted improvement in  h/h   f/u CBC, PRBC TX prn for HB<7        #  SUPERFICIAL VEIN THROMBOSIS   w/u with doppler above   neg for DVT  pt is obese, male   sedentary/mostly in bed d/t LE cellulitis,   chronic  b/l leg ulcers  High risk for DVT in the future   pt reports h/o of CT a/p at [facility] ~ 2 months ago- neg as per pt  on   DOAC at present   tolerating well    recommend to complete 4 weeks as an outpt   cont with topical compress prn   f/u with his  outside medical; team  call with questions [phone]    # MICROCYTIC  ANEMIA  #  IRON DEFICIENCY ANEMIA   pt with h/o of  polyps on colonoscopy ~ [id number], sched for f/u with GI this hank  noted  iron study  c/w ANEUDY   s/p   IV iron x 3 doses  noted improvement in  h/h   f/u CBC, PRBC TX prn for HB<7        #  SUPERFICIAL VEIN THROMBOSIS   w/u with doppler above   neg for DVT  pt is obese, male   sedentary/mostly in bed d/t LE cellulitis,   chronic  b/l leg ulcers  High risk for DVT in the future   pt reports h/o of CT a/p at [facility] ~ 2 months ago- neg as per pt  on   DOAC at present   tolerating well    recommend to complete 4 weeks as an outpt   cont with topical compress prn   f/u with his  outside medical; team  call with questions [phone]

## 2023-08-28 NOTE — PROGRESS NOTE ADULT - ASSESSMENT
A:  DM wounds b/l    P:   Patient evaluated and Chart reviewed   Discussed diagnosis and treatment with patient  Xrays reviewed  BRIDGETTE/PVR- Right Foot: 1.01, Left Foot-1.00  MRI reviewed- No OM noted   Wound cx reviewed   Applied santyl, xeroform with dry sterile dressing to b/l legs   Continue with IV antibiotics As Per ID  Weight bearing as tolerated to b/l feet and legs  ID consult appreciated   Offloading to bilateral Heels.   Discussed importance of daily foot examinations and proper shoe gear and to importance of lower Fasting Blood Glucose levels.   Podiatry to follow while in house.  Seen, reviewed, and treated with Dr. Davis  Discussed with Dr. Farias

## 2023-08-29 NOTE — CHART NOTE - NSCHARTNOTEFT_GEN_A_CORE
Discussed with Dr. Ravin Maloney supervising MD for student clinic at [address] [place] [address]; and informed need for CBC/ CMP while on ABX weekly  They will follow up with patient   Patient also is being followed up at [facility] and patient was educated at length on follow up with his PCP or covering physician to have blood work checked as provided in discharge instructions Discussed with Dr. Ravin Maloney supervising MD for student clinic at [address] [place] [address]; and informed need for CBC/ CMP while on ABX weekly  They will follow up with patient   Patient also is being followed up at [facility] and patient was educated at length on follow up with his PCP or covering physician to have blood work checked as provided in discharge instructions Discussed with Dr. Ravin Maloney supervising MD for student clinic at [address] [place] [address]; and informed need for CBC/ CMP while on ABX weekly  They will follow up with patient   Patient also is being followed up at [place] and patient was educated at length on follow up with his PCP or covering physician to have blood work checked as provided in discharge instructions

## 2023-09-14 PROBLEM — Z00.00 ENCOUNTER FOR PREVENTIVE HEALTH EXAMINATION: Status: ACTIVE | Noted: 2023-09-14

## 2023-09-21 ENCOUNTER — APPOINTMENT (OUTPATIENT)
Dept: WOUND CARE | Facility: CLINIC | Age: 58
End: 2023-09-21

## 2023-09-21 ENCOUNTER — OUTPATIENT (OUTPATIENT)
Dept: OUTPATIENT SERVICES | Facility: HOSPITAL | Age: 58
LOS: 1 days | End: 2023-09-21
Payer: SELF-PAY

## 2023-09-21 VITALS
TEMPERATURE: 97 F | WEIGHT: 250 LBS | HEART RATE: 81 BPM | BODY MASS INDEX: 32.08 KG/M2 | OXYGEN SATURATION: 98 % | HEIGHT: 74 IN | DIASTOLIC BLOOD PRESSURE: 59 MMHG | RESPIRATION RATE: 18 BRPM | SYSTOLIC BLOOD PRESSURE: 126 MMHG

## 2023-09-21 DIAGNOSIS — L97.929 NON-PRESSURE CHRONIC ULCER OF UNSPECIFIED PART OF RIGHT LOWER LEG WITH UNSPECIFIED SEVERITY: ICD-10-CM

## 2023-09-21 DIAGNOSIS — L97.222 NON-PRESSURE CHRONIC ULCER OF LEFT CALF WITH FAT LAYER EXPOSED: ICD-10-CM

## 2023-09-21 DIAGNOSIS — E11.628 TYPE 2 DIABETES MELLITUS WITH OTHER SKIN COMPLICATIONS: ICD-10-CM

## 2023-09-21 DIAGNOSIS — Z00.00 ENCOUNTER FOR GENERAL ADULT MEDICAL EXAMINATION WITHOUT ABNORMAL FINDINGS: ICD-10-CM

## 2023-09-21 DIAGNOSIS — E78.5 HYPERLIPIDEMIA, UNSPECIFIED: ICD-10-CM

## 2023-09-21 DIAGNOSIS — L97.212 NON-PRESSURE CHRONIC ULCER OF RIGHT CALF WITH FAT LAYER EXPOSED: ICD-10-CM

## 2023-09-21 DIAGNOSIS — I87.2 VENOUS INSUFFICIENCY (CHRONIC) (PERIPHERAL): ICD-10-CM

## 2023-09-21 DIAGNOSIS — L97.919 NON-PRESSURE CHRONIC ULCER OF UNSPECIFIED PART OF RIGHT LOWER LEG WITH UNSPECIFIED SEVERITY: ICD-10-CM

## 2023-09-21 DIAGNOSIS — I10 ESSENTIAL (PRIMARY) HYPERTENSION: ICD-10-CM

## 2023-09-21 DIAGNOSIS — Z78.9 OTHER SPECIFIED HEALTH STATUS: ICD-10-CM

## 2023-09-21 PROCEDURE — G0463: CPT

## 2023-09-21 PROCEDURE — 11042 DBRDMT SUBQ TIS 1ST 20SQCM/<: CPT

## 2023-09-21 RX ORDER — INSULIN GLARGINE 100 [IU]/ML
100 INJECTION, SOLUTION SUBCUTANEOUS
Refills: 0 | Status: ACTIVE | COMMUNITY

## 2023-09-21 RX ORDER — LOSARTAN POTASSIUM 100 MG/1
TABLET, FILM COATED ORAL
Refills: 0 | Status: ACTIVE | COMMUNITY

## 2023-09-21 RX ORDER — SITAGLIPTIN AND METFORMIN HYDROCHLORIDE 50; 1000 MG/1; MG/1
TABLET, FILM COATED ORAL
Refills: 0 | Status: ACTIVE | COMMUNITY

## 2023-09-21 RX ORDER — EMPAGLIFLOZIN 25 MG/1
TABLET, FILM COATED ORAL
Refills: 0 | Status: ACTIVE | COMMUNITY

## 2023-09-21 RX ORDER — APIXABAN 5 MG/1
5 TABLET, FILM COATED ORAL
Refills: 0 | Status: ACTIVE | COMMUNITY

## 2023-09-21 RX ORDER — PRAVASTATIN SODIUM 80 MG/1
TABLET ORAL
Refills: 0 | Status: ACTIVE | COMMUNITY

## 2023-09-21 RX ORDER — DOXYCYCLINE HYCLATE 100 MG/1
100 CAPSULE ORAL
Refills: 0 | Status: ACTIVE | COMMUNITY

## 2023-10-08 PROCEDURE — 96375 TX/PRO/DX INJ NEW DRUG ADDON: CPT

## 2023-10-08 PROCEDURE — 87186 SC STD MICRODIL/AGAR DIL: CPT

## 2023-10-08 PROCEDURE — A9585: CPT

## 2023-10-08 PROCEDURE — 83036 HEMOGLOBIN GLYCOSYLATED A1C: CPT

## 2023-10-08 PROCEDURE — 85027 COMPLETE CBC AUTOMATED: CPT

## 2023-10-08 PROCEDURE — 82962 GLUCOSE BLOOD TEST: CPT

## 2023-10-08 PROCEDURE — 83605 ASSAY OF LACTIC ACID: CPT

## 2023-10-08 PROCEDURE — 82728 ASSAY OF FERRITIN: CPT

## 2023-10-08 PROCEDURE — 83550 IRON BINDING TEST: CPT

## 2023-10-08 PROCEDURE — 85045 AUTOMATED RETICULOCYTE COUNT: CPT

## 2023-10-08 PROCEDURE — 73590 X-RAY EXAM OF LOWER LEG: CPT

## 2023-10-08 PROCEDURE — 86140 C-REACTIVE PROTEIN: CPT

## 2023-10-08 PROCEDURE — 96365 THER/PROPH/DIAG IV INF INIT: CPT

## 2023-10-08 PROCEDURE — 80053 COMPREHEN METABOLIC PANEL: CPT

## 2023-10-08 PROCEDURE — 83615 LACTATE (LD) (LDH) ENZYME: CPT

## 2023-10-08 PROCEDURE — 81001 URINALYSIS AUTO W/SCOPE: CPT

## 2023-10-08 PROCEDURE — 93971 EXTREMITY STUDY: CPT

## 2023-10-08 PROCEDURE — 73723 MRI JOINT LWR EXTR W/O&W/DYE: CPT

## 2023-10-08 PROCEDURE — 73722 MRI JOINT OF LWR EXTR W/DYE: CPT

## 2023-10-08 PROCEDURE — 84484 ASSAY OF TROPONIN QUANT: CPT

## 2023-10-08 PROCEDURE — 93923 UPR/LXTR ART STDY 3+ LVLS: CPT

## 2023-10-08 PROCEDURE — 87086 URINE CULTURE/COLONY COUNT: CPT

## 2023-10-08 PROCEDURE — 86850 RBC ANTIBODY SCREEN: CPT

## 2023-10-08 PROCEDURE — 83540 ASSAY OF IRON: CPT

## 2023-10-08 PROCEDURE — 87635 SARS-COV-2 COVID-19 AMP PRB: CPT

## 2023-10-08 PROCEDURE — 86923 COMPATIBILITY TEST ELECTRIC: CPT

## 2023-10-08 PROCEDURE — 82550 ASSAY OF CK (CPK): CPT

## 2023-10-08 PROCEDURE — 86900 BLOOD TYPING SEROLOGIC ABO: CPT

## 2023-10-08 PROCEDURE — 84100 ASSAY OF PHOSPHORUS: CPT

## 2023-10-08 PROCEDURE — 84466 ASSAY OF TRANSFERRIN: CPT

## 2023-10-08 PROCEDURE — 85610 PROTHROMBIN TIME: CPT

## 2023-10-08 PROCEDURE — 83010 ASSAY OF HAPTOGLOBIN QUANT: CPT

## 2023-10-08 PROCEDURE — 83735 ASSAY OF MAGNESIUM: CPT

## 2023-10-08 PROCEDURE — 87070 CULTURE OTHR SPECIMN AEROBIC: CPT

## 2023-10-08 PROCEDURE — 85025 COMPLETE CBC W/AUTO DIFF WBC: CPT

## 2023-10-08 PROCEDURE — 99285 EMERGENCY DEPT VISIT HI MDM: CPT

## 2023-10-08 PROCEDURE — 71045 X-RAY EXAM CHEST 1 VIEW: CPT

## 2023-10-08 PROCEDURE — 93005 ELECTROCARDIOGRAM TRACING: CPT

## 2023-10-08 PROCEDURE — 85730 THROMBOPLASTIN TIME PARTIAL: CPT

## 2023-10-08 PROCEDURE — 87205 SMEAR GRAM STAIN: CPT

## 2023-10-08 PROCEDURE — 87040 BLOOD CULTURE FOR BACTERIA: CPT

## 2023-10-08 PROCEDURE — 86803 HEPATITIS C AB TEST: CPT

## 2023-10-08 PROCEDURE — 86901 BLOOD TYPING SEROLOGIC RH(D): CPT

## 2023-10-08 PROCEDURE — 36430 TRANSFUSION BLD/BLD COMPNT: CPT

## 2023-10-08 PROCEDURE — 36415 COLL VENOUS BLD VENIPUNCTURE: CPT

## 2023-10-08 PROCEDURE — 85652 RBC SED RATE AUTOMATED: CPT

## 2023-10-08 PROCEDURE — 87077 CULTURE AEROBIC IDENTIFY: CPT

## 2023-10-08 PROCEDURE — 80048 BASIC METABOLIC PNL TOTAL CA: CPT

## 2023-10-08 PROCEDURE — 80202 ASSAY OF VANCOMYCIN: CPT

## 2023-10-08 PROCEDURE — P9040: CPT

## 2024-01-16 RX ORDER — MECLIZINE HCL 12.5 MG
1 TABLET ORAL
Refills: 0 | DISCHARGE

## 2024-01-16 RX ORDER — SITAGLIPTIN AND METFORMIN HYDROCHLORIDE 500; 50 MG/1; MG/1
2 TABLET, FILM COATED ORAL
Refills: 0 | DISCHARGE

## 2024-01-16 RX ORDER — EMPAGLIFLOZIN 10 MG/1
1 TABLET, FILM COATED ORAL
Refills: 0 | DISCHARGE

## 2024-01-16 RX ORDER — SITAGLIPTIN AND METFORMIN HYDROCHLORIDE 500; 50 MG/1; MG/1
1 TABLET, FILM COATED ORAL
Refills: 0 | DISCHARGE

## 2024-01-16 RX ORDER — HYDROCHLOROTHIAZIDE 25 MG
1 TABLET ORAL
Qty: 0 | Refills: 0 | DISCHARGE

## 2024-01-16 RX ORDER — FERROUS SULFATE 325(65) MG
1 TABLET ORAL
Refills: 0 | DISCHARGE

## 2024-01-16 RX ORDER — TIMOLOL 0.5 %
1 DROPS OPHTHALMIC (EYE)
Refills: 0 | DISCHARGE

## 2024-01-16 RX ORDER — GABAPENTIN 400 MG/1
1 CAPSULE ORAL
Refills: 0 | DISCHARGE

## 2024-01-16 RX ORDER — INSULIN GLARGINE 100 [IU]/ML
4 INJECTION, SOLUTION SUBCUTANEOUS
Refills: 0 | DISCHARGE

## 2024-01-16 RX ORDER — CHOLECALCIFEROL (VITAMIN D3) 125 MCG
1 CAPSULE ORAL
Refills: 0 | DISCHARGE

## 2024-01-16 RX ORDER — LOSARTAN POTASSIUM 100 MG/1
1 TABLET, FILM COATED ORAL
Qty: 0 | Refills: 0 | DISCHARGE